# Patient Record
Sex: MALE | Race: WHITE | Employment: FULL TIME | URBAN - METROPOLITAN AREA
[De-identification: names, ages, dates, MRNs, and addresses within clinical notes are randomized per-mention and may not be internally consistent; named-entity substitution may affect disease eponyms.]

---

## 2017-07-09 LAB
AVERAGE GLUCOSE: 278
HBA1C MFR BLD: 11.3 %

## 2017-07-25 ENCOUNTER — OFFICE VISIT (OUTPATIENT)
Dept: INTERNAL MEDICINE | Age: 42
End: 2017-07-25
Payer: MEDICARE

## 2017-07-25 VITALS
HEART RATE: 88 BPM | BODY MASS INDEX: 36.58 KG/M2 | SYSTOLIC BLOOD PRESSURE: 116 MMHG | WEIGHT: 276 LBS | HEIGHT: 73 IN | DIASTOLIC BLOOD PRESSURE: 79 MMHG

## 2017-07-25 DIAGNOSIS — E11.42 POLYNEUROPATHY DUE TO TYPE 2 DIABETES MELLITUS (HCC): ICD-10-CM

## 2017-07-25 DIAGNOSIS — B18.2 CHRONIC HEPATITIS C WITHOUT HEPATIC COMA (HCC): ICD-10-CM

## 2017-07-25 DIAGNOSIS — E66.09 NON MORBID OBESITY DUE TO EXCESS CALORIES: ICD-10-CM

## 2017-07-25 DIAGNOSIS — H54.3 IMPAIRED VISION IN BOTH EYES: ICD-10-CM

## 2017-07-25 DIAGNOSIS — B35.1 ONYCHOMYCOSIS DUE TO DERMATOPHYTE: ICD-10-CM

## 2017-07-25 DIAGNOSIS — R35.89 POLYURIA: ICD-10-CM

## 2017-07-25 DIAGNOSIS — E11.9 NEW ONSET TYPE 2 DIABETES MELLITUS (HCC): Primary | ICD-10-CM

## 2017-07-25 DIAGNOSIS — Z11.4 SCREENING FOR HIV (HUMAN IMMUNODEFICIENCY VIRUS): ICD-10-CM

## 2017-07-25 PROCEDURE — 99214 OFFICE O/P EST MOD 30 MIN: CPT

## 2017-07-25 PROCEDURE — 99203 OFFICE O/P NEW LOW 30 MIN: CPT | Performed by: HOSPITALIST

## 2017-07-25 RX ORDER — TIZANIDINE 2 MG/1
TABLET ORAL
COMMUNITY
Start: 2017-06-08 | End: 2017-08-02 | Stop reason: ALTCHOICE

## 2017-07-25 RX ORDER — IBUPROFEN 800 MG/1
TABLET ORAL
COMMUNITY
Start: 2017-05-26 | End: 2017-08-02 | Stop reason: ALTCHOICE

## 2017-07-25 RX ORDER — GABAPENTIN 800 MG/1
TABLET ORAL
COMMUNITY
End: 2017-07-25 | Stop reason: SDUPTHER

## 2017-07-25 RX ORDER — GABAPENTIN 800 MG/1
TABLET ORAL
COMMUNITY
Start: 2017-06-25 | End: 2017-07-25 | Stop reason: SDUPTHER

## 2017-07-25 RX ORDER — GABAPENTIN 800 MG/1
800 TABLET ORAL 3 TIMES DAILY
Qty: 90 TABLET | Refills: 5 | Status: SHIPPED | OUTPATIENT
Start: 2017-07-25 | End: 2017-08-16

## 2017-07-25 RX ORDER — INSULIN HUMAN 100 [IU]/ML
INJECTION, SOLUTION PARENTERAL
COMMUNITY
Start: 2017-07-10 | End: 2017-07-25 | Stop reason: ALTCHOICE

## 2017-07-25 RX ORDER — IBUPROFEN 800 MG/1
800 TABLET ORAL
COMMUNITY
Start: 2017-05-20 | End: 2017-08-02 | Stop reason: SDUPTHER

## 2017-07-25 RX ORDER — FUROSEMIDE 20 MG/1
20 TABLET ORAL PRN
COMMUNITY
Start: 2017-05-10 | End: 2018-02-16 | Stop reason: SDUPTHER

## 2017-07-25 RX ORDER — INSULIN ASPART 100 [IU]/ML
INJECTION, SUSPENSION SUBCUTANEOUS
COMMUNITY
Start: 2017-07-05 | End: 2017-07-25 | Stop reason: ALTCHOICE

## 2017-07-25 RX ORDER — OMEPRAZOLE 20 MG/1
CAPSULE, DELAYED RELEASE ORAL
COMMUNITY
Start: 2017-07-05 | End: 2017-10-27 | Stop reason: SDUPTHER

## 2017-07-25 RX ORDER — SYRING-NEEDL,DISP,INSUL,0.3 ML 31 GX5/16"
SYRINGE, EMPTY DISPOSABLE MISCELLANEOUS
COMMUNITY
Start: 2017-07-10 | End: 2020-01-31

## 2017-07-25 ASSESSMENT — PATIENT HEALTH QUESTIONNAIRE - PHQ9
1. LITTLE INTEREST OR PLEASURE IN DOING THINGS: 0
2. FEELING DOWN, DEPRESSED OR HOPELESS: 0
SUM OF ALL RESPONSES TO PHQ9 QUESTIONS 1 & 2: 0
SUM OF ALL RESPONSES TO PHQ QUESTIONS 1-9: 0

## 2017-08-02 ENCOUNTER — OFFICE VISIT (OUTPATIENT)
Dept: INTERNAL MEDICINE | Age: 42
End: 2017-08-02
Payer: MEDICARE

## 2017-08-02 VITALS
OXYGEN SATURATION: 98 % | HEART RATE: 91 BPM | WEIGHT: 273 LBS | DIASTOLIC BLOOD PRESSURE: 77 MMHG | BODY MASS INDEX: 36.02 KG/M2 | RESPIRATION RATE: 18 BRPM | SYSTOLIC BLOOD PRESSURE: 121 MMHG

## 2017-08-02 DIAGNOSIS — L60.0 INGROWN TOENAIL: ICD-10-CM

## 2017-08-02 DIAGNOSIS — E11.9 NEW ONSET TYPE 2 DIABETES MELLITUS (HCC): Primary | ICD-10-CM

## 2017-08-02 LAB — HBA1C MFR BLD: 9.5 %

## 2017-08-02 PROCEDURE — 99213 OFFICE O/P EST LOW 20 MIN: CPT | Performed by: INTERNAL MEDICINE

## 2017-08-02 PROCEDURE — 83036 HEMOGLOBIN GLYCOSYLATED A1C: CPT | Performed by: INTERNAL MEDICINE

## 2017-08-02 ASSESSMENT — ENCOUNTER SYMPTOMS
RESPIRATORY NEGATIVE: 1
EYES NEGATIVE: 1
GASTROINTESTINAL NEGATIVE: 1
ALLERGIC/IMMUNOLOGIC NEGATIVE: 1

## 2017-08-09 ENCOUNTER — OFFICE VISIT (OUTPATIENT)
Dept: INTERNAL MEDICINE | Age: 42
End: 2017-08-09
Payer: MEDICARE

## 2017-08-09 ENCOUNTER — HOSPITAL ENCOUNTER (OUTPATIENT)
Age: 42
Setting detail: SPECIMEN
Discharge: HOME | End: 2017-08-09
Payer: MEDICARE

## 2017-08-09 ENCOUNTER — OFFICE VISIT (OUTPATIENT)
Dept: PODIATRY | Age: 42
End: 2017-08-09
Payer: MEDICARE

## 2017-08-09 ENCOUNTER — TELEPHONE (OUTPATIENT)
Dept: PODIATRY | Age: 42
End: 2017-08-09

## 2017-08-09 VITALS
WEIGHT: 274 LBS | BODY MASS INDEX: 36.31 KG/M2 | DIASTOLIC BLOOD PRESSURE: 86 MMHG | HEIGHT: 73 IN | HEART RATE: 89 BPM | SYSTOLIC BLOOD PRESSURE: 130 MMHG

## 2017-08-09 VITALS — HEIGHT: 73 IN | BODY MASS INDEX: 35.78 KG/M2 | WEIGHT: 270 LBS

## 2017-08-09 DIAGNOSIS — E11.40 UNCONTROLLED TYPE 2 DIABETES MELLITUS WITH DIABETIC NEUROPATHY, UNSPECIFIED LONG TERM INSULIN USE STATUS: ICD-10-CM

## 2017-08-09 DIAGNOSIS — F17.200 SMOKING: ICD-10-CM

## 2017-08-09 DIAGNOSIS — Z79.4 TYPE 2 DIABETES MELLITUS WITHOUT COMPLICATION, WITH LONG-TERM CURRENT USE OF INSULIN (HCC): ICD-10-CM

## 2017-08-09 DIAGNOSIS — M79.674 PAIN DUE TO ONYCHOMYCOSIS OF TOENAILS OF BOTH FEET: ICD-10-CM

## 2017-08-09 DIAGNOSIS — Z01.818 PRE-OPERATIVE EXAM: Primary | ICD-10-CM

## 2017-08-09 DIAGNOSIS — N30.00 ACUTE CYSTITIS WITHOUT HEMATURIA: Primary | ICD-10-CM

## 2017-08-09 DIAGNOSIS — B35.1 ONYCHOMYCOSIS: ICD-10-CM

## 2017-08-09 DIAGNOSIS — M79.675 PAIN DUE TO ONYCHOMYCOSIS OF TOENAILS OF BOTH FEET: ICD-10-CM

## 2017-08-09 DIAGNOSIS — E11.9 TYPE 2 DIABETES MELLITUS WITHOUT COMPLICATION, WITH LONG-TERM CURRENT USE OF INSULIN (HCC): ICD-10-CM

## 2017-08-09 DIAGNOSIS — J44.9 CHRONIC OBSTRUCTIVE PULMONARY DISEASE, UNSPECIFIED COPD TYPE (HCC): ICD-10-CM

## 2017-08-09 DIAGNOSIS — B35.1 PAIN DUE TO ONYCHOMYCOSIS OF TOENAILS OF BOTH FEET: ICD-10-CM

## 2017-08-09 DIAGNOSIS — E11.65 UNCONTROLLED TYPE 2 DIABETES MELLITUS WITH DIABETIC NEUROPATHY, UNSPECIFIED LONG TERM INSULIN USE STATUS: ICD-10-CM

## 2017-08-09 DIAGNOSIS — L60.0 OC (ONYCHOCRYPTOSIS): ICD-10-CM

## 2017-08-09 DIAGNOSIS — M72.2 PLANTAR FASCIITIS OF RIGHT FOOT: ICD-10-CM

## 2017-08-09 LAB
CREATININE URINE: 215.9 MG/DL (ref 39–259)
MICROALBUMIN/CREAT 24H UR: <12 MG/L
MICROALBUMIN/CREAT UR-RTO: 6 MCG/MG CREAT

## 2017-08-09 PROCEDURE — 99214 OFFICE O/P EST MOD 30 MIN: CPT | Performed by: INTERNAL MEDICINE

## 2017-08-09 PROCEDURE — 11765 WEDGE EXCISION SKN NAIL FOLD: CPT | Performed by: PODIATRIST

## 2017-08-09 PROCEDURE — 11721 DEBRIDE NAIL 6 OR MORE: CPT | Performed by: PODIATRIST

## 2017-08-09 PROCEDURE — 99203 OFFICE O/P NEW LOW 30 MIN: CPT | Performed by: PODIATRIST

## 2017-08-09 PROCEDURE — 81003 URINALYSIS AUTO W/O SCOPE: CPT | Performed by: INTERNAL MEDICINE

## 2017-08-09 RX ORDER — TAMSULOSIN HYDROCHLORIDE 0.4 MG/1
0.4 CAPSULE ORAL DAILY
Qty: 30 CAPSULE | Refills: 3 | Status: SHIPPED | OUTPATIENT
Start: 2017-08-09 | End: 2017-08-16

## 2017-08-09 RX ORDER — ASPIRIN 81 MG/1
81 TABLET ORAL DAILY
Qty: 30 TABLET | Refills: 3 | Status: SHIPPED | OUTPATIENT
Start: 2017-08-09 | End: 2017-12-22 | Stop reason: SDUPTHER

## 2017-08-09 RX ORDER — CEPHALEXIN 500 MG/1
500 CAPSULE ORAL 2 TIMES DAILY
Qty: 20 CAPSULE | Refills: 0 | Status: SHIPPED | OUTPATIENT
Start: 2017-08-09 | End: 2017-09-21 | Stop reason: ALTCHOICE

## 2017-08-09 RX ORDER — ATORVASTATIN CALCIUM 40 MG/1
40 TABLET, FILM COATED ORAL DAILY
Qty: 30 TABLET | Refills: 3 | Status: SHIPPED | OUTPATIENT
Start: 2017-08-09 | End: 2018-01-16 | Stop reason: SDUPTHER

## 2017-08-15 ENCOUNTER — TELEPHONE (OUTPATIENT)
Dept: INTERNAL MEDICINE | Age: 42
End: 2017-08-15

## 2017-08-16 ENCOUNTER — OFFICE VISIT (OUTPATIENT)
Dept: INTERNAL MEDICINE | Age: 42
End: 2017-08-16
Payer: MEDICARE

## 2017-08-16 VITALS
DIASTOLIC BLOOD PRESSURE: 82 MMHG | WEIGHT: 270.4 LBS | HEART RATE: 94 BPM | SYSTOLIC BLOOD PRESSURE: 128 MMHG | BODY MASS INDEX: 35.84 KG/M2 | HEIGHT: 73 IN

## 2017-08-16 DIAGNOSIS — E11.9 TYPE 2 DIABETES MELLITUS WITHOUT COMPLICATION, WITH LONG-TERM CURRENT USE OF INSULIN (HCC): Primary | ICD-10-CM

## 2017-08-16 DIAGNOSIS — G63 POLYNEUROPATHY ASSOCIATED WITH UNDERLYING DISEASE (HCC): ICD-10-CM

## 2017-08-16 DIAGNOSIS — Z79.4 TYPE 2 DIABETES MELLITUS WITHOUT COMPLICATION, WITH LONG-TERM CURRENT USE OF INSULIN (HCC): Primary | ICD-10-CM

## 2017-08-16 PROCEDURE — 99214 OFFICE O/P EST MOD 30 MIN: CPT | Performed by: INTERNAL MEDICINE

## 2017-08-16 RX ORDER — PREGABALIN 75 MG/1
75 CAPSULE ORAL 3 TIMES DAILY
Qty: 60 CAPSULE | Refills: 3 | Status: SHIPPED | OUTPATIENT
Start: 2017-08-16 | End: 2017-10-23 | Stop reason: ALTCHOICE

## 2017-08-16 ASSESSMENT — ENCOUNTER SYMPTOMS
ALLERGIC/IMMUNOLOGIC NEGATIVE: 1
RESPIRATORY NEGATIVE: 1
ABDOMINAL PAIN: 1

## 2017-09-21 ENCOUNTER — HOSPITAL ENCOUNTER (OUTPATIENT)
Age: 42
Setting detail: SPECIMEN
Discharge: HOME | End: 2017-09-21
Payer: MEDICARE

## 2017-09-21 ENCOUNTER — OFFICE VISIT (OUTPATIENT)
Dept: INTERNAL MEDICINE | Age: 42
End: 2017-09-21
Payer: MEDICARE

## 2017-09-21 VITALS
WEIGHT: 275 LBS | HEIGHT: 73 IN | SYSTOLIC BLOOD PRESSURE: 120 MMHG | TEMPERATURE: 97.8 F | DIASTOLIC BLOOD PRESSURE: 70 MMHG | HEART RATE: 80 BPM | BODY MASS INDEX: 36.45 KG/M2

## 2017-09-21 DIAGNOSIS — Z23 NEED FOR IMMUNIZATION AGAINST INFLUENZA: ICD-10-CM

## 2017-09-21 DIAGNOSIS — Z23 NEED FOR PROPHYLACTIC VACCINATION AGAINST STREPTOCOCCUS PNEUMONIAE (PNEUMOCOCCUS): ICD-10-CM

## 2017-09-21 DIAGNOSIS — L73.9 FOLLICULITIS: Primary | ICD-10-CM

## 2017-09-21 DIAGNOSIS — E11.42 TYPE 2 DIABETES MELLITUS WITH DIABETIC POLYNEUROPATHY, WITHOUT LONG-TERM CURRENT USE OF INSULIN (HCC): ICD-10-CM

## 2017-09-21 PROCEDURE — 90472 IMMUNIZATION ADMIN EACH ADD: CPT | Performed by: INTERNAL MEDICINE

## 2017-09-21 PROCEDURE — 99213 OFFICE O/P EST LOW 20 MIN: CPT | Performed by: INTERNAL MEDICINE

## 2017-09-21 PROCEDURE — 90688 IIV4 VACCINE SPLT 0.5 ML IM: CPT | Performed by: INTERNAL MEDICINE

## 2017-09-21 PROCEDURE — 90732 PPSV23 VACC 2 YRS+ SUBQ/IM: CPT | Performed by: INTERNAL MEDICINE

## 2017-09-21 PROCEDURE — 90471 IMMUNIZATION ADMIN: CPT | Performed by: INTERNAL MEDICINE

## 2017-09-21 RX ORDER — GABAPENTIN 800 MG/1
800 TABLET ORAL 3 TIMES DAILY
COMMUNITY
Start: 2017-09-15 | End: 2018-01-23 | Stop reason: SDUPTHER

## 2017-09-21 ASSESSMENT — ENCOUNTER SYMPTOMS
COUGH: 0
VOMITING: 0
SHORTNESS OF BREATH: 0
ABDOMINAL PAIN: 0
BLURRED VISION: 0

## 2017-10-11 ENCOUNTER — HOSPITAL ENCOUNTER (OUTPATIENT)
Age: 42
Discharge: HOME | End: 2017-10-11
Payer: MEDICARE

## 2017-10-11 ENCOUNTER — OFFICE VISIT (OUTPATIENT)
Dept: PODIATRY | Age: 42
End: 2017-10-11
Payer: MEDICARE

## 2017-10-11 VITALS
BODY MASS INDEX: 37.11 KG/M2 | DIASTOLIC BLOOD PRESSURE: 75 MMHG | HEART RATE: 81 BPM | SYSTOLIC BLOOD PRESSURE: 114 MMHG | WEIGHT: 280 LBS | HEIGHT: 73 IN

## 2017-10-11 DIAGNOSIS — E11.40 UNCONTROLLED TYPE 2 DIABETES MELLITUS WITH DIABETIC NEUROPATHY, UNSPECIFIED LONG TERM INSULIN USE STATUS: Primary | ICD-10-CM

## 2017-10-11 DIAGNOSIS — E11.9 NEW ONSET TYPE 2 DIABETES MELLITUS (HCC): ICD-10-CM

## 2017-10-11 DIAGNOSIS — M79.671 PAIN IN RIGHT FOOT: ICD-10-CM

## 2017-10-11 DIAGNOSIS — E11.65 UNCONTROLLED TYPE 2 DIABETES MELLITUS WITH DIABETIC NEUROPATHY, UNSPECIFIED LONG TERM INSULIN USE STATUS: Primary | ICD-10-CM

## 2017-10-11 DIAGNOSIS — F17.200 SMOKING: ICD-10-CM

## 2017-10-11 DIAGNOSIS — Z11.4 SCREENING FOR HIV (HUMAN IMMUNODEFICIENCY VIRUS): ICD-10-CM

## 2017-10-11 DIAGNOSIS — B35.1 ONYCHOMYCOSIS: ICD-10-CM

## 2017-10-11 DIAGNOSIS — B18.2 CHRONIC HEPATITIS C WITHOUT HEPATIC COMA (HCC): ICD-10-CM

## 2017-10-11 LAB
ABSOLUTE EOS #: 0.2 K/UL (ref 0–0.4)
ABSOLUTE LYMPH #: 1.6 K/UL (ref 1–4.8)
ABSOLUTE MONO #: 0.8 K/UL (ref 0.1–1.2)
ALBUMIN SERPL-MCNC: 3.3 G/DL (ref 3.5–5.2)
ALBUMIN/GLOBULIN RATIO: 0.9 (ref 1–2.5)
ALP BLD-CCNC: 153 U/L (ref 40–129)
ALT SERPL-CCNC: 298 U/L (ref 5–41)
ANION GAP SERPL CALCULATED.3IONS-SCNC: 17 MMOL/L (ref 9–17)
AST SERPL-CCNC: 245 U/L
BASOPHILS # BLD: 1 %
BASOPHILS ABSOLUTE: 0.1 K/UL (ref 0–0.2)
BILIRUB SERPL-MCNC: 0.82 MG/DL (ref 0.3–1.2)
BILIRUBIN DIRECT: 0.22 MG/DL
BILIRUBIN, INDIRECT: 0.6 MG/DL (ref 0–1)
BUN BLDV-MCNC: 14 MG/DL (ref 6–20)
BUN/CREAT BLD: ABNORMAL (ref 9–20)
CALCIUM SERPL-MCNC: 8.8 MG/DL (ref 8.6–10.4)
CHLORIDE BLD-SCNC: 102 MMOL/L (ref 98–107)
CHOLESTEROL/HDL RATIO: 7.4
CHOLESTEROL: 125 MG/DL
CO2: 20 MMOL/L (ref 20–31)
CREAT SERPL-MCNC: 0.92 MG/DL (ref 0.7–1.2)
DIFFERENTIAL TYPE: ABNORMAL
EOSINOPHILS RELATIVE PERCENT: 2 %
GFR AFRICAN AMERICAN: >60 ML/MIN
GFR NON-AFRICAN AMERICAN: >60 ML/MIN
GFR SERPL CREATININE-BSD FRML MDRD: ABNORMAL ML/MIN/{1.73_M2}
GFR SERPL CREATININE-BSD FRML MDRD: ABNORMAL ML/MIN/{1.73_M2}
GLOBULIN: ABNORMAL G/DL (ref 1.5–3.8)
GLUCOSE BLD-MCNC: 124 MG/DL (ref 70–99)
HCT VFR BLD CALC: 46.5 % (ref 41–53)
HDLC SERPL-MCNC: 17 MG/DL
HEMOGLOBIN: 15.5 G/DL (ref 13.5–17.5)
HIV AG/AB: NONREACTIVE
INR BLD: 1.1
LDL CHOLESTEROL: 79 MG/DL (ref 0–130)
LYMPHOCYTES # BLD: 18 %
MCH RBC QN AUTO: 30.3 PG (ref 26–34)
MCHC RBC AUTO-ENTMCNC: 33.4 G/DL (ref 31–37)
MCV RBC AUTO: 90.7 FL (ref 80–100)
MONOCYTES # BLD: 9 %
PDW BLD-RTO: 15.7 % (ref 12.5–15.4)
PLATELET # BLD: 173 K/UL (ref 140–450)
PLATELET ESTIMATE: ABNORMAL
PMV BLD AUTO: 10.1 FL (ref 6–12)
POTASSIUM SERPL-SCNC: 5 MMOL/L (ref 3.7–5.3)
PROTHROMBIN TIME: 12.3 SEC (ref 9.4–12.6)
RBC # BLD: 5.13 M/UL (ref 4.5–5.9)
RBC # BLD: ABNORMAL 10*6/UL
SEG NEUTROPHILS: 70 %
SEGMENTED NEUTROPHILS ABSOLUTE COUNT: 6.3 K/UL (ref 1.8–7.7)
SODIUM BLD-SCNC: 139 MMOL/L (ref 135–144)
T3 FREE: 3.37 PG/ML (ref 2.02–4.43)
THYROXINE, FREE: 1.47 NG/DL (ref 0.93–1.7)
TOTAL PROTEIN: 6.9 G/DL (ref 6.4–8.3)
TRIGL SERPL-MCNC: 145 MG/DL
TSH SERPL DL<=0.05 MIU/L-ACNC: 1.78 MIU/L (ref 0.3–5)
VLDLC SERPL CALC-MCNC: ABNORMAL MG/DL (ref 1–30)
WBC # BLD: 8.9 K/UL (ref 3.5–11)
WBC # BLD: ABNORMAL 10*3/UL

## 2017-10-11 PROCEDURE — 99213 OFFICE O/P EST LOW 20 MIN: CPT | Performed by: STUDENT IN AN ORGANIZED HEALTH CARE EDUCATION/TRAINING PROGRAM

## 2017-10-11 PROCEDURE — 84439 ASSAY OF FREE THYROXINE: CPT

## 2017-10-11 PROCEDURE — 36415 COLL VENOUS BLD VENIPUNCTURE: CPT

## 2017-10-11 PROCEDURE — 80061 LIPID PANEL: CPT

## 2017-10-11 PROCEDURE — 80076 HEPATIC FUNCTION PANEL: CPT

## 2017-10-11 PROCEDURE — 84443 ASSAY THYROID STIM HORMONE: CPT

## 2017-10-11 PROCEDURE — 85025 COMPLETE CBC W/AUTO DIFF WBC: CPT

## 2017-10-11 PROCEDURE — 80048 BASIC METABOLIC PNL TOTAL CA: CPT

## 2017-10-11 PROCEDURE — 85610 PROTHROMBIN TIME: CPT

## 2017-10-11 PROCEDURE — 87389 HIV-1 AG W/HIV-1&-2 AB AG IA: CPT

## 2017-10-11 PROCEDURE — 84481 FREE ASSAY (FT-3): CPT

## 2017-10-11 NOTE — PROGRESS NOTES
injection pen Inject 12 Units into the skin nightly 7/25/17  Yes Tanja Hussein MD   albuterol sulfate HFA (PROVENTIL HFA) 108 (90 BASE) MCG/ACT inhaler Inhale 2 puffs into the lungs every 4 hours as needed for Wheezing or Shortness of Breath (Space out to every 6 hours as symptoms improve) Space out to every 6 hours as symptoms improve. 10/20/16  Yes Emily Mancia MD   tiotropium (SPIRIVA) 18 MCG inhalation capsule Inhale 1 capsule into the lungs daily 10/13/16  Yes Vicki Jacobson,    METHADONE HCL PO Take  by mouth.    Yes Historical Provider, MD   NOVOFINE PLUS 32G X 4 MM MISC  7/6/17   Historical Provider, MD Judit Galindo 44 0.3ML/31G 31G X 5/16\" 0.3 ML MISC  7/10/17   Historical Provider, MD Sandhu [facility] [address] [facility]  7/6/17   Historical Provider, MD   ONE TOUCH ULTRA TEST strip 1 each by Other route 4 times daily (before meals and nightly) 7/25/17   Tanja Hussein MD   Insulin Pen Needle 31G X 5 MM MISC 1 each by Does not apply route 4 times daily (before meals and nightly) 7/25/17   Tanja Hussein MD   insulin aspart (NOVOLOG FLEXPEN) 100 UNIT/ML injection pen Inject 5 Units into the skin 3 times daily (before meals) 7/25/17   Tanja Hussein MD     Scheduled Meds:  Continuous Infusions:  PRN Meds:    Allergies   Allergen Reactions    Flexeril [Cyclobenzaprine]     Klonopin [Clonazepam]     Morphine Hives    Quetiapine        Family History   Problem Relation Age of Onset    Substance Abuse Mother     Depression Mother     Substance Abuse Father     Depression Sister     Substance Abuse Brother     Substance Abuse Maternal Aunt     Substance Abuse Maternal Uncle     Substance Abuse Paternal Aunt     Substance Abuse Paternal Uncle     Substance Abuse Maternal Grandmother     Depression Maternal Grandmother        Social History   Substance Use Topics    Smoking status: Current Every Day Smoker     Packs/day: 1.00     Years: 25.00    Smokeless tobacco: Never Used    Alcohol use No       Review of Systems: All 12 systems reviewed and pertinent positives noted above. Lower Extremity Physical Examination:     General: AAO x 3 in NAD. Vascular: DP and PT pulses palpable 2/4, Bilateral.  CFT <5 seconds, Bilateral.  Hair growth absent to the level of the digits, Bilateral.  Edema, erythema and varicosities absent, Bilateral.      Neurological: Sensation intact to light touch to level of digits, Bilateral.  Protective sensation intact 2/10 sites via 5.07/10g Gary-Aisha Monofilament, Bilateral.  Negative Tinel's and Valleix sign, Bilateral.      Musculoskeletal: Muscle strength 5/5, Bilateral.  Pain present to palpation medial and lateral borders Right hallux and plantar-medial calcaneal tubercle on Right. No pain present to palpation sinus tarsi, Bilateral, as well as plantar-medial calcaneal tubercle, Left. Medial longitudinal arch within normal limits, Bilateral.  Ankle ROM decreased, Bilateral.  1st MPJ ROM decreased, Bilateral.  Dorsally contracted digits present 2-5, Bilateral.     Integument: Warm, dry, supple, Bilateral.  Medial and lateral incurvation hallOpen lesion absent, Bilateral. Interdigital maceration absent to web spaces 1-4, Bilateral.  Fissures absent, Bilateral.     Biomechanical Exam:   Right foot: 1st MPJ: unloaded 55deg df, 10deg pf; loaded 45deg df          Right foot: 1st Ray: 2mm df, 2mm pf          Right foot: Ankle DF knee extended: 2deg df    Right foot: Ankle DF knee flexed: 4deg df    Left foot: 1st MPJ: unloaded 55deg df, 5deg pf; loaded 45deg df      Left foot: 1st Ray: 2mm df, 2mm pf       Left foot: Ankle DF knee extended: 2deg df    Left foot: Ankle DF knee flexed: 4deg df      Gait analysis: Propulsive, supination to mid-stance, no HAV or HT    Asessment: Patient is a 40 y.o. male with:   1.  Uncontrolled type 2 diabetes mellitus with diabetic neuropathy, unspecified long term insulin use status (HCC)  VL Lower Extremity Arterial Segmental Pressures W Ppg   2. Smoking  VL Lower Extremity Arterial Segmental Pressures W Ppg   3. Onychomycosis     4. Pain in right foot  VL Lower Extremity Arterial Segmental Pressures W Ppg       Plan:   Patient examined and evaluated. Discussed with patient total permanent nail avulsion Right hallux. Ordered PVR PPG again  Smoking cessation education. RTC 1 month and will perform PNA pending good blood flow and improved DM control. Discussed with Dr. Ava Crockett.     Electronically signed by Princess Berry DPM on 10/11/2017 at 3:43 PM

## 2017-10-12 DIAGNOSIS — R79.89 ABNORMAL LFTS: Primary | ICD-10-CM

## 2017-10-23 ENCOUNTER — OFFICE VISIT (OUTPATIENT)
Dept: INTERNAL MEDICINE | Age: 42
End: 2017-10-23
Payer: MEDICARE

## 2017-10-23 ENCOUNTER — HOSPITAL ENCOUNTER (OUTPATIENT)
Age: 42
Discharge: HOME | End: 2017-10-23
Payer: MEDICARE

## 2017-10-23 VITALS
HEART RATE: 91 BPM | DIASTOLIC BLOOD PRESSURE: 79 MMHG | WEIGHT: 271.6 LBS | BODY MASS INDEX: 36 KG/M2 | HEIGHT: 73 IN | SYSTOLIC BLOOD PRESSURE: 139 MMHG

## 2017-10-23 DIAGNOSIS — K21.9 GASTROESOPHAGEAL REFLUX DISEASE WITHOUT ESOPHAGITIS: ICD-10-CM

## 2017-10-23 DIAGNOSIS — K21.9 GASTROESOPHAGEAL REFLUX DISEASE WITHOUT ESOPHAGITIS: Primary | ICD-10-CM

## 2017-10-23 PROCEDURE — 4004F PT TOBACCO SCREEN RCVD TLK: CPT | Performed by: INTERNAL MEDICINE

## 2017-10-23 PROCEDURE — 83013 H PYLORI (C-13) BREATH: CPT

## 2017-10-23 PROCEDURE — 83014 H PYLORI DRUG ADMIN: CPT

## 2017-10-23 PROCEDURE — G8417 CALC BMI ABV UP PARAM F/U: HCPCS | Performed by: INTERNAL MEDICINE

## 2017-10-23 PROCEDURE — G8427 DOCREV CUR MEDS BY ELIG CLIN: HCPCS | Performed by: INTERNAL MEDICINE

## 2017-10-23 PROCEDURE — 99213 OFFICE O/P EST LOW 20 MIN: CPT | Performed by: INTERNAL MEDICINE

## 2017-10-23 PROCEDURE — G8484 FLU IMMUNIZE NO ADMIN: HCPCS | Performed by: INTERNAL MEDICINE

## 2017-10-23 ASSESSMENT — ENCOUNTER SYMPTOMS
NAUSEA: 0
BLURRED VISION: 0
ABDOMINAL PAIN: 0
HEMOPTYSIS: 0
COUGH: 0
HEARTBURN: 0
DOUBLE VISION: 0

## 2017-10-23 NOTE — PROGRESS NOTES
Visit Information    Have you changed or started any medications since your last visit including any over-the-counter medicines, vitamins, or herbal medicines? no   Have you stopped taking any of your medications? Is so, why? -  no  Are you having any side effects from any of your medications? - no    Have you seen any other physician or provider since your last visit?  no   Have you had any other diagnostic tests since your last visit? yes - labs,    Have you been seen in the emergency room and/or had an admission in a hospital since we last saw you?  no   Have you had your routine dental cleaning in the past 6 months?  no     Do you have an active MyChart account? If no, what is the barrier?   Yes    Patient Care Team:  Ioana Dawkins MD as PCP - General (Internal Medicine)    Medical History Review  Past Medical, Family, and Social History reviewed and does contribute to the patient presenting condition    Health Maintenance   Topic Date Due    Diabetic foot exam  02/18/1986    Diabetic hemoglobin A1C test  11/02/2017    Diabetic retinal exam  08/09/2018 (Originally 2/18/1986)    Diabetic microalbuminuria test  08/09/2018    Lipid screen  10/11/2018    DTaP/Tdap/Td vaccine (2 - Td) 10/08/2022    Flu vaccine  Completed    Pneumococcal med risk  Completed    HIV screen  Completed

## 2017-10-23 NOTE — PROGRESS NOTES
[facility]  [facility] [address]  [place] [address] [facility] [address]  Dept: [phone]  Dept Fax: [phone]    Office Progress/Follow Up Note  Date of patient's visit: 10/23/2017  Patient's Name:  Juan Luis Fonseca YOB: 1975            Patient Care Team:  Irving Kellogg MD as PCP - General (Internal Medicine)  ================================================================    REASON FOR VISIT/CHIEF COMPLAINT:  Nausea & Vomiting (sx started 10/18, burping \"sulfur\", very gassy, no known fever )    HISTORY OF PRESENTING ILLNESS:  History was obtained from: patient. Juan Luis Fonseca is a 40 y.o. is here for a same-day visit with a complain of nausea associated with burping and epigastric pain. Patient reported as symptoms comes and go. He reports when he burps smell like sulfa. He also reports that he feel gassy. Patient has history of GERD for which he is on omeprazole. He has not been screened for H. pylori.        Patient Active Problem List   Diagnosis    Long-term current use of methadone for opiate dependence ([facility] [address]    Tobacco abuse    Type 2 diabetes mellitus without complication, with long-term current use of insulin ([facility] [address]       Health Maintenance Due   Topic Date Due    Diabetic foot exam  02/18/1986    Diabetic hemoglobin A1C test  11/02/2017       Allergies   Allergen Reactions    Flexeril [Cyclobenzaprine]     Klonopin [Clonazepam]     Morphine Hives    Quetiapine          Current Outpatient Prescriptions   Medication Sig Dispense Refill    gabapentin (NEURONTIN) 800 MG tablet Take 800 mg by mouth 3 times daily      aspirin EC 81 MG EC tablet Take 1 tablet by mouth daily 30 tablet 3    atorvastatin (LIPITOR) 40 MG tablet Take 1 tablet by mouth daily 30 tablet 3    metFORMIN (GLUCOPHAGE) 500 MG tablet Take 1 tablet by mouth 2 times daily (with meals) 60 tablet 3    furosemide (LASIX) 20 MG tablet Take 20 mg by mouth as needed       RELION INSULIN SYR 0.3ML/31G 31G X 5/16\" 0.3 ML MISC       ONETOUCH DELICA LANCETS FINE MISC       nystatin-triamcinolone (MYCOLOG II) 653479-0.1 UNIT/GM-% cream       omeprazole (PRILOSEC) 20 MG delayed release capsule       ONE TOUCH ULTRA TEST strip 1 each by Other route 4 times daily (before meals and nightly) 100 each 5    albuterol sulfate HFA (PROVENTIL HFA) 108 (90 BASE) MCG/ACT inhaler Inhale 2 puffs into the lungs every 4 hours as needed for Wheezing or Shortness of Breath (Space out to every 6 hours as symptoms improve) Space out to every 6 hours as symptoms improve. 1 Inhaler 0    tiotropium (SPIRIVA) 18 MCG inhalation capsule Inhale 1 capsule into the lungs daily 30 capsule 1    METHADONE HCL PO Take  by mouth. No current facility-administered medications for this visit. Social History   Substance Use Topics    Smoking status: Current Every Day Smoker     Packs/day: 1.00     Years: 25.00    Smokeless tobacco: Never Used    Alcohol use No       Family History   Problem Relation Age of Onset    Substance Abuse Mother     Depression Mother     Substance Abuse Father     Depression Sister     Substance Abuse Brother     Substance Abuse Maternal Aunt     Substance Abuse Maternal Uncle     Substance Abuse Paternal Aunt     Substance Abuse Paternal Uncle     Substance Abuse Maternal Grandmother     Depression Maternal Grandmother         REVIEW OF SYSTEMS:  Review of Systems   Constitutional: Negative for chills and fever. HENT: Negative for congestion, ear discharge and nosebleeds. Eyes: Negative for blurred vision and double vision. Respiratory: Negative for cough and hemoptysis. Cardiovascular: Negative for chest pain and palpitations. Gastrointestinal: Negative for abdominal pain, heartburn and nausea. Genitourinary: Negative for dysuria and urgency. Musculoskeletal: Negative for myalgias and neck pain. Neurological: Negative for dizziness and headaches.

## 2017-10-25 LAB — H PYLORI BREATH TEST: POSITIVE

## 2017-10-27 ENCOUNTER — OFFICE VISIT (OUTPATIENT)
Dept: INTERNAL MEDICINE | Age: 42
End: 2017-10-27
Payer: MEDICARE

## 2017-10-27 VITALS
BODY MASS INDEX: 35.65 KG/M2 | HEART RATE: 105 BPM | HEIGHT: 73 IN | SYSTOLIC BLOOD PRESSURE: 129 MMHG | WEIGHT: 269 LBS | DIASTOLIC BLOOD PRESSURE: 72 MMHG

## 2017-10-27 DIAGNOSIS — A04.8 H. PYLORI INFECTION: ICD-10-CM

## 2017-10-27 DIAGNOSIS — E11.9 TYPE 2 DIABETES MELLITUS WITHOUT COMPLICATION, WITH LONG-TERM CURRENT USE OF INSULIN (HCC): ICD-10-CM

## 2017-10-27 DIAGNOSIS — E11.42 TYPE 2 DIABETES MELLITUS WITH DIABETIC POLYNEUROPATHY, WITHOUT LONG-TERM CURRENT USE OF INSULIN (HCC): Primary | ICD-10-CM

## 2017-10-27 DIAGNOSIS — Z79.4 TYPE 2 DIABETES MELLITUS WITHOUT COMPLICATION, WITH LONG-TERM CURRENT USE OF INSULIN (HCC): ICD-10-CM

## 2017-10-27 DIAGNOSIS — B18.2 CHRONIC HEPATITIS C WITH HEPATIC COMA (HCC): ICD-10-CM

## 2017-10-27 DIAGNOSIS — L82.1 SEBORRHEIC KERATOSES: ICD-10-CM

## 2017-10-27 PROCEDURE — 3046F HEMOGLOBIN A1C LEVEL >9.0%: CPT | Performed by: HOSPITALIST

## 2017-10-27 PROCEDURE — 99213 OFFICE O/P EST LOW 20 MIN: CPT | Performed by: HOSPITALIST

## 2017-10-27 PROCEDURE — 4004F PT TOBACCO SCREEN RCVD TLK: CPT | Performed by: HOSPITALIST

## 2017-10-27 PROCEDURE — G8427 DOCREV CUR MEDS BY ELIG CLIN: HCPCS | Performed by: HOSPITALIST

## 2017-10-27 PROCEDURE — G8484 FLU IMMUNIZE NO ADMIN: HCPCS | Performed by: HOSPITALIST

## 2017-10-27 PROCEDURE — G8417 CALC BMI ABV UP PARAM F/U: HCPCS | Performed by: HOSPITALIST

## 2017-10-27 RX ORDER — METRONIDAZOLE 250 MG/1
250 TABLET ORAL 4 TIMES DAILY
Qty: 56 TABLET | Refills: 0 | Status: SHIPPED | OUTPATIENT
Start: 2017-10-27 | End: 2017-11-10

## 2017-10-27 RX ORDER — OMEPRAZOLE 20 MG/1
20 CAPSULE, DELAYED RELEASE ORAL 2 TIMES DAILY
Qty: 60 CAPSULE | Refills: 3 | Status: SHIPPED | OUTPATIENT
Start: 2017-10-27 | End: 2018-01-16 | Stop reason: SDUPTHER

## 2017-10-27 RX ORDER — TETRACYCLINE HYDROCHLORIDE 500 MG/1
500 CAPSULE ORAL 4 TIMES DAILY
Qty: 56 CAPSULE | Refills: 0 | Status: SHIPPED | OUTPATIENT
Start: 2017-10-27 | End: 2017-11-10

## 2017-10-27 ASSESSMENT — ENCOUNTER SYMPTOMS
DIARRHEA: 0
CONSTIPATION: 0
ABDOMINAL PAIN: 0
HEARTBURN: 1
EYES NEGATIVE: 1
BLOOD IN STOOL: 0
NAUSEA: 1
RESPIRATORY NEGATIVE: 1
ORTHOPNEA: 0
VOMITING: 1

## 2017-10-27 NOTE — PROGRESS NOTES
Diabetic visit information    BP Readings from Last 3 Encounters:   10/23/17 139/79   10/11/17 114/75   09/21/17 120/70       Hemoglobin A1C (%)   Date Value   08/02/2017 9.5   07/09/2017 11.3     Microalb/Crt. Ratio (mcg/mg creat)   Date Value   08/09/2017 6     LDL Cholesterol (mg/dL)   Date Value   10/11/2017 79               Have you changed or started any medications since your last visit including any over-the-counter medicines, vitamins, or herbal medicines? no   Have you stopped taking any of your medications? Is so, why? -  no  Are you having any side effects from any of your medications? - no    Have you seen any other physician or provider since your last visit?  no   Have you had any other diagnostic tests since your last visit? Labs   Have you been seen in the emergency room and/or had an admission in a hospital since we last saw you?  no   Have you had your annual diabetic retinal (eye) exam? no   Have you had your routine dental cleaning in the past 6 months? no    Do you have an active [facility] account? If not, what are your barriers? No:     Patient Care Team:  Melissa Mcdermott MD as PCP - General (Internal Medicine)    Medical history Review  Past Medical, Family, and Social History reviewed and does not contribute to the patient presenting condition.     Health Maintenance   Topic Date Due    Diabetic foot exam  02/18/1986    Diabetic hemoglobin A1C test  11/02/2017    Diabetic retinal exam  08/09/2018 (Originally 2/18/1986)    Diabetic microalbuminuria test  08/09/2018    Lipid screen  10/11/2018    DTaP/Tdap/Td vaccine (2 - Td) 10/08/2022    Flu vaccine  Completed    Pneumococcal med risk  Completed    HIV screen  Completed

## 2017-10-27 NOTE — PROGRESS NOTES
[facility]  [facility] [address] [facility]  [place] [address] [facility] [address]  Dept: [phone]  Dept Fax: [phone]    Office Progress/Follow Up Note  Date of patient's visit: 10/27/2017  Patient's Name:  Andrew Burrell YOB: 1975            Patient Care Team:  Cisco Begum MD as PCP - General (Internal Medicine)  ================================================================    REASON FOR VISIT/CHIEF COMPLAINT:  Diabetes (FOLLOW UP ) and Discuss Labs (results)    HISTORY OF PRESENTING ILLNESS:  History was obtained from: patient, electronic medical record. Andrew Burrell is a 40 y.o. is here for a follow up visit for Diabetes and to discuss recent labs. Pt reports his diabetes is getting better controlled. His BS are around 120's - 140's. He is only taking Glucophage. He is checking his sugars 3 x per day. His A1C is down to 9.3. His recent lab work showed transaminitis. Pt reports to have hepatitic c infection and has never been treated. He has been having a lot of belching and GI upset. His H. Pylori breath test was positive. Patient wants a referral to dermatology for removal of several moles and skin tags.      Patient Active Problem List   Diagnosis    Long-term current use of methadone for opiate dependence ([facility] [address]    Tobacco abuse    Type 2 diabetes mellitus without complication, with long-term current use of insulin ([facility] [address]       Health Maintenance Due   Topic Date Due    Diabetic foot exam  02/18/1986    Diabetic hemoglobin A1C test  11/02/2017       Allergies   Allergen Reactions    Flexeril [Cyclobenzaprine]     Klonopin [Clonazepam]     Morphine Hives    Quetiapine          Current Outpatient Prescriptions   Medication Sig Dispense Refill    gabapentin (NEURONTIN) 800 MG tablet Take 800 mg by mouth 3 times daily      aspirin EC 81 MG EC tablet Take 1 tablet by mouth daily 30 tablet 3    atorvastatin (LIPITOR) 40 MG tablet Take 1 tablet by mouth daily 30 tablet 3    metFORMIN (GLUCOPHAGE) 500 MG tablet Take 1 tablet by mouth 2 times daily (with meals) 60 tablet 3    furosemide (LASIX) 20 MG tablet Take 20 mg by mouth as needed       RELION INSULIN SYR 0.3ML/31G 31G X 5/16\" 0.3 ML MISC       ONETOUCH DELICA LANCETS FINE MISC       nystatin-triamcinolone (MYCOLOG II) 106838-8.1 UNIT/GM-% cream       omeprazole (PRILOSEC) 20 MG delayed release capsule       ONE TOUCH ULTRA TEST strip 1 each by Other route 4 times daily (before meals and nightly) 100 each 5    albuterol sulfate HFA (PROVENTIL HFA) 108 (90 BASE) MCG/ACT inhaler Inhale 2 puffs into the lungs every 4 hours as needed for Wheezing or Shortness of Breath (Space out to every 6 hours as symptoms improve) Space out to every 6 hours as symptoms improve. 1 Inhaler 0    tiotropium (SPIRIVA) 18 MCG inhalation capsule Inhale 1 capsule into the lungs daily 30 capsule 1    METHADONE HCL PO Take  by mouth. No current facility-administered medications for this visit. Social History   Substance Use Topics    Smoking status: Current Every Day Smoker     Packs/day: 1.00     Years: 25.00    Smokeless tobacco: Never Used    Alcohol use No       Family History   Problem Relation Age of Onset    Substance Abuse Mother     Depression Mother     Substance Abuse Father     Depression Sister     Substance Abuse Brother     Substance Abuse Maternal Aunt     Substance Abuse Maternal Uncle     Substance Abuse Paternal Aunt     Substance Abuse Paternal Uncle     Substance Abuse Maternal Grandmother     Depression Maternal Grandmother         REVIEW OF SYSTEMS:  Review of Systems   Constitutional: Negative for chills, fever and weight loss. HENT: Negative. Eyes: Negative. Respiratory: Negative. Cardiovascular: Negative for chest pain, palpitations, orthopnea, claudication, leg swelling and PND. Gastrointestinal: Positive for heartburn, nausea and vomiting.

## 2017-11-03 ENCOUNTER — HOSPITAL ENCOUNTER (OUTPATIENT)
Dept: ULTRASOUND IMAGING | Age: 42
Discharge: HOME | End: 2017-11-03
Payer: MEDICARE

## 2017-11-03 ENCOUNTER — HOSPITAL ENCOUNTER (OUTPATIENT)
Dept: VASCULAR LAB | Age: 42
Discharge: HOME | End: 2017-11-03
Payer: MEDICARE

## 2017-11-03 ENCOUNTER — HOSPITAL ENCOUNTER (OUTPATIENT)
Age: 42
Discharge: HOME | End: 2017-11-03
Payer: MEDICARE

## 2017-11-03 DIAGNOSIS — M79.671 PAIN IN RIGHT FOOT: ICD-10-CM

## 2017-11-03 DIAGNOSIS — R79.89 ABNORMAL LFTS: ICD-10-CM

## 2017-11-03 DIAGNOSIS — B18.2 CHRONIC HEPATITIS C WITH HEPATIC COMA (HCC): ICD-10-CM

## 2017-11-03 DIAGNOSIS — E11.65 UNCONTROLLED TYPE 2 DIABETES MELLITUS WITH DIABETIC NEUROPATHY, UNSPECIFIED LONG TERM INSULIN USE STATUS: ICD-10-CM

## 2017-11-03 DIAGNOSIS — F17.200 SMOKING: ICD-10-CM

## 2017-11-03 DIAGNOSIS — E11.40 UNCONTROLLED TYPE 2 DIABETES MELLITUS WITH DIABETIC NEUROPATHY, UNSPECIFIED LONG TERM INSULIN USE STATUS: ICD-10-CM

## 2017-11-03 LAB
AFP: 56.6 UG/L
HAV AB SERPL IA-ACNC: NONREACTIVE
HBV SURFACE AB TITR SER: <3.5 MIU/ML
HEPATITIS B SURFACE ANTIGEN: REACTIVE

## 2017-11-03 PROCEDURE — 93923 UPR/LXTR ART STDY 3+ LVLS: CPT

## 2017-11-03 PROCEDURE — 87522 HEPATITIS C REVRS TRNSCRPJ: CPT

## 2017-11-03 PROCEDURE — 86317 IMMUNOASSAY INFECTIOUS AGENT: CPT

## 2017-11-03 PROCEDURE — 87340 HEPATITIS B SURFACE AG IA: CPT

## 2017-11-03 PROCEDURE — 76705 ECHO EXAM OF ABDOMEN: CPT

## 2017-11-03 PROCEDURE — 86708 HEPATITIS A ANTIBODY: CPT

## 2017-11-03 PROCEDURE — 36415 COLL VENOUS BLD VENIPUNCTURE: CPT

## 2017-11-03 PROCEDURE — 82105 ALPHA-FETOPROTEIN SERUM: CPT

## 2017-11-03 PROCEDURE — 87902 NFCT AGT GNTYP ALYS HEP C: CPT

## 2017-11-06 ENCOUNTER — TELEPHONE (OUTPATIENT)
Dept: INTERNAL MEDICINE | Age: 42
End: 2017-11-06

## 2017-11-06 LAB
HCV QUANTITATIVE: NORMAL
HEPATITIS C GENOTYPE: NORMAL

## 2017-11-07 LAB
DIRECT EXAM: NORMAL
Lab: NORMAL
SPECIMEN DESCRIPTION: NORMAL
STATUS: NORMAL

## 2017-11-07 RX ORDER — ONDANSETRON 4 MG/1
4 TABLET, FILM COATED ORAL EVERY 8 HOURS PRN
Qty: 30 TABLET | Refills: 1 | Status: SHIPPED | OUTPATIENT
Start: 2017-11-07 | End: 2018-02-11 | Stop reason: SDUPTHER

## 2017-11-10 ENCOUNTER — TELEPHONE (OUTPATIENT)
Dept: INTERNAL MEDICINE | Age: 42
End: 2017-11-10

## 2017-11-10 DIAGNOSIS — R76.8 HEPATITIS B ANTIBODY POSITIVE: Primary | ICD-10-CM

## 2017-11-10 NOTE — TELEPHONE ENCOUNTER
Pt is showing a Positive Hepatitis B surface antigen with elevated LFT's ; Alk Phos 153;  and has been having some nausea. Concern for acute Hepatitis B infection. Will order Hep B DNA, PCR; IgM Anti-Hep C, and Hepatitis E antigen and repeat LFT's. Pt will need to seen GI ASAP to see if treatment with interferon will be needed. Please inform patient that new lab work is required and he will need to schedule appointment with GI as soon as possible once lab work is complete. Ioana Dawkins M.D.  PGY-2 Internal Medicine  [address] [facility]

## 2017-12-22 DIAGNOSIS — Z79.4 TYPE 2 DIABETES MELLITUS WITHOUT COMPLICATION, WITH LONG-TERM CURRENT USE OF INSULIN (HCC): ICD-10-CM

## 2017-12-22 DIAGNOSIS — E11.9 TYPE 2 DIABETES MELLITUS WITHOUT COMPLICATION, WITH LONG-TERM CURRENT USE OF INSULIN (HCC): ICD-10-CM

## 2017-12-22 RX ORDER — ASPIRIN 81 MG/1
TABLET ORAL
Qty: 90 TABLET | Refills: 3 | Status: SHIPPED | OUTPATIENT
Start: 2017-12-22 | End: 2018-02-16 | Stop reason: SDUPTHER

## 2017-12-22 NOTE — TELEPHONE ENCOUNTER
E-scribe request for Aspirin 81 MG. Please review and e-scribe if applicable. Health Maintenance   Topic Date Due    Diabetic foot exam  02/18/1986    Diabetic hemoglobin A1C test  11/02/2017    Diabetic retinal exam  08/09/2018 (Originally 2/18/1986)    Diabetic microalbuminuria test  08/09/2018    Lipid screen  10/11/2018    DTaP/Tdap/Td vaccine (2 - Td) 10/08/2022    Flu vaccine  Completed    Pneumococcal med risk  Completed    HIV screen  Completed             (applicable per patient's age: Cancer Screenings, Depression Screening, Fall Risk Screening, Immunizations)    Hemoglobin A1C (%)   Date Value   08/02/2017 9.5   07/09/2017 11.3     Microalb/Crt.  Ratio (mcg/mg creat)   Date Value   08/09/2017 6     LDL Cholesterol (mg/dL)   Date Value   10/11/2017 79     AST (U/L)   Date Value   10/11/2017 245 (H)     ALT (U/L)   Date Value   10/11/2017 298 (H)     BUN (mg/dL)   Date Value   10/11/2017 14      (goal A1C is < 7)   (goal LDL is <100) need 30-50% reduction from baseline     BP Readings from Last 3 Encounters:   10/27/17 129/72   10/23/17 139/79   10/11/17 114/75    (goal /80)      All Future Testing planned in [facility]:  Lab Frequency Next Occurrence   AFP Tumor Marker Once 02/10/2018   VL Arterial PVR Lower w Exercise w PPG Once 10/27/2017   Hepatitis C Antibody Once 01/20/2018   Hepatitis B Surface Antigen Once 01/20/2018   Hepatitis B Core Antibody, Total Once 01/20/2018   Hepatitis B E Antigen Once 02/20/2018   Hepatitis B DNA, Ultraquantitative, PCR Once 02/20/2018   Hepatic Function Panel Once 02/20/2018   Hepatitis B Core Antibody, IgM Once 02/20/2018       Next Visit Date:  Future Appointments  Date Time Provider Didier Burton   12/27/2017 1:15 PM Ericka Vann DPM [facility] Podiatry [facility]   2/16/2018 1:00 PM Sharee Galaviz MD [facility] IM Rohan Nogueira            Patient Active Problem List:     Long-term current use of methadone for opiate dependence ([address]     Tobacco abuse     Type 2 diabetes mellitus without complication, with long-term current use of insulin ([facility] [address]

## 2017-12-27 ENCOUNTER — OFFICE VISIT (OUTPATIENT)
Dept: PODIATRY | Age: 42
End: 2017-12-27
Payer: MEDICARE

## 2017-12-27 VITALS
BODY MASS INDEX: 35.17 KG/M2 | HEIGHT: 73 IN | DIASTOLIC BLOOD PRESSURE: 76 MMHG | SYSTOLIC BLOOD PRESSURE: 114 MMHG | WEIGHT: 265.4 LBS | HEART RATE: 85 BPM

## 2017-12-27 DIAGNOSIS — L60.0 OC (ONYCHOCRYPTOSIS): ICD-10-CM

## 2017-12-27 DIAGNOSIS — F17.200 SMOKING: ICD-10-CM

## 2017-12-27 DIAGNOSIS — B35.1 ONYCHOMYCOSIS: ICD-10-CM

## 2017-12-27 DIAGNOSIS — E11.9 TYPE 2 DIABETES MELLITUS WITHOUT COMPLICATION, WITH LONG-TERM CURRENT USE OF INSULIN (HCC): Primary | ICD-10-CM

## 2017-12-27 DIAGNOSIS — M79.671 PAIN IN RIGHT FOOT: ICD-10-CM

## 2017-12-27 DIAGNOSIS — Z79.4 TYPE 2 DIABETES MELLITUS WITHOUT COMPLICATION, WITH LONG-TERM CURRENT USE OF INSULIN (HCC): Primary | ICD-10-CM

## 2017-12-27 LAB — HBA1C MFR BLD: 5.9 %

## 2017-12-27 PROCEDURE — G8427 DOCREV CUR MEDS BY ELIG CLIN: HCPCS | Performed by: PODIATRIST

## 2017-12-27 PROCEDURE — G8417 CALC BMI ABV UP PARAM F/U: HCPCS | Performed by: PODIATRIST

## 2017-12-27 PROCEDURE — 4004F PT TOBACCO SCREEN RCVD TLK: CPT | Performed by: PODIATRIST

## 2017-12-27 PROCEDURE — 11750 EXCISION NAIL&NAIL MATRIX: CPT | Performed by: PODIATRIST

## 2017-12-27 PROCEDURE — 3044F HG A1C LEVEL LT 7.0%: CPT | Performed by: PODIATRIST

## 2017-12-27 PROCEDURE — 83036 HEMOGLOBIN GLYCOSYLATED A1C: CPT | Performed by: PODIATRIST

## 2017-12-27 PROCEDURE — 99213 OFFICE O/P EST LOW 20 MIN: CPT | Performed by: PODIATRIST

## 2017-12-27 PROCEDURE — G8484 FLU IMMUNIZE NO ADMIN: HCPCS | Performed by: PODIATRIST

## 2017-12-27 RX ORDER — ACETAMINOPHEN AND CODEINE PHOSPHATE 300; 30 MG/1; MG/1
TABLET ORAL
COMMUNITY
Start: 2017-12-18 | End: 2017-12-27 | Stop reason: ALTCHOICE

## 2017-12-27 NOTE — PROGRESS NOTES
Patient instructed to remove shoes and socks, instructed to sit in exam chair. Current PCP name is Dr. Fredy Sparks and date of last visit 10/27/17. Do you have a follow up visit scheduled? Yes     If yes the date is 2/16/18  Diabetic visit information    Blood pressure (Control is BP <140/90)  BP Readings from Last 3 Encounters:   12/27/17 114/76   10/27/17 129/72   10/23/17 139/79       BP taken with correct size cuff? - Yes   Repeated if > 140/90 NA      Tobacco use:  Patient  reports that he has been smoking. He has a 25.00 pack-year smoking history. He has never used smokeless tobacco.  If Smoker - Cessation materials given?- NA       Diabetic Health Maintenance Items due  Diabetes Management   Topic Date Due    Diabetic foot exam  02/18/1986    Diabetic hemoglobin A1C test  11/02/2017       Diabetic retinal exam done in last year? - Yes   If No: remind patient that it is due and they should schedule an exam    Medications  Is patient taking any medications for diabetes? -   Yes  Have blood sugars been controlled? Fasting blood sugars under 120   -   Yes   Random home sugars or today's POCT glucose is under 180 -   Yes   []  If No to the above then patient should schedule appt with PCP. Diabetic Plan    A1C Plan  Lab Results   Component Value Date    LABA1C 9.5 08/02/2017    LABA1C 11.3 07/09/2017      []  If A1C over 8 and last result >3 months ago - Order A1C and refer to PCP   []  If last A1C over 6 months ago - Order A1C and refer to PCP for follow up   []  If elevated blood sugars > 180 - refer to PCP for follow up    []  Blood sugar controlled - A1C under 8 and last check was < 6 months      Cholesterol Plan   Lab Results   Component Value Date    LDLCHOLESTEROL 79 10/11/2017      []  If LDL > 100 and last result >3 months ago - order Fasting lipids and refer to PCP for follow up   []  If LDL < 100 and over 1 year ago - Order Fasting lipids and refer to PCP for follow up   [] LDL is controlled.   LDL < 100 and checked within the last year     Blood Pressure  BP Readings from Last 3 Encounters:   12/27/17 114/76   10/27/17 129/72   10/23/17 139/79      []  If SBP >140 mmhg - refer to PCP for follow up   []  If DBP > 90 mmhg - refer to PCP for follow up   [] BP is controlled <140/90     Order labs as PCP ordered.   (ie: Lipids, A1C, CMP)

## 2018-01-10 ENCOUNTER — OFFICE VISIT (OUTPATIENT)
Dept: PODIATRY | Age: 43
End: 2018-01-10
Payer: MEDICARE

## 2018-01-10 VITALS
BODY MASS INDEX: 34.72 KG/M2 | HEIGHT: 73 IN | SYSTOLIC BLOOD PRESSURE: 112 MMHG | DIASTOLIC BLOOD PRESSURE: 73 MMHG | HEART RATE: 79 BPM | WEIGHT: 262 LBS

## 2018-01-10 DIAGNOSIS — M79.675 PAIN AROUND TOENAIL, LEFT FOOT: ICD-10-CM

## 2018-01-10 DIAGNOSIS — E11.9 TYPE 2 DIABETES MELLITUS WITHOUT COMPLICATION, WITH LONG-TERM CURRENT USE OF INSULIN (HCC): ICD-10-CM

## 2018-01-10 DIAGNOSIS — L60.0 INGROWN RIGHT GREATER TOENAIL: ICD-10-CM

## 2018-01-10 DIAGNOSIS — L60.0 INGROWN TOENAIL: Primary | ICD-10-CM

## 2018-01-10 DIAGNOSIS — L60.0 INGROWN LEFT BIG TOENAIL: ICD-10-CM

## 2018-01-10 DIAGNOSIS — Z79.4 TYPE 2 DIABETES MELLITUS WITHOUT COMPLICATION, WITH LONG-TERM CURRENT USE OF INSULIN (HCC): ICD-10-CM

## 2018-01-10 PROCEDURE — G8484 FLU IMMUNIZE NO ADMIN: HCPCS | Performed by: STUDENT IN AN ORGANIZED HEALTH CARE EDUCATION/TRAINING PROGRAM

## 2018-01-10 PROCEDURE — 11730 AVULSION NAIL PLATE SIMPLE 1: CPT | Performed by: STUDENT IN AN ORGANIZED HEALTH CARE EDUCATION/TRAINING PROGRAM

## 2018-01-10 PROCEDURE — G8427 DOCREV CUR MEDS BY ELIG CLIN: HCPCS | Performed by: STUDENT IN AN ORGANIZED HEALTH CARE EDUCATION/TRAINING PROGRAM

## 2018-01-10 PROCEDURE — 3046F HEMOGLOBIN A1C LEVEL >9.0%: CPT | Performed by: STUDENT IN AN ORGANIZED HEALTH CARE EDUCATION/TRAINING PROGRAM

## 2018-01-10 PROCEDURE — 4004F PT TOBACCO SCREEN RCVD TLK: CPT | Performed by: STUDENT IN AN ORGANIZED HEALTH CARE EDUCATION/TRAINING PROGRAM

## 2018-01-10 PROCEDURE — G8417 CALC BMI ABV UP PARAM F/U: HCPCS | Performed by: STUDENT IN AN ORGANIZED HEALTH CARE EDUCATION/TRAINING PROGRAM

## 2018-01-10 RX ORDER — LIDOCAINE HYDROCHLORIDE 10 MG/ML
6 INJECTION, SOLUTION INFILTRATION; PERINEURAL ONCE
Status: COMPLETED | OUTPATIENT
Start: 2018-01-10 | End: 2018-01-10

## 2018-01-10 RX ADMIN — LIDOCAINE HYDROCHLORIDE 6 ML: 10 INJECTION, SOLUTION INFILTRATION; PERINEURAL at 15:46

## 2018-01-16 ENCOUNTER — HOSPITAL ENCOUNTER (OUTPATIENT)
Age: 43
Setting detail: SPECIMEN
Discharge: HOME | End: 2018-01-16
Payer: MEDICARE

## 2018-01-16 ENCOUNTER — OFFICE VISIT (OUTPATIENT)
Dept: INTERNAL MEDICINE | Age: 43
End: 2018-01-16
Payer: MEDICARE

## 2018-01-16 VITALS
WEIGHT: 265 LBS | SYSTOLIC BLOOD PRESSURE: 118 MMHG | BODY MASS INDEX: 34.97 KG/M2 | HEART RATE: 87 BPM | DIASTOLIC BLOOD PRESSURE: 74 MMHG

## 2018-01-16 DIAGNOSIS — A04.8 H. PYLORI INFECTION: ICD-10-CM

## 2018-01-16 DIAGNOSIS — B35.1 ONYCHOMYCOSIS: ICD-10-CM

## 2018-01-16 DIAGNOSIS — L82.1 SEBORRHEIC KERATOSES: Primary | ICD-10-CM

## 2018-01-16 DIAGNOSIS — R76.8 HEPATITIS B ANTIBODY POSITIVE: ICD-10-CM

## 2018-01-16 DIAGNOSIS — E11.9 TYPE 2 DIABETES MELLITUS WITHOUT COMPLICATION, WITH LONG-TERM CURRENT USE OF INSULIN (HCC): ICD-10-CM

## 2018-01-16 DIAGNOSIS — Z79.4 TYPE 2 DIABETES MELLITUS WITHOUT COMPLICATION, WITH LONG-TERM CURRENT USE OF INSULIN (HCC): ICD-10-CM

## 2018-01-16 LAB
ALBUMIN SERPL-MCNC: 3.5 G/DL (ref 3.5–5.2)
ALBUMIN/GLOBULIN RATIO: 0.8 (ref 1–2.5)
ALP BLD-CCNC: 200 U/L (ref 40–129)
ALT SERPL-CCNC: 326 U/L (ref 5–41)
AST SERPL-CCNC: 244 U/L
BILIRUB SERPL-MCNC: 2.32 MG/DL (ref 0.3–1.2)
BILIRUBIN DIRECT: 0.92 MG/DL
BILIRUBIN, INDIRECT: 1.4 MG/DL (ref 0–1)
GLOBULIN: ABNORMAL G/DL (ref 1.5–3.8)
HEPATITIS B CORE IGM ANTIBODY: NONREACTIVE
TOTAL PROTEIN: 7.7 G/DL (ref 6.4–8.3)

## 2018-01-16 PROCEDURE — 36415 COLL VENOUS BLD VENIPUNCTURE: CPT

## 2018-01-16 PROCEDURE — 87350 HEPATITIS BE AG IA: CPT

## 2018-01-16 PROCEDURE — 87517 HEPATITIS B DNA QUANT: CPT

## 2018-01-16 PROCEDURE — 83014 H PYLORI DRUG ADMIN: CPT

## 2018-01-16 PROCEDURE — 3046F HEMOGLOBIN A1C LEVEL >9.0%: CPT | Performed by: HOSPITALIST

## 2018-01-16 PROCEDURE — 86705 HEP B CORE ANTIBODY IGM: CPT

## 2018-01-16 PROCEDURE — 80076 HEPATIC FUNCTION PANEL: CPT

## 2018-01-16 PROCEDURE — G8417 CALC BMI ABV UP PARAM F/U: HCPCS | Performed by: HOSPITALIST

## 2018-01-16 PROCEDURE — 4004F PT TOBACCO SCREEN RCVD TLK: CPT | Performed by: HOSPITALIST

## 2018-01-16 PROCEDURE — G8427 DOCREV CUR MEDS BY ELIG CLIN: HCPCS | Performed by: HOSPITALIST

## 2018-01-16 PROCEDURE — 83013 H PYLORI (C-13) BREATH: CPT

## 2018-01-16 PROCEDURE — G8484 FLU IMMUNIZE NO ADMIN: HCPCS | Performed by: HOSPITALIST

## 2018-01-16 PROCEDURE — 99213 OFFICE O/P EST LOW 20 MIN: CPT | Performed by: HOSPITALIST

## 2018-01-16 RX ORDER — OMEPRAZOLE 20 MG/1
20 CAPSULE, DELAYED RELEASE ORAL 2 TIMES DAILY
Qty: 60 CAPSULE | Refills: 3 | Status: SHIPPED | OUTPATIENT
Start: 2018-01-16 | End: 2018-01-17 | Stop reason: ALTCHOICE

## 2018-01-16 RX ORDER — ATORVASTATIN CALCIUM 40 MG/1
40 TABLET, FILM COATED ORAL DAILY
Qty: 30 TABLET | Refills: 3 | Status: SHIPPED | OUTPATIENT
Start: 2018-01-16 | End: 2018-02-16

## 2018-01-16 NOTE — PROGRESS NOTES
Attending Physician Statement  I have discussed the care of Gerber Mg, including pertinent history and exam findings with the resident. I have reviewed the key elements of all parts of the encounter with the resident. I agree with the assessment, and status of the problem list as documented. 1. Seborrheic keratoses     2. H. pylori infection  omeprazole (PRILOSEC) 20 MG delayed release capsule    H. Pylori Breath Test   3. Type 2 diabetes mellitus without complication, with long-term current use of insulin (HCC)  metFORMIN (GLUCOPHAGE) 1000 MG tablet    atorvastatin (LIPITOR) 40 MG tablet   4. Onychomycosis        The plan and orders should include No orders of the defined types were placed in this encounter. and this was also documented by the resident. The medication list was reviewed with the resident and is up to date. The return visit should be in 3 months . Will need to follow up with referrals to GI and DERM.     Pham Jacobson MD, [address] [place] [address] Internal Medicine Associate & [facility]  Associate  Department of Internal Medicine  Internal Medicine Clerkship - [place] [facility]  1/16/2018, 2:03 PM

## 2018-01-16 NOTE — PATIENT INSTRUCTIONS
Return To Clinic 2/16/18. After Visit Summary given and reviewed. bb    It is very important for your care that you keep your appointment. If for some reason you are unable to keep your appointment it is equally important that you call our office at [phone] to cancel your appointment and reschedule. Failure to do so may result in your termination from our practice. LABORATORY INSTRUCTIONS    Your doctor has ordered blood or urine testing. You can get this testing done at the Lab located on the first floor of the [facility], or at any other [facility]. Please stop at Main Registration, before going to the lab, as you must be registered first.     Please get this lab done before your next visit.     You may eat or drink before this test.    Referrals for dermatology and GI given to pt with phone numbers and instructions to call both clinics to schedule an appointment     Labwork from last visit also reprinted and given to pt     Letter for work regarding appointment given to pt

## 2018-01-16 NOTE — PROGRESS NOTES
Diabetic visit information    BP Readings from Last 3 Encounters:   01/10/18 112/73   12/27/17 114/76   10/27/17 129/72       Hemoglobin A1C (%)   Date Value   12/27/2017 5.9   08/02/2017 9.5   07/09/2017 11.3     Microalb/Crt. Ratio (mcg/mg creat)   Date Value   08/09/2017 6     LDL Cholesterol (mg/dL)   Date Value   10/11/2017 79               Have you changed or started any medications since your last visit including any over-the-counter medicines, vitamins, or herbal medicines? no   Have you stopped taking any of your medications? Is so, why? -  no  Are you having any side effects from any of your medications? - no    Have you seen any other physician or provider since your last visit? yes - podiatry   Have you had any other diagnostic tests since your last visit?  no   Have you been seen in the emergency room and/or had an admission in a hospital since we last saw you?  yes - toe surgery     Have you had your annual diabetic retinal (eye) exam? No   (ensure copy of exam is in the chart)    Have you had your routine dental cleaning in the past 6 months? yes -     Do you have an active [employer] account? If not, what are your barriers? Yes    Patient Care Team:  Luz Marina Meraz MD as PCP - General (Internal Medicine)    Medical history Review  Past Medical, Family, and Social History reviewed and does not contribute to the patient presenting condition.     Health Maintenance   Topic Date Due    Diabetic foot exam  02/18/1986    Diabetic retinal exam  08/09/2018 (Originally 2/18/1986)    Diabetic microalbuminuria test  08/09/2018    Lipid screen  10/11/2018    Potassium monitoring  10/11/2018    Creatinine monitoring  10/11/2018    A1C test (Diabetic or Prediabetic)  12/27/2018    DTaP/Tdap/Td vaccine (2 - Td) 10/08/2022    Flu vaccine  Completed    Pneumococcal med risk  Completed    HIV screen  Completed

## 2018-01-17 ENCOUNTER — TELEPHONE (OUTPATIENT)
Dept: INTERNAL MEDICINE | Age: 43
End: 2018-01-17

## 2018-01-17 DIAGNOSIS — K21.9 GASTROESOPHAGEAL REFLUX DISEASE, ESOPHAGITIS PRESENCE NOT SPECIFIED: Primary | ICD-10-CM

## 2018-01-17 RX ORDER — PANTOPRAZOLE SODIUM 40 MG/1
40 TABLET, DELAYED RELEASE ORAL 2 TIMES DAILY
Qty: 60 TABLET | Refills: 3 | Status: SHIPPED | OUTPATIENT
Start: 2018-01-17 | End: 2018-02-16 | Stop reason: SDUPTHER

## 2018-01-17 NOTE — TELEPHONE ENCOUNTER
pc said he had a test to check and see if he still had H-pylori and he is now sick to his stomach and burping up sulfer , please advise

## 2018-01-17 NOTE — TELEPHONE ENCOUNTER
Prescribed Protonix 40 mg BID, Pt has been on prilosec and is still having symptoms despite being compliant with medication. Will await results from recent h. Pylori breath test which is pending. Pt will need prior insurance authorization for protonix. Pt will need to follow up with GI physician. Mani Car M.D.  PGY-2 Internal Medicine  [address] [facility]

## 2018-01-18 LAB — H PYLORI BREATH TEST: NEGATIVE

## 2018-01-19 LAB
HBV DNA QNT I/U: NORMAL IU/ML
HBV DNA ULTRA QNT REALTIME PCR: >8.2 LOG IU
HEP B PCR INTERP: DETECTED
HEPATITIS BE ANTIGEN: POSITIVE

## 2018-01-23 RX ORDER — GABAPENTIN 800 MG/1
800 TABLET ORAL 3 TIMES DAILY
Qty: 90 TABLET | Refills: 5 | Status: SHIPPED | OUTPATIENT
Start: 2018-01-23 | End: 2018-02-22

## 2018-01-24 NOTE — TELEPHONE ENCOUNTER
Patient called yesterday and letter was printed out and placed at 2nd floor desk  He has already picked up his Protonix

## 2018-01-29 ENCOUNTER — TELEPHONE (OUTPATIENT)
Dept: INTERNAL MEDICINE | Age: 43
End: 2018-01-29

## 2018-01-29 NOTE — TELEPHONE ENCOUNTER
Phone call from patient stating that he has not been testing his blood sugars but can just feel that they are running 200-400s he states that he think his body is rejecting the medication 2 days ago he states that it was 436. Also he states that he has a rotten egg smelling burp think he still has h-pylori please review and advise . Health Maintenance   Topic Date Due    Diabetic foot exam  02/18/1986    Diabetic retinal exam  08/09/2018 (Originally 2/18/1986)    Diabetic microalbuminuria test  08/09/2018    Lipid screen  10/11/2018    Potassium monitoring  10/11/2018    Creatinine monitoring  10/11/2018    A1C test (Diabetic or Prediabetic)  12/27/2018    DTaP/Tdap/Td vaccine (2 - Td) 10/08/2022    Flu vaccine  Completed    Pneumococcal med risk  Completed    HIV screen  Completed       Hemoglobin A1C (%)   Date Value   12/27/2017 5.9   08/02/2017 9.5   07/09/2017 11.3             ( goal A1C is < 7)   Microalb/Crt.  Ratio (mcg/mg creat)   Date Value   08/09/2017 6     LDL Cholesterol (mg/dL)   Date Value   10/11/2017 79       (goal LDL is <100)   AST (U/L)   Date Value   01/16/2018 244 (H)     ALT (U/L)   Date Value   01/16/2018 326 (H)     BUN (mg/dL)   Date Value   10/11/2017 14     BP Readings from Last 3 Encounters:   01/16/18 118/74   01/10/18 112/73   12/27/17 114/76          (goal 120/80)      Next Visit Date:  2/16/2018    Patient Active Problem List:     Long-term current use of methadone for opiate dependence ([address]     Tobacco abuse     Type 2 diabetes mellitus without complication, with long-term current use of insulin ([address]

## 2018-01-31 NOTE — TELEPHONE ENCOUNTER
PC to pt spoke with pt message given that BS needs to be tested 3 times a day, and he needs to be seen in office. Pt states he plans on using the walk in clinic tomorrow. He does take his medication Protonix every day. He also wants to know if it can interfer with the H-pylori breath test. Pt states he took his medication right before the test and the second part was done at 25 mins instead of the 15 mins. He would like to be retest to be sure.

## 2018-02-01 ENCOUNTER — HOSPITAL ENCOUNTER (OUTPATIENT)
Age: 43
Setting detail: SPECIMEN
Discharge: HOME | End: 2018-02-01
Payer: MEDICARE

## 2018-02-01 ENCOUNTER — OFFICE VISIT (OUTPATIENT)
Dept: INTERNAL MEDICINE | Age: 43
End: 2018-02-01
Payer: MEDICARE

## 2018-02-01 VITALS
HEART RATE: 91 BPM | BODY MASS INDEX: 35.23 KG/M2 | DIASTOLIC BLOOD PRESSURE: 81 MMHG | SYSTOLIC BLOOD PRESSURE: 125 MMHG | WEIGHT: 267 LBS

## 2018-02-01 DIAGNOSIS — E11.42 TYPE 2 DIABETES MELLITUS WITH DIABETIC POLYNEUROPATHY, WITHOUT LONG-TERM CURRENT USE OF INSULIN (HCC): Primary | ICD-10-CM

## 2018-02-01 DIAGNOSIS — R17 JAUNDICE: ICD-10-CM

## 2018-02-01 DIAGNOSIS — B16.9 ACUTE HEPATITIS B: ICD-10-CM

## 2018-02-01 DIAGNOSIS — L60.0 INGROWN TOENAIL: ICD-10-CM

## 2018-02-01 LAB
ALBUMIN SERPL-MCNC: 3.1 G/DL (ref 3.5–5.2)
ALBUMIN/GLOBULIN RATIO: 0.8 (ref 1–2.5)
ALP BLD-CCNC: 208 U/L (ref 40–129)
ALT SERPL-CCNC: 279 U/L (ref 5–41)
AST SERPL-CCNC: 238 U/L
BILIRUB SERPL-MCNC: 1.66 MG/DL (ref 0.3–1.2)
BILIRUBIN DIRECT: 0.74 MG/DL
BILIRUBIN, INDIRECT: 0.92 MG/DL (ref 0–1)
GLOBULIN: ABNORMAL G/DL (ref 1.5–3.8)
INR BLD: 1.2
PROTHROMBIN TIME: 13.3 SEC (ref 9.4–12.6)
TOTAL PROTEIN: 7 G/DL (ref 6.4–8.3)

## 2018-02-01 PROCEDURE — 85610 PROTHROMBIN TIME: CPT

## 2018-02-01 PROCEDURE — 80076 HEPATIC FUNCTION PANEL: CPT

## 2018-02-01 PROCEDURE — 4004F PT TOBACCO SCREEN RCVD TLK: CPT | Performed by: STUDENT IN AN ORGANIZED HEALTH CARE EDUCATION/TRAINING PROGRAM

## 2018-02-01 PROCEDURE — G8417 CALC BMI ABV UP PARAM F/U: HCPCS | Performed by: STUDENT IN AN ORGANIZED HEALTH CARE EDUCATION/TRAINING PROGRAM

## 2018-02-01 PROCEDURE — 99213 OFFICE O/P EST LOW 20 MIN: CPT | Performed by: STUDENT IN AN ORGANIZED HEALTH CARE EDUCATION/TRAINING PROGRAM

## 2018-02-01 PROCEDURE — 36415 COLL VENOUS BLD VENIPUNCTURE: CPT

## 2018-02-01 PROCEDURE — G8484 FLU IMMUNIZE NO ADMIN: HCPCS | Performed by: STUDENT IN AN ORGANIZED HEALTH CARE EDUCATION/TRAINING PROGRAM

## 2018-02-01 PROCEDURE — G8427 DOCREV CUR MEDS BY ELIG CLIN: HCPCS | Performed by: STUDENT IN AN ORGANIZED HEALTH CARE EDUCATION/TRAINING PROGRAM

## 2018-02-01 PROCEDURE — 3046F HEMOGLOBIN A1C LEVEL >9.0%: CPT | Performed by: STUDENT IN AN ORGANIZED HEALTH CARE EDUCATION/TRAINING PROGRAM

## 2018-02-01 NOTE — TELEPHONE ENCOUNTER
Yes the Protonix can interfere with the H.Pylori test. Perhaps when he is seen in the clinic he can get the fecal antigen stool test for H. Pylori, as he is unable to stop the Protonix due to his symptoms. Feli Nathan M.D.  PGY-2 Internal Medicine  [facility].

## 2018-02-01 NOTE — PROGRESS NOTES
Attending Physician Statement  I have discussed the care of Gerber gM, including pertinent history and exam findings with the resident. I have reviewed the key elements of all parts of the encounter and discussed them with the resident. Added history includes DM 2 and has been on metformin. His sugars have been elevated again in the 200s koffi in am. He had recent nail surgery and he has recent dx of Hep B and C the B is clearly active and he has e antigen pos. Added exam findings include VS ok and exam with mild icterus and liver slight tender and no edema no cpuat no signs of ascites and labs for years pos for increased LFTs and now bili is up . I agree with the assessment, and status of the problem list as documented. The plan and orders should include restart his Lantus 12 units at hs and followsugar and slowy increase the lantus to get FBS down to about 100. Continue the metformin  Orders Placed This Encounter   Procedures    Hepatic Function Panel    Protime-INR    and this was also documented by the resident. I agree with the referral to GI for hep B and C. The medication list was reviewed with the resident and is up to date. The return visit should be in 2 weeks to [facility] as sched .     Albert Clements
Nausea or Vomiting 30 tablet 1    bismuth subsalicylate (BISMATROL) 262 MG/15ML suspension Take 15 mLs by mouth every 6 hours as needed for Indigestion      furosemide (LASIX) 20 MG tablet Take 20 mg by mouth as needed       RELION INSULIN SYR 0.3ML/31G 31G X 5/16\" 0.3 ML MISC       ONETOUCH DELICA LANCETS FINE MISC       nystatin-triamcinolone (MYCOLOG II) 736677-1.1 UNIT/GM-% cream       ONE TOUCH ULTRA TEST strip 1 each by Other route 4 times daily (before meals and nightly) 100 each 5    albuterol sulfate HFA (PROVENTIL HFA) 108 (90 BASE) MCG/ACT inhaler Inhale 2 puffs into the lungs every 4 hours as needed for Wheezing or Shortness of Breath (Space out to every 6 hours as symptoms improve) Space out to every 6 hours as symptoms improve. 1 Inhaler 0    tiotropium (SPIRIVA) 18 MCG inhalation capsule Inhale 1 capsule into the lungs daily 30 capsule 1    METHADONE HCL PO Take  by mouth. No current facility-administered medications on file prior to visit. SOCIAL HISTORY    Reviewed and no change from previous record. Theodore  reports that he has been smoking. He has a 25.00 pack-year smoking history. He has never used smokeless tobacco.    FAMILY HISTORY:    Reviewed and No change from previous visit    REVIEW OF SYSTEMS:    ENT: negative  Respiratory: no cough, shortness of breath, or wheezing  Cardiovascular: no chest pain or dyspnea on exertion  Gastrointestinal:Dyspepsia  Neurological: no TIA or stroke symptoms  Endocrine: negative  Genito-Urinary: no dysuria, trouble voiding, or hematuria  Musculoskeletal: bigToenails in  Feet have been removed and undersurface looks to be healing without any active infection  Dermatological: negative    PHYSICAL EXAM:      Vitals:    02/01/18 0917   BP: 125/81   Pulse: 91     General appearance - alert, well appearing, and in no distress.   Mild icterus  Chest - clear to auscultation, no wheezes, rales or rhonchi, symmetric air entry  Heart - normal rate,

## 2018-02-07 ENCOUNTER — OFFICE VISIT (OUTPATIENT)
Dept: INTERNAL MEDICINE | Age: 43
End: 2018-02-07
Payer: MEDICARE

## 2018-02-07 VITALS
HEART RATE: 91 BPM | SYSTOLIC BLOOD PRESSURE: 128 MMHG | BODY MASS INDEX: 35.23 KG/M2 | DIASTOLIC BLOOD PRESSURE: 78 MMHG | WEIGHT: 267 LBS

## 2018-02-07 DIAGNOSIS — E11.9 TYPE 2 DIABETES MELLITUS WITHOUT COMPLICATION, WITH LONG-TERM CURRENT USE OF INSULIN (HCC): Primary | ICD-10-CM

## 2018-02-07 DIAGNOSIS — R50.9 FEVER, UNSPECIFIED FEVER CAUSE: ICD-10-CM

## 2018-02-07 DIAGNOSIS — J01.00 ACUTE NON-RECURRENT MAXILLARY SINUSITIS: ICD-10-CM

## 2018-02-07 DIAGNOSIS — Z79.4 TYPE 2 DIABETES MELLITUS WITHOUT COMPLICATION, WITH LONG-TERM CURRENT USE OF INSULIN (HCC): Primary | ICD-10-CM

## 2018-02-07 LAB
GLUCOSE BLD-MCNC: 290 MG/DL
INFLUENZA A ANTIBODY: NORMAL
INFLUENZA B ANTIBODY: NORMAL

## 2018-02-07 PROCEDURE — 87804 INFLUENZA ASSAY W/OPTIC: CPT | Performed by: INTERNAL MEDICINE

## 2018-02-07 PROCEDURE — G8417 CALC BMI ABV UP PARAM F/U: HCPCS | Performed by: INTERNAL MEDICINE

## 2018-02-07 PROCEDURE — G8484 FLU IMMUNIZE NO ADMIN: HCPCS | Performed by: INTERNAL MEDICINE

## 2018-02-07 PROCEDURE — 99213 OFFICE O/P EST LOW 20 MIN: CPT | Performed by: INTERNAL MEDICINE

## 2018-02-07 PROCEDURE — 3046F HEMOGLOBIN A1C LEVEL >9.0%: CPT | Performed by: INTERNAL MEDICINE

## 2018-02-07 PROCEDURE — 82962 GLUCOSE BLOOD TEST: CPT | Performed by: INTERNAL MEDICINE

## 2018-02-07 PROCEDURE — 4004F PT TOBACCO SCREEN RCVD TLK: CPT | Performed by: INTERNAL MEDICINE

## 2018-02-07 PROCEDURE — G8427 DOCREV CUR MEDS BY ELIG CLIN: HCPCS | Performed by: INTERNAL MEDICINE

## 2018-02-07 RX ORDER — ECHINACEA PURPUREA EXTRACT 125 MG
1 TABLET ORAL PRN
Qty: 1 BOTTLE | Refills: 3 | Status: SHIPPED | OUTPATIENT
Start: 2018-02-07 | End: 2021-09-14

## 2018-02-07 RX ORDER — AMOXICILLIN AND CLAVULANATE POTASSIUM 875; 125 MG/1; MG/1
1 TABLET, FILM COATED ORAL 2 TIMES DAILY
Qty: 14 TABLET | Refills: 0 | Status: SHIPPED | OUTPATIENT
Start: 2018-02-07 | End: 2018-02-14

## 2018-02-07 RX ORDER — FLUTICASONE PROPIONATE 50 MCG
1 SPRAY, SUSPENSION (ML) NASAL DAILY
Qty: 1 BOTTLE | Refills: 0 | Status: SHIPPED | OUTPATIENT
Start: 2018-02-07 | End: 2018-03-16 | Stop reason: ALTCHOICE

## 2018-02-07 NOTE — LETTER
[address]  [address] [facility] [address] [facility]  Phone: [phone]  Fax: [phone]    Godwin Madden MD        February 7, 2018     Patient: Roselyn Queen   YOB: 1975   Date of Visit: 2/7/2018       To Whom It May Concern: It is my medical opinion that Ronnie Castaneda should remain out of work until 2/9/18. .    If you have any questions or concerns, please don't hesitate to call.     Sincerely,        Godwin Madden MD

## 2018-02-07 NOTE — PATIENT INSTRUCTIONS
RTC on 2/16/18. Medications e-scribe to pharmacy of pt's choice. An After Visit Summary was printed and given to the patient.  CB

## 2018-02-09 ENCOUNTER — OFFICE VISIT (OUTPATIENT)
Dept: GASTROENTEROLOGY | Age: 43
End: 2018-02-09
Payer: MEDICARE

## 2018-02-09 VITALS
HEART RATE: 86 BPM | DIASTOLIC BLOOD PRESSURE: 71 MMHG | SYSTOLIC BLOOD PRESSURE: 109 MMHG | HEIGHT: 73 IN | OXYGEN SATURATION: 95 % | BODY MASS INDEX: 35.39 KG/M2 | WEIGHT: 267 LBS

## 2018-02-09 DIAGNOSIS — B19.10 HEPATITIS B INFECTION WITHOUT DELTA AGENT WITHOUT HEPATIC COMA, UNSPECIFIED CHRONICITY: ICD-10-CM

## 2018-02-09 DIAGNOSIS — R79.89 ELEVATED LFTS: Primary | ICD-10-CM

## 2018-02-09 DIAGNOSIS — Z86.19 HISTORY OF HEPATITIS C: ICD-10-CM

## 2018-02-09 PROCEDURE — G8417 CALC BMI ABV UP PARAM F/U: HCPCS | Performed by: INTERNAL MEDICINE

## 2018-02-09 PROCEDURE — G8484 FLU IMMUNIZE NO ADMIN: HCPCS | Performed by: INTERNAL MEDICINE

## 2018-02-09 PROCEDURE — 99204 OFFICE O/P NEW MOD 45 MIN: CPT | Performed by: INTERNAL MEDICINE

## 2018-02-09 PROCEDURE — G8427 DOCREV CUR MEDS BY ELIG CLIN: HCPCS | Performed by: INTERNAL MEDICINE

## 2018-02-09 PROCEDURE — 4004F PT TOBACCO SCREEN RCVD TLK: CPT | Performed by: INTERNAL MEDICINE

## 2018-02-09 ASSESSMENT — ENCOUNTER SYMPTOMS
TROUBLE SWALLOWING: 0
BLOOD IN STOOL: 0
SINUS PRESSURE: 1
ABDOMINAL PAIN: 1
ABDOMINAL DISTENTION: 1
ANAL BLEEDING: 0
NAUSEA: 1
SINUS PAIN: 1
SHORTNESS OF BREATH: 0
CONSTIPATION: 0
VOMITING: 0
RECTAL PAIN: 0
COUGH: 0
SORE THROAT: 0
DIARRHEA: 0
BACK PAIN: 1
CHOKING: 0

## 2018-02-09 NOTE — PROGRESS NOTES
Substance Abuse Paternal Aunt     Substance Abuse Paternal Uncle     Substance Abuse Maternal Grandmother     Depression Maternal Grandmother      No known GI pathology. SOCIAL HISTORY:   Social History     Social History    Marital status: Single     Spouse name: N/A    Number of children: N/A    Years of education: N/A     Occupational History    Not on file. Social History Main Topics    Smoking status: Current Every Day Smoker     Packs/day: 1.00     Years: 25.00    Smokeless tobacco: Never Used    Alcohol use No    Drug use: No      Comment: stopped a 1and  1/2 years ago.  Sexual activity: Yes     Partners: Female     Other Topics Concern    Not on file     Social History Narrative    No narrative on file       REVIEW OF SYSTEMS: A 12-point review of systems was obtained and pertinent positives and negatives were enumerated above in the history of present illness. All other reviewed systems / symptoms were negative. Review of Systems   Constitutional: Positive for chills, diaphoresis, fatigue and fever. HENT: Positive for sinus pain and sinus pressure. Negative for congestion, sore throat and trouble swallowing. Eyes: Positive for visual disturbance. Respiratory: Negative for cough, choking and shortness of breath. Cardiovascular: Positive for chest pain (occasional - not currently). Gastrointestinal: Positive for abdominal distention, abdominal pain and nausea. Negative for anal bleeding, blood in stool, constipation, diarrhea, rectal pain and vomiting. Endocrine: Negative. Genitourinary: Positive for difficulty urinating. Musculoskeletal: Positive for arthralgias and back pain. Skin: Negative. Allergic/Immunologic: Negative for environmental allergies and food allergies. Neurological: Positive for headaches. Negative for dizziness and light-headedness. Hematological: Bruises/bleeds easily. Psychiatric/Behavioral: Negative for sleep disturbance. PHYSICAL EXAMINATION: Vital signs reviewed per the nursing documentation. /71   Pulse 86   Ht 6' 1\" (1.854 m)   Wt 267 lb (121.1 kg)   SpO2 95%   BMI 35.23 kg/m²   Body mass index is 35.23 kg/m². I personally reviewed the nurse's notes and documentation and I agree with her notes. General: alert, appears stated age and cooperative Psych: Normal. and Alert and oriented, appropriate affect. . Normal affect. Mentation normal  HEENT: PERRLA. Clear conjunctivae and sclerae. Moist oral mucosae, no lesions or ulcers. The neck is supple, without lymphadenopathy or jugular venous distension. No masses. Normal thyroid. Cardiovascular: S1 S2 RRR no rubs or murmurs. Pulmonary: clear BL. No accessory muscle usage. Abdominal Exam: Soft, NT ND, no hepato or spleno megaly, +BS, no ascites. No groin masses or lymphadenopathy. Extremities: no lower ext edema. Skin: Warm skin. No skin rash. No spider nevi palmar erythema nail dystrophy. Joint: No joint swelling or deformity. Neurological: intact sensory. DTR+. LABORATORY DATA: Reviewed  Lab Results   Component Value Date    WBC 8.9 10/11/2017    HGB 15.5 10/11/2017    HCT 46.5 10/11/2017    MCV 90.7 10/11/2017     10/11/2017     10/11/2017    K 5.0 10/11/2017     10/11/2017    CO2 20 10/11/2017    BUN 14 10/11/2017    CREATININE 0.92 10/11/2017    LABALBU 3.1 (L) 02/01/2018    BILITOT 1.66 (H) 02/01/2018    ALKPHOS 208 (H) 02/01/2018     (H) 02/01/2018     (H) 02/01/2018    INR 1.2 02/01/2018         Lab Results   Component Value Date    RBC 5.13 10/11/2017    HGB 15.5 10/11/2017    MCV 90.7 10/11/2017    MCH 30.3 10/11/2017    MCHC 33.4 10/11/2017    RDW 15.7 (H) 10/11/2017    MPV 10.1 10/11/2017    BASOPCT 1 10/11/2017    LYMPHSABS 1.60 10/11/2017    MONOSABS 0.80 10/11/2017    NEUTROABS 6.30 10/11/2017    EOSABS 0.20 10/11/2017    BASOSABS 0.10 10/11/2017         DIAGNOSTIC TESTING:     No results found.

## 2018-02-12 RX ORDER — ONDANSETRON 4 MG/1
TABLET, FILM COATED ORAL
Qty: 30 TABLET | Refills: 0 | Status: SHIPPED | OUTPATIENT
Start: 2018-02-12 | End: 2018-03-16 | Stop reason: ALTCHOICE

## 2018-02-12 NOTE — TELEPHONE ENCOUNTER
Metabolic w Bili Profile Once 02/09/2018   CBC Auto Differential Once 05/10/2018   Ferritin Once 05/10/2018   Iron Profile Once 05/10/2018   Liver Fibrosis, Chronic Viral Hepatitis Once 02/09/2018       Next Visit Date:  Future Appointments  Date Time Provider Didier Burton   2/16/2018 1:00 PM Leigha Graf MD [facility]   3/15/2018 1:00 PM Aminah Huddleston MD [facility]            Patient Active Problem List:     Long-term current use of methadone for opiate dependence ([facility]     Tobacco abuse     Type 2 diabetes mellitus without complication, with long-term current use of insulin ([facility]     Hepatitis B     History of hepatitis C

## 2018-02-13 ENCOUNTER — HOSPITAL ENCOUNTER (OUTPATIENT)
Age: 43
Setting detail: SPECIMEN
Discharge: HOME | End: 2018-02-13
Payer: MEDICARE

## 2018-02-13 ENCOUNTER — OFFICE VISIT (OUTPATIENT)
Dept: INTERNAL MEDICINE | Age: 43
End: 2018-02-13
Payer: MEDICARE

## 2018-02-13 VITALS
WEIGHT: 269 LBS | HEART RATE: 79 BPM | BODY MASS INDEX: 35.65 KG/M2 | DIASTOLIC BLOOD PRESSURE: 67 MMHG | HEIGHT: 73 IN | SYSTOLIC BLOOD PRESSURE: 117 MMHG

## 2018-02-13 DIAGNOSIS — Z79.4 TYPE 2 DIABETES MELLITUS WITHOUT COMPLICATION, WITH LONG-TERM CURRENT USE OF INSULIN (HCC): Primary | ICD-10-CM

## 2018-02-13 DIAGNOSIS — B19.10 HEPATITIS B INFECTION WITHOUT DELTA AGENT WITHOUT HEPATIC COMA, UNSPECIFIED CHRONICITY: ICD-10-CM

## 2018-02-13 DIAGNOSIS — Z86.19 HISTORY OF HEPATITIS C: ICD-10-CM

## 2018-02-13 DIAGNOSIS — E11.9 TYPE 2 DIABETES MELLITUS WITHOUT COMPLICATION, WITH LONG-TERM CURRENT USE OF INSULIN (HCC): Primary | ICD-10-CM

## 2018-02-13 DIAGNOSIS — R79.89 ELEVATED LFTS: ICD-10-CM

## 2018-02-13 LAB
ABSOLUTE EOS #: 0.2 K/UL (ref 0–0.44)
ABSOLUTE IMMATURE GRANULOCYTE: 0.03 K/UL (ref 0–0.3)
ABSOLUTE LYMPH #: 1.77 K/UL (ref 1.1–3.7)
ABSOLUTE MONO #: 0.64 K/UL (ref 0.1–1.2)
ALBUMIN SERPL-MCNC: 3.2 G/DL (ref 3.5–5.2)
ALBUMIN/GLOBULIN RATIO: 0.9 (ref 1–2.5)
ALP BLD-CCNC: 210 U/L (ref 40–129)
ALT SERPL-CCNC: 307 U/L (ref 5–41)
ANION GAP SERPL CALCULATED.3IONS-SCNC: 11 MMOL/L (ref 9–17)
AST SERPL-CCNC: 237 U/L
BASOPHILS # BLD: 1 % (ref 0–2)
BASOPHILS ABSOLUTE: 0.05 K/UL (ref 0–0.2)
BILIRUB SERPL-MCNC: 1.84 MG/DL (ref 0.3–1.2)
BILIRUBIN DIRECT: 0.91 MG/DL
BILIRUBIN, INDIRECT: 0.93 MG/DL (ref 0–1)
BUN BLDV-MCNC: 13 MG/DL (ref 6–20)
CALCIUM SERPL-MCNC: 8.9 MG/DL (ref 8.6–10.4)
CHLORIDE BLD-SCNC: 104 MMOL/L (ref 98–107)
CO2: 23 MMOL/L (ref 20–31)
CREAT SERPL-MCNC: 0.87 MG/DL (ref 0.7–1.2)
DIFFERENTIAL TYPE: ABNORMAL
EOSINOPHILS RELATIVE PERCENT: 3 % (ref 1–4)
FERRITIN: 315 UG/L (ref 30–400)
GFR AFRICAN AMERICAN: >60 ML/MIN
GFR NON-AFRICAN AMERICAN: >60 ML/MIN
GFR SERPL CREATININE-BSD FRML MDRD: ABNORMAL ML/MIN/{1.73_M2}
GFR SERPL CREATININE-BSD FRML MDRD: ABNORMAL ML/MIN/{1.73_M2}
GLUCOSE BLD-MCNC: 141 MG/DL (ref 70–99)
HAV AB SERPL IA-ACNC: NONREACTIVE
HBV SURFACE AB TITR SER: <3.5 MIU/ML
HCT VFR BLD CALC: 41.1 % (ref 40.7–50.3)
HEMOGLOBIN: 13.4 G/DL (ref 13–17)
HEPATITIS B CORE TOTAL ANTIBODY: REACTIVE
IGG: 2318 MG/DL (ref 700–1600)
IGM: 130 MG/DL (ref 40–230)
IMMATURE GRANULOCYTES: 0 %
IRON SATURATION: 53 % (ref 20–55)
IRON: 117 UG/DL (ref 59–158)
LYMPHOCYTES # BLD: 26 % (ref 24–43)
MCH RBC QN AUTO: 30.9 PG (ref 25.2–33.5)
MCHC RBC AUTO-ENTMCNC: 32.6 G/DL (ref 28.4–34.8)
MCV RBC AUTO: 94.9 FL (ref 82.6–102.9)
MONOCYTES # BLD: 9 % (ref 3–12)
NRBC AUTOMATED: 0 PER 100 WBC
PDW BLD-RTO: 15.5 % (ref 11.8–14.4)
PLATELET # BLD: 100 K/UL (ref 138–453)
PLATELET ESTIMATE: ABNORMAL
PMV BLD AUTO: 11.8 FL (ref 8.1–13.5)
POTASSIUM SERPL-SCNC: 4.3 MMOL/L (ref 3.7–5.3)
RBC # BLD: 4.33 M/UL (ref 4.21–5.77)
RBC # BLD: ABNORMAL 10*6/UL
SEG NEUTROPHILS: 61 % (ref 36–65)
SEGMENTED NEUTROPHILS ABSOLUTE COUNT: 4.24 K/UL (ref 1.5–8.1)
SODIUM BLD-SCNC: 138 MMOL/L (ref 135–144)
TOTAL IRON BINDING CAPACITY: 222 UG/DL (ref 250–450)
TOTAL PROTEIN: 6.9 G/DL (ref 6.4–8.3)
UNSATURATED IRON BINDING CAPACITY: 105 UG/DL (ref 112–347)
WBC # BLD: 6.9 K/UL (ref 3.5–11.3)
WBC # BLD: ABNORMAL 10*3/UL

## 2018-02-13 PROCEDURE — 86708 HEPATITIS A ANTIBODY: CPT

## 2018-02-13 PROCEDURE — 80053 COMPREHEN METABOLIC PANEL: CPT

## 2018-02-13 PROCEDURE — 86255 FLUORESCENT ANTIBODY SCREEN: CPT

## 2018-02-13 PROCEDURE — 86707 HEPATITIS BE ANTIBODY: CPT

## 2018-02-13 PROCEDURE — 84520 ASSAY OF UREA NITROGEN: CPT

## 2018-02-13 PROCEDURE — 36415 COLL VENOUS BLD VENIPUNCTURE: CPT

## 2018-02-13 PROCEDURE — 3046F HEMOGLOBIN A1C LEVEL >9.0%: CPT | Performed by: INTERNAL MEDICINE

## 2018-02-13 PROCEDURE — 99213 OFFICE O/P EST LOW 20 MIN: CPT | Performed by: INTERNAL MEDICINE

## 2018-02-13 PROCEDURE — 4004F PT TOBACCO SCREEN RCVD TLK: CPT | Performed by: INTERNAL MEDICINE

## 2018-02-13 PROCEDURE — 87340 HEPATITIS B SURFACE AG IA: CPT

## 2018-02-13 PROCEDURE — G8484 FLU IMMUNIZE NO ADMIN: HCPCS | Performed by: INTERNAL MEDICINE

## 2018-02-13 PROCEDURE — 86704 HEP B CORE ANTIBODY TOTAL: CPT

## 2018-02-13 PROCEDURE — G8417 CALC BMI ABV UP PARAM F/U: HCPCS | Performed by: INTERNAL MEDICINE

## 2018-02-13 PROCEDURE — 82248 BILIRUBIN DIRECT: CPT

## 2018-02-13 PROCEDURE — 87350 HEPATITIS BE AG IA: CPT

## 2018-02-13 PROCEDURE — 83540 ASSAY OF IRON: CPT

## 2018-02-13 PROCEDURE — 82784 ASSAY IGA/IGD/IGG/IGM EACH: CPT

## 2018-02-13 PROCEDURE — 87522 HEPATITIS C REVRS TRNSCRPJ: CPT

## 2018-02-13 PROCEDURE — 86038 ANTINUCLEAR ANTIBODIES: CPT

## 2018-02-13 PROCEDURE — 82728 ASSAY OF FERRITIN: CPT

## 2018-02-13 PROCEDURE — 85025 COMPLETE CBC W/AUTO DIFF WBC: CPT

## 2018-02-13 PROCEDURE — 83883 ASSAY NEPHELOMETRY NOT SPEC: CPT

## 2018-02-13 PROCEDURE — 83516 IMMUNOASSAY NONANTIBODY: CPT

## 2018-02-13 PROCEDURE — 86317 IMMUNOASSAY INFECTIOUS AGENT: CPT

## 2018-02-13 PROCEDURE — 82977 ASSAY OF GGT: CPT

## 2018-02-13 PROCEDURE — 83550 IRON BINDING TEST: CPT

## 2018-02-13 PROCEDURE — 84460 ALANINE AMINO (ALT) (SGPT): CPT

## 2018-02-13 PROCEDURE — 84450 TRANSFERASE (AST) (SGOT): CPT

## 2018-02-13 PROCEDURE — 86376 MICROSOMAL ANTIBODY EACH: CPT

## 2018-02-13 PROCEDURE — 87517 HEPATITIS B DNA QUANT: CPT

## 2018-02-13 PROCEDURE — G8427 DOCREV CUR MEDS BY ELIG CLIN: HCPCS | Performed by: INTERNAL MEDICINE

## 2018-02-13 NOTE — PROGRESS NOTES
Visit Information    Have you changed or started any medications since your last visit including any over-the-counter medicines, vitamins, or herbal medicines? yes - motrin   Have you stopped taking any of your medications? Is so, why? -  no  Are you having any side effects from any of your medications? - no    Have you seen any other physician or provider since your last visit? yes - GI   Have you had any other diagnostic tests since your last visit?  no   Have you been seen in the emergency room and/or had an admission in a hospital since we last saw you?  no   Have you had your routine dental cleaning in the past 6 months?  yes -      Do you have an active [employer] account? If no, what is the barrier?   Yes    Patient Care Team:  Katelyn Castañeda MD as PCP - General (Internal Medicine)  Mikayla Howard MD as Consulting Physician (Gastroenterology)    Medical History Review  Past Medical, Family, and Social History reviewed and does contribute to the patient presenting condition    Health Maintenance   Topic Date Due    Diabetic foot exam  02/18/1986    Diabetic retinal exam  08/09/2018 (Originally 2/18/1986)    Diabetic microalbuminuria test  08/09/2018    Lipid screen  10/11/2018    Potassium monitoring  10/11/2018    Creatinine monitoring  10/11/2018    A1C test (Diabetic or Prediabetic)  12/27/2018    DTaP/Tdap/Td vaccine (2 - Td) 10/08/2022    Flu vaccine  Completed    Pneumococcal med risk  Completed    HIV screen  Completed

## 2018-02-14 LAB
ANTI-NUCLEAR ANTIBODY (ANA): POSITIVE
DIRECT EXAM: NORMAL
HEPATITIS B SURFACE ANTIGEN: REACTIVE
Lab: NORMAL
SPECIMEN DESCRIPTION: NORMAL
STATUS: NORMAL

## 2018-02-15 LAB
ALANINE AMINOTRANSFERASE, FIBROMETER: 327 U/L (ref 5–50)
ALPHA-2-MACROGLOBULIN, FIBROMETER: 152 MG/DL (ref 131–293)
ASPARTATE AMINOTRANSFERASE, FIBROMETER: 253 U/L (ref 9–50)
CIRRHOMETER PATIENT SCORE: 0.89
EER FIBROMETER REPORT: ABNORMAL
FIBROMETER INTERPRETATION: ABNORMAL
FIBROMETER PATIENT SCORE: 0.99
FIBROMETER PLATELET COUNT: 99
FIBROMETER PROTHROMBIN INDEX: 61 % (ref 90–120)
FIBROSIS METAVIR CLASSIFICATION: ABNORMAL
GAMMA GLUTAMYL TRANSFERASE, FIBROMETER: 608 U/L (ref 7–51)
HEPATITIS BE ANTIBODY: NEGATIVE
HEPATITIS BE ANTIGEN: POSITIVE
INFLAMETER METAVIR CLASSIFICATION: ABNORMAL
INFLAMETER PATIENT SCORE: 0.97
LIVER-KIDNEY MICROSOMAL AB: NORMAL
MITOCHONDRIAL ANTIBODY: 6.2 UNITS (ref 0–20)
SMOOTH MUSCLE ANTIBODY: NORMAL
UREA NITROGEN, FIBROMETER: 13 MG/DL (ref 7–20)

## 2018-02-16 ENCOUNTER — HOSPITAL ENCOUNTER (OUTPATIENT)
Age: 43
Setting detail: SPECIMEN
Discharge: HOME | End: 2018-02-16
Payer: MEDICARE

## 2018-02-16 ENCOUNTER — OFFICE VISIT (OUTPATIENT)
Dept: INTERNAL MEDICINE | Age: 43
End: 2018-02-16
Payer: MEDICARE

## 2018-02-16 VITALS
HEART RATE: 89 BPM | HEIGHT: 73 IN | WEIGHT: 262 LBS | SYSTOLIC BLOOD PRESSURE: 114 MMHG | DIASTOLIC BLOOD PRESSURE: 77 MMHG | BODY MASS INDEX: 34.72 KG/M2

## 2018-02-16 DIAGNOSIS — R53.83 FATIGUE, UNSPECIFIED TYPE: ICD-10-CM

## 2018-02-16 DIAGNOSIS — Z79.4 TYPE 2 DIABETES MELLITUS WITHOUT COMPLICATION, WITH LONG-TERM CURRENT USE OF INSULIN (HCC): Primary | ICD-10-CM

## 2018-02-16 DIAGNOSIS — J45.20 MILD INTERMITTENT ASTHMA WITHOUT COMPLICATION: ICD-10-CM

## 2018-02-16 DIAGNOSIS — R35.0 FREQUENCY OF MICTURITION: ICD-10-CM

## 2018-02-16 DIAGNOSIS — E11.9 TYPE 2 DIABETES MELLITUS WITHOUT COMPLICATION, WITH LONG-TERM CURRENT USE OF INSULIN (HCC): Primary | ICD-10-CM

## 2018-02-16 DIAGNOSIS — K21.9 GASTROESOPHAGEAL REFLUX DISEASE, ESOPHAGITIS PRESENCE NOT SPECIFIED: ICD-10-CM

## 2018-02-16 DIAGNOSIS — R29.818 SUSPECTED SLEEP APNEA: ICD-10-CM

## 2018-02-16 DIAGNOSIS — I50.32 CHRONIC DIASTOLIC CONGESTIVE HEART FAILURE (HCC): ICD-10-CM

## 2018-02-16 PROBLEM — A04.8 H. PYLORI INFECTION: Status: ACTIVE | Noted: 2018-01-15

## 2018-02-16 LAB
-: NORMAL
AMORPHOUS: NORMAL
BACTERIA: NORMAL
BILIRUBIN URINE: ABNORMAL
BILIRUBIN, POC: ABNORMAL
BLOOD URINE, POC: ABNORMAL
CASTS UA: NORMAL /LPF (ref 0–8)
CLARITY, POC: CLEAR
COLOR, POC: ABNORMAL
COLOR: ABNORMAL
COMMENT UA: ABNORMAL
CRYSTALS, UA: NORMAL /HPF
EPITHELIAL CELLS UA: NORMAL /HPF (ref 0–5)
GLUCOSE BLD-MCNC: 225 MG/DL
GLUCOSE URINE, POC: ABNORMAL
GLUCOSE URINE: NEGATIVE
HBV DNA QNT I/U: NORMAL IU/ML
HBV DNA ULTRA QNT REALTIME PCR: >8.2 LOG IU
HEP B PCR INTERP: DETECTED
KETONES, POC: ABNORMAL
KETONES, URINE: NEGATIVE
LEUKOCYTE EST, POC: ABNORMAL
LEUKOCYTE ESTERASE, URINE: NEGATIVE
MUCUS: NORMAL
NITRITE, POC: ABNORMAL
NITRITE, URINE: POSITIVE
OTHER OBSERVATIONS UA: NORMAL
PH UA: 5.5 (ref 5–8)
PH, POC: 5.5
PROTEIN UA: NEGATIVE
PROTEIN, POC: ABNORMAL
RBC UA: NORMAL /HPF (ref 0–4)
RENAL EPITHELIAL, UA: NORMAL /HPF
SPECIFIC GRAVITY UA: 1.02 (ref 1–1.03)
SPECIFIC GRAVITY, POC: 1.03
TRICHOMONAS: NORMAL
TURBIDITY: CLEAR
URINE HGB: NEGATIVE
UROBILINOGEN, POC: 2
UROBILINOGEN, URINE: NORMAL
WBC UA: NORMAL /HPF (ref 0–5)
YEAST: NORMAL

## 2018-02-16 PROCEDURE — G8484 FLU IMMUNIZE NO ADMIN: HCPCS | Performed by: HOSPITALIST

## 2018-02-16 PROCEDURE — 82962 GLUCOSE BLOOD TEST: CPT | Performed by: HOSPITALIST

## 2018-02-16 PROCEDURE — 4004F PT TOBACCO SCREEN RCVD TLK: CPT | Performed by: HOSPITALIST

## 2018-02-16 PROCEDURE — 3046F HEMOGLOBIN A1C LEVEL >9.0%: CPT | Performed by: HOSPITALIST

## 2018-02-16 PROCEDURE — G8417 CALC BMI ABV UP PARAM F/U: HCPCS | Performed by: HOSPITALIST

## 2018-02-16 PROCEDURE — G8427 DOCREV CUR MEDS BY ELIG CLIN: HCPCS | Performed by: HOSPITALIST

## 2018-02-16 PROCEDURE — 99214 OFFICE O/P EST MOD 30 MIN: CPT | Performed by: HOSPITALIST

## 2018-02-16 RX ORDER — ALBUTEROL SULFATE 90 UG/1
2 AEROSOL, METERED RESPIRATORY (INHALATION) EVERY 4 HOURS PRN
Qty: 1 INHALER | Refills: 3 | Status: SHIPPED | OUTPATIENT
Start: 2018-02-16

## 2018-02-16 RX ORDER — FUROSEMIDE 20 MG/1
20 TABLET ORAL DAILY
Qty: 30 TABLET | Refills: 2 | Status: SHIPPED | OUTPATIENT
Start: 2018-02-16 | End: 2018-03-16 | Stop reason: SDUPTHER

## 2018-02-16 RX ORDER — ASPIRIN 81 MG/1
81 TABLET ORAL DAILY
Qty: 90 TABLET | Refills: 3 | Status: SHIPPED | OUTPATIENT
Start: 2018-02-16 | End: 2018-03-16 | Stop reason: SDUPTHER

## 2018-02-16 RX ORDER — PANTOPRAZOLE SODIUM 40 MG/1
40 TABLET, DELAYED RELEASE ORAL DAILY
Qty: 30 TABLET | Refills: 2 | Status: SHIPPED | OUTPATIENT
Start: 2018-02-16 | End: 2018-03-16 | Stop reason: SDUPTHER

## 2018-02-16 RX ORDER — ATORVASTATIN CALCIUM 40 MG/1
40 TABLET, FILM COATED ORAL DAILY
Qty: 30 TABLET | Refills: 5 | Status: CANCELLED | OUTPATIENT
Start: 2018-02-16

## 2018-02-16 ASSESSMENT — ENCOUNTER SYMPTOMS
ORTHOPNEA: 0
HEARTBURN: 1
EYE PAIN: 0
PHOTOPHOBIA: 0
DIARRHEA: 0
EYE DISCHARGE: 0
CONSTIPATION: 0
EYE REDNESS: 0
ABDOMINAL PAIN: 0
BLOOD IN STOOL: 0
VOMITING: 0
NAUSEA: 0
RESPIRATORY NEGATIVE: 1
BLURRED VISION: 1
DOUBLE VISION: 0

## 2018-02-16 NOTE — PROGRESS NOTES
vaccine (2 - Td) 10/08/2022    Flu vaccine  Completed    Pneumococcal med risk  Completed    HIV screen  Completed

## 2018-02-17 LAB
CULTURE: NORMAL
CULTURE: NORMAL
Lab: NORMAL
SPECIMEN DESCRIPTION: NORMAL
STATUS: NORMAL

## 2018-02-23 DIAGNOSIS — E11.9 NEW ONSET TYPE 2 DIABETES MELLITUS (HCC): ICD-10-CM

## 2018-03-12 ENCOUNTER — TELEPHONE (OUTPATIENT)
Dept: GASTROENTEROLOGY | Age: 43
End: 2018-03-12

## 2018-03-16 ENCOUNTER — HOSPITAL ENCOUNTER (OUTPATIENT)
Age: 43
Setting detail: SPECIMEN
Discharge: HOME | End: 2018-03-16
Payer: MEDICARE

## 2018-03-16 ENCOUNTER — OFFICE VISIT (OUTPATIENT)
Dept: INTERNAL MEDICINE | Age: 43
End: 2018-03-16
Payer: MEDICARE

## 2018-03-16 VITALS
DIASTOLIC BLOOD PRESSURE: 66 MMHG | WEIGHT: 267 LBS | HEART RATE: 80 BPM | BODY MASS INDEX: 35.23 KG/M2 | SYSTOLIC BLOOD PRESSURE: 125 MMHG

## 2018-03-16 DIAGNOSIS — M25.561 CHRONIC PAIN OF BOTH KNEES: ICD-10-CM

## 2018-03-16 DIAGNOSIS — M25.562 CHRONIC PAIN OF BOTH KNEES: ICD-10-CM

## 2018-03-16 DIAGNOSIS — G89.29 CHRONIC PAIN OF BOTH KNEES: ICD-10-CM

## 2018-03-16 DIAGNOSIS — Z79.4 TYPE 2 DIABETES MELLITUS WITHOUT COMPLICATION, WITH LONG-TERM CURRENT USE OF INSULIN (HCC): Primary | ICD-10-CM

## 2018-03-16 DIAGNOSIS — K21.9 GASTROESOPHAGEAL REFLUX DISEASE, ESOPHAGITIS PRESENCE NOT SPECIFIED: ICD-10-CM

## 2018-03-16 DIAGNOSIS — B18.2 CHRONIC HEPATITIS C WITHOUT HEPATIC COMA (HCC): ICD-10-CM

## 2018-03-16 DIAGNOSIS — I50.32 CHRONIC DIASTOLIC CONGESTIVE HEART FAILURE (HCC): ICD-10-CM

## 2018-03-16 DIAGNOSIS — E11.9 TYPE 2 DIABETES MELLITUS WITHOUT COMPLICATION, WITH LONG-TERM CURRENT USE OF INSULIN (HCC): Primary | ICD-10-CM

## 2018-03-16 PROBLEM — A04.8 H. PYLORI INFECTION: Status: RESOLVED | Noted: 2018-01-15 | Resolved: 2018-03-16

## 2018-03-16 LAB
C-REACTIVE PROTEIN: 35.3 MG/L (ref 0–5)
INR BLD: 1.3
PROTHROMBIN TIME: 13.2 SEC (ref 9–12)
RHEUMATOID FACTOR: 10.4 IU/ML
SEDIMENTATION RATE, ERYTHROCYTE: 10 MM (ref 0–10)

## 2018-03-16 PROCEDURE — 99213 OFFICE O/P EST LOW 20 MIN: CPT

## 2018-03-16 PROCEDURE — G8427 DOCREV CUR MEDS BY ELIG CLIN: HCPCS | Performed by: HOSPITALIST

## 2018-03-16 PROCEDURE — 4004F PT TOBACCO SCREEN RCVD TLK: CPT | Performed by: HOSPITALIST

## 2018-03-16 PROCEDURE — 85610 PROTHROMBIN TIME: CPT

## 2018-03-16 PROCEDURE — G8482 FLU IMMUNIZE ORDER/ADMIN: HCPCS | Performed by: HOSPITALIST

## 2018-03-16 PROCEDURE — 36415 COLL VENOUS BLD VENIPUNCTURE: CPT

## 2018-03-16 PROCEDURE — 86431 RHEUMATOID FACTOR QUANT: CPT

## 2018-03-16 PROCEDURE — 86140 C-REACTIVE PROTEIN: CPT

## 2018-03-16 PROCEDURE — 85651 RBC SED RATE NONAUTOMATED: CPT

## 2018-03-16 PROCEDURE — G8417 CALC BMI ABV UP PARAM F/U: HCPCS | Performed by: HOSPITALIST

## 2018-03-16 PROCEDURE — 3046F HEMOGLOBIN A1C LEVEL >9.0%: CPT | Performed by: HOSPITALIST

## 2018-03-16 PROCEDURE — 86200 CCP ANTIBODY: CPT

## 2018-03-16 PROCEDURE — 99214 OFFICE O/P EST MOD 30 MIN: CPT | Performed by: HOSPITALIST

## 2018-03-16 RX ORDER — ASPIRIN 81 MG/1
81 TABLET ORAL DAILY
Qty: 90 TABLET | Refills: 5 | Status: SHIPPED | OUTPATIENT
Start: 2018-03-16 | End: 2019-04-05 | Stop reason: SDUPTHER

## 2018-03-16 RX ORDER — FUROSEMIDE 20 MG/1
20 TABLET ORAL DAILY
Qty: 30 TABLET | Refills: 5 | Status: SHIPPED | OUTPATIENT
Start: 2018-03-16 | End: 2018-09-28 | Stop reason: SDUPTHER

## 2018-03-16 RX ORDER — PANTOPRAZOLE SODIUM 40 MG/1
40 TABLET, DELAYED RELEASE ORAL DAILY
Qty: 30 TABLET | Refills: 5 | Status: SHIPPED | OUTPATIENT
Start: 2018-03-16 | End: 2018-09-28 | Stop reason: SDUPTHER

## 2018-03-16 ASSESSMENT — ENCOUNTER SYMPTOMS
EYES NEGATIVE: 1
BACK PAIN: 0
RESPIRATORY NEGATIVE: 1
GASTROINTESTINAL NEGATIVE: 1

## 2018-03-16 NOTE — PROGRESS NOTES
Uncle     Substance Abuse Maternal Grandmother     Depression Maternal Grandmother         REVIEW OF SYSTEMS:  Review of Systems   Constitutional: Negative. HENT: Negative. Eyes: Negative. Respiratory: Negative. Cardiovascular: Negative. Gastrointestinal: Negative. Genitourinary: Negative. Musculoskeletal: Positive for joint pain (bilateral knee pain). Negative for back pain, falls, myalgias and neck pain. Skin: Negative. Neurological: Negative. Endo/Heme/Allergies: Bruises/bleeds easily (c/o prolonged bleeding). Psychiatric/Behavioral: Positive for substance abuse (taking methadone, recovering substance abuse). Negative for depression and suicidal ideas. PHYSICAL EXAM:  Vitals:    03/16/18 0830   BP: 125/66   Site: Left Arm   Position: Sitting   Cuff Size: Medium Adult   Pulse: 80   Weight: 267 lb (121.1 kg)     BP Readings from Last 3 Encounters:   03/16/18 125/66   02/16/18 114/77   02/13/18 117/67        Physical Exam   Constitutional: He is oriented to person, place, and time and well-developed, well-nourished, and in no distress. No distress. HENT:   Head: Normocephalic and atraumatic. Right Ear: External ear normal.   Left Ear: External ear normal.   Mouth/Throat: Oropharynx is clear and moist. No oropharyngeal exudate. Eyes: Conjunctivae and EOM are normal. Pupils are equal, round, and reactive to light. Right eye exhibits no discharge. Left eye exhibits no discharge. No scleral icterus. Neck: Normal range of motion. Neck supple. No JVD present. No tracheal deviation present. Cardiovascular: Normal rate, regular rhythm, normal heart sounds and intact distal pulses. Exam reveals no gallop and no friction rub. No murmur heard. Pulmonary/Chest: Effort normal and breath sounds normal. No stridor. No respiratory distress. He has no wheezes. He has no rales. He exhibits no tenderness. Abdominal: Soft.  Bowel sounds are normal. He exhibits no distension and no Future  -     Cyclic Citrul Peptide Antibody, IgG; Future  -     Sedimentation Rate; Future  -     C-Reactive Protein; Future    Chronic hepatitis C without hepatic coma ([facility]  -     Protime-INR; Future      Will follow up with patient in 4 month for diabetes follow up. Pt is to follow up with GI on 3/23/2018 for hepatitis C treatment, Pt reporting they may obtain Liver Biopsy to assess for liver cirrhosis. Continue current diabetes treatment. Will follow up x-rays on knees, encouraged to lose weight, increase activity, and to treat with NSAIDs occassionally. Will obtain rheumatoid factor, anti-CCP, ESR, CRP      FOLLOW UP AND INSTRUCTIONS:  No Follow-up on file. · Theodore received counseling on the following healthy behaviors: nutrition, exercise, medication adherence and tobacco cessation    · Discussed use, benefit, and side effects of prescribed medications. Barriers to medication compliance addressed. All patient questions answered. Pt voiced understanding. · Patient given educational materials - see patient instructions    Olga Lovett M.D.  PGY-2 Internal Medicine  [facility]. This note is created with the assistance of a speech-recognition program. While intending to generate a document that actually reflects the content of the visit, the document can still have some mistakes which may not have been identified and corrected by editing.

## 2018-03-16 NOTE — PROGRESS NOTES
Diabetic visit information    BP Readings from Last 3 Encounters:   02/16/18 114/77   02/13/18 117/67   02/09/18 109/71       Hemoglobin A1C (%)   Date Value   12/27/2017 5.9   08/02/2017 9.5   07/09/2017 11.3     Microalb/Crt. Ratio (mcg/mg creat)   Date Value   08/09/2017 6     LDL Cholesterol (mg/dL)   Date Value   10/11/2017 79               Have you changed or started any medications since your last visit including any over-the-counter medicines, vitamins, or herbal medicines? no   Have you stopped taking any of your medications? Is so, why? -  no  Are you having any side effects from any of your medications? - no    Have you seen any other physician or provider since your last visit?  no   Have you had any other diagnostic tests since your last visit?  no   Have you been seen in the emergency room and/or had an admission in a hospital since we last saw you?  no     Have you had your annual diabetic retinal (eye) exam? No   (ensure copy of exam is in the chart)    Have you had your routine dental cleaning in the past 6 months? no    Do you have an active [email or web address] account? If not, what are your barriers? Yes    Patient Care Team:  Beryle Nephew, MD as PCP - General (Internal Medicine)  Ammie Boxer, MD as Consulting Physician (Gastroenterology)    Medical history Review  Past Medical, Family, and Social History reviewed and does not contribute to the patient presenting condition.     Health Maintenance   Topic Date Due    Diabetic retinal exam  08/09/2018 (Originally 2/18/1986)    Diabetic microalbuminuria test  08/09/2018    Lipid screen  10/11/2018    Creatinine monitoring  10/11/2018    A1C test (Diabetic or Prediabetic)  12/27/2018    Diabetic foot exam  01/10/2019    Potassium monitoring  02/13/2019    DTaP/Tdap/Td vaccine (2 - Td) 10/08/2022    Flu vaccine  Completed    Pneumococcal med risk  Completed    HIV screen  Completed

## 2018-03-20 LAB — CCP IGG ANTIBODIES: <1.5 U/ML

## 2018-03-21 ENCOUNTER — TELEPHONE (OUTPATIENT)
Dept: GASTROENTEROLOGY | Age: 43
End: 2018-03-21

## 2018-04-05 ENCOUNTER — HOSPITAL ENCOUNTER (OUTPATIENT)
Age: 43
Setting detail: SPECIMEN
Discharge: HOME | End: 2018-04-05
Payer: MEDICARE

## 2018-04-05 ENCOUNTER — OFFICE VISIT (OUTPATIENT)
Dept: GASTROENTEROLOGY | Age: 43
End: 2018-04-05
Payer: MEDICARE

## 2018-04-05 VITALS
SYSTOLIC BLOOD PRESSURE: 112 MMHG | DIASTOLIC BLOOD PRESSURE: 75 MMHG | BODY MASS INDEX: 35.39 KG/M2 | OXYGEN SATURATION: 95 % | WEIGHT: 267 LBS | HEIGHT: 73 IN | HEART RATE: 95 BPM

## 2018-04-05 DIAGNOSIS — B19.10 HEPATITIS B INFECTION WITHOUT DELTA AGENT WITHOUT HEPATIC COMA, UNSPECIFIED CHRONICITY: ICD-10-CM

## 2018-04-05 DIAGNOSIS — Z86.19 HISTORY OF HEPATITIS C: ICD-10-CM

## 2018-04-05 DIAGNOSIS — B19.10 HEPATITIS B INFECTION WITHOUT DELTA AGENT WITHOUT HEPATIC COMA, UNSPECIFIED CHRONICITY: Primary | ICD-10-CM

## 2018-04-05 LAB
ALBUMIN SERPL-MCNC: 3.2 G/DL (ref 3.5–5.2)
ALBUMIN/GLOBULIN RATIO: 0.7 (ref 1–2.5)
ALP BLD-CCNC: 187 U/L (ref 40–129)
ALT SERPL-CCNC: 155 U/L (ref 5–41)
AST SERPL-CCNC: 138 U/L
BILIRUB SERPL-MCNC: 1.7 MG/DL (ref 0.3–1.2)
BILIRUBIN DIRECT: 0.7 MG/DL
BILIRUBIN, INDIRECT: 1 MG/DL (ref 0–1)
GLOBULIN: ABNORMAL G/DL (ref 1.5–3.8)
TOTAL PROTEIN: 7.7 G/DL (ref 6.4–8.3)

## 2018-04-05 PROCEDURE — G8427 DOCREV CUR MEDS BY ELIG CLIN: HCPCS | Performed by: INTERNAL MEDICINE

## 2018-04-05 PROCEDURE — 4004F PT TOBACCO SCREEN RCVD TLK: CPT | Performed by: INTERNAL MEDICINE

## 2018-04-05 PROCEDURE — 99213 OFFICE O/P EST LOW 20 MIN: CPT | Performed by: INTERNAL MEDICINE

## 2018-04-05 PROCEDURE — G8417 CALC BMI ABV UP PARAM F/U: HCPCS | Performed by: INTERNAL MEDICINE

## 2018-04-05 ASSESSMENT — ENCOUNTER SYMPTOMS
SORE THROAT: 0
BLOOD IN STOOL: 0
VOMITING: 0
ABDOMINAL PAIN: 1
CONSTIPATION: 0
SHORTNESS OF BREATH: 0
ABDOMINAL DISTENTION: 1
CHOKING: 0
ANAL BLEEDING: 0
BACK PAIN: 1
SINUS PAIN: 1
COUGH: 0
SINUS PRESSURE: 1
DIARRHEA: 0
NAUSEA: 1
TROUBLE SWALLOWING: 0
RECTAL PAIN: 0

## 2018-04-09 LAB
HBV DNA QNT I/U: NORMAL IU/ML
HBV DNA ULTRA QNT REALTIME PCR: 7.1 LOG IU
HEP B PCR INTERP: DETECTED

## 2018-05-01 ENCOUNTER — HOSPITAL ENCOUNTER (OUTPATIENT)
Dept: INTERVENTIONAL RADIOLOGY/VASCULAR | Age: 43
Discharge: HOME | End: 2018-05-03
Payer: MEDICARE

## 2018-05-01 VITALS
TEMPERATURE: 98.6 F | HEART RATE: 80 BPM | OXYGEN SATURATION: 95 % | SYSTOLIC BLOOD PRESSURE: 106 MMHG | RESPIRATION RATE: 18 BRPM | DIASTOLIC BLOOD PRESSURE: 66 MMHG | WEIGHT: 267.7 LBS | HEIGHT: 73 IN | BODY MASS INDEX: 35.48 KG/M2

## 2018-05-01 DIAGNOSIS — B19.10 HEPATITIS B INFECTION WITHOUT DELTA AGENT WITHOUT HEPATIC COMA, UNSPECIFIED CHRONICITY: ICD-10-CM

## 2018-05-01 LAB
INR BLD: 1.2
PARTIAL THROMBOPLASTIN TIME: 26.8 SEC (ref 23–31)
PLATELET # BLD: 101 K/UL (ref 150–450)
PROTHROMBIN TIME: 12.6 SEC (ref 9.7–12)

## 2018-05-01 PROCEDURE — 7100000031 HC ASPR PHASE II RECOVERY - ADDTL 15 MIN

## 2018-05-01 PROCEDURE — 85049 AUTOMATED PLATELET COUNT: CPT

## 2018-05-01 PROCEDURE — 88313 SPECIAL STAINS GROUP 2: CPT

## 2018-05-01 PROCEDURE — 85610 PROTHROMBIN TIME: CPT

## 2018-05-01 PROCEDURE — 2580000003 HC RX 258: Performed by: RADIOLOGY

## 2018-05-01 PROCEDURE — 76942 ECHO GUIDE FOR BIOPSY: CPT | Performed by: RADIOLOGY

## 2018-05-01 PROCEDURE — 88307 TISSUE EXAM BY PATHOLOGIST: CPT

## 2018-05-01 PROCEDURE — 7100000030 HC ASPR PHASE II RECOVERY - FIRST 15 MIN

## 2018-05-01 PROCEDURE — 47000 NEEDLE BIOPSY OF LIVER PERQ: CPT | Performed by: RADIOLOGY

## 2018-05-01 PROCEDURE — 36415 COLL VENOUS BLD VENIPUNCTURE: CPT

## 2018-05-01 PROCEDURE — 85730 THROMBOPLASTIN TIME PARTIAL: CPT

## 2018-05-01 RX ORDER — SODIUM CHLORIDE 0.9 % (FLUSH) 0.9 %
10 SYRINGE (ML) INJECTION PRN
Status: DISCONTINUED | OUTPATIENT
Start: 2018-05-01 | End: 2018-05-04 | Stop reason: HOSPADM

## 2018-05-01 RX ORDER — ACETAMINOPHEN 325 MG/1
650 TABLET ORAL EVERY 4 HOURS PRN
Status: DISCONTINUED | OUTPATIENT
Start: 2018-05-01 | End: 2018-05-04 | Stop reason: HOSPADM

## 2018-05-01 RX ORDER — SODIUM CHLORIDE 9 MG/ML
INJECTION, SOLUTION INTRAVENOUS CONTINUOUS
Status: DISCONTINUED | OUTPATIENT
Start: 2018-05-01 | End: 2018-05-04 | Stop reason: HOSPADM

## 2018-05-01 RX ADMIN — SODIUM CHLORIDE: 9 INJECTION, SOLUTION INTRAVENOUS at 10:06

## 2018-05-01 ASSESSMENT — PAIN DESCRIPTION - PAIN TYPE
TYPE: ACUTE PAIN
TYPE: ACUTE PAIN

## 2018-05-01 ASSESSMENT — PAIN DESCRIPTION - ORIENTATION
ORIENTATION: RIGHT;UPPER
ORIENTATION: UPPER;RIGHT

## 2018-05-01 ASSESSMENT — PAIN DESCRIPTION - LOCATION
LOCATION: ABDOMEN
LOCATION: ABDOMEN

## 2018-05-01 ASSESSMENT — PAIN SCALES - GENERAL
PAINLEVEL_OUTOF10: 6
PAINLEVEL_OUTOF10: 7

## 2018-05-01 ASSESSMENT — PAIN - FUNCTIONAL ASSESSMENT: PAIN_FUNCTIONAL_ASSESSMENT: 0-10

## 2018-05-02 LAB — SURGICAL PATHOLOGY REPORT: NORMAL

## 2018-06-07 ENCOUNTER — TELEPHONE (OUTPATIENT)
Dept: INTERNAL MEDICINE | Age: 43
End: 2018-06-07

## 2018-07-18 RX ORDER — ONDANSETRON 4 MG/1
TABLET, FILM COATED ORAL
Qty: 30 TABLET | Refills: 0 | OUTPATIENT
Start: 2018-07-18

## 2018-07-18 RX ORDER — GABAPENTIN 800 MG/1
TABLET ORAL
Qty: 90 TABLET | Refills: 5 | OUTPATIENT
Start: 2018-07-18

## 2018-07-18 NOTE — TELEPHONE ENCOUNTER
E-scribe request for ONDANSETRON 4MG TAB. Please review and e-scribe if applicable. Last Visit Date:  3/16/18  Next Visit Date:  8/1/18    Hemoglobin A1C (%)   Date Value   12/27/2017 5.9   08/02/2017 9.5   07/09/2017 11.3             ( goal A1C is < 7)   Microalb/Crt.  Ratio (mcg/mg creat)   Date Value   08/09/2017 6     LDL Cholesterol (mg/dL)   Date Value   10/11/2017 79       (goal LDL is <100)   AST (U/L)   Date Value   04/05/2018 138 (H)     ALT (U/L)   Date Value   04/05/2018 155 (H)     BUN (mg/dL)   Date Value   02/13/2018 13     BP Readings from Last 3 Encounters:   05/01/18 106/66   04/05/18 112/75   03/16/18 125/66          (goal 120/80)        Patient Active Problem List:     Long-term current use of methadone for opiate dependence ([facility] [address]     Tobacco abuse     Type 2 diabetes mellitus without complication, with long-term current use of insulin (HCC)     Hepatitis B     History of hepatitis C     Gastroesophageal reflux disease     Chronic diastolic congestive heart failure ([facility] [address]      ----JF

## 2018-07-18 NOTE — TELEPHONE ENCOUNTER
E-scribe request for GABAPENTIN 800MG TAB. Please review and e-scribe if applicable. Last Visit Date:  3/16/18  Next Visit Date:  8/1/2018    Hemoglobin A1C (%)   Date Value   12/27/2017 5.9   08/02/2017 9.5   07/09/2017 11.3             ( goal A1C is < 7)   Microalb/Crt.  Ratio (mcg/mg creat)   Date Value   08/09/2017 6     LDL Cholesterol (mg/dL)   Date Value   10/11/2017 79       (goal LDL is <100)   AST (U/L)   Date Value   04/05/2018 138 (H)     ALT (U/L)   Date Value   04/05/2018 155 (H)     BUN (mg/dL)   Date Value   02/13/2018 13     BP Readings from Last 3 Encounters:   05/01/18 106/66   04/05/18 112/75   03/16/18 125/66          (goal 120/80)        Patient Active Problem List:     Long-term current use of methadone for opiate dependence ([facility] [address]     Tobacco abuse     Type 2 diabetes mellitus without complication, with long-term current use of insulin (HCC)     Hepatitis B     History of hepatitis C     Gastroesophageal reflux disease     Chronic diastolic congestive heart failure ([facility] [address]      ----JF

## 2018-08-01 ENCOUNTER — OFFICE VISIT (OUTPATIENT)
Dept: INTERNAL MEDICINE | Age: 43
End: 2018-08-01
Payer: MEDICARE

## 2018-08-01 ENCOUNTER — HOSPITAL ENCOUNTER (OUTPATIENT)
Age: 43
Setting detail: SPECIMEN
Discharge: HOME | End: 2018-08-01
Payer: MEDICARE

## 2018-08-01 VITALS
BODY MASS INDEX: 35.39 KG/M2 | SYSTOLIC BLOOD PRESSURE: 124 MMHG | DIASTOLIC BLOOD PRESSURE: 76 MMHG | WEIGHT: 267 LBS | HEART RATE: 66 BPM | HEIGHT: 73 IN

## 2018-08-01 DIAGNOSIS — G62.9 NEUROPATHY: ICD-10-CM

## 2018-08-01 DIAGNOSIS — Z79.4 TYPE 2 DIABETES MELLITUS WITH HYPERGLYCEMIA, WITH LONG-TERM CURRENT USE OF INSULIN (HCC): Primary | ICD-10-CM

## 2018-08-01 DIAGNOSIS — E11.65 TYPE 2 DIABETES MELLITUS WITH HYPERGLYCEMIA, WITH LONG-TERM CURRENT USE OF INSULIN (HCC): Primary | ICD-10-CM

## 2018-08-01 DIAGNOSIS — B19.10 HEPATITIS B INFECTION WITHOUT DELTA AGENT WITHOUT HEPATIC COMA, UNSPECIFIED CHRONICITY: ICD-10-CM

## 2018-08-01 DIAGNOSIS — F11.20 LONG-TERM CURRENT USE OF METHADONE FOR OPIATE DEPENDENCE (HCC): ICD-10-CM

## 2018-08-01 PROBLEM — L02.91 ABSCESS: Status: ACTIVE | Noted: 2018-06-04

## 2018-08-01 LAB
CREATININE URINE: 191.5 MG/DL (ref 39–259)
HBA1C MFR BLD: 7.2 %
MICROALBUMIN/CREAT 24H UR: <12 MG/L
MICROALBUMIN/CREAT UR-RTO: NORMAL MCG/MG CREAT

## 2018-08-01 PROCEDURE — 2022F DILAT RTA XM EVC RTNOPTHY: CPT | Performed by: HOSPITALIST

## 2018-08-01 PROCEDURE — 83036 HEMOGLOBIN GLYCOSYLATED A1C: CPT | Performed by: HOSPITALIST

## 2018-08-01 PROCEDURE — G8427 DOCREV CUR MEDS BY ELIG CLIN: HCPCS | Performed by: HOSPITALIST

## 2018-08-01 PROCEDURE — 3045F PR MOST RECENT HEMOGLOBIN A1C LEVEL 7.0-9.0%: CPT | Performed by: HOSPITALIST

## 2018-08-01 PROCEDURE — 99213 OFFICE O/P EST LOW 20 MIN: CPT | Performed by: INTERNAL MEDICINE

## 2018-08-01 PROCEDURE — 99213 OFFICE O/P EST LOW 20 MIN: CPT | Performed by: HOSPITALIST

## 2018-08-01 PROCEDURE — 4004F PT TOBACCO SCREEN RCVD TLK: CPT | Performed by: HOSPITALIST

## 2018-08-01 PROCEDURE — G8417 CALC BMI ABV UP PARAM F/U: HCPCS | Performed by: HOSPITALIST

## 2018-08-01 RX ORDER — GABAPENTIN 800 MG/1
800 TABLET ORAL 3 TIMES DAILY
Qty: 90 TABLET | Refills: 3 | Status: SHIPPED | OUTPATIENT
Start: 2018-08-01 | End: 2018-09-28 | Stop reason: SDUPTHER

## 2018-08-01 RX ORDER — GLYBURIDE 2.5 MG/1
2.5 TABLET ORAL
Qty: 30 TABLET | Refills: 3 | Status: CANCELLED | OUTPATIENT
Start: 2018-08-01

## 2018-08-01 ASSESSMENT — ENCOUNTER SYMPTOMS
VOMITING: 0
BLOOD IN STOOL: 0
SPUTUM PRODUCTION: 0
DOUBLE VISION: 0
CONSTIPATION: 0
HEMOPTYSIS: 0
PHOTOPHOBIA: 0
SHORTNESS OF BREATH: 0
BLURRED VISION: 0
WHEEZING: 0
DIARRHEA: 0
SINUS PAIN: 0
ABDOMINAL PAIN: 0
ORTHOPNEA: 0
NAUSEA: 0
HEARTBURN: 0
COUGH: 0

## 2018-08-01 ASSESSMENT — PATIENT HEALTH QUESTIONNAIRE - PHQ9
SUM OF ALL RESPONSES TO PHQ9 QUESTIONS 1 & 2: 0
SUM OF ALL RESPONSES TO PHQ QUESTIONS 1-9: 0
1. LITTLE INTEREST OR PLEASURE IN DOING THINGS: 0
2. FEELING DOWN, DEPRESSED OR HOPELESS: 0
1. LITTLE INTEREST OR PLEASURE IN DOING THINGS: 0
SUM OF ALL RESPONSES TO PHQ9 QUESTIONS 1 & 2: 0
2. FEELING DOWN, DEPRESSED OR HOPELESS: 0
SUM OF ALL RESPONSES TO PHQ QUESTIONS 1-9: 0

## 2018-08-01 NOTE — PROGRESS NOTES
His chronic medical problems including insulin-dependent diabetes type 2, obesity, hyperlipidemia. He has chronic hepatitis B which is currently being treated. He also has a history of hepatitis C. He has a history of IVDA in the past but has been on methadone for several years and stable. Diet is poor. A1c is higher than before. He is also having diarrhea and abdominal discomfort. Metformin. Will decrease metformin dose and add Januvia. No history of pancreatic CA. Continue current dose of insulin. Follow up with GI  Advise eye exam    Results for POC orders placed in visit on 08/01/18   POCT glycosylated hemoglobin (Hb A1C)   Result Value Ref Range    Hemoglobin A1C 7.2 %     Attending Physician Statement  I have discussed the care of [address] , including pertinent history and exam findings,  with the resident. I have reviewed the key elements of all parts of the encounter with the resident. I agree with the assessment, plan and orders as documented by the resident.   (GE Modifier)

## 2018-08-01 NOTE — PROGRESS NOTES
Chronic Disease Visit Information    BP Readings from Last 3 Encounters:   05/01/18 106/66   04/05/18 112/75   03/16/18 125/66          Hemoglobin A1C (%)   Date Value   12/27/2017 5.9   08/02/2017 9.5   07/09/2017 11.3     Microalb/Crt. Ratio (mcg/mg creat)   Date Value   08/09/2017 6     LDL Cholesterol (mg/dL)   Date Value   10/11/2017 79     HDL (mg/dL)   Date Value   10/11/2017 17 (L)     BUN (mg/dL)   Date Value   02/13/2018 13     CREATININE (mg/dL)   Date Value   02/13/2018 0.87     Glucose (mg/dL)   Date Value   02/16/2018 225            Have you changed or started any medications since your last visit including any over-the-counter medicines, vitamins, or herbal medicines? no   Are you having any side effects from any of your medications? -  yes - gas  Have you stopped taking any of your medications? Is so, why? -  no    Have you seen any other physician or provider since your last visit? No  Have you had any other diagnostic tests since your last visit? No  Have you been seen in the emergency room and/or had an admission to a hospital since we last saw you? No  Have you had your annual diabetic retinal (eye) exam? Yes - Records Obtained  Have you had your routine dental cleaning in the past 6 months? no    Have you activated your [employer] account? If not, what are your barriers?  Yes     Patient Care Team:  Alexandra Gracia MD as PCP - General (Internal Medicine)  Bijan Andino MD as PCP - S Attributed Provider  Alyssa Boateng MD as Consulting Physician (Gastroenterology)         Medical History Review  Past Medical, Family, and Social History reviewed and does not contribute to the patient presenting condition    Health Maintenance   Topic Date Due    Diabetic microalbuminuria test  08/09/2018    Diabetic retinal exam  08/09/2018 (Originally 2/18/1986)    Flu vaccine (1) 09/01/2018    Lipid screen  10/11/2018    Creatinine monitoring  10/11/2018    A1C test (Diabetic or Prediabetic)  12/27/2018  Diabetic foot exam  01/10/2019    Potassium monitoring  02/13/2019    DTaP/Tdap/Td vaccine (2 - Td) 10/08/2022    Pneumococcal med risk  Completed    HIV screen  Completed

## 2018-08-27 ENCOUNTER — TELEPHONE (OUTPATIENT)
Dept: INTERNAL MEDICINE | Age: 43
End: 2018-08-27

## 2018-08-27 DIAGNOSIS — E11.9 TYPE 2 DIABETES MELLITUS WITHOUT COMPLICATION, WITH LONG-TERM CURRENT USE OF INSULIN (HCC): ICD-10-CM

## 2018-08-27 DIAGNOSIS — Z79.4 TYPE 2 DIABETES MELLITUS WITHOUT COMPLICATION, WITH LONG-TERM CURRENT USE OF INSULIN (HCC): ICD-10-CM

## 2018-08-28 NOTE — TELEPHONE ENCOUNTER
We will need to see him in the office so as to get a better handle on his diabetes. I don't think the adjustment in his Glucophage could have made this kind of change from his baseline sugars. He may need to be checked for an infection. Sandra Sheikh M.D.  PGY-3 Internal Medicine  [facility]
Tobacco abuse     Type 2 diabetes mellitus without complication, with long-term current use of insulin (HCC)     Hepatitis B     History of hepatitis C     Gastroesophageal reflux disease     Chronic diastolic congestive heart failure (HCC)     Abscess

## 2018-08-30 ENCOUNTER — HOSPITAL ENCOUNTER (OUTPATIENT)
Age: 43
Setting detail: SPECIMEN
Discharge: HOME | End: 2018-08-30
Payer: MEDICARE

## 2018-08-30 ENCOUNTER — OFFICE VISIT (OUTPATIENT)
Dept: INTERNAL MEDICINE | Age: 43
End: 2018-08-30
Payer: MEDICARE

## 2018-08-30 VITALS
WEIGHT: 267.8 LBS | BODY MASS INDEX: 35.33 KG/M2 | DIASTOLIC BLOOD PRESSURE: 77 MMHG | HEART RATE: 78 BPM | SYSTOLIC BLOOD PRESSURE: 133 MMHG

## 2018-08-30 DIAGNOSIS — Z86.19 HISTORY OF HEPATITIS C: ICD-10-CM

## 2018-08-30 DIAGNOSIS — E11.65 UNCONTROLLED TYPE 2 DIABETES MELLITUS WITH HYPERGLYCEMIA, WITH LONG-TERM CURRENT USE OF INSULIN (HCC): Primary | ICD-10-CM

## 2018-08-30 DIAGNOSIS — I50.32 CHRONIC DIASTOLIC CONGESTIVE HEART FAILURE (HCC): ICD-10-CM

## 2018-08-30 DIAGNOSIS — Z79.4 UNCONTROLLED TYPE 2 DIABETES MELLITUS WITH HYPERGLYCEMIA, WITH LONG-TERM CURRENT USE OF INSULIN (HCC): ICD-10-CM

## 2018-08-30 DIAGNOSIS — E11.65 UNCONTROLLED TYPE 2 DIABETES MELLITUS WITH HYPERGLYCEMIA, WITH LONG-TERM CURRENT USE OF INSULIN (HCC): ICD-10-CM

## 2018-08-30 DIAGNOSIS — Z79.4 UNCONTROLLED TYPE 2 DIABETES MELLITUS WITH HYPERGLYCEMIA, WITH LONG-TERM CURRENT USE OF INSULIN (HCC): Primary | ICD-10-CM

## 2018-08-30 LAB
-: ABNORMAL
ALBUMIN SERPL-MCNC: 3.6 G/DL (ref 3.5–5.2)
ALBUMIN/GLOBULIN RATIO: 1.1 (ref 1–2.5)
ALP BLD-CCNC: 160 U/L (ref 40–129)
ALT SERPL-CCNC: 40 U/L (ref 5–41)
AMORPHOUS: ABNORMAL
ANION GAP SERPL CALCULATED.3IONS-SCNC: 12 MMOL/L (ref 9–17)
AST SERPL-CCNC: 48 U/L
BACTERIA: ABNORMAL
BILIRUB SERPL-MCNC: 0.84 MG/DL (ref 0.3–1.2)
BILIRUBIN URINE: NEGATIVE
BUN BLDV-MCNC: 11 MG/DL (ref 6–20)
BUN/CREAT BLD: ABNORMAL (ref 9–20)
CALCIUM SERPL-MCNC: 8.6 MG/DL (ref 8.6–10.4)
CASTS UA: ABNORMAL /LPF (ref 0–8)
CHLORIDE BLD-SCNC: 103 MMOL/L (ref 98–107)
CO2: 22 MMOL/L (ref 20–31)
COLOR: YELLOW
CREAT SERPL-MCNC: 0.89 MG/DL (ref 0.7–1.2)
CRYSTALS, UA: ABNORMAL /HPF
EPITHELIAL CELLS UA: ABNORMAL /HPF (ref 0–5)
GFR AFRICAN AMERICAN: >60 ML/MIN
GFR NON-AFRICAN AMERICAN: >60 ML/MIN
GFR SERPL CREATININE-BSD FRML MDRD: ABNORMAL ML/MIN/{1.73_M2}
GFR SERPL CREATININE-BSD FRML MDRD: ABNORMAL ML/MIN/{1.73_M2}
GLUCOSE BLD-MCNC: 260 MG/DL
GLUCOSE BLD-MCNC: 287 MG/DL (ref 70–99)
GLUCOSE URINE: ABNORMAL
KETONES, URINE: NEGATIVE
LEUKOCYTE ESTERASE, URINE: NEGATIVE
MUCUS: ABNORMAL
NITRITE, URINE: NEGATIVE
OTHER OBSERVATIONS UA: ABNORMAL
PH UA: 5.5 (ref 5–8)
POTASSIUM SERPL-SCNC: 4.9 MMOL/L (ref 3.7–5.3)
PROTEIN UA: NEGATIVE
RBC UA: ABNORMAL /HPF (ref 0–4)
RENAL EPITHELIAL, UA: ABNORMAL /HPF
SODIUM BLD-SCNC: 137 MMOL/L (ref 135–144)
SPECIFIC GRAVITY UA: 1.02 (ref 1–1.03)
TOTAL PROTEIN: 6.8 G/DL (ref 6.4–8.3)
TRICHOMONAS: ABNORMAL
TURBIDITY: CLEAR
URINE HGB: NEGATIVE
UROBILINOGEN, URINE: NORMAL
WBC UA: ABNORMAL /HPF (ref 0–5)
YEAST: ABNORMAL

## 2018-08-30 PROCEDURE — 4004F PT TOBACCO SCREEN RCVD TLK: CPT | Performed by: STUDENT IN AN ORGANIZED HEALTH CARE EDUCATION/TRAINING PROGRAM

## 2018-08-30 PROCEDURE — 81001 URINALYSIS AUTO W/SCOPE: CPT

## 2018-08-30 PROCEDURE — G8427 DOCREV CUR MEDS BY ELIG CLIN: HCPCS | Performed by: STUDENT IN AN ORGANIZED HEALTH CARE EDUCATION/TRAINING PROGRAM

## 2018-08-30 PROCEDURE — 3045F PR MOST RECENT HEMOGLOBIN A1C LEVEL 7.0-9.0%: CPT | Performed by: STUDENT IN AN ORGANIZED HEALTH CARE EDUCATION/TRAINING PROGRAM

## 2018-08-30 PROCEDURE — 80053 COMPREHEN METABOLIC PANEL: CPT

## 2018-08-30 PROCEDURE — 36415 COLL VENOUS BLD VENIPUNCTURE: CPT

## 2018-08-30 PROCEDURE — 82962 GLUCOSE BLOOD TEST: CPT | Performed by: STUDENT IN AN ORGANIZED HEALTH CARE EDUCATION/TRAINING PROGRAM

## 2018-08-30 PROCEDURE — G8417 CALC BMI ABV UP PARAM F/U: HCPCS | Performed by: STUDENT IN AN ORGANIZED HEALTH CARE EDUCATION/TRAINING PROGRAM

## 2018-08-30 PROCEDURE — 99211 OFF/OP EST MAY X REQ PHY/QHP: CPT | Performed by: INTERNAL MEDICINE

## 2018-08-30 PROCEDURE — 2022F DILAT RTA XM EVC RTNOPTHY: CPT | Performed by: STUDENT IN AN ORGANIZED HEALTH CARE EDUCATION/TRAINING PROGRAM

## 2018-08-30 PROCEDURE — 99213 OFFICE O/P EST LOW 20 MIN: CPT | Performed by: STUDENT IN AN ORGANIZED HEALTH CARE EDUCATION/TRAINING PROGRAM

## 2018-08-30 RX ORDER — PEN NEEDLE, DIABETIC 30 GX5/16"
1 NEEDLE, DISPOSABLE MISCELLANEOUS 3 TIMES DAILY
Qty: 100 EACH | Refills: 5 | Status: SHIPPED | OUTPATIENT
Start: 2018-08-30 | End: 2020-01-31

## 2018-08-30 NOTE — PATIENT INSTRUCTIONS
You have been given a lab order no need to schedule you are not required to fast you may complete labs anytime prior to your next visit. You have been given an order for Diabetes Education, your order was faxed to [facility] they will contact you with an appointment if any questions you can contact their office at [phone]. Your medications for this visit were escribed to your preferred pharmacy. Avs was given and reviewed appt card given with next appt.  MS

## 2018-08-30 NOTE — PROGRESS NOTES
[facility]  [facility] [address] [facility]  [address] [facility] [address]  Dept: [phone]  Dept Fax: [phone]    Office Progress/Follow Up Note  Date of patient's visit: 8/30/2018  Patient's Name:  Ava Geronimo YOB: 1975            Patient Care Team:  Jazmine Duffy MD as PCP - General (Internal Medicine)  Benito Stevens MD as PCP - [facility] Attributed Provider  Joanna Suggs MD as Consulting Physician (Gastroenterology)    REASON FOR VISIT:  Hyperglycemia    HISTORY OF PRESENT ILLNESS:      Chief Complaint   Patient presents with    Blood Sugar Problem     pt states blood sugars have been running high , pt states they have been running 300-400 since he has been on Januvia       History was obtained from the patient. Ava Geronimo is a 44 y.o. with past medical history of diabetes mellitus HbA1c 7.2 on 8118, was on metformin 1000 mg twice a day and insulin basilar 55 units nightly. Patient was complaining of bloating and gaseous distention of the abdomen and metformin was decreased from 1000 twice a day to 500 twice a day. Per patient, his fasting blood glucose was running between 100 to 130 before but after metformin was decreased. His blood glucose has been running in 250s to 300s. Per patient, he is compliant with his diet. Patient denies any other problems except he started his school from a few weeks back. 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 Patient denies any other stressors except school. Patient has increased his basiglar insulin to 85 units from 70 units last night. Patient denies any dysuria, or upper respiratory infection.       Patient Active Problem List   Diagnosis    Long-term current use of methadone for opiate dependence ([facility]    Tobacco abuse    Type 2 diabetes mellitus without complication, with long-term current use of insulin ([address]    Hepatitis medications for this visit. SOCIAL HISTORY    Reviewed and no change from previous record. Theodore  reports that he has been smoking. He has a 30.00 pack-year smoking history.  He has never used smokeless tobacco.    FAMILY HISTORY:    Reviewed and No change from previous visit    REVIEW OF SYSTEMS:    CONSTITUTIONAL: Denies: fever, chills  PSYCH: Denies: anxiety, depression  ALLERGIES: Denies: urticaria  EYES: Denies: blurry vision, decreased vision, photophobia  ENT: Denies: sore throat, nasal congestion  CARDIOVASCULAR: Denies: chest pain, dyspnea on exertion  RESPIRATORY: Denies: cough, hemoptysis, shortness of breath  GI: Denies: Denies: abdominal pain, flank pain  : Denies: Denies: dysuria, frequency/urgency  NEURO: Denies: dizzy/vertigo, headache  MUSCULOSKELETAL: Denies: back pain, joint pain  SKIN: Denies: rash, itching    PHYSICAL EXAM:      Vitals:    08/30/18 0825   BP: 133/77   Site: Right Arm   Position: Sitting   Cuff Size: Large Adult   Pulse: 78   Weight: 267 lb 12.8 oz (121.5 kg)     BP Readings from Last 3 Encounters:   08/30/18 133/77   08/01/18 124/76   05/01/18 106/66      General appearance - alert, well appearing, and in no distress  Chest - clear to auscultation, no wheezes, rales or rhonchi, symmetric air entry  Heart - normal rate, regular rhythm, normal S1, S2, no murmurs, rubs, clicks or gallops  Abdomen - soft, nontender, nondistended, no masses or organomegaly  Neurological - alert, oriented, normal speech, no focal findings or movement disorder noted  Musculoskeletal - no joint tenderness, deformity or swelling  Extremities - peripheral pulses normal, 1 plus pedal edema, no clubbing or cyanosis  Skin - normal coloration and turgor, no rashes, no suspicious skin lesions noted    LABORATORY FINDINGS:    CBC:  Lab Results   Component Value Date    WBC 6.9 02/13/2018    HGB 13.4 02/13/2018     05/01/2018       BMP:    Lab Results   Component Value Date     02/13/2018 K 4.3 02/13/2018     02/13/2018    CO2 23 02/13/2018    BUN 13 02/13/2018    CREATININE 0.87 02/13/2018    GLUCOSE 260 08/30/2018       HEMOGLOBIN A1C:   Lab Results   Component Value Date    LABA1C 7.2 08/01/2018       FASTING LIPID PANEL:  Lab Results   Component Value Date    CHOL 125 10/11/2017    HDL 17 (L) 10/11/2017    TRIG 145 10/11/2017       ASSESSMENT AND PLAN:    Al Newman was seen today for blood sugar problem. Diagnoses and all orders for this visit:    Uncontrolled type 2 diabetes mellitus with hyperglycemia, with long-term current use of insulin ([facility])  -     insulin lispro (HUMALOG KWIKPEN) 100 UNIT/ML pen; 7 units with breakfast and 10 units with meals  -     Ambulatory referral to Diabetic Education  -     Insulin Pen Needle (PEN NEEDLES 3/16\") 31G X 5 MM MISC; 1 each by Does not apply route 3 times daily  -     Comprehensive Metabolic Panel; Future  -     Urinalysis with Microscopic; Future    History of hepatitis C  -     Comprehensive Metabolic Panel; Future    Chronic diastolic congestive heart failure ([facility] [address]      FOLLOW UP AND INSTRUCTIONS:   Return in about 1 week (around 9/6/2018). · Theodore received counseling on the following healthy behaviors: nutrition, exercise and medication adherence    · Discussed use, benefit, and side effects of prescribed medications. Barriers to medication compliance addressed. All patient questions answered. Pt voiced understanding.      · Patient given educational materials - see patient instructions    Jesús Castellanos MD  Internal Medicine Resident  [facility]  8/30/2018, 9:24 AM

## 2018-08-30 NOTE — PROGRESS NOTES
Patient comes in with high blood sugars for the last few weeks. He has not been compliant with diet. Recently his glargine insulin was increased to 85 units daily. He is still having high blood sugars in the 200-300 range. His metformin was cut down due to abdominal discomfort. He is also on sitagliptin. Plan  Refer to diabetic add for diabetic meal plans and insulin education. Start short-acting insulin with meals. Follow-up in a week with blood sugars. He did not bring his blood sugars today with him and he is advised to bring it with every visit. Check labs. Attending Physician Statement  I have discussed the care of Theodore Valadez, including pertinent history and exam findings,  with the resident. I have reviewed the key elements of all parts of the encounter with the resident. I agree with the assessment, plan and orders as documented by the resident.   (GE Modifier)

## 2018-09-07 ENCOUNTER — OFFICE VISIT (OUTPATIENT)
Dept: INTERNAL MEDICINE | Age: 43
End: 2018-09-07
Payer: MEDICARE

## 2018-09-07 VITALS
DIASTOLIC BLOOD PRESSURE: 62 MMHG | BODY MASS INDEX: 36.18 KG/M2 | HEART RATE: 85 BPM | WEIGHT: 273 LBS | HEIGHT: 73 IN | SYSTOLIC BLOOD PRESSURE: 107 MMHG

## 2018-09-07 DIAGNOSIS — E11.9 TYPE 2 DIABETES MELLITUS WITHOUT COMPLICATION, WITH LONG-TERM CURRENT USE OF INSULIN (HCC): ICD-10-CM

## 2018-09-07 DIAGNOSIS — Z79.4 TYPE 2 DIABETES MELLITUS WITH HYPERGLYCEMIA, WITH LONG-TERM CURRENT USE OF INSULIN (HCC): Primary | ICD-10-CM

## 2018-09-07 DIAGNOSIS — Z79.4 TYPE 2 DIABETES MELLITUS WITHOUT COMPLICATION, WITH LONG-TERM CURRENT USE OF INSULIN (HCC): ICD-10-CM

## 2018-09-07 DIAGNOSIS — E11.65 TYPE 2 DIABETES MELLITUS WITH HYPERGLYCEMIA, WITH LONG-TERM CURRENT USE OF INSULIN (HCC): Primary | ICD-10-CM

## 2018-09-07 LAB — GLUCOSE BLD-MCNC: 321 MG/DL

## 2018-09-07 PROCEDURE — 4004F PT TOBACCO SCREEN RCVD TLK: CPT | Performed by: STUDENT IN AN ORGANIZED HEALTH CARE EDUCATION/TRAINING PROGRAM

## 2018-09-07 PROCEDURE — 99214 OFFICE O/P EST MOD 30 MIN: CPT | Performed by: STUDENT IN AN ORGANIZED HEALTH CARE EDUCATION/TRAINING PROGRAM

## 2018-09-07 PROCEDURE — G8427 DOCREV CUR MEDS BY ELIG CLIN: HCPCS | Performed by: STUDENT IN AN ORGANIZED HEALTH CARE EDUCATION/TRAINING PROGRAM

## 2018-09-07 PROCEDURE — 99211 OFF/OP EST MAY X REQ PHY/QHP: CPT | Performed by: INTERNAL MEDICINE

## 2018-09-07 PROCEDURE — G8417 CALC BMI ABV UP PARAM F/U: HCPCS | Performed by: STUDENT IN AN ORGANIZED HEALTH CARE EDUCATION/TRAINING PROGRAM

## 2018-09-07 PROCEDURE — 82962 GLUCOSE BLOOD TEST: CPT | Performed by: STUDENT IN AN ORGANIZED HEALTH CARE EDUCATION/TRAINING PROGRAM

## 2018-09-07 PROCEDURE — 2022F DILAT RTA XM EVC RTNOPTHY: CPT | Performed by: STUDENT IN AN ORGANIZED HEALTH CARE EDUCATION/TRAINING PROGRAM

## 2018-09-07 PROCEDURE — 3045F PR MOST RECENT HEMOGLOBIN A1C LEVEL 7.0-9.0%: CPT | Performed by: STUDENT IN AN ORGANIZED HEALTH CARE EDUCATION/TRAINING PROGRAM

## 2018-09-07 RX ORDER — LISINOPRIL 2.5 MG/1
2.5 TABLET ORAL DAILY
Qty: 30 TABLET | Refills: 2 | Status: SHIPPED | OUTPATIENT
Start: 2018-09-07 | End: 2018-09-07 | Stop reason: CLARIF

## 2018-09-07 NOTE — PATIENT INSTRUCTIONS
Follow-up appointment scheduled for 9/21/18, AVS given to patient. Letter for work given to pt. Labs given to patient, they will have them done before their next visit.      Alicia Gross

## 2018-09-07 NOTE — PROGRESS NOTES
organomegaly  Neurological - alert, oriented, normal speech, no focal findings or movement disorder noted  Extremities - peripheral pulses normal, no pedal edema, no clubbing or cyanosis  Skin - normal coloration and turgor, no rashes, no suspicious skin lesions noted    LABORATORY FINDINGS:    CBC:  Lab Results   Component Value Date    WBC 6.9 02/13/2018    HGB 13.4 02/13/2018     05/01/2018       BMP:    Lab Results   Component Value Date     08/30/2018    K 4.9 08/30/2018     08/30/2018    CO2 22 08/30/2018    BUN 11 08/30/2018    CREATININE 0.89 08/30/2018    GLUCOSE 321 09/07/2018       HEMOGLOBIN A1C:   Lab Results   Component Value Date    LABA1C 7.2 08/01/2018       FASTING LIPID PANEL:  Lab Results   Component Value Date    CHOL 125 10/11/2017    HDL 17 (L) 10/11/2017    TRIG 145 10/11/2017       ASSESSMENT AND PLAN:    Bryce Wilkes was seen today for diabetes, letter for school/work and health maintenance. Diagnoses and all orders for this visit:    Type 2 diabetes mellitus without complication, with long-term current use of insulin ([facility])  -     POCT Glucose  -     dapagliflozin (FARXIGA) 5 MG tablet; Take 1 tablet by mouth every morning  -     Insulin Degludec (TRESIBA FLEXTOUCH) 200 UNIT/ML SOPN; Inject 85 Units into the skin nightly  -     Basic Metabolic Panel; Future    Type 2 diabetes mellitus with hyperglycemia, with long-term current use of insulin ([facility])  -     metFORMIN (GLUCOPHAGE) 1000 MG tablet; Take 1 tablet by mouth 2 times daily (with meals)      FOLLOW UP AND INSTRUCTIONS:   Return in about 2 weeks (around 9/21/2018) for DM. · Theodore received counseling on the following healthy behaviors: nutrition, exercise and medication adherence    · Discussed use, benefit, and side effects of prescribed medications. Barriers to medication compliance addressed. All patient questions answered. Pt voiced understanding.      · Patient given educational materials - see patient instructions    [facility]  9/7/2018, 9:48 AM

## 2018-09-07 NOTE — PROGRESS NOTES
ATTENDING PHYSICIAN STATEMENT     I have discussed the care of Gerber Mg, including pertinent history and exam findings with the resident. I have reviewed the key elements of all parts of the encounter with the resident. I have seen and examined the patient with the resident and the key elements of all parts of the encounter have been performed by me. I agree with the assessment, and status of the problem list as documented. Additional Comments:  Patient is here to follow-up for diabetes. His blood sugars have been running more than 200 lately. He is in [address] [place] [address] 80 units daily. He is also on metformin 500 mg twice a day and Januvia. Metformin dose was decreased due to flatulence. Will increase metformin to 1000 twice a day and change Basaglar to [place] u 200, 85 units daily. We will also add [place]. Side effects of Farxiga like hyperkalemia, renal failure, UTI and yeast infection was discussed. He is advised to maintain hydration and get his BMP done in 2 weeks. His potassium runs a little high. He is also on Lasix for leg edema. He is advised to maintain blood sugar log. We'll adjust the insulin next visit. He is advised to continue using NovoLog per sliding scale. The plan and orders should include   Diagnosis Orders   1. Type 2 diabetes mellitus without complication, with long-term current use of insulin (HCC)  POCT Glucose    dapagliflozin (FARXIGA) 5 MG tablet    Insulin Degludec (TRESIBA FLEXTOUCH) 200 UNIT/ML SOPN    Basic Metabolic Panel    DISCONTINUED: lisinopril (PRINIVIL;ZESTRIL) 2.5 MG tablet   2. Type 2 diabetes mellitus with hyperglycemia, with long-term current use of insulin (HCC)  metFORMIN (GLUCOPHAGE) 1000 MG tablet          The return visit should be in 2 weeks .     Alvarado Lombardo MD  Attending and Faculty Internal Medicine  [address] [facility]  Internal Medicine [address] [place] [address], [facility]   9/7/18 9:19 AM      This note is created with the assistance of a speech-recognition program. While intending to generate a document that actually reflects the content of the visit, the document can still have some mistakes which may not have been identified and corrected by editing.

## 2018-09-10 ENCOUNTER — TELEPHONE (OUTPATIENT)
Dept: INTERNAL MEDICINE | Age: 43
End: 2018-09-10

## 2018-09-10 DIAGNOSIS — E11.9 TYPE 2 DIABETES MELLITUS WITHOUT COMPLICATION, WITH LONG-TERM CURRENT USE OF INSULIN (HCC): Primary | ICD-10-CM

## 2018-09-10 DIAGNOSIS — Z79.4 TYPE 2 DIABETES MELLITUS WITHOUT COMPLICATION, WITH LONG-TERM CURRENT USE OF INSULIN (HCC): Primary | ICD-10-CM

## 2018-09-11 PROBLEM — E11.9 TYPE 2 DIABETES MELLITUS WITHOUT COMPLICATION, WITH LONG-TERM CURRENT USE OF INSULIN (HCC): Chronic | Status: ACTIVE | Noted: 2017-08-16

## 2018-09-11 PROBLEM — Z79.4 TYPE 2 DIABETES MELLITUS WITHOUT COMPLICATION, WITH LONG-TERM CURRENT USE OF INSULIN (HCC): Chronic | Status: ACTIVE | Noted: 2017-08-16

## 2018-09-11 NOTE — TELEPHONE ENCOUNTER
Can you please adjust patient's medication list to which medication he should be taking. As of now, [place] and Trulicity neither have been approved by insurance. If you would like him to stay on Basaglar it would need to be reordered. He has metformin 1000 mg and Januvia at home.

## 2018-09-11 NOTE — TELEPHONE ENCOUNTER
Per Dr. Dominique Echols, patient to decrease Basaglar to 60 units, continue metformin 1000 mg bid, and Januvia 100 mg daily. As [place] are approved, patient to inform us of blood sugars before adding them into his routine. PC to patient, needs refill on Basaglar. Order pended.

## 2018-09-12 NOTE — TELEPHONE ENCOUNTER
Miguel Morales now approved through insurance. Per Dr. Juno Cortez, patient to start Tresiba 60 units, continue metformin 1000 mg bid and Januvia 100 mg daily. Call with blood sugars. PC to patient, left voice message with instructions and requested a return call verifying he received message.

## 2018-09-17 DIAGNOSIS — E11.9 TYPE 2 DIABETES MELLITUS WITHOUT COMPLICATION, WITH LONG-TERM CURRENT USE OF INSULIN (HCC): ICD-10-CM

## 2018-09-17 DIAGNOSIS — Z79.4 TYPE 2 DIABETES MELLITUS WITHOUT COMPLICATION, WITH LONG-TERM CURRENT USE OF INSULIN (HCC): ICD-10-CM

## 2018-09-19 RX ORDER — ATORVASTATIN CALCIUM 40 MG/1
TABLET, FILM COATED ORAL
Qty: 90 TABLET | Refills: 2 | Status: SHIPPED | OUTPATIENT
Start: 2018-09-19 | End: 2019-07-09 | Stop reason: SDUPTHER

## 2018-09-19 NOTE — TELEPHONE ENCOUNTER
Will restart Lipitor at 40 mg po q hs. LFT's on 8/30/2018 Albumin 3.6; Alk Phos 160, ALT 40, AST 48, Bilirubin 0.84, Total protein 6.8. Will need to monitor liver function in light of pt's hepatitis C and Hepatitis B. Liver function appears stable. Will need to obtain lipid profile with next office visit. Sandra Sheikh M.D.  PGY-3 Internal Medicine  [address] [facility]
B     History of hepatitis C     Gastroesophageal reflux disease     Chronic diastolic congestive heart failure (HCC)     Abscess

## 2018-09-25 ENCOUNTER — HOSPITAL ENCOUNTER (EMERGENCY)
Age: 43
Discharge: HOME | End: 2018-09-25
Payer: MEDICARE

## 2018-09-25 VITALS
OXYGEN SATURATION: 96 % | TEMPERATURE: 98 F | SYSTOLIC BLOOD PRESSURE: 128 MMHG | WEIGHT: 268.4 LBS | DIASTOLIC BLOOD PRESSURE: 78 MMHG | HEART RATE: 101 BPM | RESPIRATION RATE: 16 BRPM | BODY MASS INDEX: 35.41 KG/M2

## 2018-09-25 DIAGNOSIS — K21.9 GASTROESOPHAGEAL REFLUX DISEASE, ESOPHAGITIS PRESENCE NOT SPECIFIED: ICD-10-CM

## 2018-09-25 DIAGNOSIS — Z87.19 HISTORY OF INDIGESTION: ICD-10-CM

## 2018-09-25 DIAGNOSIS — Z79.4 TYPE 2 DIABETES MELLITUS WITHOUT COMPLICATION, WITH LONG-TERM CURRENT USE OF INSULIN (HCC): Chronic | ICD-10-CM

## 2018-09-25 DIAGNOSIS — E11.9 TYPE 2 DIABETES MELLITUS WITHOUT COMPLICATION, WITH LONG-TERM CURRENT USE OF INSULIN (HCC): Chronic | ICD-10-CM

## 2018-09-25 DIAGNOSIS — R11.0 NAUSEA: Primary | ICD-10-CM

## 2018-09-25 PROBLEM — Z76.89 ESTABLISHING CARE WITH NEW DOCTOR, ENCOUNTER FOR: Status: ACTIVE | Noted: 2018-09-25

## 2018-09-25 PROCEDURE — 99212 OFFICE O/P EST SF 10 MIN: CPT

## 2018-09-25 PROCEDURE — 99213 OFFICE O/P EST LOW 20 MIN: CPT | Performed by: NURSE PRACTITIONER

## 2018-09-25 RX ORDER — ONDANSETRON 4 MG/1
4 TABLET, FILM COATED ORAL EVERY 8 HOURS PRN
Qty: 15 TABLET | Refills: 0 | Status: SHIPPED | OUTPATIENT
Start: 2018-09-25 | End: 2018-09-30

## 2018-09-25 ASSESSMENT — ENCOUNTER SYMPTOMS
CHEST TIGHTNESS: 0
COUGH: 0
NAUSEA: 1
ABDOMINAL PAIN: 0
SHORTNESS OF BREATH: 0
VOMITING: 1

## 2018-09-25 ASSESSMENT — PAIN SCALES - GENERAL: PAINLEVEL_OUTOF10: 7

## 2018-09-25 ASSESSMENT — PAIN DESCRIPTION - LOCATION: LOCATION: ABDOMEN

## 2018-09-25 ASSESSMENT — PAIN DESCRIPTION - ORIENTATION: ORIENTATION: MID

## 2018-09-25 NOTE — ED PROVIDER NOTES
[facility]  Urgent Care Encounter       CHIEF COMPLAINT       Chief Complaint   Patient presents with    Nausea    Emesis     x2/24 hours    Other     \" burps. I was treated with antibiotics within the last 6 months by PCP in [place] and now syptoms are back\"    Letter for School/Work     pt request note \" missed school yesterday but wants to go today\"       Nurses Notes reviewed and I agree except as noted in the HPI. HISTORY OF PRESENT ILLNESS   Charlotte Vences is a 44 y.o. male who presents     Patient states that for the last 24-48 hours he has had some indigestion and burping episodes, and had vomited twice which had caused him to be evaluated in urgent care. Patient denies any fevers or body aches. He states that his appetite has not changed. Patient was previously treated 6 months ago by primary care provider for another flareup, he states that he believes the sclerae is back. He does have an appointment with same primary care provider within the next 5 days that he will address this with. Patient is also requesting assistance in establishing with a primary care provider within [facility] network that is closer to his residence. Gastroesophageal Reflux   This is a recurrent problem. The current episode started yesterday. The problem occurs daily. The problem has not changed since onset. Pertinent negatives include no chest pain, no abdominal pain, no headaches and no shortness of breath. Nothing aggravates the symptoms. Nothing relieves the symptoms. He has tried nothing for the symptoms. The treatment provided no relief. REVIEW OF SYSTEMS     Review of Systems   Constitutional: Negative for chills, diaphoresis, fatigue and fever. HENT: Negative for congestion. Respiratory: Negative for cough, chest tightness and shortness of breath. Cardiovascular: Negative for chest pain.    Gastrointestinal: Positive for nausea and vomiting (reports twice within last 24 hours). Negative for abdominal pain. Allergic/Immunologic: Negative for environmental allergies and food allergies. Neurological: Negative for dizziness, weakness, light-headedness, numbness and headaches. All other systems reviewed and are negative. 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 CURRENT MEDICATIONS       Previous Medications    ALBUTEROL SULFATE HFA (PROVENTIL HFA) 108 (90 BASE) MCG/ACT INHALER    Inhale 2 puffs into the lungs every 4 hours as needed for Wheezing or Shortness of Breath (Space out to every 6 hours as symptoms improve) Space out to every 6 hours as symptoms improve. ASPIRIN 81 MG EC TABLET    Take 1 tablet by mouth daily    ATORVASTATIN (LIPITOR) 40 MG TABLET    TAKE ONE TABLET BY MOUTH ONCE DAILY    DAPAGLIFLOZIN (FARXIGA) 5 MG TABLET    Take 1 tablet by mouth every morning    FUROSEMIDE (LASIX) 20 MG TABLET    Take 1 tablet by mouth daily    GABAPENTIN (NEURONTIN) 800 MG TABLET    Take 1 tablet by mouth 3 times daily for 120 days. .    INSULIN DEGLUDEC (TRESIBA FLEXTOUCH) 200 UNIT/ML SOPN    Inject 85 Units into the skin nightly    INSULIN LISPRO (HUMALOG KWIKPEN) 100 UNIT/ML PEN    7 units with breakfast and 10 units with meals    INSULIN PEN NEEDLE (PEN NEEDLES 3/16\") 31G X 5 MM MISC patient. Physical Exam   Constitutional: He is oriented to person, place, and time. He appears well-developed and well-nourished. No distress. Pulmonary/Chest: Effort normal.   Abdominal: Soft. Normal appearance. He exhibits no distension. There is no tenderness. Neurological: He is alert and oriented to person, place, and time. Skin: Skin is warm and dry. No rash noted. He is not diaphoretic. No erythema. No pallor. Psychiatric: He has a normal mood and affect. His behavior is normal. Thought content normal.       DIAGNOSTIC RESULTS     Labs:No results found for this visit on 09/25/18. IMAGING:    No orders to display     URGENT CARE COURSE:     Vitals:    09/25/18 0825   BP: 128/78   Pulse: 101   Resp: 16   Temp: 98 °F (36.7 °C)   SpO2: 96%   Weight: 268 lb 6.4 oz (121.7 kg)       Medications - No data to display         PROCEDURES:  None    FINAL IMPRESSION      1. Nausea    2. History of indigestion    3. Gastroesophageal reflux disease, esophagitis presence not specified    4. Type 2 diabetes mellitus without complication, with long-term current use of insulin [facility])          DISPOSITION/PLAN   Patient is discharged home with script for Zofran for the next 5 days for nausea. Patient is encouraged to follow up with primary care for chronic management of indigestion and diabetes. Patient encouraged to use diet low in greasy, spicy, or citrus foods to prevent flare-ups. He will continue use of Protonix daily.         PATIENT REFERRED TO:  Justina Mills MD  [facility] [id number]      DISCHARGE MEDICATIONS:  New Prescriptions    ONDANSETRON (ZOFRAN) 4 MG TABLET    Take 1 tablet by mouth every 8 hours as needed for Nausea or Vomiting       Discontinued Medications    No medications on file       Current Discharge Medication List          CELIO Mcgowan - NP    (Please note that portions of this note were completed with a voice recognition program.  Efforts were made to edit the

## 2018-09-28 ENCOUNTER — OFFICE VISIT (OUTPATIENT)
Dept: INTERNAL MEDICINE | Age: 43
End: 2018-09-28
Payer: MEDICARE

## 2018-09-28 VITALS
HEIGHT: 73 IN | BODY MASS INDEX: 36.05 KG/M2 | DIASTOLIC BLOOD PRESSURE: 85 MMHG | WEIGHT: 272 LBS | SYSTOLIC BLOOD PRESSURE: 136 MMHG | HEART RATE: 85 BPM

## 2018-09-28 DIAGNOSIS — G62.9 NEUROPATHY: ICD-10-CM

## 2018-09-28 DIAGNOSIS — I50.32 CHRONIC DIASTOLIC CONGESTIVE HEART FAILURE (HCC): ICD-10-CM

## 2018-09-28 DIAGNOSIS — E11.9 NEW ONSET TYPE 2 DIABETES MELLITUS (HCC): ICD-10-CM

## 2018-09-28 DIAGNOSIS — Z79.4 TYPE 2 DIABETES MELLITUS WITH HYPERGLYCEMIA, WITH LONG-TERM CURRENT USE OF INSULIN (HCC): Primary | ICD-10-CM

## 2018-09-28 DIAGNOSIS — Z23 NEED FOR IMMUNIZATION AGAINST INFLUENZA: ICD-10-CM

## 2018-09-28 DIAGNOSIS — E11.65 TYPE 2 DIABETES MELLITUS WITH HYPERGLYCEMIA, WITH LONG-TERM CURRENT USE OF INSULIN (HCC): Primary | ICD-10-CM

## 2018-09-28 DIAGNOSIS — A04.8 H. PYLORI INFECTION: ICD-10-CM

## 2018-09-28 DIAGNOSIS — K21.9 GASTROESOPHAGEAL REFLUX DISEASE, ESOPHAGITIS PRESENCE NOT SPECIFIED: ICD-10-CM

## 2018-09-28 LAB — GLUCOSE BLD-MCNC: 225 MG/DL

## 2018-09-28 PROCEDURE — 99214 OFFICE O/P EST MOD 30 MIN: CPT | Performed by: STUDENT IN AN ORGANIZED HEALTH CARE EDUCATION/TRAINING PROGRAM

## 2018-09-28 PROCEDURE — 2022F DILAT RTA XM EVC RTNOPTHY: CPT | Performed by: STUDENT IN AN ORGANIZED HEALTH CARE EDUCATION/TRAINING PROGRAM

## 2018-09-28 PROCEDURE — G8417 CALC BMI ABV UP PARAM F/U: HCPCS | Performed by: STUDENT IN AN ORGANIZED HEALTH CARE EDUCATION/TRAINING PROGRAM

## 2018-09-28 PROCEDURE — G8427 DOCREV CUR MEDS BY ELIG CLIN: HCPCS | Performed by: STUDENT IN AN ORGANIZED HEALTH CARE EDUCATION/TRAINING PROGRAM

## 2018-09-28 PROCEDURE — 82962 GLUCOSE BLOOD TEST: CPT | Performed by: STUDENT IN AN ORGANIZED HEALTH CARE EDUCATION/TRAINING PROGRAM

## 2018-09-28 PROCEDURE — 3045F PR MOST RECENT HEMOGLOBIN A1C LEVEL 7.0-9.0%: CPT | Performed by: STUDENT IN AN ORGANIZED HEALTH CARE EDUCATION/TRAINING PROGRAM

## 2018-09-28 PROCEDURE — 99211 OFF/OP EST MAY X REQ PHY/QHP: CPT | Performed by: STUDENT IN AN ORGANIZED HEALTH CARE EDUCATION/TRAINING PROGRAM

## 2018-09-28 PROCEDURE — 4004F PT TOBACCO SCREEN RCVD TLK: CPT | Performed by: STUDENT IN AN ORGANIZED HEALTH CARE EDUCATION/TRAINING PROGRAM

## 2018-09-28 PROCEDURE — 90686 IIV4 VACC NO PRSV 0.5 ML IM: CPT | Performed by: INTERNAL MEDICINE

## 2018-09-28 RX ORDER — ONDANSETRON 4 MG/1
4 TABLET, FILM COATED ORAL EVERY 8 HOURS PRN
Qty: 15 TABLET | Refills: 0 | OUTPATIENT
Start: 2018-09-28 | End: 2018-10-03

## 2018-09-28 RX ORDER — GABAPENTIN 800 MG/1
800 TABLET ORAL 3 TIMES DAILY
Qty: 90 TABLET | Refills: 3 | Status: SHIPPED | OUTPATIENT
Start: 2018-09-28 | End: 2019-03-14 | Stop reason: SDUPTHER

## 2018-09-28 RX ORDER — FUROSEMIDE 20 MG/1
20 TABLET ORAL DAILY
Qty: 30 TABLET | Refills: 5 | Status: SHIPPED | OUTPATIENT
Start: 2018-09-28 | End: 2019-03-08

## 2018-09-28 RX ORDER — CLARITHROMYCIN 500 MG/1
500 TABLET, COATED ORAL 2 TIMES DAILY
Qty: 28 TABLET | Refills: 0 | Status: SHIPPED | OUTPATIENT
Start: 2018-09-28 | End: 2018-10-12

## 2018-09-28 RX ORDER — PROMETHAZINE HYDROCHLORIDE 12.5 MG/1
12.5 TABLET ORAL EVERY 6 HOURS PRN
Qty: 15 TABLET | Refills: 0 | Status: SHIPPED | OUTPATIENT
Start: 2018-09-28 | End: 2018-10-05

## 2018-09-28 RX ORDER — AMOXICILLIN AND CLAVULANATE POTASSIUM 562.5; 437.5; 62.5 MG/1; MG/1; MG/1
1 TABLET, FILM COATED, EXTENDED RELEASE ORAL 2 TIMES DAILY
Qty: 28 TABLET | Refills: 0 | Status: SHIPPED | OUTPATIENT
Start: 2018-09-28 | End: 2018-10-12

## 2018-09-28 RX ORDER — PANTOPRAZOLE SODIUM 40 MG/1
40 TABLET, DELAYED RELEASE ORAL DAILY
Qty: 30 TABLET | Refills: 5 | Status: SHIPPED | OUTPATIENT
Start: 2018-09-28 | End: 2019-05-06 | Stop reason: SDUPTHER

## 2018-09-28 RX ORDER — PANTOPRAZOLE SODIUM 40 MG/1
40 TABLET, DELAYED RELEASE ORAL DAILY
Qty: 30 TABLET | Refills: 0 | Status: CANCELLED | OUTPATIENT
Start: 2018-09-28 | End: 2018-10-28

## 2018-09-28 NOTE — PROGRESS NOTES
(Diabetic or Prediabetic)  08/01/2019    Diabetic microalbuminuria test  08/01/2019    Potassium monitoring  08/30/2019    Creatinine monitoring  08/30/2019    DTaP/Tdap/Td vaccine (3 - Td) 11/13/2025    Pneumococcal med risk  Completed    HIV screen  Completed

## 2018-09-28 NOTE — PROGRESS NOTES
3 months . Johanne Manriquez MD  Attending and Faculty Internal Medicine  [address]   Internal [address]   9/28/18 10:48 AM      This note is created with the assistance of a speech-recognition program. While intending to generate a document that actually reflects the content of the visit, the document can still have some mistakes which may not have been identified and corrected by editing.

## 2018-09-28 NOTE — PATIENT INSTRUCTIONS
Medications e-scribed to pharmacy of patient's choice. LABORATORY INSTRUCTIONS    Your doctor has ordered blood or urine testing. You can get this testing done at the Lab located on the first floor of the [facility], or at any other [facility]. Please stop at Main Registration, before going to the lab, as you must be registered first.     Please get this lab done before your next visit. You may NOT eat, drink, smoke, or chew anything before this test for 8 hours. You may still have water. Return To Clinic 12/5/18. After Visit Summary given and reviewed. JF    It is very important for your care that you keep your appointment. If for some reason you are unable to keep your appointment it is equally important that you call our office at [phone] to cancel your appointment and reschedule. Failure to do so may result in your termination from our practice.

## 2018-09-28 NOTE — TELEPHONE ENCOUNTER
Won't do Zofran due to interaction with Methadone, will rather do Phenergan. E-scribed.  Thank you    Hope Sue MD  PGY-2, Internal Medicine  [address] 
Gastroesophageal reflux disease     Chronic diastolic congestive heart failure ([facility] [address]     Abscess     Establishing care with new doctor, encounter for

## 2018-09-28 NOTE — PROGRESS NOTES
Internal Medicine Clinic Progress Note    Date of patient's visit: 9/28/2018  Patient's Name:  Anirudh Cerrato                   YOB: 1975        PCP:  Sosa Adams MD    Anirudh Cerrato is a 44 y.o. male who presents for   Chief Complaint   Patient presents with    Diabetes     Pt last seen on 9/7/18   [facility] Health Maintenance     Due for Lipid and Flu, discuss with pt.  Gas     Pt having H. Pylori flare up, lots of sulfur gaseous burps    Skin Lesion     Pt has mole of sorts on left upper arm, states it's been there last year but has been getting harder and peeling now      Patient is here for a follow-up visit for his type 2 diabetes, new onset last year. Patient is currently on Farxiga 5 mg, insulin Degludec 85U nightly but he is currently using 80U. And metformin 1000mg BID. That was decreased to 500 BID due to flatulence and abdominal SE but was increased back up to 1000mg. Blood glucose on last visit was 321, hemoglobin A1c on 8/1/18 was 7.2, 5.9 on 12/27/17. He states morning sugars are running around 100-140. BG this am is 225, he had his breakfast @ 6am and ate pancakes/Malaysian toast with syrup and nieves. He states that he has not been able tolerate solid food comfortably due to excessive burping, and belching along with, \"sulphur gaseous burps\" for which he went to ER 4 days ago and received Zofran on discharge which helped with the symptoms. But he continues to have the \"sulphur burps\" and states that his [facility]. Pylori has come back\". Her states he was treated for it sometime ago, but it never went away. He states that \"my negative was false negative\". EGD done on 6/20/2018 showed grade 1 esophageal varices. Patient currently not on any beta blockers. He sees GI Dr James Whitehead at [place], and had liver biopsy done 3 months ago that showed Cirrhosis of the liver, also has chronic HBV, and HCV that he reports \"his body fought off\". HCV was negative on 11/3/17. He last saw him on 8/31/18. Dispense Refill    pantoprazole (PROTONIX) 40 MG tablet Take 1 tablet by mouth daily 30 tablet 5    gabapentin (NEURONTIN) 800 MG tablet Take 1 tablet by mouth 3 times daily for 120 days. . 90 tablet 3    metFORMIN (GLUCOPHAGE) 1000 MG tablet Take 1 tablet by mouth 2 times daily (with meals) 60 tablet 2    ONE TOUCH ULTRA TEST strip 1 each by Other route 3 times daily 100 each 5    furosemide (LASIX) 20 MG tablet Take 1 tablet by mouth daily 30 tablet 5    clarithromycin (BIAXIN) 500 MG tablet Take 1 tablet by mouth 2 times daily for 14 days 28 tablet 0    amoxicillin-clavulanate (AUGMENTIN XR) 1000-62.5 MG per extended release tablet Take 1 tablet by mouth 2 times daily for 14 days 28 tablet 0    promethazine (PHENERGAN) 12.5 MG tablet Take 1 tablet by mouth every 6 hours as needed for Nausea 15 tablet 0    ondansetron (ZOFRAN) 4 MG tablet Take 1 tablet by mouth every 8 hours as needed for Nausea or Vomiting 15 tablet 0    atorvastatin (LIPITOR) 40 MG tablet TAKE ONE TABLET BY MOUTH ONCE DAILY 90 tablet 2    dapagliflozin (FARXIGA) 5 MG tablet Take 1 tablet by mouth every morning 30 tablet 3    Insulin Degludec (TRESIBA FLEXTOUCH) 200 UNIT/ML SOPN Inject 85 Units into the skin nightly 3 mL 2    insulin lispro (HUMALOG KWIKPEN) 100 UNIT/ML pen 7 units with breakfast and 10 units with meals 5 pen 3    Insulin Pen Needle (PEN NEEDLES 3/16\") 31G X 5 MM MISC 1 each by Does not apply route 3 times daily 100 each 5    SITagliptin (JANUVIA) 100 MG tablet Take 1 tablet by mouth daily 30 tablet 3    aspirin 81 MG EC tablet Take 1 tablet by mouth daily 90 tablet 5    albuterol sulfate HFA (PROVENTIL HFA) 108 (90 Base) MCG/ACT inhaler Inhale 2 puffs into the lungs every 4 hours as needed for Wheezing or Shortness of Breath (Space out to every 6 hours as symptoms improve) Space out to every 6 hours as symptoms improve.  1 Inhaler 3    sodium chloride (ALTAMIST SPRAY) 0.65 % nasal spray 1 spray by Nasal route as Maternal Grandmother     Depression Maternal Grandmother        REVIEW OF SYSTEMS:    · General: no significant weight changes. · Dermatological: no abnormal pigmentation, rash, or itching. · Ears/Nose/Throat: no hearing loss, tinnitus, vertigo, nosebleed, chronic nasal congestion, rhinorrhea, sore throat. · Respiratory: no cough, pleuritic chest pain, dyspnea, or wheezing  · Cardiovascular: no pain, dyspnea on exertion, orthopnea, palpitations, or claudication  · Gastrointestinal: no nausea, vomiting, heartburn, diarrhea, constipation, bloating, or abdominal pain. No bloody or black stools. · Genito-Urinary: no urinary urgency, frequency, dysuria, nocturia, hesitancy, incontinence, or pain. No hematuria  · Musculoskeletal: no arthralgia, myalgia, weakness, or morning stiffness  · Neurologic: no TIA or stroke symptoms. no paralysis, or frequent or severe headaches  · Hematologic/Lymphatic/Immunologic: no abnormal bleeding/bruising, fever, chills, night sweats orswollen glands.   · Endocrine: no heat or cold intolerance and no polyuria    PHYSICAL EXAM:      Vitals:    09/28/18 0919   BP: 136/85   Pulse: 85     BP Readings from Last 3 Encounters:   09/28/18 136/85   09/25/18 128/78   09/07/18 107/62       Physical Examination: General appearance - alert, well appearing, and in no distress  Eyes - pupils equal and reactive, extraocular eye movements intact  Chest - clear to auscultation, no wheezes, rales or rhonchi, symmetric air entry  Heart - normal rate, regular rhythm, normal S1, S2, no murmurs, rubs, clicks or gallops  Abdomen - soft, nontender, nondistended, no masses or organomegaly  Extremities - peripheral pulses normal, no pedal edema, no clubbing or cyanosis    LABORATORY FINDINGS:    CBC:   Lab Results   Component Value Date    WBC 6.9 02/13/2018    HGB 13.4 02/13/2018     05/01/2018     BMP:    Lab Results   Component Value Date     08/30/2018    K 4.9 08/30/2018     tablet     Sig: Take 1 tablet by mouth every 6 hours as needed for Nausea     Dispense:  15 tablet     Refill:  0          Completed Refills               Requested Prescriptions     Signed Prescriptions Disp Refills    pantoprazole (PROTONIX) 40 MG tablet 30 tablet 5     Sig: Take 1 tablet by mouth daily    gabapentin (NEURONTIN) 800 MG tablet 90 tablet 3     Sig: Take 1 tablet by mouth 3 times daily for 120 days. Thomas Handing metFORMIN (GLUCOPHAGE) 1000 MG tablet 60 tablet 2     Sig: Take 1 tablet by mouth 2 times daily (with meals)    ONE TOUCH ULTRA TEST strip 100 each 5     Si each by Other route 3 times daily    furosemide (LASIX) 20 MG tablet 30 tablet 5     Sig: Take 1 tablet by mouth daily    clarithromycin (BIAXIN) 500 MG tablet 28 tablet 0     Sig: Take 1 tablet by mouth 2 times daily for 14 days    amoxicillin-clavulanate (AUGMENTIN XR) 1000-62.5 MG per extended release tablet 28 tablet 0     Sig: Take 1 tablet by mouth 2 times daily for 14 days    promethazine (PHENERGAN) 12.5 MG tablet 15 tablet 0     Sig: Take 1 tablet by mouth every 6 hours as needed for Nausea       5. Patient given educational materials - see patient instructions    6. Was a self-tracking handout given in paper form or via [employer]? If yes, see orders or list here. Orders Placed This Encounter   Procedures    INFLUENZA, QUADV, 3 YRS AND OLDER, IM, PF, PREFILL SYR OR SDV, 0.5ML (FLUZONE QUADV, PF)    Lipid Panel     Standing Status:   Future     Standing Expiration Date:   2019     Order Specific Question:   Is Patient Fasting?/# of Hours     Answer:   8    H. Pylori Antigen, EIA    POCT Glucose    AR ADMIN INFLUENZA VIRUS VAC       No Follow-up on file. Patient voiced understanding and agreed to treatment plan.        Fabricio Fulton MD  PGY-2, Internal Medicine  [facility]

## 2018-10-10 ENCOUNTER — OFFICE VISIT (OUTPATIENT)
Dept: INTERNAL MEDICINE | Age: 43
End: 2018-10-10
Payer: MEDICARE

## 2018-10-10 ENCOUNTER — TELEPHONE (OUTPATIENT)
Dept: INTERNAL MEDICINE | Age: 43
End: 2018-10-10

## 2018-10-10 VITALS
SYSTOLIC BLOOD PRESSURE: 131 MMHG | HEART RATE: 86 BPM | WEIGHT: 272.6 LBS | DIASTOLIC BLOOD PRESSURE: 84 MMHG | BODY MASS INDEX: 35.97 KG/M2

## 2018-10-10 DIAGNOSIS — E11.65 UNCONTROLLED TYPE 2 DIABETES MELLITUS WITH HYPERGLYCEMIA, WITH LONG-TERM CURRENT USE OF INSULIN (HCC): ICD-10-CM

## 2018-10-10 DIAGNOSIS — E11.65 TYPE 2 DIABETES MELLITUS WITH HYPERGLYCEMIA, WITH LONG-TERM CURRENT USE OF INSULIN (HCC): Primary | Chronic | ICD-10-CM

## 2018-10-10 DIAGNOSIS — J30.1 SEASONAL ALLERGIC RHINITIS DUE TO POLLEN: ICD-10-CM

## 2018-10-10 DIAGNOSIS — E66.01 CLASS 2 SEVERE OBESITY DUE TO EXCESS CALORIES WITH SERIOUS COMORBIDITY AND BODY MASS INDEX (BMI) OF 35.0 TO 35.9 IN ADULT (HCC): ICD-10-CM

## 2018-10-10 DIAGNOSIS — Z79.4 UNCONTROLLED TYPE 2 DIABETES MELLITUS WITH HYPERGLYCEMIA, WITH LONG-TERM CURRENT USE OF INSULIN (HCC): ICD-10-CM

## 2018-10-10 DIAGNOSIS — Z79.4 TYPE 2 DIABETES MELLITUS WITH HYPERGLYCEMIA, WITH LONG-TERM CURRENT USE OF INSULIN (HCC): Primary | Chronic | ICD-10-CM

## 2018-10-10 LAB — GLUCOSE BLD-MCNC: 265 MG/DL

## 2018-10-10 PROCEDURE — 3045F PR MOST RECENT HEMOGLOBIN A1C LEVEL 7.0-9.0%: CPT | Performed by: HOSPITALIST

## 2018-10-10 PROCEDURE — 99213 OFFICE O/P EST LOW 20 MIN: CPT | Performed by: HOSPITALIST

## 2018-10-10 PROCEDURE — 99211 OFF/OP EST MAY X REQ PHY/QHP: CPT | Performed by: INTERNAL MEDICINE

## 2018-10-10 PROCEDURE — G8427 DOCREV CUR MEDS BY ELIG CLIN: HCPCS | Performed by: HOSPITALIST

## 2018-10-10 PROCEDURE — G8482 FLU IMMUNIZE ORDER/ADMIN: HCPCS | Performed by: HOSPITALIST

## 2018-10-10 PROCEDURE — 82962 GLUCOSE BLOOD TEST: CPT | Performed by: HOSPITALIST

## 2018-10-10 PROCEDURE — 2022F DILAT RTA XM EVC RTNOPTHY: CPT | Performed by: HOSPITALIST

## 2018-10-10 PROCEDURE — 4004F PT TOBACCO SCREEN RCVD TLK: CPT | Performed by: HOSPITALIST

## 2018-10-10 PROCEDURE — G8417 CALC BMI ABV UP PARAM F/U: HCPCS | Performed by: HOSPITALIST

## 2018-10-10 RX ORDER — LORATADINE 10 MG/1
10 TABLET ORAL NIGHTLY
Qty: 90 TABLET | Refills: 3 | Status: SHIPPED | OUTPATIENT
Start: 2018-10-10 | End: 2020-08-13 | Stop reason: SDUPTHER

## 2018-10-10 ASSESSMENT — ENCOUNTER SYMPTOMS
EYE DISCHARGE: 0
RHINORRHEA: 1
EYE REDNESS: 0
VOICE CHANGE: 0
GASTROINTESTINAL NEGATIVE: 1
EYE ITCHING: 0
PHOTOPHOBIA: 0
SINUS PAIN: 1
FACIAL SWELLING: 0
RESPIRATORY NEGATIVE: 1
ALLERGIC/IMMUNOLOGIC NEGATIVE: 1
SINUS PRESSURE: 0
EYE PAIN: 0
TROUBLE SWALLOWING: 0
SORE THROAT: 0

## 2018-10-10 NOTE — PATIENT INSTRUCTIONS
severe hypoglycemia and cannot eat or drink. Be sure your family and close friends know how to give you this injection in an emergency. Also watch for signs of high blood sugar (hyperglycemia) such as increased thirst or urination, blurred vision, headache, and tiredness. Blood sugar levels can be affected by stress, illness, surgery, exercise, alcohol use, or skipping meals. Ask your doctor before changing your insulin dose or schedule. Insulin degludec is only part of a complete treatment program that may also include diet, exercise, weight control, regular blood sugar testing, and special medical care. Follow your doctor's instructions very closely. Keep this medicine in its original container protected from heat and light. Do not freeze insulin or store it near the cooling element in a refrigerator. Throw away any insulin that has been frozen. Storing unopened (not in use) insulin degludec:  · Refrigerate and use until expiration date; or  · Store at room temperature and use within 8 weeks (56 days). Storing opened (in use) insulin degludec:  · Store the injection pen at room temperature (do not refrigerate) and use within 8 weeks. Do not store the injection pen with a needle attached. Do not use the medicine if it looks cloudy or has changed colors. Call your pharmacist for new medicine. Read all patient information, medication guides, and instruction sheets provided to you. Ask your doctor or pharmacist if you have any questions. Wear a diabetes medical alert tag in case of emergency. Any medical care provider who treats you should know that you have diabetes. What happens if I miss a dose? Use the missed dose as soon as you remember. Then continue your regular dosing schedule, allowing at least 8 hours to pass between doses. Do not use extra insulin to make up a missed dose. Keep insulin on hand at all times. Get your prescription refilled before you run out of medicine completely.   What happens nurse or pharmacist.  Copyright 7598-1676 [employer]. Version: 1.04. Revision date: 6/22/2016. Care instructions adapted under license by [facility] ([facility]). If you have questions about a medical condition or this instruction, always ask your healthcare professional. [address] any warranty or liability for your use of this information. dapagliflozin  Pronunciation:  DAP a gli FLOE zin  Brand:  Warren Cohn  What is the most important information I should know about dapagliflozin? You should not use dapagliflozin if you have severe kidney disease, if you are on dialysis, or if you have diabetic ketoacidosis. Dapagliflozin is not for treating type 1 diabetes. Taking dapagliflozin can make you dehydrated, which could cause you to feel weak or dizzy (especially when you stand up). Dapagliflozin can also cause infections in the bladder or genitals (penis or vagina). Call your doctor if you have genital pain or itching, genital odor or discharge, increased urination, pain or burning when you urinate, or blood in your urine. Some people taking this medicine have had bladder cancer, but it is not clear if dapagliflozin was the actual cause. What is dapagliflozin? Dapagliflozin is an oral diabetes medicine that helps control blood sugar levels. Dapagliflozin works by helping the kidneys get rid of glucose from your bloodstream.  Dapagliflozin is used together with diet and exercise to improve blood sugar control in adults with type 2 diabetes mellitus. Dapagliflozin is not for treating type 1 diabetes. Dapagliflozin may also be used for purposes not listed in this medication guide. What should I discuss with my healthcare provider before taking dapagliflozin? You should not use dapagliflozin if you are allergic to it, or if you have:  · severe kidney disease (or if you are on dialysis); or  · diabetic ketoacidosis (call your doctor for treatment with insulin).   To make sure

## 2018-10-10 NOTE — PROGRESS NOTES
[facility]  [facility] [address]  [address] [place] [address] [facility] [address]  Dept: [phone]  Dept Fax: [phone]    Office Progress/Follow Up Note  Date of patient's visit: 10/10/2018  Patient's Name:  Jean-Pierre Boothe YOB: 1975            Patient Care Team:  Marita Brewster MD as PCP - General (Internal Medicine)  Bhavik Brand MD as PCP - S Attributed Provider  Mynor Menendez MD as Consulting Physician (Gastroenterology)  ================================================================    REASON FOR VISIT/CHIEFCOMPLAINT:  Diabetes    HISTORY OF PRESENTING ILLNESS:  History was obtained from: patient, electronic medical record. Jean-Pierre Boothe is a 44 y.o. is here for a same day visit for reports of uncontrolled diabetes. Patient reports his blood sugars of been between 250 and 350 at home. Patient reports he has been taking the Degludec insulin and has increased his dose up to 105 units subcu every evening. Patient reports he is taking Januvia 100 daily, Metformin 1000 mg twice a day. Patient reports she has not been taking the Humalog sliding scale insulin with meals, and has not been taking the dapagliflozin. His fasting blood sugar this a.m. in the office is 265. His previous hemoglobin A1c in August was 1.2. Medication teaching done with patient. Patient told to resume taking the Humalog sliding scale insulin with meals. Patient told to resume taking the dapagliflozin. Patient is also complaining of intermittent headaches and sinus congestion. He is also complaining of some visual changes which could be due to his diabetes being uncontrolled as well as some allergies. Patient has some redness in the bilateral nares with some green drainage. No reports of fever, chills, nausea, or vomiting. He is instructed to stop smoking. Patient is not amenable to at this time.           Patient Active Problem List   Diagnosis    Long-term Genitourinary: Positive for frequency. Negative for decreased urine volume, difficulty urinating, discharge, dysuria, flank pain, hematuria, penile pain, penile swelling, scrotal swelling, testicular pain and urgency. Musculoskeletal: Negative. Allergic/Immunologic: Negative. Neurological: Negative. Hematological: Negative. Psychiatric/Behavioral: Negative. PHYSICAL EXAM:  Vitals:    10/10/18 0842   BP: 131/84   Site: Right Upper Arm   Position: Sitting   Cuff Size: Large Adult   Pulse: 86   Weight: 272 lb 9.6 oz (123.7 kg)     BP Readings from Last 3 Encounters:   10/10/18 131/84   09/28/18 136/85   09/25/18 128/78        Physical Exam   Constitutional: He is oriented to person, place, and time. He appears well-developed and well-nourished. No distress. HENT:   Head: Normocephalic and atraumatic. Mouth/Throat: Oropharynx is clear and moist. No oropharyngeal exudate. Bilateral nares with redness and congestion. Eyes: Pupils are equal, round, and reactive to light. Conjunctivae and EOM are normal. Right eye exhibits no discharge. Left eye exhibits no discharge. No scleral icterus. Neck: Normal range of motion. Neck supple. No JVD present. No tracheal deviation present. No thyromegaly present. Cardiovascular: Normal rate, regular rhythm, normal heart sounds and intact distal pulses. Exam reveals no gallop and no friction rub. No murmur heard. Pulmonary/Chest: Effort normal and breath sounds normal. No stridor. No respiratory distress. He has no wheezes. He has no rales. He exhibits no tenderness. Abdominal: Soft. Bowel sounds are normal. He exhibits no distension and no mass. There is no tenderness. There is no rebound. Musculoskeletal: Normal range of motion. He exhibits no edema, tenderness or deformity. Lymphadenopathy:     He has no cervical adenopathy. Neurological: He is alert and oriented to person, place, and time. He displays normal reflexes.  No cranial nerve

## 2018-10-24 ENCOUNTER — TELEPHONE (OUTPATIENT)
Dept: INTERNAL MEDICINE | Age: 43
End: 2018-10-24

## 2018-10-30 NOTE — TELEPHONE ENCOUNTER
PS response still not received---     The preferred medication is Admelog Vials can we change him to that medication? ?

## 2018-10-31 ENCOUNTER — TELEPHONE (OUTPATIENT)
Dept: INTERNAL MEDICINE | Age: 43
End: 2018-10-31

## 2018-10-31 ENCOUNTER — OFFICE VISIT (OUTPATIENT)
Dept: INTERNAL MEDICINE | Age: 43
End: 2018-10-31
Payer: MEDICARE

## 2018-10-31 VITALS
SYSTOLIC BLOOD PRESSURE: 144 MMHG | HEART RATE: 83 BPM | BODY MASS INDEX: 35.65 KG/M2 | DIASTOLIC BLOOD PRESSURE: 83 MMHG | HEIGHT: 73 IN | WEIGHT: 269 LBS

## 2018-10-31 DIAGNOSIS — E11.65 TYPE 2 DIABETES MELLITUS WITH HYPERGLYCEMIA, WITH LONG-TERM CURRENT USE OF INSULIN (HCC): Primary | Chronic | ICD-10-CM

## 2018-10-31 DIAGNOSIS — Z72.0 TOBACCO ABUSE: ICD-10-CM

## 2018-10-31 DIAGNOSIS — I10 ESSENTIAL HYPERTENSION: ICD-10-CM

## 2018-10-31 DIAGNOSIS — H53.9 VISUAL DISTURBANCES: ICD-10-CM

## 2018-10-31 DIAGNOSIS — Z79.4 TYPE 2 DIABETES MELLITUS WITH HYPERGLYCEMIA, WITH LONG-TERM CURRENT USE OF INSULIN (HCC): Primary | Chronic | ICD-10-CM

## 2018-10-31 LAB — GLUCOSE BLD-MCNC: 299 MG/DL

## 2018-10-31 PROCEDURE — 82962 GLUCOSE BLOOD TEST: CPT | Performed by: HOSPITALIST

## 2018-10-31 PROCEDURE — 4004F PT TOBACCO SCREEN RCVD TLK: CPT | Performed by: HOSPITALIST

## 2018-10-31 PROCEDURE — G8417 CALC BMI ABV UP PARAM F/U: HCPCS | Performed by: HOSPITALIST

## 2018-10-31 PROCEDURE — 99211 OFF/OP EST MAY X REQ PHY/QHP: CPT | Performed by: HOSPITALIST

## 2018-10-31 PROCEDURE — G8427 DOCREV CUR MEDS BY ELIG CLIN: HCPCS | Performed by: HOSPITALIST

## 2018-10-31 PROCEDURE — 99213 OFFICE O/P EST LOW 20 MIN: CPT | Performed by: HOSPITALIST

## 2018-10-31 PROCEDURE — 3045F PR MOST RECENT HEMOGLOBIN A1C LEVEL 7.0-9.0%: CPT | Performed by: HOSPITALIST

## 2018-10-31 PROCEDURE — G8482 FLU IMMUNIZE ORDER/ADMIN: HCPCS | Performed by: HOSPITALIST

## 2018-10-31 PROCEDURE — 2022F DILAT RTA XM EVC RTNOPTHY: CPT | Performed by: HOSPITALIST

## 2018-10-31 RX ORDER — LISINOPRIL 5 MG/1
5 TABLET ORAL DAILY
Qty: 30 TABLET | Refills: 3 | Status: SHIPPED | OUTPATIENT
Start: 2018-10-31 | End: 2019-07-27 | Stop reason: SDUPTHER

## 2018-10-31 ASSESSMENT — ENCOUNTER SYMPTOMS
TROUBLE SWALLOWING: 0
DIARRHEA: 0
RESPIRATORY NEGATIVE: 1
VOMITING: 0
ABDOMINAL PAIN: 0
EYE PAIN: 0
EYE DISCHARGE: 0
BLOOD IN STOOL: 0
EYE ITCHING: 0
CONSTIPATION: 0
ANAL BLEEDING: 0
ABDOMINAL DISTENTION: 0
SINUS PAIN: 0
ALLERGIC/IMMUNOLOGIC NEGATIVE: 1
NAUSEA: 0
RECTAL PAIN: 0
PHOTOPHOBIA: 0
SINUS PRESSURE: 0
RHINORRHEA: 0
EYE REDNESS: 0

## 2018-10-31 NOTE — PROGRESS NOTES
Attending Physician Statement  I have discussed the care of Gerber Mg, including pertinent history and exam findings with the resident. I have reviewed the key elements of all parts of the encounter with the resident. I agree with the assessment, and status of the problem list as documented. Diagnosis Orders   1. Type 2 diabetes mellitus with hyperglycemia, with long-term current use of insulin (HCC)  POCT Glucose    insulin lispro (HUMALOG KWIKPEN) 100 UNIT/ML pen    insulin glargine (BASAGLAR KWIKPEN) 100 UNIT/ML injection pen    [facility] - Jamil Stokes MD    Lipid Panel    lisinopril (PRINIVIL;ZESTRIL) 5 MG tablet   2. Visual disturbances     3. Essential hypertension  lisinopril (PRINIVIL;ZESTRIL) 5 MG tablet   4. Tobacco abuse        The plan and orders should include   Orders Placed This Encounter   Procedures    POCT Glucose    and this was also documented by the resident. I agree with the referral to Endo. The medication list was reviewed with the resident and is up to date. The return visit should be in 6 weeks .     Coretta Ivan MD, [address] [place] [address] [facility]  Associate  Department of Internal Medicine  Internal Medicine Clerkship - Aylin Alfredo  10/31/2018, 10:23 AM

## 2018-10-31 NOTE — PATIENT INSTRUCTIONS
Patient Education   Patient Education        Learning About Diabetes Food Guidelines  Your Care Instructions    Meal planning is important to manage diabetes. It helps keep your blood sugar at a target level (which you set with your doctor). You don't have to eat special foods. You can eat what your family eats, including sweets once in a while. But you do have to pay attention to how often you eat and how much you eat of certain foods. You may want to work with a dietitian or a certified diabetes educator (CDE) to help you plan meals and snacks. A dietitian or CDE can also help you lose weight if that is one of your goals. What should you know about eating carbs? Managing the amount of carbohydrate (carbs) you eat is an important part of healthy meals when you have diabetes. Carbohydrate is found in many foods. · Learn which foods have carbs. And learn the amounts of carbs in different foods. ¨ Bread, cereal, pasta, and rice have about 15 grams of carbs in a serving. A serving is 1 slice of bread (1 ounce), ½ cup of cooked cereal, or 1/3 cup of cooked pasta or rice. ¨ Fruits have 15 grams of carbs in a serving. A serving is 1 small fresh fruit, such as an apple or orange; ½ of a banana; ½ cup of cooked or canned fruit; ½ cup of fruit juice; 1 cup of melon or raspberries; or 2 tablespoons of dried fruit. ¨ Milk and no-sugar-added yogurt have 15 grams of carbs in a serving. A serving is 1 cup of milk or 2/3 cup of no-sugar-added yogurt. ¨ Starchy vegetables have 15 grams of carbs in a serving. A serving is ½ cup of mashed potatoes or sweet potato; 1 cup winter squash; ½ of a small baked potato; ½ cup of cooked beans; or ½ cup cooked corn or green peas. · Learn how much carbs to eat each day and at each meal. A dietitian or CDE can teach you how to keep track of the amount of carbs you eat. This is called carbohydrate counting.   · If you are not sure how to count carbohydrate grams, use the Plate Method to plan meals. It is a good, quick way to make sure that you have a balanced meal. It also helps you spread carbs throughout the day. ¨ Divide your plate by types of foods. Put non-starchy vegetables on half the plate, meat or other protein food on one-quarter of the plate, and a grain or starchy vegetable in the final quarter of the plate. To this you can add a small piece of fruit and 1 cup of milk or yogurt, depending on how many carbs you are supposed to eat at a meal.  · Try to eat about the same amount of carbs at each meal. Do not \"save up\" your daily allowance of carbs to eat at one meal.  · Proteins have very little or no carbs per serving. Examples of proteins are beef, chicken, turkey, fish, eggs, tofu, cheese, cottage cheese, and peanut butter. A serving size of meat is 3 ounces, which is about the size of a deck of cards. Examples of meat substitute serving sizes (equal to 1 ounce of meat) are 1/4 cup of cottage cheese, 1 egg, 1 tablespoon of peanut butter, and ½ cup of tofu. How can you eat out and still eat healthy? · Learn to estimate the serving sizes of foods that have carbohydrate. If you measure food at home, it will be easier to estimate the amount in a serving of restaurant food. · If the meal you order has too much carbohydrate (such as potatoes, corn, or baked beans), ask to have a low-carbohydrate food instead. Ask for a salad or green vegetables. · If you use insulin, check your blood sugar before and after eating out to help you plan how much to eat in the future. · If you eat more carbohydrate at a meal than you had planned, take a walk or do other exercise. This will help lower your blood sugar. What else should you know? · Limit saturated fat, such as the fat from meat and dairy products. This is a healthy choice because people who have diabetes are at higher risk of heart disease. So choose lean cuts of meat and nonfat or low-fat dairy products.  Use olive or canola oil instead of please click on the \"Sign Up Now\" link. Current as of: November 29, 2017  Content Version: 11.7  © 0761-6868 [employer]. Care instructions adapted under license by [facility] ([facility]). If you have questions about a medical condition or this instruction, always ask your healthcare professional. [address] any warranty or liability for your use of this information. Medications e-scribed to pharmacy of patient's choice. LABORATORY INSTRUCTIONS    Your doctor has ordered blood or urine testing. You can get this testing done at the Lab located on the first floor of the [facility], or at any other [facility]. Please stop at Main Registration, before going to the lab, as you must be registered first.     Please get this lab done before your next visit. You may NOT eat, drink, smoke, or chew anything before this test for 8 hours. You may still have water. Return To Clinic 11/28/18. After Visit Summary given and reviewed. JF    It is very important for your care that you keep your appointment. If for some reason you are unable to keep your appointment it is equally important that you call our office at [phone] to cancel your appointment and reschedule. Failure to do so may result in your termination from our practice.

## 2018-11-01 ENCOUNTER — TELEPHONE (OUTPATIENT)
Dept: INTERNAL MEDICINE | Age: 43
End: 2018-11-01

## 2018-11-01 NOTE — TELEPHONE ENCOUNTER
LM for patient to call the office for a message from md  Letter can be printed and cosigned, need to know if patient would like to  letter
[facility]            Patient Active Problem List:     Long-term current use of methadone for opiate dependence ([facility] [address]     Tobacco abuse     Type 2 diabetes mellitus with hyperglycemia, with long-term current use of insulin (HCC)     Hepatitis B     History of hepatitis C     Gastroesophageal reflux disease     Chronic diastolic congestive heart failure ([facility] [address]     Abscess     Establishing care with new doctor, encounter for

## 2018-11-06 ENCOUNTER — OFFICE VISIT (OUTPATIENT)
Dept: INTERNAL MEDICINE | Age: 43
End: 2018-11-06
Payer: MEDICARE

## 2018-11-06 VITALS
WEIGHT: 280 LBS | DIASTOLIC BLOOD PRESSURE: 88 MMHG | HEIGHT: 73 IN | BODY MASS INDEX: 37.11 KG/M2 | HEART RATE: 100 BPM | SYSTOLIC BLOOD PRESSURE: 137 MMHG

## 2018-11-06 DIAGNOSIS — R11.0 CHRONIC NAUSEA: ICD-10-CM

## 2018-11-06 DIAGNOSIS — E11.65 UNCONTROLLED TYPE 2 DIABETES MELLITUS WITH HYPERGLYCEMIA (HCC): Primary | ICD-10-CM

## 2018-11-06 LAB — GLUCOSE BLD-MCNC: 353 MG/DL

## 2018-11-06 PROCEDURE — 3045F PR MOST RECENT HEMOGLOBIN A1C LEVEL 7.0-9.0%: CPT | Performed by: INTERNAL MEDICINE

## 2018-11-06 PROCEDURE — 82962 GLUCOSE BLOOD TEST: CPT | Performed by: INTERNAL MEDICINE

## 2018-11-06 PROCEDURE — G8427 DOCREV CUR MEDS BY ELIG CLIN: HCPCS | Performed by: INTERNAL MEDICINE

## 2018-11-06 PROCEDURE — 99211 OFF/OP EST MAY X REQ PHY/QHP: CPT | Performed by: INTERNAL MEDICINE

## 2018-11-06 PROCEDURE — G8417 CALC BMI ABV UP PARAM F/U: HCPCS | Performed by: INTERNAL MEDICINE

## 2018-11-06 PROCEDURE — G8482 FLU IMMUNIZE ORDER/ADMIN: HCPCS | Performed by: INTERNAL MEDICINE

## 2018-11-06 PROCEDURE — 2022F DILAT RTA XM EVC RTNOPTHY: CPT | Performed by: INTERNAL MEDICINE

## 2018-11-06 PROCEDURE — 99213 OFFICE O/P EST LOW 20 MIN: CPT | Performed by: INTERNAL MEDICINE

## 2018-11-06 PROCEDURE — 4004F PT TOBACCO SCREEN RCVD TLK: CPT | Performed by: INTERNAL MEDICINE

## 2018-11-06 RX ORDER — ONDANSETRON 4 MG/1
4 TABLET, FILM COATED ORAL EVERY 12 HOURS PRN
Qty: 20 TABLET | Refills: 0 | Status: SHIPPED | OUTPATIENT
Start: 2018-11-06 | End: 2018-11-26 | Stop reason: SDUPTHER

## 2018-11-06 ASSESSMENT — ENCOUNTER SYMPTOMS
NAUSEA: 1
PHOTOPHOBIA: 0

## 2018-11-06 NOTE — PROGRESS NOTES
In to see pt , pt states he keeps hi BS history in his machine. Pt not interested at all in learning to fill out a BS log. Educated pt on reasons for keeping good BS control, taking medications as directed and not forgetting medications. Pt interested in DM Ed and would like to see if there is a class he can take through [address] [facility] in [address] [facility] where he lives in the afternoon pt is only in [facility] in the mornings. All questions and concerns answered. Support offered writers desk ph# given to pt .

## 2018-11-06 NOTE — PROGRESS NOTES
[facility]    Progress Note    Date of patient's visit: 11/6/2018    Patient's Name:  Yvone Hamman    YOB: 1975            Patient Care Team:  Quinn Chin MD as PCP - General (Internal Medicine)  Tevin Rjoo MD as PCP - S Attributed Provider  Julienne Hope MD as Consulting Physician (Gastroenterology)    REASON FOR VISIT: Routine outpatient follow     Chief Complaint   Patient presents with    Diabetes     high blood sugars pt has endocrine appt 11/12 having vision issues state that eye dr can not do anything until his blood sugar is down fasting blood sugar this morning 277    Hypertension     not taking bp meds     Nausea     since blood sugar has been up          HISTORY OF PRESENT ILLNESS:    History was obtained from the patient. Yvone Hamman is a 44 y.o. is here for Follow-up of uncontrolled diabetes. He has hypertension but has not been taking Lisinopril. He says he never picked up those medications. He has uncontrolled diabetes. He has blood sugars running in the 300 range all the time but yesterday his morning and her blood sugar was more than 500. He is supposed to be on basically a 75 units 2 times a day along with 15 units of Humalog with his meals. He says yesterday he had to take 30 units of Humalog with each meal to his blood sugar came down to about 180. He has an appointment next week to see an endocrinologist.  He is at school trade school. He would like a week off from school as he is not able to see very well because of blurring vision. He saw an ophthalmologist earlier this summer and was told he has myopia but no diabetic retinopathy. He has been nauseous also for the last month. He has not had a gastric emptying scan done. He states compliance with all his medications. His metformin was cut down as it was causing a lot of flatulence. He has been going to methadone clinic regularly.   He has not had

## 2018-11-11 ASSESSMENT — ENCOUNTER SYMPTOMS
SHORTNESS OF BREATH: 0
ABDOMINAL PAIN: 0
WHEEZING: 0
VOICE CHANGE: 0
RHINORRHEA: 0
CONSTIPATION: 0
COUGH: 0
BLOOD IN STOOL: 0
SORE THROAT: 0
VOMITING: 0

## 2018-11-14 ENCOUNTER — TELEPHONE (OUTPATIENT)
Dept: INTERNAL MEDICINE | Age: 43
End: 2018-11-14

## 2018-11-14 NOTE — LETTER
[address]  [address] [facility] [address] [facility]  Phone: [phone]  Fax: [phone]    Carlos Gutiérrez MD        November 14, 2018    [address] [place] [address]      Dear Lauren Horotn: We are sending this letter because your PCP ordered [facility] for you to have done at your last visit here and they have not yet been completed. If you can please come to our office on the 2nd floor to  your orders to have them compelted. If you do not have a follow-up appointment scheduled you can either contact the office to make an appointment with us or you can make one when you come in to pick-up your orders. If you have any questions or concerns, please don't hesitate to call.     Sincerely,        Carlos Gutiérrez MD

## 2018-11-26 DIAGNOSIS — R11.0 CHRONIC NAUSEA: ICD-10-CM

## 2018-11-28 RX ORDER — ONDANSETRON 4 MG/1
TABLET, FILM COATED ORAL
Qty: 20 TABLET | Refills: 2 | Status: SHIPPED | OUTPATIENT
Start: 2018-11-28 | End: 2019-02-20 | Stop reason: SDUPTHER

## 2018-12-31 DIAGNOSIS — Z79.4 TYPE 2 DIABETES MELLITUS WITH HYPERGLYCEMIA, WITH LONG-TERM CURRENT USE OF INSULIN (HCC): Primary | Chronic | ICD-10-CM

## 2018-12-31 DIAGNOSIS — E11.65 TYPE 2 DIABETES MELLITUS WITH HYPERGLYCEMIA, WITH LONG-TERM CURRENT USE OF INSULIN (HCC): Primary | Chronic | ICD-10-CM

## 2019-01-01 NOTE — TELEPHONE ENCOUNTER
Glenna Luu refilled    Wali Marin M.D.  PGY-3 Internal Medicine  [facility].
RIGHT (1-2 VIEWS) Once 03/16/2019   C-Peptide Once 02/24/2019   TSH with Reflex Once 02/07/2019   Lipid Panel Once 80/92/0229   Basic Metabolic Panel Once 73/54/8701   CBC With Auto Differential Once 02/24/2019   Hepatic Function Panel Once 02/07/2019       Next Visit Date:  No future appointments.          Patient Active Problem List:     Long-term current use of methadone for opiate dependence ([facility] [address]     Tobacco abuse     Type 2 diabetes mellitus with hyperglycemia, with long-term current use of insulin (HCC)     Hepatitis B     History of hepatitis C     Gastroesophageal reflux disease     Chronic diastolic congestive heart failure ([facility] [address]     Abscess     Establishing care with new doctor, encounter for

## 2019-02-06 ENCOUNTER — OFFICE VISIT (OUTPATIENT)
Dept: PRIMARY CARE CLINIC | Age: 44
End: 2019-02-06
Payer: MEDICARE

## 2019-02-06 VITALS
HEART RATE: 78 BPM | HEIGHT: 73 IN | BODY MASS INDEX: 37.51 KG/M2 | WEIGHT: 283 LBS | DIASTOLIC BLOOD PRESSURE: 70 MMHG | TEMPERATURE: 98.1 F | SYSTOLIC BLOOD PRESSURE: 102 MMHG

## 2019-02-06 DIAGNOSIS — R11.2 NAUSEA AND VOMITING, INTRACTABILITY OF VOMITING NOT SPECIFIED, UNSPECIFIED VOMITING TYPE: ICD-10-CM

## 2019-02-06 DIAGNOSIS — Z87.19 HX OF GASTROESOPHAGEAL REFLUX (GERD): ICD-10-CM

## 2019-02-06 DIAGNOSIS — B19.10 HEPATITIS B INFECTION WITHOUT DELTA AGENT WITHOUT HEPATIC COMA, UNSPECIFIED CHRONICITY: Primary | ICD-10-CM

## 2019-02-06 DIAGNOSIS — Z87.19 HISTORY OF CIRRHOSIS: ICD-10-CM

## 2019-02-06 PROCEDURE — G8427 DOCREV CUR MEDS BY ELIG CLIN: HCPCS | Performed by: NURSE PRACTITIONER

## 2019-02-06 PROCEDURE — G8417 CALC BMI ABV UP PARAM F/U: HCPCS | Performed by: NURSE PRACTITIONER

## 2019-02-06 PROCEDURE — G8482 FLU IMMUNIZE ORDER/ADMIN: HCPCS | Performed by: NURSE PRACTITIONER

## 2019-02-06 PROCEDURE — 99202 OFFICE O/P NEW SF 15 MIN: CPT | Performed by: NURSE PRACTITIONER

## 2019-02-06 PROCEDURE — 4004F PT TOBACCO SCREEN RCVD TLK: CPT | Performed by: NURSE PRACTITIONER

## 2019-02-06 RX ORDER — SUCRALFATE ORAL 1 G/10ML
1 SUSPENSION ORAL 4 TIMES DAILY
Qty: 200 ML | Refills: 0 | Status: SHIPPED | OUTPATIENT
Start: 2019-02-06 | End: 2019-03-08 | Stop reason: SDUPTHER

## 2019-02-06 RX ORDER — PROMETHAZINE HYDROCHLORIDE 12.5 MG/1
12.5 TABLET ORAL 4 TIMES DAILY PRN
Qty: 20 TABLET | Refills: 0 | Status: SHIPPED | OUTPATIENT
Start: 2019-02-06 | End: 2019-02-13

## 2019-02-06 RX ORDER — ONDANSETRON 4 MG/1
4 TABLET, FILM COATED ORAL ONCE
Status: COMPLETED | OUTPATIENT
Start: 2019-02-06 | End: 2019-02-06

## 2019-02-06 RX ADMIN — ONDANSETRON 4 MG: 4 TABLET, FILM COATED ORAL at 10:17

## 2019-02-06 ASSESSMENT — ENCOUNTER SYMPTOMS
EYE PAIN: 0
DIARRHEA: 0
SINUS PAIN: 0
SHORTNESS OF BREATH: 0
COUGH: 0
VOMITING: 1
BACK PAIN: 0
ABDOMINAL PAIN: 0
NAUSEA: 1
CONSTIPATION: 0
SORE THROAT: 0

## 2019-02-06 ASSESSMENT — PATIENT HEALTH QUESTIONNAIRE - PHQ9
SUM OF ALL RESPONSES TO PHQ9 QUESTIONS 1 & 2: 0
SUM OF ALL RESPONSES TO PHQ QUESTIONS 1-9: 0
1. LITTLE INTEREST OR PLEASURE IN DOING THINGS: 0
SUM OF ALL RESPONSES TO PHQ QUESTIONS 1-9: 0
2. FEELING DOWN, DEPRESSED OR HOPELESS: 0

## 2019-02-07 ENCOUNTER — TELEPHONE (OUTPATIENT)
Dept: INTERNAL MEDICINE | Age: 44
End: 2019-02-07

## 2019-02-15 ENCOUNTER — TELEPHONE (OUTPATIENT)
Dept: INTERNAL MEDICINE | Age: 44
End: 2019-02-15

## 2019-02-20 DIAGNOSIS — R11.0 CHRONIC NAUSEA: ICD-10-CM

## 2019-02-20 RX ORDER — ONDANSETRON 4 MG/1
4 TABLET, FILM COATED ORAL EVERY 8 HOURS PRN
Qty: 60 TABLET | Refills: 1 | Status: SHIPPED | OUTPATIENT
Start: 2019-02-20 | End: 2019-04-12 | Stop reason: SDUPTHER

## 2019-02-26 ENCOUNTER — OFFICE VISIT (OUTPATIENT)
Dept: PRIMARY CARE CLINIC | Age: 44
End: 2019-02-26
Payer: MEDICARE

## 2019-02-26 ENCOUNTER — HOSPITAL ENCOUNTER (OUTPATIENT)
Age: 44
Discharge: HOME | End: 2019-02-26
Payer: MEDICARE

## 2019-02-26 VITALS
BODY MASS INDEX: 37.89 KG/M2 | WEIGHT: 287.2 LBS | SYSTOLIC BLOOD PRESSURE: 119 MMHG | DIASTOLIC BLOOD PRESSURE: 78 MMHG | HEART RATE: 99 BPM | OXYGEN SATURATION: 96 % | TEMPERATURE: 99 F

## 2019-02-26 DIAGNOSIS — J40 BRONCHITIS: Primary | ICD-10-CM

## 2019-02-26 DIAGNOSIS — J02.9 SORE THROAT: ICD-10-CM

## 2019-02-26 LAB
ABSOLUTE EOS #: 0.1 K/UL (ref 0–0.44)
ABSOLUTE IMMATURE GRANULOCYTE: 0.05 K/UL (ref 0–0.3)
ABSOLUTE LYMPH #: 1.15 K/UL (ref 1.1–3.7)
ABSOLUTE MONO #: 0.56 K/UL (ref 0.1–1.2)
ALBUMIN SERPL-MCNC: 4 G/DL (ref 3.5–5.2)
ALBUMIN/GLOBULIN RATIO: 1.3 (ref 1–2.5)
ALP BLD-CCNC: 154 U/L (ref 40–129)
ALT SERPL-CCNC: 27 U/L (ref 5–41)
ANION GAP SERPL CALCULATED.3IONS-SCNC: 13 MMOL/L (ref 9–17)
AST SERPL-CCNC: 33 U/L
BASOPHILS # BLD: 1 % (ref 0–2)
BASOPHILS ABSOLUTE: 0.04 K/UL (ref 0–0.2)
BILIRUB SERPL-MCNC: 0.76 MG/DL (ref 0.3–1.2)
BUN BLDV-MCNC: 10 MG/DL (ref 6–20)
BUN/CREAT BLD: ABNORMAL (ref 9–20)
CALCIUM SERPL-MCNC: 9.1 MG/DL (ref 8.6–10.4)
CHLORIDE BLD-SCNC: 103 MMOL/L (ref 98–107)
CO2: 22 MMOL/L (ref 20–31)
CREAT SERPL-MCNC: 0.75 MG/DL (ref 0.7–1.2)
DIFFERENTIAL TYPE: ABNORMAL
EOSINOPHILS RELATIVE PERCENT: 2 % (ref 1–4)
GFR AFRICAN AMERICAN: >60 ML/MIN
GFR NON-AFRICAN AMERICAN: >60 ML/MIN
GFR SERPL CREATININE-BSD FRML MDRD: ABNORMAL ML/MIN/{1.73_M2}
GFR SERPL CREATININE-BSD FRML MDRD: ABNORMAL ML/MIN/{1.73_M2}
GLUCOSE BLD-MCNC: 160 MG/DL (ref 70–99)
HCT VFR BLD CALC: 43.5 % (ref 40.7–50.3)
HEMOGLOBIN: 15.5 G/DL (ref 13–17)
IMMATURE GRANULOCYTES: 1 %
LYMPHOCYTES # BLD: 17 % (ref 24–43)
MCH RBC QN AUTO: 30.9 PG (ref 25.2–33.5)
MCHC RBC AUTO-ENTMCNC: 35.6 G/DL (ref 28.4–34.8)
MCV RBC AUTO: 86.8 FL (ref 82.6–102.9)
MONOCYTES # BLD: 8 % (ref 3–12)
NRBC AUTOMATED: 0 PER 100 WBC
PDW BLD-RTO: 14.2 % (ref 11.8–14.4)
PLATELET # BLD: 123 K/UL (ref 138–453)
PLATELET ESTIMATE: ABNORMAL
PMV BLD AUTO: 10.9 FL (ref 8.1–13.5)
POTASSIUM SERPL-SCNC: 4.3 MMOL/L (ref 3.7–5.3)
RBC # BLD: 5.01 M/UL (ref 4.21–5.77)
RBC # BLD: ABNORMAL 10*6/UL
S PYO AG THROAT QL: NORMAL
SEG NEUTROPHILS: 71 % (ref 36–65)
SEGMENTED NEUTROPHILS ABSOLUTE COUNT: 4.74 K/UL (ref 1.5–8.1)
SODIUM BLD-SCNC: 138 MMOL/L (ref 135–144)
TOTAL PROTEIN: 7.2 G/DL (ref 6.4–8.3)
WBC # BLD: 6.6 K/UL (ref 3.5–11.3)
WBC # BLD: ABNORMAL 10*3/UL

## 2019-02-26 PROCEDURE — G8417 CALC BMI ABV UP PARAM F/U: HCPCS | Performed by: INTERNAL MEDICINE

## 2019-02-26 PROCEDURE — 36415 COLL VENOUS BLD VENIPUNCTURE: CPT

## 2019-02-26 PROCEDURE — 85025 COMPLETE CBC W/AUTO DIFF WBC: CPT

## 2019-02-26 PROCEDURE — G8482 FLU IMMUNIZE ORDER/ADMIN: HCPCS | Performed by: INTERNAL MEDICINE

## 2019-02-26 PROCEDURE — 87880 STREP A ASSAY W/OPTIC: CPT | Performed by: INTERNAL MEDICINE

## 2019-02-26 PROCEDURE — 4004F PT TOBACCO SCREEN RCVD TLK: CPT | Performed by: INTERNAL MEDICINE

## 2019-02-26 PROCEDURE — G8427 DOCREV CUR MEDS BY ELIG CLIN: HCPCS | Performed by: INTERNAL MEDICINE

## 2019-02-26 PROCEDURE — 99213 OFFICE O/P EST LOW 20 MIN: CPT | Performed by: INTERNAL MEDICINE

## 2019-02-26 PROCEDURE — 80053 COMPREHEN METABOLIC PANEL: CPT

## 2019-02-26 RX ORDER — GUAIFENESIN DEXTROMETHORPHAN HYDROBROMIDE ORAL SOLUTION 10; 100 MG/5ML; MG/5ML
10 SOLUTION ORAL EVERY 4 HOURS PRN
Qty: 1 BOTTLE | Refills: 1 | Status: SHIPPED | OUTPATIENT
Start: 2019-02-26 | End: 2020-01-31

## 2019-02-26 RX ORDER — AZITHROMYCIN 250 MG/1
TABLET, FILM COATED ORAL
Qty: 1 PACKET | Refills: 0 | Status: SHIPPED | OUTPATIENT
Start: 2019-02-26 | End: 2019-05-17

## 2019-02-26 ASSESSMENT — ENCOUNTER SYMPTOMS
COUGH: 1
SORE THROAT: 1
SHORTNESS OF BREATH: 1
ALLERGIC/IMMUNOLOGIC NEGATIVE: 1
GASTROINTESTINAL NEGATIVE: 1
EYES NEGATIVE: 1

## 2019-02-27 ENCOUNTER — TELEPHONE (OUTPATIENT)
Dept: PRIMARY CARE CLINIC | Age: 44
End: 2019-02-27

## 2019-03-05 ENCOUNTER — OFFICE VISIT (OUTPATIENT)
Dept: PRIMARY CARE CLINIC | Age: 44
End: 2019-03-05
Payer: MEDICARE

## 2019-03-05 VITALS
OXYGEN SATURATION: 94 % | BODY MASS INDEX: 37.47 KG/M2 | WEIGHT: 284 LBS | SYSTOLIC BLOOD PRESSURE: 128 MMHG | DIASTOLIC BLOOD PRESSURE: 82 MMHG | TEMPERATURE: 97 F | HEART RATE: 92 BPM

## 2019-03-05 DIAGNOSIS — I50.32 CHRONIC DIASTOLIC CONGESTIVE HEART FAILURE (HCC): Primary | ICD-10-CM

## 2019-03-05 PROCEDURE — 4004F PT TOBACCO SCREEN RCVD TLK: CPT | Performed by: INTERNAL MEDICINE

## 2019-03-05 PROCEDURE — G8482 FLU IMMUNIZE ORDER/ADMIN: HCPCS | Performed by: INTERNAL MEDICINE

## 2019-03-05 PROCEDURE — G8427 DOCREV CUR MEDS BY ELIG CLIN: HCPCS | Performed by: INTERNAL MEDICINE

## 2019-03-05 PROCEDURE — 99214 OFFICE O/P EST MOD 30 MIN: CPT | Performed by: INTERNAL MEDICINE

## 2019-03-05 PROCEDURE — G8417 CALC BMI ABV UP PARAM F/U: HCPCS | Performed by: INTERNAL MEDICINE

## 2019-03-05 RX ORDER — PREDNISONE 50 MG/1
TABLET ORAL
COMMUNITY
Start: 2019-03-02 | End: 2019-05-17

## 2019-03-05 RX ORDER — FUROSEMIDE 40 MG/1
40 TABLET ORAL 2 TIMES DAILY
Qty: 60 TABLET | Refills: 1 | Status: SHIPPED | OUTPATIENT
Start: 2019-03-05 | End: 2019-03-08 | Stop reason: SDUPTHER

## 2019-03-05 RX ORDER — IPRATROPIUM BROMIDE AND ALBUTEROL SULFATE 2.5; .5 MG/3ML; MG/3ML
SOLUTION RESPIRATORY (INHALATION)
COMMUNITY
Start: 2019-03-02 | End: 2020-05-06 | Stop reason: SDUPTHER

## 2019-03-05 ASSESSMENT — ENCOUNTER SYMPTOMS
COUGH: 1
SHORTNESS OF BREATH: 1

## 2019-03-08 ENCOUNTER — OFFICE VISIT (OUTPATIENT)
Dept: INTERNAL MEDICINE | Age: 44
End: 2019-03-08
Payer: MEDICARE

## 2019-03-08 VITALS
BODY MASS INDEX: 37.77 KG/M2 | DIASTOLIC BLOOD PRESSURE: 86 MMHG | HEIGHT: 73 IN | HEART RATE: 95 BPM | WEIGHT: 285 LBS | OXYGEN SATURATION: 96 % | SYSTOLIC BLOOD PRESSURE: 136 MMHG

## 2019-03-08 DIAGNOSIS — E11.65 TYPE 2 DIABETES MELLITUS WITH HYPERGLYCEMIA, WITH LONG-TERM CURRENT USE OF INSULIN (HCC): Primary | Chronic | ICD-10-CM

## 2019-03-08 DIAGNOSIS — K21.9 GASTROESOPHAGEAL REFLUX DISEASE, ESOPHAGITIS PRESENCE NOT SPECIFIED: ICD-10-CM

## 2019-03-08 DIAGNOSIS — I50.32 CHRONIC DIASTOLIC CONGESTIVE HEART FAILURE (HCC): ICD-10-CM

## 2019-03-08 DIAGNOSIS — F11.20 LONG-TERM CURRENT USE OF METHADONE FOR OPIATE DEPENDENCE (HCC): ICD-10-CM

## 2019-03-08 DIAGNOSIS — Z87.19 HX OF GASTROESOPHAGEAL REFLUX (GERD): ICD-10-CM

## 2019-03-08 DIAGNOSIS — Z79.4 TYPE 2 DIABETES MELLITUS WITH HYPERGLYCEMIA, WITH LONG-TERM CURRENT USE OF INSULIN (HCC): Primary | Chronic | ICD-10-CM

## 2019-03-08 DIAGNOSIS — R06.02 SHORTNESS OF BREATH: ICD-10-CM

## 2019-03-08 LAB — HBA1C MFR BLD: 6.8 %

## 2019-03-08 PROCEDURE — G8482 FLU IMMUNIZE ORDER/ADMIN: HCPCS | Performed by: STUDENT IN AN ORGANIZED HEALTH CARE EDUCATION/TRAINING PROGRAM

## 2019-03-08 PROCEDURE — G8417 CALC BMI ABV UP PARAM F/U: HCPCS | Performed by: STUDENT IN AN ORGANIZED HEALTH CARE EDUCATION/TRAINING PROGRAM

## 2019-03-08 PROCEDURE — 99211 OFF/OP EST MAY X REQ PHY/QHP: CPT | Performed by: INTERNAL MEDICINE

## 2019-03-08 PROCEDURE — 3044F HG A1C LEVEL LT 7.0%: CPT | Performed by: STUDENT IN AN ORGANIZED HEALTH CARE EDUCATION/TRAINING PROGRAM

## 2019-03-08 PROCEDURE — 83036 HEMOGLOBIN GLYCOSYLATED A1C: CPT | Performed by: STUDENT IN AN ORGANIZED HEALTH CARE EDUCATION/TRAINING PROGRAM

## 2019-03-08 PROCEDURE — 4004F PT TOBACCO SCREEN RCVD TLK: CPT | Performed by: STUDENT IN AN ORGANIZED HEALTH CARE EDUCATION/TRAINING PROGRAM

## 2019-03-08 PROCEDURE — 2022F DILAT RTA XM EVC RTNOPTHY: CPT | Performed by: STUDENT IN AN ORGANIZED HEALTH CARE EDUCATION/TRAINING PROGRAM

## 2019-03-08 PROCEDURE — 99213 OFFICE O/P EST LOW 20 MIN: CPT | Performed by: STUDENT IN AN ORGANIZED HEALTH CARE EDUCATION/TRAINING PROGRAM

## 2019-03-08 PROCEDURE — G8427 DOCREV CUR MEDS BY ELIG CLIN: HCPCS | Performed by: STUDENT IN AN ORGANIZED HEALTH CARE EDUCATION/TRAINING PROGRAM

## 2019-03-08 RX ORDER — SUCRALFATE ORAL 1 G/10ML
1 SUSPENSION ORAL 4 TIMES DAILY
Qty: 200 ML | Refills: 0 | Status: SHIPPED | OUTPATIENT
Start: 2019-03-08 | End: 2021-07-01

## 2019-03-08 RX ORDER — FUROSEMIDE 40 MG/1
40 TABLET ORAL DAILY
Qty: 30 TABLET | Refills: 2 | Status: SHIPPED | OUTPATIENT
Start: 2019-03-08 | End: 2020-09-10 | Stop reason: SDUPTHER

## 2019-03-14 DIAGNOSIS — G62.9 NEUROPATHY: ICD-10-CM

## 2019-03-14 RX ORDER — GABAPENTIN 800 MG/1
800 TABLET ORAL 3 TIMES DAILY
Qty: 90 TABLET | Refills: 3 | Status: SHIPPED | OUTPATIENT
Start: 2019-03-14 | End: 2019-07-09 | Stop reason: SDUPTHER

## 2019-03-18 ENCOUNTER — HOSPITAL ENCOUNTER (OUTPATIENT)
Dept: NON INVASIVE DIAGNOSTICS | Age: 44
Discharge: HOME | End: 2019-03-18
Payer: MEDICARE

## 2019-03-18 DIAGNOSIS — I50.32 CHRONIC DIASTOLIC CONGESTIVE HEART FAILURE (HCC): ICD-10-CM

## 2019-03-18 LAB
LV EF: 58 %
LVEF MODALITY: NORMAL

## 2019-03-18 PROCEDURE — 93306 TTE W/DOPPLER COMPLETE: CPT

## 2019-03-22 ENCOUNTER — HOSPITAL ENCOUNTER (OUTPATIENT)
Dept: PULMONOLOGY | Age: 44
Discharge: HOME | End: 2019-03-22
Payer: MEDICARE

## 2019-03-22 DIAGNOSIS — R06.02 SHORTNESS OF BREATH: ICD-10-CM

## 2019-03-22 PROCEDURE — 94640 AIRWAY INHALATION TREATMENT: CPT

## 2019-03-22 PROCEDURE — 94060 EVALUATION OF WHEEZING: CPT

## 2019-03-22 PROCEDURE — 99406 BEHAV CHNG SMOKING 3-10 MIN: CPT

## 2019-03-22 PROCEDURE — 94726 PLETHYSMOGRAPHY LUNG VOLUMES: CPT

## 2019-03-22 PROCEDURE — 94664 DEMO&/EVAL PT USE INHALER: CPT

## 2019-03-22 PROCEDURE — 94727 GAS DIL/WSHOT DETER LNG VOL: CPT

## 2019-03-22 PROCEDURE — 94250 HC DIFFUSION: CPT

## 2019-04-05 ENCOUNTER — TELEPHONE (OUTPATIENT)
Dept: INTERNAL MEDICINE | Age: 44
End: 2019-04-05

## 2019-04-05 DIAGNOSIS — Z79.4 TYPE 2 DIABETES MELLITUS WITHOUT COMPLICATION, WITH LONG-TERM CURRENT USE OF INSULIN (HCC): ICD-10-CM

## 2019-04-05 DIAGNOSIS — E11.9 TYPE 2 DIABETES MELLITUS WITHOUT COMPLICATION, WITH LONG-TERM CURRENT USE OF INSULIN (HCC): ICD-10-CM

## 2019-04-05 RX ORDER — HYDROGEN PEROXIDE 2.65 ML/100ML
LIQUID ORAL; TOPICAL
Qty: 30 TABLET | Refills: 17 | Status: SHIPPED | OUTPATIENT
Start: 2019-04-05 | End: 2020-09-25

## 2019-04-05 NOTE — TELEPHONE ENCOUNTER
PC to PCP--patient had ECHO and PFT completed on 3/18/19 and would like to discuss results, please advise.

## 2019-04-05 NOTE — TELEPHONE ENCOUNTER
Attempted to call patient with results of PFT's and Echo. Pt did not answer, message left with patient to schedule appointment if he wants further information about his tests. Will try and call again on Monday. Pt' PFT show mild obstructive disease consistent with his smoking history and his echo shows preserved LV function, LVH, and grade 1 diastolic dysfunction. Melvina Casey M.D.  PGY-3 Internal Medicine  [facility].

## 2019-04-09 NOTE — TELEPHONE ENCOUNTER
PC to patient---He said he has not talked to Dr. Gigi Zendejas about results but has appt tomorrow. He will discuss test results at that time.

## 2019-04-10 ENCOUNTER — OFFICE VISIT (OUTPATIENT)
Dept: INTERNAL MEDICINE | Age: 44
End: 2019-04-10
Payer: MEDICARE

## 2019-04-10 VITALS
SYSTOLIC BLOOD PRESSURE: 125 MMHG | BODY MASS INDEX: 38.03 KG/M2 | HEART RATE: 82 BPM | WEIGHT: 280.8 LBS | HEIGHT: 72 IN | DIASTOLIC BLOOD PRESSURE: 84 MMHG

## 2019-04-10 DIAGNOSIS — E11.9 ENCOUNTER FOR DIABETIC FOOT EXAM (HCC): ICD-10-CM

## 2019-04-10 DIAGNOSIS — E66.01 CLASS 2 SEVERE OBESITY DUE TO EXCESS CALORIES WITH SERIOUS COMORBIDITY AND BODY MASS INDEX (BMI) OF 38.0 TO 38.9 IN ADULT (HCC): ICD-10-CM

## 2019-04-10 DIAGNOSIS — Z71.6 TOBACCO ABUSE COUNSELING: ICD-10-CM

## 2019-04-10 DIAGNOSIS — J43.2 CENTRILOBULAR EMPHYSEMA (HCC): Primary | ICD-10-CM

## 2019-04-10 DIAGNOSIS — Z72.0 TOBACCO ABUSE: ICD-10-CM

## 2019-04-10 DIAGNOSIS — E11.42 CONTROLLED TYPE 2 DIABETES MELLITUS WITH DIABETIC POLYNEUROPATHY, WITH LONG-TERM CURRENT USE OF INSULIN (HCC): ICD-10-CM

## 2019-04-10 DIAGNOSIS — Z79.4 CONTROLLED TYPE 2 DIABETES MELLITUS WITH DIABETIC POLYNEUROPATHY, WITH LONG-TERM CURRENT USE OF INSULIN (HCC): ICD-10-CM

## 2019-04-10 DIAGNOSIS — I50.32 CHRONIC DIASTOLIC CONGESTIVE HEART FAILURE, NYHA CLASS 1 (HCC): ICD-10-CM

## 2019-04-10 DIAGNOSIS — G47.33 OBSTRUCTIVE SLEEP APNEA SYNDROME IN ADULT: ICD-10-CM

## 2019-04-10 DIAGNOSIS — I10 ESSENTIAL HYPERTENSION: ICD-10-CM

## 2019-04-10 PROCEDURE — 2022F DILAT RTA XM EVC RTNOPTHY: CPT | Performed by: HOSPITALIST

## 2019-04-10 PROCEDURE — 99211 OFF/OP EST MAY X REQ PHY/QHP: CPT | Performed by: INTERNAL MEDICINE

## 2019-04-10 PROCEDURE — 3023F SPIROM DOC REV: CPT | Performed by: HOSPITALIST

## 2019-04-10 PROCEDURE — 3044F HG A1C LEVEL LT 7.0%: CPT | Performed by: HOSPITALIST

## 2019-04-10 PROCEDURE — G8417 CALC BMI ABV UP PARAM F/U: HCPCS | Performed by: HOSPITALIST

## 2019-04-10 PROCEDURE — G8427 DOCREV CUR MEDS BY ELIG CLIN: HCPCS | Performed by: HOSPITALIST

## 2019-04-10 PROCEDURE — 99213 OFFICE O/P EST LOW 20 MIN: CPT | Performed by: HOSPITALIST

## 2019-04-10 PROCEDURE — G8926 SPIRO NO PERF OR DOC: HCPCS | Performed by: HOSPITALIST

## 2019-04-10 PROCEDURE — 4004F PT TOBACCO SCREEN RCVD TLK: CPT | Performed by: HOSPITALIST

## 2019-04-10 RX ORDER — NICOTINE 21 MG/24HR
1 PATCH, TRANSDERMAL 24 HOURS TRANSDERMAL DAILY
Qty: 45 PATCH | Refills: 0 | Status: SHIPPED | OUTPATIENT
Start: 2019-04-10 | End: 2020-01-31

## 2019-04-10 ASSESSMENT — ENCOUNTER SYMPTOMS
EYE PAIN: 0
NAUSEA: 0
VOICE CHANGE: 0
ABDOMINAL PAIN: 0
SORE THROAT: 0
WHEEZING: 0
EYE DISCHARGE: 0
CONSTIPATION: 0
SHORTNESS OF BREATH: 0
DIARRHEA: 0
RHINORRHEA: 0
EYE REDNESS: 0
VOMITING: 0
COUGH: 0
CHEST TIGHTNESS: 0

## 2019-04-10 NOTE — PROGRESS NOTES
Shouldn't here for follow-up. He had recent tests done including echo and PFTs. He continues to smoke. He agrees to nicotine cessation with nicotine patches. Echo shows diastolic congestive heart failure. He has symptoms of obstructive sleep apnea including morning headaches, fatigue and he is obese. He also has snoring per wife. He agrees to sleep study. Plan  See orders  Attending Physician Statement  I have discussed the care of [facility], including pertinent history and exam findings,  with the resident. I have reviewed the key elements of all parts of the encounter with the resident. I agree with the assessment, plan and orders as documented by the resident.   (GE Modifier)

## 2019-04-10 NOTE — LETTER
[address]  [address] [facility] 29 [facility]  Phone: [phone]  Fax: [phone]    Anali Soliz MD        April 10, 2019     Patient: Fior Loera   YOB: 1975   Date of Visit: 4/10/2019       To Whom it May Concern: Haywood Schaumann was seen in my clinic on 4/10/2019. He may return to school on 4/11/2019. .    If you have any questions or concerns, please don't hesitate to call.     Sincerely,         Anali Soliz MD

## 2019-04-10 NOTE — PROGRESS NOTES
[facility]  [facility] [address] [facility]  [address] [facility] [address]  Dept: [phone]  Dept Fax: [phone]    Office Progress/Follow Up Note  Date of patient's visit: 4/10/2019  Patient's Name:  Ronnie Oakes YOB: 1975            Patient Care Team:  Felton Merlos MD as PCP - General (Internal Medicine)  Demond Sparks MD as PCP - [facility] Attributed Provider  Nicola Poe MD as Consulting Physician (Gastroenterology)  ================================================================    REASON FOR VISIT/CHIEFCOMPLAINT:  COPD (f/u); Health Maintenance; and Diabetes    HISTORY OF PRESENTING ILLNESS:  History was obtained from: patient, electronic medical record. Ronnie Oakes is a 38 y.o. is here for a follow-up visit for his COPD. Patient is also wanting to know about his test results from his recent echocardiogram and PFTs. Discussed with patient his chronic diastolic heart failure, and his mild obstructive COPD. Patient reports he would like to quit smoking and is asking for the nicotine patch at this time. Patient also has not had his sleep study done which was ordered in February 2018. Discussed with patient the need for the sleep study. Patient still continues to have witnessed episodes of apnea at night per his wife who is in the room with him. Patient continues to snore loudly. Patient has morning headaches. Patient is hypertensive. Patient has daytime fatigue, and patient is obese with large short neck. Patient's diabetes is controlled with hemoglobin A1c of 6.8. Patient reports his blood sugars are ranging 100 - 130 mg/dL. Patient reports he is been having difficulty losing weight. Patient reports now that the weather is warming up he will start walking in the evenings. Patient hypertension is controlled. Patient reports she is not taking any inhalers at this time.   Patient does have his rescue inhaler but he does not take it very often. Patient Active Problem List   Diagnosis    Long-term current use of methadone for opiate dependence ([facility] [address]    Tobacco abuse    Type 2 diabetes mellitus with hyperglycemia, with long-term current use of insulin ([facility] [address]    Hepatitis B    History of hepatitis C    Gastroesophageal reflux disease    Chronic diastolic congestive heart failure ([facility] [address]    Abscess    Establishing care with new doctor, encounter for       Health Maintenance Due   Topic Date Due    Hepatitis B Vaccine (1 of 3 - Risk 3-dose series) 02/18/1995    Lipid screen  10/11/2018    Diabetic foot exam  01/10/2019       Allergies   Allergen Reactions    Flexeril [Cyclobenzaprine] Hives     Pt unsure    Klonopin [Clonazepam] Other (See Comments)     Restless leg    Morphine Hives    Quetiapine Other (See Comments)     restless         Current Outpatient Medications   Medication Sig Dispense Refill    EQ ASPIRIN ADULT LOW DOSE 81 MG EC tablet TAKE 1 TABLET BY MOUTH ONCE DAILY 30 tablet 17    gabapentin (NEURONTIN) 800 MG tablet Take 1 tablet by mouth 3 times daily for 120 days.  90 tablet 3    sucralfate (CARAFATE) 1 GM/10ML suspension Take 10 mLs by mouth 4 times daily 200 mL 0    furosemide (LASIX) 40 MG tablet Take 1 tablet by mouth daily 30 tablet 2    predniSONE (DELTASONE) 50 MG tablet       ipratropium-albuterol (DUONEB) 0.5-2.5 (3) MG/3ML SOLN nebulizer solution       azithromycin (ZITHROMAX) 250 MG tablet Take 2 tabs (500 mg) on Day 1, and take 1 tab (250 mg) on days 2 through 5. 1 packet 0    dextromethorphan-guaiFENesin (GUAIASORB DM)  MG/5ML syrup Take 10 mLs by mouth every 4 hours as needed for Cough 1 Bottle 1    ondansetron (ZOFRAN) 4 MG tablet Take 1 tablet by mouth every 8 hours as needed for Nausea or Vomiting 60 tablet 1    insulin glargine (BASAGLAR KWIKPEN) 100 UNIT/ML injection pen Inject 80 Units into the skin 2 times daily 5 pen 5    insulin lispro (HUMALOG KWIKPEN) 100 UNIT/ML pen Inject 15 Units into the skin 3 times daily (before meals) Take 15 units into the skin 3 time daily before meals. 5 pen 3    lisinopril (PRINIVIL;ZESTRIL) 5 MG tablet Take 1 tablet by mouth daily 30 tablet 3    loratadine (CLARITIN) 10 MG tablet Take 1 tablet by mouth nightly 90 tablet 3    pantoprazole (PROTONIX) 40 MG tablet Take 1 tablet by mouth daily 30 tablet 5    metFORMIN (GLUCOPHAGE) 1000 MG tablet Take 1 tablet by mouth 2 times daily (with meals) 60 tablet 2    ONE TOUCH ULTRA TEST strip 1 each by Other route 3 times daily 100 each 5    atorvastatin (LIPITOR) 40 MG tablet TAKE ONE TABLET BY MOUTH ONCE DAILY 90 tablet 2    Insulin Pen Needle (PEN NEEDLES 3/16\") 31G X 5 MM MISC 1 each by Does not apply route 3 times daily 100 each 5    albuterol sulfate HFA (PROVENTIL HFA) 108 (90 Base) MCG/ACT inhaler Inhale 2 puffs into the lungs every 4 hours as needed for Wheezing or Shortness of Breath (Space out to every 6 hours as symptoms improve) Space out to every 6 hours as symptoms improve. 1 Inhaler 3    sodium chloride (ALTAMIST SPRAY) 0.65 % nasal spray 1 spray by Nasal route as needed for Congestion 1 Bottle 3    RELION INSULIN SYR 0.3ML/31G 31G X 5/16\" 0.3 ML MISC       ONETOUCH DELICA LANCETS FINE MISC       METHADONE HCL PO Take 175 mg by mouth daily        No current facility-administered medications for this visit.         Social History     Tobacco Use    Smoking status: Current Every Day Smoker     Packs/day: 0.50     Years: 30.00     Pack years: 15.00    Smokeless tobacco: Never Used   Substance Use Topics    Alcohol use: No    Drug use: No     Comment: stopped heroin in 2013       Family History   Problem Relation Age of Onset    Substance Abuse Mother     Depression Mother     Heart Disease Mother     Substance Abuse Father     Heart Disease Father     Depression Sister     Substance Abuse Brother     Substance Abuse Maternal Aunt     Substance Abuse Maternal Uncle     Substance Abuse Paternal Aunt     Substance Abuse Paternal Uncle     Substance Abuse Maternal Grandmother     Depression Maternal Grandmother         REVIEW OF SYSTEMS:  Review of Systems   Constitutional: Positive for fatigue. Negative for activity change, diaphoresis and fever. HENT: Negative for congestion, ear pain, rhinorrhea, sore throat, tinnitus and voice change. Eyes: Negative for pain, discharge, redness and visual disturbance. Respiratory: Negative for cough, chest tightness, shortness of breath and wheezing. Cardiovascular: Positive for leg swelling. Negative for chest pain and palpitations. Gastrointestinal: Negative for abdominal pain, constipation, diarrhea, nausea and vomiting. Endocrine: Negative for cold intolerance, heat intolerance and polyuria. Genitourinary: Negative for difficulty urinating, frequency, hematuria and urgency. Musculoskeletal: Negative for arthralgias and myalgias. Skin: Negative for pallor, rash and wound. Allergic/Immunologic: Negative for environmental allergies and food allergies. Neurological: Negative for dizziness, seizures, syncope, weakness, light-headedness and numbness. Hematological: Negative for adenopathy. Does not bruise/bleed easily. Psychiatric/Behavioral: Negative for agitation, hallucinations, sleep disturbance and suicidal ideas. The patient is not nervous/anxious. PHYSICAL EXAM:  Vitals:    04/10/19 0813   BP: 125/84   Site: Right Upper Arm   Position: Sitting   Cuff Size: Large Adult   Pulse: 82   Weight: 280 lb 12.8 oz (127.4 kg)   Height: 6' (1.829 m)     BP Readings from Last 3 Encounters:   04/10/19 125/84   03/08/19 136/86   03/05/19 128/82        Physical Exam   Constitutional: He is oriented to person, place, and time. He appears well-developed and well-nourished. No distress. HENT:   Head: Normocephalic and atraumatic.    Right Ear: External ear normal.   Left Ear: External ear normal.   Nose: Nose normal. Mouth/Throat: Oropharynx is clear and moist. No oropharyngeal exudate. Eyes: Pupils are equal, round, and reactive to light. EOM are normal. Right eye exhibits no discharge. Left eye exhibits no discharge. No scleral icterus. Neck: No JVD present. No tracheal deviation present. No thyromegaly present. Cardiovascular: Normal rate, regular rhythm, normal heart sounds and intact distal pulses. Exam reveals no gallop and no friction rub. No murmur heard. Pulmonary/Chest: Effort normal and breath sounds normal. No stridor. No respiratory distress. He has no wheezes. He has no rales. He exhibits no tenderness. Abdominal: Soft. Bowel sounds are normal. He exhibits no distension and no mass. There is no tenderness. There is no rebound and no guarding. No hernia. Obese abdomen   Musculoskeletal: Normal range of motion. He exhibits no edema, tenderness or deformity. Lymphadenopathy:     He has no cervical adenopathy. Neurological: He is alert and oriented to person, place, and time. He displays normal reflexes. No cranial nerve deficit or sensory deficit. He exhibits normal muscle tone. Coordination normal.   Skin: Skin is warm and dry. Capillary refill takes less than 2 seconds. He is not diaphoretic. No erythema. No pallor. Psychiatric: He has a normal mood and affect.  His behavior is normal. Judgment and thought content normal.         DIAGNOSTIC FINDINGS:  CBC:  Lab Results   Component Value Date    WBC 6.6 02/26/2019    HGB 15.5 02/26/2019     02/26/2019       BMP:    Lab Results   Component Value Date     02/26/2019    K 4.3 02/26/2019     02/26/2019    CO2 22 02/26/2019    BUN 10 02/26/2019    CREATININE 0.75 02/26/2019    GLUCOSE 160 02/26/2019       HEMOGLOBIN A1C:   Lab Results   Component Value Date    LABA1C 6.8 03/08/2019       FASTING LIPID PANEL:  Lab Results   Component Value Date    CHOL 125 10/11/2017    HDL 17 (L) 10/11/2017    TRIG 145 10/11/2017       ASSESSMENT ANDPLAN:  1. Centrilobular emphysema (HCC)    - tiotropium (SPIRIVA HANDIHALER) 18 MCG inhalation capsule; Inhale 1 capsule into the lungs daily  Dispense: 90 capsule; Refill: 1    2. Obstructive sleep apnea syndrome in adult    - Sleep Study with PAP Titration; Future    3. Essential hypertension  Continue Lisinopril    4. Controlled type 2 diabetes mellitus with diabetic polyneuropathy, with long-term current use of insulin ([facility])    -  DIABETES FOOT EXAM, done. Circulation intact, no wounds. There is a sensory defect to the distal digits suggestive of polyneuropathy. 5. Chronic diastolic congestive heart failure, NYHA class 1 ([facility])  Obtain sleep study, continue lasix 40 mg po qd. Continue asa 81 mg po qd    6. Class 2 severe obesity due to excess calories with serious comorbidity and body mass index (BMI) of 38.0 to 38.9 in adult [facility])  Stressed weight loss, will walk in the evenings. 7. Encounter for diabetic foot exam ([facility] [address]  DM foot exam done    8. Tobacco abuse    - nicotine (NICODERM CQ) 14 MG/24HR; Place 1 patch onto the skin daily  Dispense: 45 patch; Refill: 0    9. Tobacco abuse counseling    - nicotine (NICODERM CQ) 14 MG/24HR; Place 1 patch onto the skin daily  Dispense: 45 patch; Refill: 0    Letter written for school. Lab work re-printed. Pt reports will get the labs drawn. FOLLOW UP ANDINSTRUCTIONS:  No follow-ups on file. · Theodore received counseling on the following healthy behaviors: nutrition, exercise, medication adherence and tobacco cessation    · Discussed use, benefit, and side effects of prescribed medications. Barriers to medication compliance addressed. All patientquestions answered. Pt voiced understanding.      · Patient given educational materials - see patientinstructions    Bessie Santos MD PGY-3; Internal Medicine  [facility]. [facility]  4/10/2019, 8:47 AM    This note is created with the assistance of torres speech-recognition program. While intending to generate a document that actually reflectsthe content of the visit, the document can still have some mistakes which may not have been identified and corrected by editing.

## 2019-04-10 NOTE — PROGRESS NOTES
Visit Information    Have you changed or started any medications since your last visit including any over-the-counter medicines, vitamins, or herbal medicines? yes - jason,   Have you stopped taking any of your medications? Is so, why? -  no  Are you having any side effects from any of your medications? - no    Have you seen any other physician or provider since your last visit? yes - gastro,x 2 week   Have you had any other diagnostic tests since your last visit?  no   Have you been seen in the emergency room and/or had an admission in a hospital since we last saw you?  no   Have you had your routine dental cleaning in the past 6 months?  yes - [facility], 2 week ago    Do you have an active MyChart account? If no, what is the barrier?   Yes    Patient Care Team:  Shaneka To MD as PCP - General (Internal Medicine)  Demond Greco MD as PCP - S Attributed Provider  An Milian MD as Consulting Physician (Gastroenterology)    Medical History Review  Past Medical, Family, and Social History reviewed and does contribute to the patient presenting condition    Health Maintenance   Topic Date Due    Hepatitis B Vaccine (1 of 3 - Risk 3-dose series) 02/18/1995    Lipid screen  10/11/2018    Diabetic foot exam  01/10/2019    Diabetic retinal exam  06/04/2019    Diabetic microalbuminuria test  08/01/2019    Potassium monitoring  02/26/2020    Creatinine monitoring  02/26/2020    A1C test (Diabetic or Prediabetic)  03/08/2020    DTaP/Tdap/Td vaccine (3 - Td) 11/13/2025    Flu vaccine  Completed    Pneumococcal 0-64 years Vaccine  Completed    HIV screen  Completed

## 2019-04-12 DIAGNOSIS — R11.0 CHRONIC NAUSEA: ICD-10-CM

## 2019-04-15 ENCOUNTER — HOSPITAL ENCOUNTER (OUTPATIENT)
Dept: SLEEP CENTER | Age: 44
Discharge: HOME | End: 2019-04-17
Payer: MEDICARE

## 2019-04-15 VITALS — HEART RATE: 64 BPM | WEIGHT: 280 LBS | HEIGHT: 72 IN | BODY MASS INDEX: 37.93 KG/M2 | RESPIRATION RATE: 12 BRPM

## 2019-04-15 DIAGNOSIS — E66.01 CLASS 2 SEVERE OBESITY DUE TO EXCESS CALORIES WITH SERIOUS COMORBIDITY AND BODY MASS INDEX (BMI) OF 38.0 TO 38.9 IN ADULT (HCC): ICD-10-CM

## 2019-04-15 DIAGNOSIS — I50.32 CHRONIC DIASTOLIC CONGESTIVE HEART FAILURE, NYHA CLASS 1 (HCC): ICD-10-CM

## 2019-04-15 DIAGNOSIS — G47.33 OBSTRUCTIVE SLEEP APNEA SYNDROME IN ADULT: ICD-10-CM

## 2019-04-15 DIAGNOSIS — I10 ESSENTIAL HYPERTENSION: ICD-10-CM

## 2019-04-15 PROCEDURE — 95811 POLYSOM 6/>YRS CPAP 4/> PARM: CPT

## 2019-04-15 ASSESSMENT — SLEEP AND FATIGUE QUESTIONNAIRES
HOW LIKELY ARE YOU TO NOD OFF OR FALL ASLEEP WHEN YOU ARE A PASSENGER IN A CAR FOR AN HOUR WITHOUT A BREAK: 3
HOW LIKELY ARE YOU TO NOD OFF OR FALL ASLEEP WHILE WATCHING TV: 3
HOW LIKELY ARE YOU TO NOD OFF OR FALL ASLEEP WHILE SITTING AND TALKING TO SOMEONE: 0
HOW LIKELY ARE YOU TO NOD OFF OR FALL ASLEEP WHILE LYING DOWN TO REST IN THE AFTERNOON WHEN CIRCUMSTANCES PERMIT: 2
HOW LIKELY ARE YOU TO NOD OFF OR FALL ASLEEP WHILE SITTING AND READING: 1
ESS TOTAL SCORE: 11
HOW LIKELY ARE YOU TO NOD OFF OR FALL ASLEEP IN A CAR, WHILE STOPPED FOR A FEW MINUTES IN TRAFFIC: 0
HOW LIKELY ARE YOU TO NOD OFF OR FALL ASLEEP WHILE SITTING INACTIVE IN A PUBLIC PLACE: 1
HOW LIKELY ARE YOU TO NOD OFF OR FALL ASLEEP WHILE SITTING QUIETLY AFTER LUNCH WITHOUT ALCOHOL: 1

## 2019-04-16 ENCOUNTER — TELEPHONE (OUTPATIENT)
Dept: INTERNAL MEDICINE | Age: 44
End: 2019-04-16

## 2019-04-16 RX ORDER — ONDANSETRON 4 MG/1
TABLET, FILM COATED ORAL
Qty: 90 TABLET | Refills: 1 | Status: SHIPPED | OUTPATIENT
Start: 2019-04-16 | End: 2019-07-09 | Stop reason: SDUPTHER

## 2019-04-16 NOTE — TELEPHONE ENCOUNTER
PC from pt stating the pharmacy has been trying to contact the office for a refill on Zofran. Script sent to wrong office. Medication is pending. Health Maintenance   Topic Date Due    Hepatitis B Vaccine (1 of 3 - Risk 3-dose series) 02/18/1995    Lipid screen  10/11/2018    Diabetic retinal exam  06/04/2019    Diabetic microalbuminuria test  08/01/2019    Potassium monitoring  02/26/2020    Creatinine monitoring  02/26/2020    A1C test (Diabetic or Prediabetic)  03/08/2020    Diabetic foot exam  04/10/2020    DTaP/Tdap/Td vaccine (3 - Td) 11/13/2025    Flu vaccine  Completed    Pneumococcal 0-64 years Vaccine  Completed    HIV screen  Completed             (applicable per patient's age: Cancer Screenings, Depression Screening, Fall Risk Screening, Immunizations)    Hemoglobin A1C (%)   Date Value   03/08/2019 6.8   08/01/2018 7.2   12/27/2017 5.9     Microalb/Crt.  Ratio (mcg/mg creat)   Date Value   08/01/2018 CANNOT BE CALCULATED     LDL Cholesterol (mg/dL)   Date Value   10/11/2017 79     AST (U/L)   Date Value   02/26/2019 33     ALT (U/L)   Date Value   02/26/2019 27     BUN (mg/dL)   Date Value   02/26/2019 10      (goal A1C is < 7)   (goal LDL is <100) need 30-50% reduction from baseline     BP Readings from Last 3 Encounters:   04/10/19 125/84   03/08/19 136/86   03/05/19 128/82    (goal /80)      All Future Testing planned in [facility]:  Lab Frequency Next Occurrence   C-Peptide Once 07/12/2019   TSH with Reflex Once 05/15/2019   Lipid Panel Once 00/16/1150   Basic Metabolic Panel Once 11/78/9482   CBC With Auto Differential Once 07/12/2019   Hepatic Function Panel Once 05/15/2019   Brain Natriuretic Peptide Once 06/01/2019   Sleep Study with PAP Titration Once 04/10/2019       Next Visit Date:  Future Appointments   Date Time Provider Didier Burton   7/12/2019  1:00 PM Guillermina Wagner MD [id number]            Patient Active Problem List:     Long-term current use of methadone for opiate dependence ([facility] [address]     Tobacco abuse     Type 2 diabetes mellitus with hyperglycemia, with long-term current use of insulin (HCC)     Hepatitis B     History of hepatitis C     Gastroesophageal reflux disease     Chronic diastolic congestive heart failure ([facility] [address]     Abscess     Establishing care with new doctor, encounter for

## 2019-04-16 NOTE — TELEPHONE ENCOUNTER
Pt calling stating that he had a sleep study done last night at [facility] and he needs a note stating he wasn't able to go to school because he had to get sleep study. Pt states that he goes to school [place] in [place]. Health Maintenance   Topic Date Due    Hepatitis B Vaccine (1 of 3 - Risk 3-dose series) 02/18/1995    Lipid screen  10/11/2018    Diabetic retinal exam  06/04/2019    Diabetic microalbuminuria test  08/01/2019    Potassium monitoring  02/26/2020    Creatinine monitoring  02/26/2020    A1C test (Diabetic or Prediabetic)  03/08/2020    Diabetic foot exam  04/10/2020    DTaP/Tdap/Td vaccine (3 - Td) 11/13/2025    Flu vaccine  Completed    Pneumococcal 0-64 years Vaccine  Completed    HIV screen  Completed             (applicable per patient's age: Cancer Screenings, Depression Screening, Fall Risk Screening, Immunizations)    Hemoglobin A1C (%)   Date Value   03/08/2019 6.8   08/01/2018 7.2   12/27/2017 5.9     Microalb/Crt.  Ratio (mcg/mg creat)   Date Value   08/01/2018 CANNOT BE CALCULATED     LDL Cholesterol (mg/dL)   Date Value   10/11/2017 79     AST (U/L)   Date Value   02/26/2019 33     ALT (U/L)   Date Value   02/26/2019 27     BUN (mg/dL)   Date Value   02/26/2019 10      (goal A1C is < 7)   (goal LDL is <100) need 30-50% reduction from baseline     BP Readings from Last 3 Encounters:   04/10/19 125/84   03/08/19 136/86   03/05/19 128/82    (goal /80)      All Future Testing planned in [facility]:  Lab Frequency Next Occurrence   C-Peptide Once 07/12/2019   TSH with Reflex Once 05/15/2019   Lipid Panel Once 05/90/6255   Basic Metabolic Panel Once 55/37/7256   CBC With Auto Differential Once 07/12/2019   Hepatic Function Panel Once 05/15/2019   Brain Natriuretic Peptide Once 06/01/2019   Sleep Study with PAP Titration Once 04/10/2019       Next Visit Date:  Future Appointments   Date Time Provider Didier Burton   7/12/2019  1:00 PM Damion Stringer MD [facility] [address] [facility]

## 2019-04-22 NOTE — TELEPHONE ENCOUNTER
PC to pt-- spoke with him and let him know that he will need to get letter from sleep center-- he voiced understanding

## 2019-05-06 DIAGNOSIS — K21.9 GASTROESOPHAGEAL REFLUX DISEASE, ESOPHAGITIS PRESENCE NOT SPECIFIED: ICD-10-CM

## 2019-05-06 DIAGNOSIS — A04.8 H. PYLORI INFECTION: ICD-10-CM

## 2019-05-07 NOTE — TELEPHONE ENCOUNTER
Protonix refilled    Jacquelin Kawasaki M.D.  PGY-3 Internal Medicine  [facility]. E-scribe request for Pantoprazole 40 MG. Please review and e-scribe if applicable. Next Visit Date:  7/12/2019    Health Maintenance   Topic Date Due    Hepatitis B Vaccine (1 of 3 - Risk 3-dose series) 02/18/1995    Lipid screen  10/11/2018    Diabetic retinal exam  06/04/2019    Diabetic microalbuminuria test  08/01/2019    Potassium monitoring  02/26/2020    Creatinine monitoring  02/26/2020    A1C test (Diabetic or Prediabetic)  03/08/2020    Diabetic foot exam  04/10/2020    DTaP/Tdap/Td vaccine (3 - Td) 11/13/2025    Flu vaccine  Completed    Pneumococcal 0-64 years Vaccine  Completed    HIV screen  Completed             (applicable per patient's age: Cancer Screenings, Depression Screening, Fall Risk Screening, Immunizations)    Hemoglobin A1C (%)   Date Value   03/08/2019 6.8   08/01/2018 7.2   12/27/2017 5.9     Microalb/Crt.  Ratio (mcg/mg creat)   Date Value   08/01/2018 CANNOT BE CALCULATED     LDL Cholesterol (mg/dL)   Date Value   10/11/2017 79     AST (U/L)   Date Value   02/26/2019 33     ALT (U/L)   Date Value   02/26/2019 27     BUN (mg/dL)   Date Value   02/26/2019 10      (goal A1C is < 7)   (goal LDL is <100) need 30-50% reduction from baseline     BP Readings from Last 3 Encounters:   04/10/19 125/84   03/08/19 136/86   03/05/19 128/82    (goal /80)      All Future Testing planned in [facility]:  Lab Frequency Next Occurrence   C-Peptide Once 07/12/2019   TSH with Reflex Once 05/15/2019   Lipid Panel Once 97/17/7164   Basic Metabolic Panel Once 63/99/3000   CBC With Auto Differential Once 07/12/2019   Hepatic Function Panel Once 05/15/2019   Brain Natriuretic Peptide Once 06/01/2019   Sleep Study with PAP Titration Once 04/10/2019       Next Visit Date:  Future Appointments   Date Time Provider Didier Burton   7/12/2019  1:00 PM Karol Mason MD [facility] [place] Patient Active Problem List:     Long-term current use of methadone for opiate dependence ([address]     Tobacco abuse     Type 2 diabetes mellitus with hyperglycemia, with long-term current use of insulin (HCC)     Hepatitis B     History of hepatitis C     Gastroesophageal reflux disease     Chronic diastolic congestive heart failure ([address]     Abscess     Establishing care with new doctor, encounter for

## 2019-05-08 RX ORDER — PANTOPRAZOLE SODIUM 40 MG/1
TABLET, DELAYED RELEASE ORAL
Qty: 30 TABLET | Refills: 5 | Status: SHIPPED | OUTPATIENT
Start: 2019-05-08 | End: 2020-01-08

## 2019-05-11 LAB — STATUS: NORMAL

## 2019-05-15 ENCOUNTER — TELEPHONE (OUTPATIENT)
Dept: INTERNAL MEDICINE | Age: 44
End: 2019-05-15

## 2019-05-15 NOTE — TELEPHONE ENCOUNTER
Patient needs to be seen in clinic for evaluation for hypoglycemia and possible medication adjustment. Steven Garcias M.D.  PGY-3 Internal Medicine  [facility].

## 2019-05-15 NOTE — TELEPHONE ENCOUNTER
PC to PCP--patient calling in with concerns about his blood sugars saying they drop very low and he was thinking maybe they need to be reduced. He also says he had a sleep study completed on 4/15/19 that has to be reviewed before he can receive his Cpap. Writer sent fax to [facility] for sleep study records. Please advise about patients DM medications.

## 2019-05-17 ENCOUNTER — OFFICE VISIT (OUTPATIENT)
Dept: INTERNAL MEDICINE | Age: 44
End: 2019-05-17
Payer: MEDICARE

## 2019-05-17 VITALS
DIASTOLIC BLOOD PRESSURE: 73 MMHG | WEIGHT: 279 LBS | SYSTOLIC BLOOD PRESSURE: 132 MMHG | BODY MASS INDEX: 37.84 KG/M2 | HEART RATE: 87 BPM

## 2019-05-17 DIAGNOSIS — E11.42 CONTROLLED TYPE 2 DIABETES MELLITUS WITH DIABETIC POLYNEUROPATHY, WITH LONG-TERM CURRENT USE OF INSULIN (HCC): Primary | ICD-10-CM

## 2019-05-17 DIAGNOSIS — Z79.4 CONTROLLED TYPE 2 DIABETES MELLITUS WITH DIABETIC POLYNEUROPATHY, WITH LONG-TERM CURRENT USE OF INSULIN (HCC): Primary | ICD-10-CM

## 2019-05-17 DIAGNOSIS — I50.32 CHRONIC DIASTOLIC CONGESTIVE HEART FAILURE (HCC): ICD-10-CM

## 2019-05-17 DIAGNOSIS — Z79.4 TYPE 2 DIABETES MELLITUS WITH HYPERGLYCEMIA, WITH LONG-TERM CURRENT USE OF INSULIN (HCC): Chronic | ICD-10-CM

## 2019-05-17 DIAGNOSIS — I10 ESSENTIAL HYPERTENSION: ICD-10-CM

## 2019-05-17 DIAGNOSIS — E11.65 TYPE 2 DIABETES MELLITUS WITH HYPERGLYCEMIA, WITH LONG-TERM CURRENT USE OF INSULIN (HCC): Chronic | ICD-10-CM

## 2019-05-17 LAB
CHP ED QC CHECK: ABNORMAL
GLUCOSE BLD-MCNC: 171 MG/DL

## 2019-05-17 PROCEDURE — 4004F PT TOBACCO SCREEN RCVD TLK: CPT | Performed by: INTERNAL MEDICINE

## 2019-05-17 PROCEDURE — 3044F HG A1C LEVEL LT 7.0%: CPT | Performed by: INTERNAL MEDICINE

## 2019-05-17 PROCEDURE — G8417 CALC BMI ABV UP PARAM F/U: HCPCS | Performed by: INTERNAL MEDICINE

## 2019-05-17 PROCEDURE — 99214 OFFICE O/P EST MOD 30 MIN: CPT | Performed by: INTERNAL MEDICINE

## 2019-05-17 PROCEDURE — 99211 OFF/OP EST MAY X REQ PHY/QHP: CPT | Performed by: INTERNAL MEDICINE

## 2019-05-17 PROCEDURE — 2022F DILAT RTA XM EVC RTNOPTHY: CPT | Performed by: INTERNAL MEDICINE

## 2019-05-17 PROCEDURE — G8427 DOCREV CUR MEDS BY ELIG CLIN: HCPCS | Performed by: INTERNAL MEDICINE

## 2019-05-17 PROCEDURE — 82962 GLUCOSE BLOOD TEST: CPT | Performed by: INTERNAL MEDICINE

## 2019-05-17 NOTE — LETTER
[address]  [address] [facility] [address] [facility]  Phone: [phone]  Fax: [phone]    Cuba Rodriguez MD        May 17, 2019     Patient: Grant Ivy   YOB: 1975   Date of Visit: 5/17/2019       To Whom It May Concern: It is my medical opinion that Elissa Medina may be excused from work on 5/14/19 and 5/16/19 due to his medical condition. If you have any questions or concerns, please don't hesitate to call.     Sincerely,        Cuba Rodriguez MD

## 2019-05-17 NOTE — PROGRESS NOTES
[facility]  [facility] [address] [facility]  [address]  Dept: [phone]      Today's Date: 5/17/2019  Patient Name: Vinod Ybarra  Patient's age: 38 y.o., 1975        CHIEF COMPLAINT:    Chief Complaint   Patient presents with    Diabetes     low bs has been dropping to 45s       History Obtained From:  patient    HISTORY OF PRESENT ILLNESS:      The patient is a 38 y.o. old  male and is here to follow-up for diabetes. His blood sugar has been dropping 30s and 40s. He is on Basaglar 80 units twice a day and has been taking it for last 6 months or more. He is also on Humalog 40 units pre-meal 3 times a day. He was started by endocrinology. He states recently he started taking metoclopramide for gastroparesis and has been exercising regularly. He does not take any GLP-1. Denies any history of pancreatitis. His blood pressure is stable with lisinopril 5 mg daily and his leg edema is stable with Lasix 40 mg daily. Today his blood sugar is 171. He has not taken his Basaglar last night and today morning. He has also not taken metformin on his regular insulin. He ate his breakfast this morning.   He is also requesting referral to another endocrinologist.    Patient Active Problem List   Diagnosis    Long-term current use of methadone for opiate dependence ([address]    Tobacco abuse    Type 2 diabetes mellitus with hyperglycemia, with long-term current use of insulin ([address]    Hepatitis B    History of hepatitis C    Gastroesophageal reflux disease    Chronic diastolic congestive heart failure ([address]    Abscess    Establishing care with new doctor, encounter for       Past Medical History:   has a past medical history of Anxiety, Bipolar 1 disorder ([address] Chronic diastolic congestive heart failure ([address] Constipation, Gastroesophageal reflux disease, Hard of hearing, Hep C w/o coma, chronic ([address] Hepatitis B, Kidney stones, Post traumatic stress disorder (PTSD), Substance abuse ([facility] Type 2 diabetes mellitus without complication, with long-term current use of insulin ([facility] and Wears glasses. Past Surgical History:   has a past surgical history that includes Kidney stone surgery and Ankle surgery (Left). Medications:    Current Outpatient Medications   Medication Sig Dispense Refill    pantoprazole (PROTONIX) 40 MG tablet TAKE 1 TABLET BY MOUTH ONCE DAILY 30 tablet 5    ondansetron (ZOFRAN) 4 MG tablet TAKE 1 TABLET BY MOUTH EVERY 8 HOURS AS NEEDED FOR NAUSEA/VOMITING 90 tablet 1    tiotropium (SPIRIVA HANDIHALER) 18 MCG inhalation capsule Inhale 1 capsule into the lungs daily 90 capsule 1    nicotine (NICODERM CQ) 14 MG/24HR Place 1 patch onto the skin daily 45 patch 0    EQ ASPIRIN ADULT LOW DOSE 81 MG EC tablet TAKE 1 TABLET BY MOUTH ONCE DAILY 30 tablet 17    gabapentin (NEURONTIN) 800 MG tablet Take 1 tablet by mouth 3 times daily for 120 days. 90 tablet 3    sucralfate (CARAFATE) 1 GM/10ML suspension Take 10 mLs by mouth 4 times daily 200 mL 0    furosemide (LASIX) 40 MG tablet Take 1 tablet by mouth daily 30 tablet 2    predniSONE (DELTASONE) 50 MG tablet       ipratropium-albuterol (DUONEB) 0.5-2.5 (3) MG/3ML SOLN nebulizer solution       azithromycin (ZITHROMAX) 250 MG tablet Take 2 tabs (500 mg) on Day 1, and take 1 tab (250 mg) on days 2 through 5. 1 packet 0    dextromethorphan-guaiFENesin (GUAIASORB DM)  MG/5ML syrup Take 10 mLs by mouth every 4 hours as needed for Cough 1 Bottle 1    insulin glargine (BASAGLAR KWIKPEN) 100 UNIT/ML injection pen Inject 80 Units into the skin 2 times daily 5 pen 5    insulin lispro (HUMALOG KWIKPEN) 100 UNIT/ML pen Inject 15 Units into the skin 3 times daily (before meals) Take 15 units into the skin 3 time daily before meals.  5 pen 3    lisinopril (PRINIVIL;ZESTRIL) 5 MG tablet Take 1 tablet by mouth daily 30 tablet 3    loratadine (CLARITIN) 10 MG tablet Take 1 tablet by mouth nightly 90 tablet 3    metFORMIN (GLUCOPHAGE) 1000 MG tablet Take 1 tablet by mouth 2 times daily (with meals) 60 tablet 2    ONE TOUCH ULTRA TEST strip 1 each by Other route 3 times daily 100 each 5    atorvastatin (LIPITOR) 40 MG tablet TAKE ONE TABLET BY MOUTH ONCE DAILY 90 tablet 2    Insulin Pen Needle (PEN NEEDLES 3/16\") 31G X 5 MM MISC 1 each by Does not apply route 3 times daily 100 each 5    albuterol sulfate HFA (PROVENTIL HFA) 108 (90 Base) MCG/ACT inhaler Inhale 2 puffs into the lungs every 4 hours as needed for Wheezing or Shortness of Breath (Space out to every 6 hours as symptoms improve) Space out to every 6 hours as symptoms improve. 1 Inhaler 3    sodium chloride (ALTAMIST SPRAY) 0.65 % nasal spray 1 spray by Nasal route as needed for Congestion 1 Bottle 3    RELION INSULIN SYR 0.3ML/31G 31G X 5/16\" 0.3 ML MISC       ONETOUCH DELICA LANCETS FINE MISC       METHADONE HCL PO Take 175 mg by mouth daily        No current facility-administered medications for this visit. Allergies:  Flexeril [cyclobenzaprine]; Jadine Height; Morphine; and Quetiapine    Social History:   reports that he has been smoking. He has a 15.00 pack-year smoking history. He has never used smokeless tobacco. He reports that he does not drink alcohol or use drugs. Family History: family history includes Depression in his maternal grandmother, mother, and sister; Heart Disease in his father and mother; Substance Abuse in his brother, father, maternal aunt, maternal grandmother, maternal uncle, mother, paternal aunt, and paternal uncle. REVIEW OF SYSTEMS:      Constitutional: Negative for fever and fatigue. HENT: Negative for congestion and sore throat. Eyes: Negative for eye pain and visual disturbance. Respiratory: Negative for chest tightness and shortness of breath. Cardiovascular: Negative for chest pain and orthopnea .    Gastrointestinal: Negative for vomiting, abdominal pain, constipation and diarrhea. Endocrine: Negative for cold intolerance, heat intolerance, polydipsia and polyuria. Genitourinary: Negative for dysuria and frequency. Musculoskeletal: Negative for  Myalgia, back pain, bone pain and arthralgias. Neurological: Negative for dizziness, weakness and headaches. PHYSICAL EXAM:        /73 (Site: Right Upper Arm, Position: Sitting, Cuff Size: Medium Adult)   Pulse 87   Wt 279 lb (126.6 kg)   BMI 37.84 kg/m²      General appearance - well appearing, not in  distress. Mental status - alert and cooperative . Head: Normocephalic, without obvious abnormality, atraumatic. Eye: PERRL, conjunctiva/corneas clear, EOM's intact. Neck - Supple, no significant adenopathy . Chest - Clear to auscultation, no wheezes, rales or rhonchi, symmetric air entry. Heart -  regular rhythm, normal S1, S2, no murmurs. Abdomen - Soft, nontender, nondistended, no masses or organomegaly. Neurological - Alert, oriented, normal speech, no focal findings or movement disorder noted. Extremities -  No pedal edema, no clubbing or cyanosis.         Labs:       Chemistry        Component Value Date/Time     02/26/2019 0810    K 4.3 02/26/2019 0810     02/26/2019 0810    CO2 22 02/26/2019 0810    BUN 10 02/26/2019 0810    CREATININE 0.75 02/26/2019 0810        Component Value Date/Time    CALCIUM 9.1 02/26/2019 0810    ALKPHOS 154 (H) 02/26/2019 0810    AST 33 02/26/2019 0810    ALT 27 02/26/2019 0810    BILITOT 0.76 02/26/2019 0810          Recent Labs     05/17/19  0823   GLUCOSE 171     Lab Results   Component Value Date    WBC 6.6 02/26/2019    HGB 15.5 02/26/2019    HCT 43.5 02/26/2019    MCV 86.8 02/26/2019     (L) 02/26/2019     Lab Results   Component Value Date    TSH 1.78 10/11/2017     Lab Results   Component Value Date    LABA1C 6.8 03/08/2019     Lab Results   Component Value Date    LABMICR CANNOT BE CALCULATED 08/01/2018     No components found for: CHLPL  Lab Results   Component Value Date    TRIG 145 10/11/2017     Lab Results   Component Value Date    HDL 17 (L) 10/11/2017     Lab Results   Component Value Date    LDLCHOLESTEROL 79 10/11/2017     The ASCVD Risk score (Amber Gonzalez., et al., 2013) failed to calculate for the following reasons: The valid HDL cholesterol range is 20 to 100 mg/dL    The valid total cholesterol range is 130 to 320 mg/dL    Health Maintenance Due   Topic Date Due    Hepatitis B Vaccine (1 of 3 - Risk 3-dose series) 02/18/1995    Lipid screen  10/11/2018        ASSESSMENT AND PLAN    1. Controlled type 2 diabetes mellitus with diabetic polyneuropathy, with long-term current use of insulin ([facility])    - POCT Glucose  - Stephanie Ruiz MD, Endocrinology, [facility]  - Dulaglutide (TRULICITY) 3.55 MD/2.0PX SOPN; Inject 0.75 mg into the skin once a week  Dispense: 4 pen; Refill: 2    2. Type 2 diabetes mellitus with hyperglycemia, with long-term current use of insulin ([facility])    - insulin lispro (HUMALOG KWIKPEN) 100 UNIT/ML pen; Take 3 times ,<200 - 0 units, 201-250 - 4 units, 251-300 - 6 units, 301-350 - 8 units, 351-400 - 10 units, >400 - 12 units. Dispense: 5 pen; Refill: 3    3. Chronic diastolic congestive heart failure ([address]      4. Essential hypertension        · Stop humalog 40 U TID and start Sliding scale   · Start Trulicity 3.85 mg weekly   · Side effects of GLP-1 agonist Trulicity was discussed with her which included abdominal pain, pancreatitis, diarrhea, nausea and weight loss. · Theodore received counseling on the following healthy behaviors: exercise and medication adherence  · Discussed use, benefit, and side effects of prescribed medications. Barriers to medication compliance addressed. All patient questions answered. Pt voiced understanding.    · The return visit should be in 4 weeks      Anyi Blue MD  Attending and Faculty Internal Medicine  [facility]  [address]

## 2019-05-17 NOTE — PATIENT INSTRUCTIONS
Return To Clinic 7/12/19. After Visit Summary given and reviewed. BB    It is very important for your care that you keep your appointment. If for some reason you are unable to keep your appointment it is equally important that you call our office at [phone] to cancel your appointment and reschedule. Failure to do so may result in your termination from our practice. Referral for endocrinology they will call pt for appt, copy of referral with number and address given to pt. Pt should call referral number if not heard from within a couple of weeks.

## 2019-05-17 NOTE — PROGRESS NOTES
Diabetic visit information    BP Readings from Last 3 Encounters:   04/10/19 125/84   03/08/19 136/86   03/05/19 128/82       Hemoglobin A1C (%)   Date Value   03/08/2019 6.8   08/01/2018 7.2   12/27/2017 5.9     Microalb/Crt. Ratio (mcg/mg creat)   Date Value   08/01/2018 CANNOT BE CALCULATED     LDL Cholesterol (mg/dL)   Date Value   10/11/2017 79               Have you changed or started any medications since your last visit including any over-the-counter medicines, vitamins, or herbal medicines? no   Have you stopped taking any of your medications? Is so, why? -  no  Are you having any side effects from any of your medications? - no    Have you seen any other physician or provider since your last visit?  no   Have you had any other diagnostic tests since your last visit?  no   Have you been seen in the emergency room and/or had an admission in a hospital since we last saw you?  no     Have you had your annual diabetic retinal (eye) exam? No   (ensure copy of exam is in the chart)    Have you had your routine dental cleaning in the past 6 months? no    Do you have an active [employer] account? If not, what are your barriers? Yes    Patient Care Team:  Melvina Casey MD as PCP - General (Internal Medicine)  Demond Poe MD as PCP - [facility] Attributed Provider  Jaki Nunes MD as Consulting Physician (Gastroenterology)    Medical history Review  Past Medical, Family, and Social History reviewed and does contribute to the patient presenting condition.     Health Maintenance   Topic Date Due    Hepatitis B Vaccine (1 of 3 - Risk 3-dose series) 02/18/1995    Lipid screen  10/11/2018    Diabetic retinal exam  06/04/2019    Diabetic microalbuminuria test  08/01/2019    Potassium monitoring  02/26/2020    Creatinine monitoring  02/26/2020    A1C test (Diabetic or Prediabetic)  03/08/2020    Diabetic foot exam  04/10/2020    DTaP/Tdap/Td vaccine (3 - Td) 11/13/2025    Flu vaccine  Completed    Pneumococcal 0-64 years Vaccine  Completed    HIV screen  Completed

## 2019-05-23 ENCOUNTER — TELEPHONE (OUTPATIENT)
Dept: INTERNAL MEDICINE | Age: 44
End: 2019-05-23

## 2019-05-23 NOTE — LETTER
[address]  [address] [facility] [address] [facility]  Phone: [phone]  Fax: [phone]    Lucinda Malave MD        May 23, 2019    [address] [place] [address]      Dear Sancho Huerta: We are sending this letter because your PCP ordered [facility] for you to have done at your last visit here and they have not yet been completed. If you can please come to our office on the 2nd floor to  your orders to have them compelted. If you do not have a follow-up appointment scheduled you can either contact the office to make an appointment with us or you can make one when you come in to pick-up your orders. If you have any questions or concerns, please don't hesitate to call.     Sincerely,        Lucinda Malave MD

## 2019-05-25 ENCOUNTER — TELEPHONE (OUTPATIENT)
Dept: INTERNAL MEDICINE | Age: 44
End: 2019-05-25

## 2019-05-25 DIAGNOSIS — E11.65 TYPE 2 DIABETES MELLITUS WITH HYPERGLYCEMIA, WITH LONG-TERM CURRENT USE OF INSULIN (HCC): Primary | Chronic | ICD-10-CM

## 2019-05-25 DIAGNOSIS — Z79.4 TYPE 2 DIABETES MELLITUS WITH HYPERGLYCEMIA, WITH LONG-TERM CURRENT USE OF INSULIN (HCC): Primary | Chronic | ICD-10-CM

## 2019-05-25 NOTE — TELEPHONE ENCOUNTER
[facility]     PA processed and submitted to pt insurance, waiting for response in regards to coverage

## 2019-05-31 DIAGNOSIS — E11.9 NEW ONSET TYPE 2 DIABETES MELLITUS (HCC): ICD-10-CM

## 2019-06-03 NOTE — TELEPHONE ENCOUNTER
E-scribe request for Test Strips. Please review and e-scribe if applicable. Next Visit Date:  7/12/2019    Health Maintenance   Topic Date Due    Hepatitis B Vaccine (1 of 3 - Risk 3-dose series) 02/18/1995    Lipid screen  10/11/2018    Diabetic retinal exam  06/04/2019    Diabetic microalbuminuria test  08/01/2019    Potassium monitoring  02/26/2020    Creatinine monitoring  02/26/2020    A1C test (Diabetic or Prediabetic)  03/08/2020    Diabetic foot exam  04/10/2020    DTaP/Tdap/Td vaccine (3 - Td) 11/13/2025    Flu vaccine  Completed    Pneumococcal 0-64 years Vaccine  Completed    HIV screen  Completed             (applicable per patient's age: Cancer Screenings, Depression Screening, Fall Risk Screening, Immunizations)    Hemoglobin A1C (%)   Date Value   03/08/2019 6.8   08/01/2018 7.2   12/27/2017 5.9     Microalb/Crt.  Ratio (mcg/mg creat)   Date Value   08/01/2018 CANNOT BE CALCULATED     LDL Cholesterol (mg/dL)   Date Value   10/11/2017 79     AST (U/L)   Date Value   02/26/2019 33     ALT (U/L)   Date Value   02/26/2019 27     BUN (mg/dL)   Date Value   02/26/2019 10      (goal A1C is < 7)   (goal LDL is <100) need 30-50% reduction from baseline     BP Readings from Last 3 Encounters:   05/17/19 132/73   04/10/19 125/84   03/08/19 136/86    (goal /80)      All Future Testing planned in [facility]:  Lab Frequency Next Occurrence   C-Peptide Once 07/12/2019   TSH with Reflex Once 08/23/2019   Lipid Panel Once 54/23/0739   Basic Metabolic Panel Once 18/45/6074   CBC With Auto Differential Once 07/12/2019   Hepatic Function Panel Once 08/23/2019   Brain Natriuretic Peptide Once 06/01/2019   Sleep Study with PAP Titration Once 04/10/2019       Next Visit Date:  Future Appointments   Date Time Provider Didier Burton   6/21/2019  7:30 PM [facility] None   7/12/2019  1:00 PM Naldo Cam MD [facility]            Patient Active Problem List:     Long-term current use of methadone for opiate dependence ([facility]     Tobacco abuse     Type 2 diabetes mellitus with hyperglycemia, with long-term current use of insulin (HCC)     Hepatitis B     History of hepatitis C     Gastroesophageal reflux disease     Chronic diastolic congestive heart failure ([facility]     Abscess     Establishing care with new doctor, encounter for     Essential hypertension

## 2019-06-05 NOTE — TELEPHONE ENCOUNTER
For some reason this encounter will not let me order the medications. I says the medications and orders are read only. Can this be fixed? Ketan Soria M.D.  PGY-3 Internal Medicine  [address] [facility]. Pharmacy called and states the patient can try Ozempic or Victoza, please advise.

## 2019-06-21 ENCOUNTER — HOSPITAL ENCOUNTER (OUTPATIENT)
Dept: SLEEP CENTER | Age: 44
Discharge: HOME | End: 2019-06-23
Payer: MEDICARE

## 2019-06-21 DIAGNOSIS — G47.33 OBSTRUCTIVE SLEEP APNEA SYNDROME IN ADULT: ICD-10-CM

## 2019-06-21 PROCEDURE — 95811 POLYSOM 6/>YRS CPAP 4/> PARM: CPT

## 2019-06-25 DIAGNOSIS — E11.65 TYPE 2 DIABETES MELLITUS WITH HYPERGLYCEMIA, WITH LONG-TERM CURRENT USE OF INSULIN (HCC): ICD-10-CM

## 2019-06-25 DIAGNOSIS — Z79.4 TYPE 2 DIABETES MELLITUS WITH HYPERGLYCEMIA, WITH LONG-TERM CURRENT USE OF INSULIN (HCC): ICD-10-CM

## 2019-06-25 NOTE — TELEPHONE ENCOUNTER
Request for metformin. Next Visit Date:  Future Appointments   Date Time Provider Didier Falki   7/12/2019  1:00 PM Curt Mitchell MD [id number] [facility]   Topic Date Due    Hepatitis B Vaccine (1 of 3 - Risk 3-dose series) 02/18/1995    Lipid screen  10/11/2018    Diabetic retinal exam  06/04/2019    Diabetic microalbuminuria test  08/01/2019    Potassium monitoring  02/26/2020    Creatinine monitoring  02/26/2020    A1C test (Diabetic or Prediabetic)  03/08/2020    Diabetic foot exam  04/10/2020    DTaP/Tdap/Td vaccine (3 - Td) 11/13/2025    Flu vaccine  Completed    Pneumococcal 0-64 years Vaccine  Completed    HIV screen  Completed       Hemoglobin A1C (%)   Date Value   03/08/2019 6.8   08/01/2018 7.2   12/27/2017 5.9             ( goal A1C is < 7)   Microalb/Crt.  Ratio (mcg/mg creat)   Date Value   08/01/2018 CANNOT BE CALCULATED     LDL Cholesterol (mg/dL)   Date Value   10/11/2017 79       (goal LDL is <100)   AST (U/L)   Date Value   02/26/2019 33     ALT (U/L)   Date Value   02/26/2019 27     BUN (mg/dL)   Date Value   02/26/2019 10     BP Readings from Last 3 Encounters:   05/17/19 132/73   04/10/19 125/84   03/08/19 136/86          (goal 120/80)    All Future Testing planned in [facility]  Lab Frequency Next Occurrence   C-Peptide Once 07/12/2019   TSH with Reflex Once 08/23/2019   Lipid Panel Once 06/71/5169   Basic Metabolic Panel Once 62/30/3332   CBC With Auto Differential Once 07/12/2019   Hepatic Function Panel Once 08/23/2019   Brain Natriuretic Peptide Once 08/12/2019         Patient Active Problem List:     Long-term current use of methadone for opiate dependence ([address]     Tobacco abuse     Type 2 diabetes mellitus with hyperglycemia, with long-term current use of insulin (HCC)     Hepatitis B     History of hepatitis C     Gastroesophageal reflux disease     Chronic diastolic congestive heart failure ([address]     Abscess     Establishing care with new doctor, encounter for     Essential hypertension

## 2019-06-29 DIAGNOSIS — R11.0 CHRONIC NAUSEA: ICD-10-CM

## 2019-06-29 DIAGNOSIS — Z79.4 TYPE 2 DIABETES MELLITUS WITHOUT COMPLICATION, WITH LONG-TERM CURRENT USE OF INSULIN (HCC): ICD-10-CM

## 2019-06-29 DIAGNOSIS — G62.9 NEUROPATHY: ICD-10-CM

## 2019-06-29 DIAGNOSIS — E11.9 TYPE 2 DIABETES MELLITUS WITHOUT COMPLICATION, WITH LONG-TERM CURRENT USE OF INSULIN (HCC): ICD-10-CM

## 2019-07-01 NOTE — TELEPHONE ENCOUNTER
Refill request for med pended          Next Visit Date:  Future Appointments   Date Time Provider Didier Falki   7/12/2019  1:00 PM Jessica Adrian MD [address] [facility]   Topic Date Due    Hepatitis B Vaccine (1 of 3 - Risk 3-dose series) 02/18/1995    Lipid screen  10/11/2018    Diabetic retinal exam  06/04/2019    Diabetic microalbuminuria test  08/01/2019    Flu vaccine (1) 09/01/2019    Potassium monitoring  02/26/2020    Creatinine monitoring  02/26/2020    A1C test (Diabetic or Prediabetic)  03/08/2020    Diabetic foot exam  04/10/2020    DTaP/Tdap/Td vaccine (3 - Td) 11/13/2025    Pneumococcal 0-64 years Vaccine  Completed    HIV screen  Completed       Hemoglobin A1C (%)   Date Value   03/08/2019 6.8   08/01/2018 7.2   12/27/2017 5.9             ( goal A1C is < 7)   Microalb/Crt.  Ratio (mcg/mg creat)   Date Value   08/01/2018 CANNOT BE CALCULATED     LDL Cholesterol (mg/dL)   Date Value   10/11/2017 79       (goal LDL is <100)   AST (U/L)   Date Value   02/26/2019 33     ALT (U/L)   Date Value   02/26/2019 27     BUN (mg/dL)   Date Value   02/26/2019 10     BP Readings from Last 3 Encounters:   05/17/19 132/73   04/10/19 125/84   03/08/19 136/86          (goal 120/80)    All Future Testing planned in [facility]  Lab Frequency Next Occurrence   C-Peptide Once 07/12/2019   TSH with Reflex Once 08/23/2019   Lipid Panel Once 04/93/1289   Basic Metabolic Panel Once 59/80/1921   CBC With Auto Differential Once 07/12/2019   Hepatic Function Panel Once 08/23/2019   Brain Natriuretic Peptide Once 08/12/2019               Patient Active Problem List:     Long-term current use of methadone for opiate dependence ([address]     Tobacco abuse     Type 2 diabetes mellitus with hyperglycemia, with long-term current use of insulin (HCC)     Hepatitis B     History of hepatitis C     Gastroesophageal reflux disease     Chronic diastolic congestive heart failure ([address]     Abscess     Establishing care with new doctor, encounter for     Essential hypertension

## 2019-07-09 RX ORDER — ONDANSETRON 4 MG/1
TABLET, FILM COATED ORAL
Qty: 90 TABLET | Refills: 1 | Status: SHIPPED | OUTPATIENT
Start: 2019-07-09 | End: 2019-09-19 | Stop reason: SDUPTHER

## 2019-07-09 RX ORDER — GABAPENTIN 800 MG/1
TABLET ORAL
Qty: 90 TABLET | Refills: 1 | Status: SHIPPED | OUTPATIENT
Start: 2019-07-09 | End: 2019-08-28 | Stop reason: SDUPTHER

## 2019-07-09 RX ORDER — ATORVASTATIN CALCIUM 40 MG/1
TABLET, FILM COATED ORAL
Qty: 90 TABLET | Refills: 2 | Status: SHIPPED | OUTPATIENT
Start: 2019-07-09 | End: 2020-08-13 | Stop reason: SDUPTHER

## 2019-07-09 NOTE — TELEPHONE ENCOUNTER
Pt calling again upset that his medications have not been filled yet and he is out of them. Pt insisting on speaking to the manager. Pt was told that he would be forward to the  and to leave a message. Writer will contact resident.

## 2019-07-27 DIAGNOSIS — Z79.4 TYPE 2 DIABETES MELLITUS WITH HYPERGLYCEMIA, WITH LONG-TERM CURRENT USE OF INSULIN (HCC): Chronic | ICD-10-CM

## 2019-07-27 DIAGNOSIS — I10 ESSENTIAL HYPERTENSION: ICD-10-CM

## 2019-07-27 DIAGNOSIS — E11.65 TYPE 2 DIABETES MELLITUS WITH HYPERGLYCEMIA, WITH LONG-TERM CURRENT USE OF INSULIN (HCC): Chronic | ICD-10-CM

## 2019-07-29 LAB — STATUS: NORMAL

## 2019-07-29 NOTE — TELEPHONE ENCOUNTER
Lizy Request for Lisinopril. Last Visit Date:5/17/19  Next Visit Date:  No future appointments. Health Maintenance   Topic Date Due    Hepatitis B Vaccine (1 of 3 - Risk 3-dose series) 02/18/1995    Lipid screen  10/11/2018    Diabetic retinal exam  06/04/2019    Diabetic microalbuminuria test  08/01/2019    Flu vaccine (1) 09/01/2019    Potassium monitoring  02/26/2020    Creatinine monitoring  02/26/2020    A1C test (Diabetic or Prediabetic)  03/08/2020    Diabetic foot exam  04/10/2020    DTaP/Tdap/Td vaccine (3 - Td) 11/13/2025    Pneumococcal 0-64 years Vaccine  Completed    HIV screen  Completed       Hemoglobin A1C (%)   Date Value   03/08/2019 6.8   08/01/2018 7.2   12/27/2017 5.9             ( goal A1C is < 7)   Microalb/Crt. Ratio (mcg/mg creat)   Date Value   08/01/2018 CANNOT BE CALCULATED     LDL Cholesterol (mg/dL)   Date Value   10/11/2017 79       (goal LDL is <100)   AST (U/L)   Date Value   02/26/2019 33     ALT (U/L)   Date Value   02/26/2019 27     BUN (mg/dL)   Date Value   02/26/2019 10     BP Readings from Last 3 Encounters:   05/17/19 132/73   04/10/19 125/84   03/08/19 136/86          (goal 120/80)    All Future Testing planned in [facility]  Lab Frequency Next Occurrence   C-Peptide Once 10/22/2019   TSH with Reflex Once 08/23/2019   Lipid Panel Once 44/63/4703   Basic Metabolic Panel Once 99/41/8529   CBC With Auto Differential Once 10/22/2019   Hepatic Function Panel Once 08/23/2019   Brain Natriuretic Peptide Once 08/12/2019       Next Visit Date:  No future appointments.       Patient Active Problem List:     Long-term current use of methadone for opiate dependence ([address]     Tobacco abuse     Type 2 diabetes mellitus with hyperglycemia, with long-term current use of insulin (HCC)     Hepatitis B     History of hepatitis C     Gastroesophageal reflux disease     Chronic diastolic congestive heart failure ([address]     Abscess     Establishing care with new doctor, encounter

## 2019-07-31 RX ORDER — LISINOPRIL 5 MG/1
TABLET ORAL
Qty: 30 TABLET | Refills: 3 | Status: SHIPPED | OUTPATIENT
Start: 2019-07-31 | End: 2020-01-08

## 2019-07-31 NOTE — TELEPHONE ENCOUNTER
Refill request for lisinopril approved.     Electronically signed by Celestino Stroud MD on 7/31/2019 at 8:29 AM

## 2019-08-28 DIAGNOSIS — G62.9 NEUROPATHY: ICD-10-CM

## 2019-08-29 RX ORDER — GABAPENTIN 800 MG/1
TABLET ORAL
Qty: 90 TABLET | Refills: 0 | Status: SHIPPED | OUTPATIENT
Start: 2019-08-29 | End: 2019-10-01 | Stop reason: SDUPTHER

## 2019-09-19 DIAGNOSIS — R11.0 CHRONIC NAUSEA: ICD-10-CM

## 2019-09-19 NOTE — TELEPHONE ENCOUNTER
Refill request for Zofran. If appropriate please send medication(s) to patients pharmacy. Next appt: None currently. Note to pharmacy to have patient contact the office to schedule appointment. Last seen : 05/17/2019      Health Maintenance   Topic Date Due    Hepatitis B Vaccine (1 of 3 - Risk 3-dose series) 02/18/1995    Lipid screen  10/11/2018    Diabetic retinal exam  06/04/2019    Diabetic microalbuminuria test  08/01/2019    Flu vaccine (1) 09/01/2019    Potassium monitoring  02/26/2020    Creatinine monitoring  02/26/2020    A1C test (Diabetic or Prediabetic)  03/08/2020    Diabetic foot exam  04/10/2020    DTaP/Tdap/Td vaccine (3 - Td) 11/13/2025    Pneumococcal 0-64 years Vaccine  Completed    HIV screen  Completed       Hemoglobin A1C (%)   Date Value   03/08/2019 6.8   08/01/2018 7.2   12/27/2017 5.9             ( goal A1C is < 7)   Microalb/Crt.  Ratio (mcg/mg creat)   Date Value   08/01/2018 CANNOT BE CALCULATED     LDL Cholesterol (mg/dL)   Date Value   10/11/2017 79       (goal LDL is <100)   AST (U/L)   Date Value   02/26/2019 33     ALT (U/L)   Date Value   02/26/2019 27     BUN (mg/dL)   Date Value   02/26/2019 10     BP Readings from Last 3 Encounters:   05/17/19 132/73   04/10/19 125/84   03/08/19 136/86          (goal 120/80)          Patient Active Problem List:     Long-term current use of methadone for opiate dependence ([address]     Tobacco abuse     Type 2 diabetes mellitus with hyperglycemia, with long-term current use of insulin (HCC)     Hepatitis B     History of hepatitis C     Gastroesophageal reflux disease     Chronic diastolic congestive heart failure ([address]     Abscess     Establishing care with new doctor, encounter for     Essential hypertension

## 2019-09-25 RX ORDER — ONDANSETRON 4 MG/1
TABLET, FILM COATED ORAL
Qty: 90 TABLET | Refills: 1 | Status: SHIPPED | OUTPATIENT
Start: 2019-09-25 | End: 2020-07-23

## 2019-09-27 DIAGNOSIS — G62.9 NEUROPATHY: ICD-10-CM

## 2019-09-27 RX ORDER — GABAPENTIN 800 MG/1
TABLET ORAL
Qty: 90 TABLET | Refills: 0 | Status: CANCELLED | OUTPATIENT
Start: 2019-09-27 | End: 2019-12-26

## 2019-10-01 DIAGNOSIS — G62.9 NEUROPATHY: ICD-10-CM

## 2019-10-02 RX ORDER — GABAPENTIN 800 MG/1
TABLET ORAL
Qty: 90 TABLET | Refills: 2 | Status: SHIPPED | OUTPATIENT
Start: 2019-10-02 | End: 2020-01-08

## 2020-01-08 RX ORDER — PANTOPRAZOLE SODIUM 40 MG/1
TABLET, DELAYED RELEASE ORAL
Qty: 30 TABLET | Refills: 0 | Status: SHIPPED | OUTPATIENT
Start: 2020-01-08 | End: 2020-01-27

## 2020-01-24 RX ORDER — LISINOPRIL 5 MG/1
TABLET ORAL
Qty: 30 TABLET | Refills: 0 | Status: CANCELLED | OUTPATIENT
Start: 2020-01-24

## 2020-01-27 RX ORDER — PANTOPRAZOLE SODIUM 40 MG/1
TABLET, DELAYED RELEASE ORAL
Qty: 30 TABLET | Refills: 0 | Status: SHIPPED | OUTPATIENT
Start: 2020-01-27 | End: 2020-03-25

## 2020-01-27 RX ORDER — LISINOPRIL 5 MG/1
TABLET ORAL
Qty: 30 TABLET | Refills: 0 | Status: SHIPPED | OUTPATIENT
Start: 2020-01-27 | End: 2020-01-31

## 2020-01-31 ENCOUNTER — OFFICE VISIT (OUTPATIENT)
Dept: INTERNAL MEDICINE | Age: 45
End: 2020-01-31
Payer: MEDICARE

## 2020-01-31 VITALS
DIASTOLIC BLOOD PRESSURE: 67 MMHG | HEART RATE: 71 BPM | BODY MASS INDEX: 33.92 KG/M2 | SYSTOLIC BLOOD PRESSURE: 108 MMHG | WEIGHT: 250.4 LBS | HEIGHT: 72 IN

## 2020-01-31 PROBLEM — M72.2 PLANTAR FASCIITIS OF RIGHT FOOT: Status: ACTIVE | Noted: 2020-01-31

## 2020-01-31 LAB — HBA1C MFR BLD: 5.3 %

## 2020-01-31 PROCEDURE — 99213 OFFICE O/P EST LOW 20 MIN: CPT | Performed by: STUDENT IN AN ORGANIZED HEALTH CARE EDUCATION/TRAINING PROGRAM

## 2020-01-31 PROCEDURE — G8417 CALC BMI ABV UP PARAM F/U: HCPCS | Performed by: STUDENT IN AN ORGANIZED HEALTH CARE EDUCATION/TRAINING PROGRAM

## 2020-01-31 PROCEDURE — G8427 DOCREV CUR MEDS BY ELIG CLIN: HCPCS | Performed by: STUDENT IN AN ORGANIZED HEALTH CARE EDUCATION/TRAINING PROGRAM

## 2020-01-31 PROCEDURE — 3044F HG A1C LEVEL LT 7.0%: CPT | Performed by: STUDENT IN AN ORGANIZED HEALTH CARE EDUCATION/TRAINING PROGRAM

## 2020-01-31 PROCEDURE — 99211 OFF/OP EST MAY X REQ PHY/QHP: CPT

## 2020-01-31 PROCEDURE — G8482 FLU IMMUNIZE ORDER/ADMIN: HCPCS | Performed by: STUDENT IN AN ORGANIZED HEALTH CARE EDUCATION/TRAINING PROGRAM

## 2020-01-31 PROCEDURE — 4004F PT TOBACCO SCREEN RCVD TLK: CPT | Performed by: STUDENT IN AN ORGANIZED HEALTH CARE EDUCATION/TRAINING PROGRAM

## 2020-01-31 PROCEDURE — 2022F DILAT RTA XM EVC RTNOPTHY: CPT | Performed by: STUDENT IN AN ORGANIZED HEALTH CARE EDUCATION/TRAINING PROGRAM

## 2020-01-31 PROCEDURE — 90688 IIV4 VACCINE SPLT 0.5 ML IM: CPT | Performed by: STUDENT IN AN ORGANIZED HEALTH CARE EDUCATION/TRAINING PROGRAM

## 2020-01-31 PROCEDURE — 83036 HEMOGLOBIN GLYCOSYLATED A1C: CPT | Performed by: STUDENT IN AN ORGANIZED HEALTH CARE EDUCATION/TRAINING PROGRAM

## 2020-01-31 RX ORDER — NICOTINE 21 MG/24HR
1 PATCH, TRANSDERMAL 24 HOURS TRANSDERMAL DAILY
Qty: 42 PATCH | Refills: 0 | Status: SHIPPED | OUTPATIENT
Start: 2020-01-31 | End: 2020-06-12 | Stop reason: ALTCHOICE

## 2020-01-31 NOTE — PROGRESS NOTES
[facility]    Progress Note    Date of patient's visit: 1/31/2020  YOB: 1975  Patient's Name:  Lana Willis    Patient Care Team:  Geno Lua MD as PCP - General (Emergency Medicine)  Ondina Dial MD as PCP - [facility]  Georgie Mcmahan MD as Consulting Physician (Gastroenterology)  Nicola Goldberg MD as Consulting Physician (Internal Medicine)  Nicola Goldberg MD as Consulting Physician (Internal Medicine)    REASON FOR VISIT: Routine outpatient follow     HISTORY OF PRESENT ILLNESS:    History was obtained from the patient. Lana Willsi is a 38 y.o. is here for a routine outpatient follow-up visit. Patient had a recent CT abdomen and pelvis for right flank pain, incidentally lung nodules were found. Right base nodule 5 mm. Right posterior basilar nodule 3 mm. To the lateral left base nodules measuring 5 mm and 3 mm. Patient is a current smoker, has 30-pack-year history of smoking, recently increased to 2 packs a day since the past 4 months. Denies any alcohol or illicit drug usage. Patient previously worked at [employer] for 16 years. Has history of COPD on albuterol, DuoNeb and Spiriva. Stable no worsening shortness of breath. Reports that he lost 40 LBS in the past 6 months, intentional.    Patient reports that he has occasional chest pain, does not relate to work of food intake. Reports that he has panic attacks. He complains of right foot plantar pain, usually after period of rest or inactivity when he takes the first few steps. Reports having history of bipolar disorder and PTSD. Currently states that he does not have interest in doing things, has sleep disturbances, loss of appetite. No intentions of harming himself or others. Patient has history of hepatitis B, gastroparesis, liver cirrhosis, grade 1 esophageal varices. Patient follows up with Dr. Marco A Negron.     Has history of ipratropium-albuterol (DUONEB) 0.5-2.5 (3) MG/3ML SOLN nebulizer solution       loratadine (CLARITIN) 10 MG tablet Take 1 tablet by mouth nightly 90 tablet 3    albuterol sulfate HFA (PROVENTIL HFA) 108 (90 Base) MCG/ACT inhaler Inhale 2 puffs into the lungs every 4 hours as needed for Wheezing or Shortness of Breath (Space out to every 6 hours as symptoms improve) Space out to every 6 hours as symptoms improve. 1 Inhaler 3    sodium chloride (ALTAMIST SPRAY) 0.65 % nasal spray 1 spray by Nasal route as needed for Congestion 1 Bottle 3    METHADONE HCL PO Take 133 mg by mouth daily       Semaglutide (OZEMPIC) 0.25 or 0.5 MG/DOSE SOPN 0.25 mg weekly x 2 weeks then take 0.5 mg weekly (Patient not taking: Reported on 1/31/2020) 4 pen 1    tiotropium (SPIRIVA HANDIHALER) 18 MCG inhalation capsule Inhale 1 capsule into the lungs daily (Patient not taking: Reported on 1/31/2020) 90 capsule 1     No current facility-administered medications on file prior to visit. SOCIAL HISTORY    Reviewed and no change from previous record. Theodore  reports that he has been smoking cigarettes. He has a 15.00 pack-year smoking history.  He has never used smokeless tobacco.    FAMILY HISTORY:    Reviewed and No change from previous visit    REVIEW OF SYSTEMS:    ENT: negative  Respiratory: no cough,no worsening shortness of breath, or wheezing  Cardiovascular: occasional chest pain or dyspnea on exertion  Gastrointestinal: no abdominal pain, black or bloody stools  Neurological: no TIA or stroke symptoms  Endocrine: negative  Genito-Urinary: no dysuria, trouble voiding, or hematuria  Musculoskeletal: right plantar foot pain  Dermatological: negative    PHYSICAL EXAM:      Vitals:    01/31/20 1307   BP: 108/67   Pulse: 71     General appearance - alert, well appearing, and in no distress  Mental status - alert, oriented to person, place, and time  Mouth - mucous membranes moist, pharynx normal without lesions  Chest - clear to

## 2020-01-31 NOTE — PROGRESS NOTES
DIABETES and HYPERTENSION visit    BP Readings from Last 3 Encounters:   05/17/19 132/73   04/10/19 125/84   03/08/19 136/86        Hemoglobin A1C (%)   Date Value   03/08/2019 6.8   08/01/2018 7.2   12/27/2017 5.9     Microalb/Crt. Ratio (mcg/mg creat)   Date Value   08/01/2018 CANNOT BE CALCULATED     LDL Cholesterol (mg/dL)   Date Value   10/11/2017 79     HDL (mg/dL)   Date Value   10/11/2017 17 (L)     BUN (mg/dL)   Date Value   02/26/2019 10     CREATININE (mg/dL)   Date Value   02/26/2019 0.75     Glucose (mg/dL)   Date Value   05/17/2019 171            Have you changed or started any medications since your last visit including any over-the-counter medicines, vitamins, or herbal medicines? no   Have you stopped taking any of your medications? Is so, why? -  yes - Stopped all of his diabetic medications  Are you having any side effects from any of your medications? - no    Have you seen any other physician or provider since your last visit?  no   Have you had any other diagnostic tests since your last visit? yes -    Have you been seen in the emergency room and/or had an admission in a hospital since we last saw you?  yes -    Have you had your routine dental cleaning in the past 6 months?  no     Have you had your annual diabetic retinal (eye) exam? Yes   (ensure copy of exam is in the chart)    Do you have an active MyChart account? If no, what is the barrier?   Yes    Patient Care Team:  Aurora De Leon MD as PCP - General (Emergency Medicine)  Naa Odell MD as PCP - [facility]  Hallie Turner MD as Consulting Physician (Gastroenterology)  Yoshi Wagner MD as Consulting Physician (Internal Medicine)  Yoshi Wagner MD as Consulting Physician (Internal Medicine)    Medical History Review  Past Medical, Family, and Social History reviewed and does contribute to the patient presenting condition    Health Maintenance   Topic Date Due    Lipid screen  10/11/2018    Diabetic

## 2020-02-13 ENCOUNTER — HOSPITAL ENCOUNTER (OUTPATIENT)
Dept: CT IMAGING | Age: 45
Discharge: HOME | End: 2020-02-15
Payer: MEDICARE

## 2020-02-13 ENCOUNTER — HOSPITAL ENCOUNTER (OUTPATIENT)
Age: 45
Discharge: HOME | End: 2020-02-13
Payer: MEDICARE

## 2020-02-13 ENCOUNTER — HOSPITAL ENCOUNTER (OUTPATIENT)
Dept: NON INVASIVE DIAGNOSTICS | Age: 45
Discharge: HOME | End: 2020-02-13
Payer: MEDICARE

## 2020-02-13 LAB
CHOLESTEROL/HDL RATIO: 3.2
CHOLESTEROL: 90 MG/DL
CREATININE URINE: 162.8 MG/DL (ref 39–259)
HDLC SERPL-MCNC: 28 MG/DL
LDL CHOLESTEROL: 33 MG/DL (ref 0–130)
MICROALBUMIN/CREAT 24H UR: <12 MG/L
MICROALBUMIN/CREAT UR-RTO: NORMAL MCG/MG CREAT
TRIGL SERPL-MCNC: 143 MG/DL
VLDLC SERPL CALC-MCNC: ABNORMAL MG/DL (ref 1–30)

## 2020-02-13 PROCEDURE — 93017 CV STRESS TEST TRACING ONLY: CPT

## 2020-02-13 PROCEDURE — 93005 ELECTROCARDIOGRAM TRACING: CPT | Performed by: STUDENT IN AN ORGANIZED HEALTH CARE EDUCATION/TRAINING PROGRAM

## 2020-02-13 PROCEDURE — 80061 LIPID PANEL: CPT

## 2020-02-13 PROCEDURE — 71250 CT THORAX DX C-: CPT

## 2020-02-13 PROCEDURE — 82043 UR ALBUMIN QUANTITATIVE: CPT

## 2020-02-13 PROCEDURE — 82570 ASSAY OF URINE CREATININE: CPT

## 2020-02-13 PROCEDURE — 36415 COLL VENOUS BLD VENIPUNCTURE: CPT

## 2020-02-13 PROCEDURE — 93350 STRESS TTE ONLY: CPT

## 2020-02-14 LAB
EKG ATRIAL RATE: 58 BPM
EKG P AXIS: 41 DEGREES
EKG P-R INTERVAL: 168 MS
EKG Q-T INTERVAL: 438 MS
EKG QRS DURATION: 82 MS
EKG QTC CALCULATION (BAZETT): 429 MS
EKG R AXIS: 50 DEGREES
EKG T AXIS: 40 DEGREES
EKG VENTRICULAR RATE: 58 BPM

## 2020-02-14 PROCEDURE — 93010 ELECTROCARDIOGRAM REPORT: CPT | Performed by: INTERNAL MEDICINE

## 2020-03-23 NOTE — TELEPHONE ENCOUNTER
Refill request for Spiriva Handihaler. If appropriate please send medication(s) to patients pharmacy. Next appt: 05/06/2020      Health Maintenance   Topic Date Due    Diabetic retinal exam  06/04/2019    Potassium monitoring  02/26/2020    Creatinine monitoring  02/26/2020    Hepatitis B vaccine (2 of 3 - Hep B Twinrix risk 3-dose series) 01/24/2021 (Originally 7/10/2013)    Diabetic foot exam  04/10/2020    A1C test (Diabetic or Prediabetic)  01/31/2021    Diabetic microalbuminuria test  02/13/2021    Lipid screen  02/13/2021    DTaP/Tdap/Td vaccine (4 - Td) 11/13/2025    Shingles Vaccine (1 of 2) 02/18/2026    Flu vaccine  Completed    Pneumococcal 0-64 years Vaccine  Completed    HIV screen  Completed    Hepatitis A vaccine  Aged Out    Hib vaccine  Aged Out    Meningococcal (ACWY) vaccine  Aged Out       Hemoglobin A1C (%)   Date Value   01/31/2020 5.3   03/08/2019 6.8   08/01/2018 7.2             ( goal A1C is < 7)   Microalb/Crt.  Ratio (mcg/mg creat)   Date Value   02/13/2020 CANNOT BE CALCULATED     LDL Cholesterol (mg/dL)   Date Value   02/13/2020 33       (goal LDL is <100)   AST (U/L)   Date Value   02/26/2019 33     ALT (U/L)   Date Value   02/26/2019 27     BUN (mg/dL)   Date Value   02/26/2019 10     BP Readings from Last 3 Encounters:   01/31/20 108/67   05/17/19 132/73   04/10/19 125/84          (goal 120/80)          Patient Active Problem List:     Long-term current use of methadone for opiate dependence ([address]     Tobacco abuse     Type 2 diabetes mellitus with hyperglycemia, with long-term current use of insulin (HCC)     Hepatitis B     History of hepatitis C     Gastroesophageal reflux disease     Chronic diastolic congestive heart failure (HCC)     Abscess     Establishing care with new doctor, encounter for     Essential hypertension     Plantar fasciitis of right foot

## 2020-03-23 NOTE — TELEPHONE ENCOUNTER
Refill request for Test strips. If appropriate please send medication(s) to patients pharmacy. Next appt: 05/06/2020      Health Maintenance   Topic Date Due    Diabetic retinal exam  06/04/2019    Potassium monitoring  02/26/2020    Creatinine monitoring  02/26/2020    Hepatitis B vaccine (2 of 3 - Hep B Twinrix risk 3-dose series) 01/24/2021 (Originally 7/10/2013)    Diabetic foot exam  04/10/2020    A1C test (Diabetic or Prediabetic)  01/31/2021    Diabetic microalbuminuria test  02/13/2021    Lipid screen  02/13/2021    DTaP/Tdap/Td vaccine (4 - Td) 11/13/2025    Shingles Vaccine (1 of 2) 02/18/2026    Flu vaccine  Completed    Pneumococcal 0-64 years Vaccine  Completed    HIV screen  Completed    Hepatitis A vaccine  Aged Out    Hib vaccine  Aged Out    Meningococcal (ACWY) vaccine  Aged Out       Hemoglobin A1C (%)   Date Value   01/31/2020 5.3   03/08/2019 6.8   08/01/2018 7.2             ( goal A1C is < 7)   Microalb/Crt.  Ratio (mcg/mg creat)   Date Value   02/13/2020 CANNOT BE CALCULATED     LDL Cholesterol (mg/dL)   Date Value   02/13/2020 33       (goal LDL is <100)   AST (U/L)   Date Value   02/26/2019 33     ALT (U/L)   Date Value   02/26/2019 27     BUN (mg/dL)   Date Value   02/26/2019 10     BP Readings from Last 3 Encounters:   01/31/20 108/67   05/17/19 132/73   04/10/19 125/84          (goal 120/80)          Patient Active Problem List:     Long-term current use of methadone for opiate dependence ([facility] [address]     Tobacco abuse     Type 2 diabetes mellitus with hyperglycemia, with long-term current use of insulin (HCC)     Hepatitis B     History of hepatitis C     Gastroesophageal reflux disease     Chronic diastolic congestive heart failure (HCC)     Abscess     Establishing care with new doctor, encounter for     Essential hypertension     Plantar fasciitis of right foot

## 2020-03-25 RX ORDER — LISINOPRIL 5 MG/1
TABLET ORAL
Qty: 30 TABLET | Refills: 0 | Status: SHIPPED | OUTPATIENT
Start: 2020-03-25 | End: 2020-08-12

## 2020-03-25 RX ORDER — TIOTROPIUM BROMIDE 18 UG/1
CAPSULE ORAL; RESPIRATORY (INHALATION)
Qty: 90 CAPSULE | Refills: 0 | Status: SHIPPED | OUTPATIENT
Start: 2020-03-25 | End: 2020-05-04 | Stop reason: SDUPTHER

## 2020-03-25 RX ORDER — PANTOPRAZOLE SODIUM 40 MG/1
TABLET, DELAYED RELEASE ORAL
Qty: 30 TABLET | Refills: 0 | Status: SHIPPED | OUTPATIENT
Start: 2020-03-25 | End: 2020-06-03

## 2020-04-16 ENCOUNTER — TELEPHONE (OUTPATIENT)
Dept: FAMILY MEDICINE CLINIC | Age: 45
End: 2020-04-16

## 2020-04-20 ENCOUNTER — TELEPHONE (OUTPATIENT)
Dept: INTERNAL MEDICINE CLINIC | Age: 45
End: 2020-04-20

## 2020-04-20 RX ORDER — SERTRALINE HYDROCHLORIDE 25 MG/1
25 TABLET, FILM COATED ORAL DAILY
Qty: 90 TABLET | Refills: 1 | Status: SHIPPED | OUTPATIENT
Start: 2020-04-20 | End: 2020-08-13 | Stop reason: SINTOL

## 2020-05-01 ENCOUNTER — TELEPHONE (OUTPATIENT)
Dept: INTERNAL MEDICINE | Age: 45
End: 2020-05-01

## 2020-05-04 ENCOUNTER — TELEPHONE (OUTPATIENT)
Dept: INTERNAL MEDICINE | Age: 45
End: 2020-05-04

## 2020-05-05 NOTE — TELEPHONE ENCOUNTER
Refill request for Gabapentin. If appropriate please send medication(s) to patients pharmacy. Next appt: None - patient was scheduled for 5/6/2020 but appt was cancelled due to COVID-19    Last appt: 1/31/2020    Health Maintenance   Topic Date Due    Diabetic retinal exam  06/04/2019    Potassium monitoring  02/26/2020    Creatinine monitoring  02/26/2020    Diabetic foot exam  04/10/2020    Hepatitis B vaccine (2 of 3 - Hep B Twinrix risk 3-dose series) 01/24/2021 (Originally 7/10/2013)    A1C test (Diabetic or Prediabetic)  01/31/2021    Diabetic microalbuminuria test  02/13/2021    Lipid screen  02/13/2021    DTaP/Tdap/Td vaccine (4 - Td) 11/13/2025    Flu vaccine  Completed    Pneumococcal 0-64 years Vaccine  Completed    HIV screen  Completed    Hepatitis A vaccine  Aged Out    Hib vaccine  Aged Out    Meningococcal (ACWY) vaccine  Aged Out       Hemoglobin A1C (%)   Date Value   01/31/2020 5.3   03/08/2019 6.8   08/01/2018 7.2             ( goal A1C is < 7)   Microalb/Crt.  Ratio (mcg/mg creat)   Date Value   02/13/2020 CANNOT BE CALCULATED     LDL Cholesterol (mg/dL)   Date Value   02/13/2020 33       (goal LDL is <100)   AST (U/L)   Date Value   02/26/2019 33     ALT (U/L)   Date Value   02/26/2019 27     BUN (mg/dL)   Date Value   02/26/2019 10     BP Readings from Last 3 Encounters:   01/31/20 108/67   05/17/19 132/73   04/10/19 125/84          (goal 120/80)          Patient Active Problem List:     Long-term current use of methadone for opiate dependence ([facility] [address]     Tobacco abuse     Type 2 diabetes mellitus with hyperglycemia, with long-term current use of insulin (HCC)     Hepatitis B     History of hepatitis C     Gastroesophageal reflux disease     Chronic diastolic congestive heart failure (HCC)     Abscess     Establishing care with new doctor, encounter for     Essential hypertension     Plantar fasciitis of right foot

## 2020-05-08 RX ORDER — GABAPENTIN 800 MG/1
TABLET ORAL
Qty: 90 TABLET | Refills: 0 | Status: SHIPPED | OUTPATIENT
Start: 2020-05-08 | End: 2020-05-08

## 2020-05-08 RX ORDER — IPRATROPIUM BROMIDE AND ALBUTEROL SULFATE 2.5; .5 MG/3ML; MG/3ML
1 SOLUTION RESPIRATORY (INHALATION) EVERY 6 HOURS
Qty: 360 ML | Refills: 3 | Status: SHIPPED | OUTPATIENT
Start: 2020-05-08 | End: 2020-09-10 | Stop reason: SDUPTHER

## 2020-05-08 RX ORDER — TIOTROPIUM BROMIDE 18 UG/1
CAPSULE ORAL; RESPIRATORY (INHALATION)
Qty: 90 CAPSULE | Refills: 0 | Status: SHIPPED | OUTPATIENT
Start: 2020-05-08 | End: 2020-09-10 | Stop reason: SDUPTHER

## 2020-05-08 RX ORDER — GABAPENTIN 800 MG/1
TABLET ORAL
Qty: 90 TABLET | Refills: 2 | Status: SHIPPED | OUTPATIENT
Start: 2020-05-08 | End: 2020-07-23

## 2020-05-11 ENCOUNTER — APPOINTMENT (OUTPATIENT)
Dept: GENERAL RADIOLOGY | Age: 45
End: 2020-05-11
Payer: MEDICARE

## 2020-05-11 ENCOUNTER — HOSPITAL ENCOUNTER (EMERGENCY)
Age: 45
Discharge: HOME | End: 2020-05-11
Payer: MEDICARE

## 2020-05-11 ENCOUNTER — APPOINTMENT (OUTPATIENT)
Dept: CT IMAGING | Age: 45
End: 2020-05-11
Payer: MEDICARE

## 2020-05-11 VITALS
RESPIRATION RATE: 16 BRPM | WEIGHT: 237 LBS | TEMPERATURE: 98 F | DIASTOLIC BLOOD PRESSURE: 83 MMHG | HEART RATE: 70 BPM | OXYGEN SATURATION: 99 % | BODY MASS INDEX: 31.41 KG/M2 | SYSTOLIC BLOOD PRESSURE: 113 MMHG | HEIGHT: 73 IN

## 2020-05-11 LAB
ALBUMIN SERPL-MCNC: 3.7 G/DL (ref 3.5–5.1)
ALP BLD-CCNC: 105 U/L (ref 38–126)
ALT SERPL-CCNC: 12 U/L (ref 11–66)
ANION GAP SERPL CALCULATED.3IONS-SCNC: 7 MEQ/L (ref 8–16)
AST SERPL-CCNC: 22 U/L (ref 5–40)
BASOPHILS # BLD: 0.5 %
BASOPHILS ABSOLUTE: 0 THOU/MM3 (ref 0–0.1)
BILIRUB SERPL-MCNC: 0.6 MG/DL (ref 0.3–1.2)
BUN BLDV-MCNC: 11 MG/DL (ref 7–22)
CALCIUM SERPL-MCNC: 9 MG/DL (ref 8.5–10.5)
CHLORIDE BLD-SCNC: 100 MEQ/L (ref 98–111)
CO2: 28 MEQ/L (ref 23–33)
CREAT SERPL-MCNC: 0.7 MG/DL (ref 0.4–1.2)
EOSINOPHIL # BLD: 2.5 %
EOSINOPHILS ABSOLUTE: 0.2 THOU/MM3 (ref 0–0.4)
ERYTHROCYTE [DISTWIDTH] IN BLOOD BY AUTOMATED COUNT: 13.9 % (ref 11.5–14.5)
ERYTHROCYTE [DISTWIDTH] IN BLOOD BY AUTOMATED COUNT: 43.1 FL (ref 35–45)
ETHYL ALCOHOL, SERUM: < 0.01 %
GFR SERPL CREATININE-BSD FRML MDRD: > 90 ML/MIN/1.73M2
GLUCOSE BLD-MCNC: 76 MG/DL (ref 70–108)
HCT VFR BLD CALC: 40.4 % (ref 42–52)
HEMOGLOBIN: 13.8 GM/DL (ref 14–18)
IMMATURE GRANS (ABS): 0.05 THOU/MM3 (ref 0–0.07)
IMMATURE GRANULOCYTES: 0.6 %
LYMPHOCYTES # BLD: 23.6 %
LYMPHOCYTES ABSOLUTE: 1.8 THOU/MM3 (ref 1–4.8)
MCH RBC QN AUTO: 29.6 PG (ref 26–33)
MCHC RBC AUTO-ENTMCNC: 34.2 GM/DL (ref 32.2–35.5)
MCV RBC AUTO: 86.5 FL (ref 80–94)
MONOCYTES # BLD: 9.3 %
MONOCYTES ABSOLUTE: 0.7 THOU/MM3 (ref 0.4–1.3)
NUCLEATED RED BLOOD CELLS: 0 /100 WBC
OSMOLALITY CALCULATION: 268.3 MOSMOL/KG (ref 275–300)
PLATELET # BLD: 110 THOU/MM3 (ref 130–400)
PMV BLD AUTO: 10.1 FL (ref 9.4–12.4)
POTASSIUM SERPL-SCNC: 3.6 MEQ/L (ref 3.5–5.2)
RBC # BLD: 4.67 MILL/MM3 (ref 4.7–6.1)
SEG NEUTROPHILS: 63.5 %
SEGMENTED NEUTROPHILS ABSOLUTE COUNT: 4.9 THOU/MM3 (ref 1.8–7.7)
SODIUM BLD-SCNC: 135 MEQ/L (ref 135–145)
TOTAL PROTEIN: 6.2 G/DL (ref 6.1–8)
WBC # BLD: 7.7 THOU/MM3 (ref 4.8–10.8)

## 2020-05-11 PROCEDURE — 36415 COLL VENOUS BLD VENIPUNCTURE: CPT

## 2020-05-11 PROCEDURE — 72100 X-RAY EXAM L-S SPINE 2/3 VWS: CPT

## 2020-05-11 PROCEDURE — 70450 CT HEAD/BRAIN W/O DYE: CPT

## 2020-05-11 PROCEDURE — 99282 EMERGENCY DEPT VISIT SF MDM: CPT

## 2020-05-11 PROCEDURE — G0480 DRUG TEST DEF 1-7 CLASSES: HCPCS

## 2020-05-11 PROCEDURE — 6370000000 HC RX 637 (ALT 250 FOR IP): Performed by: PHYSICIAN ASSISTANT

## 2020-05-11 PROCEDURE — 72125 CT NECK SPINE W/O DYE: CPT

## 2020-05-11 PROCEDURE — 80053 COMPREHEN METABOLIC PANEL: CPT

## 2020-05-11 PROCEDURE — 85025 COMPLETE CBC W/AUTO DIFF WBC: CPT

## 2020-05-11 RX ORDER — ACETAMINOPHEN 325 MG/1
650 TABLET ORAL ONCE
Status: COMPLETED | OUTPATIENT
Start: 2020-05-11 | End: 2020-05-11

## 2020-05-11 RX ORDER — LIDOCAINE 50 MG/G
1 PATCH TOPICAL DAILY
Qty: 15 PATCH | Refills: 0 | Status: SHIPPED | OUTPATIENT
Start: 2020-05-11

## 2020-05-11 RX ORDER — LIDOCAINE 4 G/G
2 PATCH TOPICAL ONCE
Status: DISCONTINUED | OUTPATIENT
Start: 2020-05-11 | End: 2020-05-11 | Stop reason: HOSPADM

## 2020-05-11 RX ORDER — ORPHENADRINE CITRATE 100 MG/1
100 TABLET, EXTENDED RELEASE ORAL ONCE
Status: COMPLETED | OUTPATIENT
Start: 2020-05-11 | End: 2020-05-11

## 2020-05-11 RX ORDER — BACLOFEN 10 MG/1
10 TABLET ORAL 3 TIMES DAILY PRN
Qty: 10 TABLET | Refills: 0 | Status: SHIPPED | OUTPATIENT
Start: 2020-05-11 | End: 2020-06-12 | Stop reason: ALTCHOICE

## 2020-05-11 RX ADMIN — ACETAMINOPHEN 650 MG: 325 TABLET ORAL at 17:52

## 2020-05-11 RX ADMIN — ORPHENADRINE CITRATE 100 MG: 100 TABLET, EXTENDED RELEASE ORAL at 17:52

## 2020-05-11 ASSESSMENT — PAIN SCALES - GENERAL: PAINLEVEL_OUTOF10: 8

## 2020-05-11 ASSESSMENT — PAIN DESCRIPTION - LOCATION: LOCATION: NECK;HEAD

## 2020-05-11 ASSESSMENT — PAIN DESCRIPTION - PAIN TYPE: TYPE: ACUTE PAIN

## 2020-05-11 NOTE — ED PROVIDER NOTES
numbness. PAST MEDICAL HISTORY    has a past medical history of Anxiety, Bipolar 1 disorder ([address] Chronic diastolic congestive heart failure ([address] Constipation, Gastroesophageal reflux disease, Hard of hearing, Hep C w/o coma, chronic (HCC), Hepatitis B, Kidney stones, Post traumatic stress disorder (PTSD), Substance abuse ([address] Type 2 diabetes mellitus without complication, with long-term current use of insulin ([address] and Wears glasses. SURGICAL HISTORY      has a past surgical history that includes Kidney stone surgery and Ankle surgery (Left). CURRENT MEDICATIONS       Discharge Medication List as of 5/11/2020  6:16 PM      CONTINUE these medications which have NOT CHANGED    Details   tiotropium (SPIRIVA HANDIHALER) 18 MCG inhalation capsule Inhale 1 puff by mouth once daily, Disp-90 capsule, R-0Normal      gabapentin (NEURONTIN) 800 MG tablet TAKE 1 TABLET BY MOUTH THREE TIMES DAILY, Disp-90 tablet, R-2Normal      ipratropium-albuterol (DUONEB) 0.5-2.5 (3) MG/3ML SOLN nebulizer solution Take 3 mLs by nebulization every 6 hours, Disp-360 mL, R-3Normal      sertraline (ZOLOFT) 25 MG tablet Take 1 tablet by mouth daily, Disp-90 tablet, R-1Normal      ONE TOUCH ULTRA TEST strip USE 1 STRIP TO CHECK GLUCOSE THREE TIMES DAILY, Disp-100 each, R-0, DAWNormal      pantoprazole (PROTONIX) 40 MG tablet Take 1 tablet by mouth once daily, Disp-30 tablet, R-0Normal      lisinopril (PRINIVIL;ZESTRIL) 5 MG tablet Take 1 tablet by mouth once daily, Disp-30 tablet, R-0Normal      nicotine (NICODERM CQ) 21 MG/24HR Place 1 patch onto the skin daily, Disp-42 patch, R-0Normal      ondansetron (ZOFRAN) 4 MG tablet TAKE 1 TABLET BY MOUTH EVERY 8 HOURS FOR NAUSEA AND VOMITING, Disp-90 tablet, R-1Please consider 90 day supplies to promote better adherence    Please have patient contact office to schedule appointment. Normal      atorvastatin (LIPITOR) 40 MG tablet TAKE 1 TABLET BY MOUTH ONCE DAILY, Disp-90 tablet,

## 2020-05-12 ENCOUNTER — CARE COORDINATION (OUTPATIENT)
Dept: CARE COORDINATION | Age: 45
End: 2020-05-12

## 2020-05-13 ENCOUNTER — CARE COORDINATION (OUTPATIENT)
Dept: CARE COORDINATION | Age: 45
End: 2020-05-13

## 2020-05-13 ASSESSMENT — ENCOUNTER SYMPTOMS
VOMITING: 0
NAUSEA: 0
SHORTNESS OF BREATH: 0
ABDOMINAL PAIN: 0
COUGH: 0
BACK PAIN: 1
BLOOD IN STOOL: 0
COLOR CHANGE: 0
SORE THROAT: 0

## 2020-06-03 RX ORDER — BLOOD SUGAR DIAGNOSTIC
STRIP MISCELLANEOUS
Qty: 100 EACH | Refills: 0 | Status: SHIPPED | OUTPATIENT
Start: 2020-06-03 | End: 2020-07-20 | Stop reason: SDUPTHER

## 2020-06-03 RX ORDER — PANTOPRAZOLE SODIUM 40 MG/1
TABLET, DELAYED RELEASE ORAL
Qty: 30 TABLET | Refills: 0 | Status: SHIPPED | OUTPATIENT
Start: 2020-06-03 | End: 2020-08-12

## 2020-06-12 ENCOUNTER — TELEPHONE (OUTPATIENT)
Dept: INTERNAL MEDICINE | Age: 45
End: 2020-06-12

## 2020-06-12 ENCOUNTER — OFFICE VISIT (OUTPATIENT)
Dept: INTERNAL MEDICINE | Age: 45
End: 2020-06-12
Payer: MEDICARE

## 2020-06-12 VITALS
HEART RATE: 68 BPM | WEIGHT: 232 LBS | SYSTOLIC BLOOD PRESSURE: 121 MMHG | BODY MASS INDEX: 30.61 KG/M2 | TEMPERATURE: 97 F | DIASTOLIC BLOOD PRESSURE: 53 MMHG

## 2020-06-12 PROCEDURE — 1111F DSCHRG MED/CURRENT MED MERGE: CPT | Performed by: INTERNAL MEDICINE

## 2020-06-12 PROCEDURE — G8427 DOCREV CUR MEDS BY ELIG CLIN: HCPCS | Performed by: INTERNAL MEDICINE

## 2020-06-12 PROCEDURE — 3023F SPIROM DOC REV: CPT | Performed by: INTERNAL MEDICINE

## 2020-06-12 PROCEDURE — G8926 SPIRO NO PERF OR DOC: HCPCS | Performed by: INTERNAL MEDICINE

## 2020-06-12 PROCEDURE — G8417 CALC BMI ABV UP PARAM F/U: HCPCS | Performed by: INTERNAL MEDICINE

## 2020-06-12 PROCEDURE — 99211 OFF/OP EST MAY X REQ PHY/QHP: CPT | Performed by: INTERNAL MEDICINE

## 2020-06-12 PROCEDURE — 4004F PT TOBACCO SCREEN RCVD TLK: CPT | Performed by: INTERNAL MEDICINE

## 2020-06-12 PROCEDURE — 99214 OFFICE O/P EST MOD 30 MIN: CPT | Performed by: INTERNAL MEDICINE

## 2020-06-12 RX ORDER — BUSPIRONE HYDROCHLORIDE 5 MG/1
5 TABLET ORAL 3 TIMES DAILY
Qty: 90 TABLET | Refills: 0 | Status: SHIPPED | OUTPATIENT
Start: 2020-06-12 | End: 2020-07-12

## 2020-06-12 ASSESSMENT — ENCOUNTER SYMPTOMS
SHORTNESS OF BREATH: 1
WHEEZING: 1

## 2020-06-12 ASSESSMENT — COPD QUESTIONNAIRES: COPD: 1

## 2020-06-12 NOTE — TELEPHONE ENCOUNTER
Patient states you told him to call with the name of a medication he could not remember at the time of his appt. He states it is celexa.

## 2020-06-12 NOTE — PROGRESS NOTES
Visit Information    Have you changed or started any medications since your last visit including any over-the-counter medicines, vitamins, or herbal medicines? no   Are you having any side effects from any of your medications? -  no  Have you stopped taking any of your medications? Is so, why? -  no    Have you seen any other physician or provider since your last visit? No  Have you had any other diagnostic tests since your last visit? No  Have you been seen in the emergency room and/or had an admission to a hospital since we last saw you? No  Have you had your routine dental cleaning in the past 6 months? no    Have you activated your [email or web address] account? If not, what are your barriers?  Yes     Patient Care Team:  Yonathan Trujillo MD as PCP - General (Emergency Medicine)  Freddy Jennings MD as PCP - [facility]  Ned Islas MD as Consulting Physician (Gastroenterology)  Verito Story MD as Consulting Physician (Internal Medicine)  Verito Story MD as Consulting Physician (Internal Medicine)  Segundo Lang RN as Ambulatory Care Manager    Medical History Review  Past Medical, Family, and Social History reviewed and does contribute to the patient presenting condition    Health Maintenance   Topic Date Due    Diabetic retinal exam  06/04/2019    Diabetic foot exam  04/10/2020    Hepatitis B vaccine (2 of 3 - Hep B Twinrix risk 3-dose series) 01/24/2021 (Originally 7/10/2013)    A1C test (Diabetic or Prediabetic)  01/31/2021    Diabetic microalbuminuria test  02/13/2021    Lipid screen  02/13/2021    Potassium monitoring  05/11/2021    Creatinine monitoring  05/11/2021    DTaP/Tdap/Td vaccine (4 - Td) 11/13/2025    Flu vaccine  Completed    Pneumococcal 0-64 years Vaccine  Completed    HIV screen  Completed    Hepatitis A vaccine  Aged Out    Hib vaccine  Aged Out    Meningococcal (ACWY) vaccine  Aged Out

## 2020-06-12 NOTE — PATIENT INSTRUCTIONS
Medications e-scribed to pharmacy of patient's choice. Follow-up appointment scheduled for    07/16/2020, AVS given to patient.         tv

## 2020-07-14 ENCOUNTER — TELEPHONE (OUTPATIENT)
Dept: INTERNAL MEDICINE | Age: 45
End: 2020-07-14

## 2020-07-14 NOTE — TELEPHONE ENCOUNTER
Pt calling stating he needs a hose and a bowl for his nebulizer machine sent to [address] also states that he was in [facility] and they cut his Gabapentin down to 200 mg because they thought it was causing him to be dizzy. Pt states that it is doing anything for his muscle spasms and wants it increased up to 800 mg. Health Maintenance   Topic Date Due    Hepatitis A vaccine (2 of 3 - Hep A Twinrix risk 3-dose series) 07/10/2013    Diabetic retinal exam  06/04/2019    Diabetic foot exam  04/10/2020    Hepatitis B vaccine (2 of 3 - Hep B Twinrix risk 3-dose series) 01/24/2021 (Originally 7/10/2013)    Flu vaccine (1) 09/01/2020    A1C test (Diabetic or Prediabetic)  01/31/2021    Diabetic microalbuminuria test  02/13/2021    Lipid screen  02/13/2021    Potassium monitoring  05/11/2021    Creatinine monitoring  05/11/2021    DTaP/Tdap/Td vaccine (4 - Td) 11/13/2025    Pneumococcal 0-64 years Vaccine  Completed    HIV screen  Completed    Hib vaccine  Aged Out    Meningococcal (ACWY) vaccine  Aged Out             (applicable per patient's age: Cancer Screenings, Depression Screening, Fall Risk Screening, Immunizations)    Hemoglobin A1C (%)   Date Value   01/31/2020 5.3   03/08/2019 6.8   08/01/2018 7.2     Microalb/Crt.  Ratio (mcg/mg creat)   Date Value   02/13/2020 CANNOT BE CALCULATED     LDL Cholesterol (mg/dL)   Date Value   02/13/2020 33     AST (U/L)   Date Value   05/11/2020 22     ALT (U/L)   Date Value   05/11/2020 12     BUN (mg/dL)   Date Value   05/11/2020 11      (goal A1C is < 7)   (goal LDL is <100) need 30-50% reduction from baseline     BP Readings from Last 3 Encounters:   06/12/20 (!) 121/53   05/11/20 113/83   01/31/20 108/67    (goal /80)      All Future Testing planned in [facility]:  Lab Frequency Next Occurrence   EKG 12 lead Once 08/11/2020       Next Visit Date:  Future Appointments   Date Time Provider Didier Burton   7/30/2020 10:20 AM

## 2020-07-15 NOTE — TELEPHONE ENCOUNTER
Patient said he called the sleep study center and they told him since it has been so long he will need to get a new order to have the sleep study done. The patient now lives in [address] [place] [address] so he would like to get the sleep study done there. He said he wants to keep Dr. Vamsi Garcia as his PCP.

## 2020-07-16 ENCOUNTER — TELEPHONE (OUTPATIENT)
Dept: INTERNAL MEDICINE | Age: 45
End: 2020-07-16

## 2020-07-16 NOTE — TELEPHONE ENCOUNTER
Referred patient to a new Pscyhiatrist ,as his appointment today with another pschiatrist was cancelled.

## 2020-07-16 NOTE — TELEPHONE ENCOUNTER
Patient had appt to see psychologist today & was informed the doctors wife  of COVID so the appt was cancelled. He would like a referral to be sent to Dr. Yakov Velásquez in [facility], [address]. Patient requests this be sent on high priority as he states he needs to see a psychologist asap.

## 2020-07-16 NOTE — TELEPHONE ENCOUNTER
Referral electronically sent to Dr. Catia June in [facility], [place]. Phone call to patient - informed of referral placed & he will them now.

## 2020-07-20 NOTE — TELEPHONE ENCOUNTER
Request for test strips. Patient said his blood sugars have been running low. Next Visit Date:  Future Appointments   Date Time Provider Didier Burton   7/30/2020 10:20 AM Omid Mejia MD [facility] [address] Maintenance   Topic Date Due    Hepatitis A vaccine (2 of 3 - Hep A Twinrix risk 3-dose series) 07/10/2013    Diabetic retinal exam  06/04/2019    Diabetic foot exam  04/10/2020    Hepatitis B vaccine (2 of 3 - Hep B Twinrix risk 3-dose series) 01/24/2021 (Originally 7/10/2013)    Flu vaccine (1) 09/01/2020    A1C test (Diabetic or Prediabetic)  01/31/2021    Diabetic microalbuminuria test  02/13/2021    Lipid screen  02/13/2021    Potassium monitoring  05/11/2021    Creatinine monitoring  05/11/2021    DTaP/Tdap/Td vaccine (4 - Td) 11/13/2025    Pneumococcal 0-64 years Vaccine  Completed    HIV screen  Completed    Hib vaccine  Aged Out    Meningococcal (ACWY) vaccine  Aged Out       Hemoglobin A1C (%)   Date Value   01/31/2020 5.3   03/08/2019 6.8   08/01/2018 7.2             ( goal A1C is < 7)   Microalb/Crt.  Ratio (mcg/mg creat)   Date Value   02/13/2020 CANNOT BE CALCULATED     LDL Cholesterol (mg/dL)   Date Value   02/13/2020 33       (goal LDL is <100)   AST (U/L)   Date Value   05/11/2020 22     ALT (U/L)   Date Value   05/11/2020 12     BUN (mg/dL)   Date Value   05/11/2020 11     BP Readings from Last 3 Encounters:   06/12/20 (!) 121/53   05/11/20 113/83   01/31/20 108/67          (goal 120/80)    All Future Testing planned in [facility]  Lab Frequency Next Occurrence   EKG 12 lead Once 08/11/2020   Baseline Diagnostic Sleep Study Once 07/20/2020   Sleep Study with PAP Titration Once 07/20/2020         Patient Active Problem List:     Long-term current use of methadone for opiate dependence ([facility] [address]     Tobacco abuse     Type 2 diabetes mellitus with hyperglycemia, with long-term current use of insulin (HCC)     Hepatitis B     History of hepatitis C Gastroesophageal reflux disease     Chronic diastolic congestive heart failure ([facility] [address]     Abscess     Establishing care with new doctor, encounter for     Essential hypertension     Plantar fasciitis of right foot

## 2020-07-23 ENCOUNTER — TELEMEDICINE (OUTPATIENT)
Dept: INTERNAL MEDICINE | Age: 45
End: 2020-07-23
Payer: MEDICARE

## 2020-07-23 PROBLEM — E66.01 CLASS 2 SEVERE OBESITY DUE TO EXCESS CALORIES WITH SERIOUS COMORBIDITY AND BODY MASS INDEX (BMI) OF 38.0 TO 38.9 IN ADULT (HCC): Status: ACTIVE | Noted: 2020-07-23

## 2020-07-23 PROBLEM — K76.82 HEPATIC ENCEPHALOPATHY: Status: ACTIVE | Noted: 2020-07-23

## 2020-07-23 PROBLEM — Z79.4 TYPE 2 DIABETES MELLITUS WITH HYPERGLYCEMIA, WITH LONG-TERM CURRENT USE OF INSULIN (HCC): Chronic | Status: RESOLVED | Noted: 2017-08-16 | Resolved: 2020-07-23

## 2020-07-23 PROBLEM — J45.20 MILD INTERMITTENT ASTHMA WITHOUT COMPLICATION: Status: ACTIVE | Noted: 2020-07-23

## 2020-07-23 PROBLEM — R91.8 LUNG NODULES: Status: ACTIVE | Noted: 2020-07-23

## 2020-07-23 PROBLEM — E11.65 TYPE 2 DIABETES MELLITUS WITH HYPERGLYCEMIA, WITH LONG-TERM CURRENT USE OF INSULIN (HCC): Chronic | Status: RESOLVED | Noted: 2017-08-16 | Resolved: 2020-07-23

## 2020-07-23 PROBLEM — F32.A DEPRESSION: Status: ACTIVE | Noted: 2020-07-23

## 2020-07-23 PROBLEM — J44.9 COPD (CHRONIC OBSTRUCTIVE PULMONARY DISEASE) (HCC): Status: ACTIVE | Noted: 2020-07-23

## 2020-07-23 PROBLEM — G63 POLYNEUROPATHY ASSOCIATED WITH UNDERLYING DISEASE (HCC): Status: ACTIVE | Noted: 2020-07-23

## 2020-07-23 PROCEDURE — 99213 OFFICE O/P EST LOW 20 MIN: CPT | Performed by: INTERNAL MEDICINE

## 2020-07-23 RX ORDER — GABAPENTIN 600 MG/1
TABLET ORAL
Qty: 90 TABLET | Refills: 2 | Status: SHIPPED | OUTPATIENT
Start: 2020-07-23 | End: 2020-08-13

## 2020-07-23 ASSESSMENT — ENCOUNTER SYMPTOMS
SHORTNESS OF BREATH: 0
COUGH: 0
ABDOMINAL PAIN: 0
DIARRHEA: 0
CONSTIPATION: 0
NAUSEA: 0

## 2020-07-23 NOTE — PROGRESS NOTES
Attending Physician Statement  I have discussed the care of Gerber Mg, including pertinent history and exam findings with the resident. I have reviewed the key elements of all parts of the encounter with the resident. Has COPD that is stable. He is referred to PULM for finding of lung nodules. Active smoker. Was started on zoloft for depression at last visit. He is waiting for psych follow up. Methadone clinic  Reduced dose of gabapentin    Diagnosis Orders   1. Chronic diastolic congestive heart failure ([address]     2. Polyneuropathy associated with underlying disease ([address]  gabapentin (NEURONTIN) 600 MG tablet   3. Class 2 severe obesity due to excess calories with serious comorbidity and body mass index (BMI) of 38.0 to 38.9 in adult ([address]     4. Hepatic encephalopathy ([address]     5. Mild intermittent asthma without complication     6. Essential hypertension     7. Hepatitis B infection without delta agent without hepatic coma, unspecified chronicity     8. Gastroesophageal reflux disease, esophagitis presence not specified     9. Long-term current use of methadone for opiate dependence ([address]     10. Tobacco abuse     11. Lung nodules     12. Chronic obstructive pulmonary disease, unspecified COPD type ([address]     13. Other depression     14. Neuropathy  gabapentin (NEURONTIN) 600 MG tablet       The plan and orders should include No orders of the defined types were placed in this encounter. and this was also documented by the resident. The medication list was reviewed with the resident and is up to date. The return visit should be in 6 months .     Dr Ratna Obando MD, [address]  Associate , Department of Internal Medicine  Resident Ambulatory Site Medical Director  [address] [facility] Internal Medicine  [address] [place] [address],[address]  Internal Medicine Clerkship - Harper Urias    7/23/2020, 9:40 AM

## 2020-07-23 NOTE — PATIENT INSTRUCTIONS
Medications e-scribe to pharmacy of pt's choice. Patient to return to clinic 6 months (office will call and schedule an appointment for January). AVS reviewed and mailed to pt. It is very important for your care that you keep your appointment. If for some reason you are unable to keep your appointment it is equally important that you call our office at [phone] to cancel your appointment and reschedule. Failure to do so may result in your termination from our practice.   MB

## 2020-07-23 NOTE — PROGRESS NOTES
varices. Patient follows up with Dr. Belkis Hernandez.     Has history of chronic diastolic CHF on Lasix 40 mg.  Echo 3/18/2019 EF 55-60, evidence of diastolic dysfunction.     Patient has history of drug abuse, currently on methadone, has been clean for 7 years.     Patient has history of diabetes mellitus, HbA1c was 5.3 on  1/31/2020  Patient not on any medications.         Review of Systems   Constitutional: Negative for chills and fever. HENT: Negative. Respiratory: Negative for cough and shortness of breath. Cardiovascular: Negative for chest pain. Gastrointestinal: Negative for abdominal pain, constipation, diarrhea and nausea. Musculoskeletal: Negative for arthralgias. Neurological: Negative for dizziness and seizures. Prior to Visit Medications    Medication Sig Taking? Authorizing Provider   ONETOUCH ULTRA strip USE 1 STRIP TO CHECK GLUCOSE THREE TIMES DAILY Yes Vicenta Peters MD   pantoprazole (PROTONIX) 40 MG tablet Take 1 tablet by mouth once daily Yes Vicenta Peters MD   lidocaine (LIDODERM) 5 % Place 1 patch onto the skin daily 12 hours on, 12 hours off.  Yes Irene Rosales PA-C   tiotropium (SPIRIVA HANDIHALER) 18 MCG inhalation capsule Inhale 1 puff by mouth once daily Yes Vicenta Peters MD   gabapentin (NEURONTIN) 800 MG tablet TAKE 1 TABLET BY MOUTH THREE TIMES DAILY Yes Vicenta Peters MD   ipratropium-albuterol (DUONEB) 0.5-2.5 (3) MG/3ML SOLN nebulizer solution Take 3 mLs by nebulization every 6 hours Yes Vicenta Peters MD   sertraline (ZOLOFT) 25 MG tablet Take 1 tablet by mouth daily Yes Vicenta Peters MD   lisinopril (PRINIVIL;ZESTRIL) 5 MG tablet Take 1 tablet by mouth once daily Yes Vicenta Peters MD   ondansetron (ZOFRAN) 4 MG tablet TAKE 1 TABLET BY MOUTH EVERY 8 HOURS FOR NAUSEA AND VOMITING Yes Alvaro Moy MD   atorvastatin (LIPITOR) 40 MG tablet TAKE 1 TABLET BY MOUTH ONCE DAILY Yes Alvaro Moy MD EQ ASPIRIN ADULT LOW DOSE 81 MG EC tablet TAKE 1 TABLET BY MOUTH ONCE DAILY Yes Elyssa Nicholas MD   sucralfate (CARAFATE) 1 GM/10ML suspension Take 10 mLs by mouth 4 times daily Yes Kandis Tran MD   furosemide (LASIX) 40 MG tablet Take 1 tablet by mouth daily Yes Kandis Tran MD   loratadine (CLARITIN) 10 MG tablet Take 1 tablet by mouth nightly Yes Kandis Tran MD   albuterol sulfate HFA (PROVENTIL HFA) 108 (90 Base) MCG/ACT inhaler Inhale 2 puffs into the lungs every 4 hours as needed for Wheezing or Shortness of Breath (Space out to every 6 hours as symptoms improve) Space out to every 6 hours as symptoms improve. Yes Vu Becker MD   sodium chloride (ALTAMIST SPRAY) 0.65 % nasal spray 1 spray by Nasal route as needed for Congestion Yes Kandis Tran MD   METHADONE HCL PO Take 108 mg by mouth daily  Yes Ann Fernandez MD   Semaglutide (OZEMPIC) 0.25 or 0.5 MG/DOSE SOPN 0.25 mg weekly x 2 weeks then take 0.5 mg weekly  Patient not taking: Reported on 1/31/2020  Vu Becker MD       Social History     Tobacco Use    Smoking status: Current Every Day Smoker     Packs/day: 1.00     Years: 30.00     Pack years: 30.00     Types: Cigarettes    Smokeless tobacco: Never Used   Substance Use Topics    Alcohol use: No    Drug use: No     Comment: stopped heroin in 2013            PHYSICAL EXAMINATION:  [ INSTRUCTIONS:  \"[x]\" Indicates a positive item  \"[]\" Indicates a negative item  -- DELETE ALL ITEMS NOT EXAMINED]  Vital Signs: (As obtained by patient/caregiver or practitioner observation)        Constitutional: [x] Appears well-developed and well-nourished [] No apparent distress      [] Abnormal-   Mental status  [x] Alert and awake  [] Oriented to person/place/time []Able to follow commands      Eyes:  EOM    [x]  Normal  [] Abnormal-  Sclera  []  Normal  [] Abnormal -         Discharge []  None visible  [] Abnormal -    HENT:   [x] Normocephalic, atraumatic.   [] Abnormal [] Mouth/Throat: Mucous membranes are moist.     External Ears [x] Normal  [] Abnormal-     Neck: [x] No visualized mass     Pulmonary/Chest: [x] Respiratory effort normal.  [] No visualized signs of difficulty breathing or respiratory distress        [] Abnormal-             Neurological:        [x] No Facial Asymmetry (Cranial nerve 7 motor function) (limited exam to video visit)          [] No gaze palsy        [] Abnormal-         Skin:        [x] No significant exanthematous lesions or discoloration noted on facial skin         [] Abnormal-            Psychiatric:       [x] Normal Affect [] No Hallucinations        [] Abnormal-     Other pertinent observable physical exam findings-     ASSESSMENT/PLAN:  1. Chronic diastolic congestive heart failure (HCC)  Lasix 40 mg     2. Polyneuropathy associated with underlying disease (HCC)  Gabapentin, will decrease dosage to 600 mg     3. Essential hypertension  Lisinopril 5 mg    7. Hepatitis B infection without delta agent without hepatic coma, unspecified chronicity      8. Gastroesophageal reflux disease, esophagitis presence not specified  Protonix 40 mg    9. Long-term current use of methadone for opiate dependence ([facility] [address]      10. Tobacco abuse  Counseled on smoking cessation    11. Lung nodules  Referral to pulmonology    12. Chronic obstructive pulmonary disease, unspecified COPD type (HCC)  Albuterol, DuoNeb, Spiriva      No follow-ups on file. Lenny Joaquin is a 41 y.o. male being evaluated by a Virtual Visit (video visit) encounter to address concerns as mentioned above. A caregiver was present when appropriate. Due to this being a TeleHealth encounter (During [facility] public health emergency), evaluation of the following organ systems was limited: Vitals/Constitutional/EENT/Resp/CV/GI//MS/Neuro/Skin/Heme-Lymph-Imm.   Pursuant to the emergency declaration under the [facility] waiver authority and the Coronavirus Preparedness and Response Supplemental Appropriations Act, this Virtual Visit was conducted with patient's (and/or legal guardian's) consent, to reduce the patient's risk of exposure to COVID-19 and provide necessary medical care. The patient (and/or legal guardian) has also been advised to contact this office for worsening conditions or problems, and seek emergency medical treatment and/or call 911 if deemed necessary. Patient identification was verified at the start of the visit: Yes    Total time spent on this encounter: 15min    Services were provided through a video synchronous discussion virtually to substitute for in-person clinic visit. Patient and provider were located at their individual homes. --Kyree Yee MD on 7/23/2020 at 9:23 AM    An electronic signature was used to authenticate this note.

## 2020-07-23 NOTE — PROGRESS NOTES
Diabetic visit information    BP Readings from Last 3 Encounters:   06/12/20 (!) 121/53   05/11/20 113/83   01/31/20 108/67       Hemoglobin A1C (%)   Date Value   01/31/2020 5.3   03/08/2019 6.8   08/01/2018 7.2     Microalb/Crt. Ratio (mcg/mg creat)   Date Value   02/13/2020 CANNOT BE CALCULATED     LDL Cholesterol (mg/dL)   Date Value   02/13/2020 33               Have you changed or started any medications since your last visit including any over-the-counter medicines, vitamins, or herbal medicines? {YES/NO DEFAULT:21114}   Have you stopped taking any of your medications? Is so, why? -  {YES/NO DEFAULT:21114}  Are you having any side effects from any of your medications? - {YES/NO DEFAULT:21114}    Have you seen any other physician or provider since your last visit? {YES/NO DEFAULT:21114}   Have you had any other diagnostic tests since your last visit? {YES/NO DEFAULT:21114}   Have you been seen in the emergency room and/or had an admission in a hospital since we last saw you? {YES/NO DEFAULT:21114}     Have you had your annual diabetic retinal (eye) exam? {MODEL YES/NO:200010}  (ensure copy of exam is in the chart)    Have you had your routine dental cleaning in the past 6 months? {YES/NO DEFAULT:21114}    Do you have an active MyChart account? If not, what are your barriers? {yes no:247743::\"Yes\"}    Patient Care Team:  Jen Voss MD as PCP - General (Emergency Medicine)  Elmer Kapadia MD as PCP - [facility] Provider  Elissa Browning MD as Consulting Physician (Gastroenterology)  Radha Lubin MD as Consulting Physician (Internal Medicine)  Radha Lubin MD as Consulting Physician (Internal Medicine)    Medical history Review  Past Medical, Family, and Social History reviewed and {DOES/NOT:44165} contribute to the patient presenting condition.     Health Maintenance   Topic Date Due    Hepatitis A vaccine (2 of 3 - Hep A Twinrix risk 3-dose series) 07/10/2013    Diabetic retinal exam  06/04/2019    Diabetic foot exam  04/10/2020    Hepatitis B vaccine (2 of 3 - Hep B Twinrix risk 3-dose series) 01/24/2021 (Originally 7/10/2013)    Flu vaccine (1) 09/01/2020    A1C test (Diabetic or Prediabetic)  01/31/2021    Diabetic microalbuminuria test  02/13/2021    Lipid screen  02/13/2021    Potassium monitoring  05/11/2021    Creatinine monitoring  05/11/2021    DTaP/Tdap/Td vaccine (4 - Td) 11/13/2025    Pneumococcal 0-64 years Vaccine  Completed    HIV screen  Completed    Hib vaccine  Aged Out    Meningococcal (ACWY) vaccine  Aged Out

## 2020-07-28 RX ORDER — BLOOD SUGAR DIAGNOSTIC
STRIP MISCELLANEOUS
Qty: 100 EACH | Refills: 0 | Status: SHIPPED | OUTPATIENT
Start: 2020-07-28 | End: 2020-08-12 | Stop reason: SDUPTHER

## 2020-07-30 ENCOUNTER — TELEPHONE (OUTPATIENT)
Dept: INTERNAL MEDICINE | Age: 45
End: 2020-07-30

## 2020-07-30 NOTE — TELEPHONE ENCOUNTER
Pt calling asking if the BMV form could be faxed to [employer]. Fax # [phone]. Form faxed and confirmation received.

## 2020-08-03 RX ORDER — ESCITALOPRAM OXALATE 5 MG/1
5 TABLET ORAL DAILY
Qty: 30 TABLET | Refills: 0 | OUTPATIENT
Start: 2020-08-03 | End: 2020-09-02

## 2020-08-03 RX ORDER — ESCITALOPRAM OXALATE 5 MG/1
5 TABLET ORAL DAILY
COMMUNITY
Start: 2020-07-04 | End: 2020-08-13 | Stop reason: SDUPTHER

## 2020-08-03 NOTE — TELEPHONE ENCOUNTER
Pt calling he was seen in [facility] and was given medication lexapro 5 mg. Pt would like a refill on medication. Pt had a VV on 7/23/20. Medication pended.

## 2020-08-11 NOTE — TELEPHONE ENCOUNTER
Request for pantoprazole, lisinopril - meds pended. Please fill if appropriate. Next Visit Date:  Future Appointments   Date Time Provider Didier Falki   9/23/2020  1:00 PM Aldo Molina MD [address]  Maintenance   Topic Date Due    Hepatitis A vaccine (2 of 3 - Hep A Twinrix risk 3-dose series) 07/10/2013    Diabetic retinal exam  06/04/2019    Diabetic foot exam  04/10/2020    Hepatitis B vaccine (2 of 3 - Hep B Twinrix risk 3-dose series) 01/24/2021 (Originally 7/10/2013)    Flu vaccine (1) 09/01/2020    A1C test (Diabetic or Prediabetic)  01/31/2021    Diabetic microalbuminuria test  02/13/2021    Lipid screen  02/13/2021    Potassium monitoring  05/11/2021    Creatinine monitoring  05/11/2021    DTaP/Tdap/Td vaccine (4 - Td) 11/13/2025    Pneumococcal 0-64 years Vaccine  Completed    HIV screen  Completed    Hib vaccine  Aged Out    Meningococcal (ACWY) vaccine  Aged Out       Hemoglobin A1C (%)   Date Value   01/31/2020 5.3   03/08/2019 6.8   08/01/2018 7.2             ( goal A1C is < 7)   Microalb/Crt.  Ratio (mcg/mg creat)   Date Value   02/13/2020 CANNOT BE CALCULATED     LDL Cholesterol (mg/dL)   Date Value   02/13/2020 33       (goal LDL is <100)   AST (U/L)   Date Value   05/11/2020 22     ALT (U/L)   Date Value   05/11/2020 12     BUN (mg/dL)   Date Value   05/11/2020 11     BP Readings from Last 3 Encounters:   06/12/20 (!) 121/53   05/11/20 113/83   01/31/20 108/67          (goal 120/80)    All Future Testing planned in [facility]  Lab Frequency Next Occurrence   EKG 12 lead Once 08/11/2020   Baseline Diagnostic Sleep Study Once 07/20/2020   Sleep Study with PAP Titration Once 07/20/2020         Patient Active Problem List:     Long-term current use of methadone for opiate dependence ([facility] [address]     Tobacco abuse     Hepatitis B     History of hepatitis C     Gastroesophageal reflux disease     Chronic diastolic congestive heart failure (HCC)     Abscess Establishing care with new doctor, encounter for     Essential hypertension     Plantar fasciitis of right foot     Polyneuropathy associated with underlying disease ([facility] [address]     Class 2 severe obesity due to excess calories with serious comorbidity and body mass index (BMI) of 38.0 to 38.9 in [facility])     Hepatic encephalopathy (HCC)     Mild intermittent asthma without complication     Lung nodules     COPD (chronic obstructive pulmonary disease) ([facility] [address]     Depression

## 2020-08-12 RX ORDER — BLOOD SUGAR DIAGNOSTIC
STRIP MISCELLANEOUS
Qty: 100 EACH | Refills: 0 | Status: SHIPPED | OUTPATIENT
Start: 2020-08-12 | End: 2020-11-02

## 2020-08-12 RX ORDER — LANCETS 28 GAUGE
1 EACH MISCELLANEOUS 2 TIMES DAILY
Qty: 100 EACH | Refills: 3 | Status: SHIPPED | OUTPATIENT
Start: 2020-08-12 | End: 2021-11-07

## 2020-08-12 RX ORDER — LISINOPRIL 5 MG/1
TABLET ORAL
Qty: 30 TABLET | Refills: 0 | Status: SHIPPED | OUTPATIENT
Start: 2020-08-12 | End: 2020-09-10 | Stop reason: SDUPTHER

## 2020-08-12 RX ORDER — PANTOPRAZOLE SODIUM 40 MG/1
TABLET, DELAYED RELEASE ORAL
Qty: 30 TABLET | Refills: 0 | Status: SHIPPED | OUTPATIENT
Start: 2020-08-12 | End: 2020-09-10 | Stop reason: SDUPTHER

## 2020-08-13 ENCOUNTER — VIRTUAL VISIT (OUTPATIENT)
Dept: INTERNAL MEDICINE | Age: 45
End: 2020-08-13
Payer: MEDICARE

## 2020-08-13 PROBLEM — G62.9 NEUROPATHY: Status: ACTIVE | Noted: 2020-08-13

## 2020-08-13 PROCEDURE — 99212 OFFICE O/P EST SF 10 MIN: CPT | Performed by: STUDENT IN AN ORGANIZED HEALTH CARE EDUCATION/TRAINING PROGRAM

## 2020-08-13 RX ORDER — LORATADINE 10 MG/1
10 TABLET ORAL NIGHTLY
Qty: 90 TABLET | Refills: 3 | Status: SHIPPED | OUTPATIENT
Start: 2020-08-13 | End: 2020-09-10 | Stop reason: SDUPTHER

## 2020-08-13 RX ORDER — TENOFOVIR DISOPROXIL FUMARATE 300 MG/1
300 TABLET, FILM COATED ORAL DAILY
COMMUNITY
Start: 2020-08-10 | End: 2022-03-30 | Stop reason: ALTCHOICE

## 2020-08-13 RX ORDER — GABAPENTIN 800 MG/1
TABLET ORAL
Qty: 90 TABLET | Refills: 2 | Status: SHIPPED | OUTPATIENT
Start: 2020-08-13 | End: 2020-09-10 | Stop reason: SDUPTHER

## 2020-08-13 RX ORDER — ESCITALOPRAM OXALATE 5 MG/1
5 TABLET ORAL DAILY
Qty: 30 TABLET | Refills: 1 | Status: SHIPPED | OUTPATIENT
Start: 2020-08-13 | End: 2020-09-10 | Stop reason: SDUPTHER

## 2020-08-13 RX ORDER — POLYETHYLENE GLYCOL 3350 17 G/17G
17 POWDER, FOR SOLUTION ORAL PRN
COMMUNITY
Start: 2020-08-09 | End: 2021-07-01

## 2020-08-13 RX ORDER — ATORVASTATIN CALCIUM 40 MG/1
TABLET, FILM COATED ORAL
Qty: 90 TABLET | Refills: 2 | Status: SHIPPED | OUTPATIENT
Start: 2020-08-13 | End: 2020-09-10 | Stop reason: SDUPTHER

## 2020-08-13 NOTE — PROGRESS NOTES
Attending Physician Statement  I have discussed the care of Gerber Mg, including pertinent history and exam findings with the resident. I have reviewed the key elements of all parts of the encounter with the resident. I agree with the assessment, and status of the problem list as documented. Diagnosis Orders   1. Other depression     2. Type 2 diabetes mellitus without complication, with long-term current use of insulin (HCC)  atorvastatin (LIPITOR) 40 MG tablet   3. Seasonal allergic rhinitis due to pollen  loratadine (CLARITIN) 10 MG tablet   4. Neuropathy  gabapentin (NEURONTIN) 800 MG tablet   5. Polyneuropathy associated with underlying disease ([facility] [address]  gabapentin (NEURONTIN) 800 MG tablet   resume gabapentin 800  D/c zoloft due to interaction with methadone  lexapro     The plan and orders should include No orders of the defined types were placed in this encounter. and this was also documented by the resident. The medication list was reviewed with the resident and is up to date. The return visit should be in 3 months .     Dr Ratna Obando MD, [address]  Associate , Department of Internal Medicine  Resident Ambulatory Site Medical Director  [address] [facility] Internal Medicine  [facility]  Internal Medicine Clerkship - Harper Urias    8/13/2020, 9:42 AM

## 2020-08-18 ENCOUNTER — HOSPITAL ENCOUNTER (EMERGENCY)
Age: 45
Discharge: HOME | End: 2020-08-18
Attending: EMERGENCY MEDICINE
Payer: MEDICARE

## 2020-08-18 ENCOUNTER — APPOINTMENT (OUTPATIENT)
Dept: CT IMAGING | Age: 45
End: 2020-08-18
Payer: MEDICARE

## 2020-08-18 VITALS
OXYGEN SATURATION: 97 % | HEART RATE: 88 BPM | BODY MASS INDEX: 29.16 KG/M2 | HEIGHT: 73 IN | DIASTOLIC BLOOD PRESSURE: 80 MMHG | TEMPERATURE: 98.1 F | WEIGHT: 220 LBS | RESPIRATION RATE: 20 BRPM | SYSTOLIC BLOOD PRESSURE: 124 MMHG

## 2020-08-18 LAB
ALBUMIN SERPL-MCNC: 3.7 G/DL (ref 3.5–5.1)
ALBUMIN SERPL-MCNC: 3.8 G/DL (ref 3.5–5.1)
ALP BLD-CCNC: 116 U/L (ref 38–126)
ALP BLD-CCNC: 120 U/L (ref 38–126)
ALT SERPL-CCNC: 27 U/L (ref 11–66)
ALT SERPL-CCNC: 29 U/L (ref 11–66)
ANION GAP SERPL CALCULATED.3IONS-SCNC: 10 MEQ/L (ref 8–16)
AST SERPL-CCNC: 30 U/L (ref 5–40)
AST SERPL-CCNC: 31 U/L (ref 5–40)
BASOPHILS # BLD: 0.6 %
BASOPHILS ABSOLUTE: 0 THOU/MM3 (ref 0–0.1)
BILIRUB SERPL-MCNC: 0.3 MG/DL (ref 0.3–1.2)
BILIRUB SERPL-MCNC: 0.4 MG/DL (ref 0.3–1.2)
BILIRUBIN DIRECT: < 0.2 MG/DL (ref 0–0.3)
BILIRUBIN URINE: NEGATIVE
BLOOD, URINE: NEGATIVE
BUN BLDV-MCNC: 8 MG/DL (ref 7–22)
CALCIUM SERPL-MCNC: 9 MG/DL (ref 8.5–10.5)
CHARACTER, URINE: CLEAR
CHLORIDE BLD-SCNC: 104 MEQ/L (ref 98–111)
CO2: 25 MEQ/L (ref 23–33)
COLOR: YELLOW
CREAT SERPL-MCNC: 0.7 MG/DL (ref 0.4–1.2)
EOSINOPHIL # BLD: 4.6 %
EOSINOPHILS ABSOLUTE: 0.2 THOU/MM3 (ref 0–0.4)
ERYTHROCYTE [DISTWIDTH] IN BLOOD BY AUTOMATED COUNT: 12.8 % (ref 11.5–14.5)
ERYTHROCYTE [DISTWIDTH] IN BLOOD BY AUTOMATED COUNT: 40.5 FL (ref 35–45)
GFR SERPL CREATININE-BSD FRML MDRD: > 90 ML/MIN/1.73M2
GLUCOSE BLD-MCNC: 186 MG/DL (ref 70–108)
GLUCOSE URINE: NEGATIVE MG/DL
HCT VFR BLD CALC: 39.5 % (ref 42–52)
HEMOGLOBIN: 13.5 GM/DL (ref 14–18)
IMMATURE GRANS (ABS): 0.03 THOU/MM3 (ref 0–0.07)
IMMATURE GRANULOCYTES: 0.6 %
KETONES, URINE: NEGATIVE
LEUKOCYTE ESTERASE, URINE: NEGATIVE
LIPASE: 40.8 U/L (ref 5.6–51.3)
LYMPHOCYTES # BLD: 26.5 %
LYMPHOCYTES ABSOLUTE: 1.4 THOU/MM3 (ref 1–4.8)
MCH RBC QN AUTO: 30 PG (ref 26–33)
MCHC RBC AUTO-ENTMCNC: 34.2 GM/DL (ref 32.2–35.5)
MCV RBC AUTO: 87.8 FL (ref 80–94)
MONOCYTES # BLD: 10.3 %
MONOCYTES ABSOLUTE: 0.5 THOU/MM3 (ref 0.4–1.3)
NITRITE, URINE: NEGATIVE
NUCLEATED RED BLOOD CELLS: 0 /100 WBC
OSMOLALITY CALCULATION: 280.7 MOSMOL/KG (ref 275–300)
PH UA: 7 (ref 5–9)
PLATELET # BLD: 118 THOU/MM3 (ref 130–400)
PMV BLD AUTO: 10.4 FL (ref 9.4–12.4)
POTASSIUM REFLEX MAGNESIUM: 4 MEQ/L (ref 3.5–5.2)
PROTEIN UA: NEGATIVE
RBC # BLD: 4.5 MILL/MM3 (ref 4.7–6.1)
SEG NEUTROPHILS: 57.4 %
SEGMENTED NEUTROPHILS ABSOLUTE COUNT: 3 THOU/MM3 (ref 1.8–7.7)
SODIUM BLD-SCNC: 139 MEQ/L (ref 135–145)
SPECIFIC GRAVITY, URINE: 1.03 (ref 1–1.03)
TOTAL PROTEIN: 6 G/DL (ref 6.1–8)
TOTAL PROTEIN: 6 G/DL (ref 6.1–8)
UROBILINOGEN, URINE: 1 EU/DL (ref 0–1)
WBC # BLD: 5.2 THOU/MM3 (ref 4.8–10.8)

## 2020-08-18 PROCEDURE — 36415 COLL VENOUS BLD VENIPUNCTURE: CPT

## 2020-08-18 PROCEDURE — 80053 COMPREHEN METABOLIC PANEL: CPT

## 2020-08-18 PROCEDURE — U0003 INFECTIOUS AGENT DETECTION BY NUCLEIC ACID (DNA OR RNA); SEVERE ACUTE RESPIRATORY SYNDROME CORONAVIRUS 2 (SARS-COV-2) (CORONAVIRUS DISEASE [COVID-19]), AMPLIFIED PROBE TECHNIQUE, MAKING USE OF HIGH THROUGHPUT TECHNOLOGIES AS DESCRIBED BY CMS-2020-01-R: HCPCS

## 2020-08-18 PROCEDURE — 99215 OFFICE O/P EST HI 40 MIN: CPT

## 2020-08-18 PROCEDURE — 99283 EMERGENCY DEPT VISIT LOW MDM: CPT

## 2020-08-18 PROCEDURE — 81003 URINALYSIS AUTO W/O SCOPE: CPT

## 2020-08-18 PROCEDURE — 83690 ASSAY OF LIPASE: CPT

## 2020-08-18 PROCEDURE — 85025 COMPLETE CBC W/AUTO DIFF WBC: CPT

## 2020-08-18 PROCEDURE — 74177 CT ABD & PELVIS W/CONTRAST: CPT

## 2020-08-18 PROCEDURE — 6360000004 HC RX CONTRAST MEDICATION: Performed by: NURSE PRACTITIONER

## 2020-08-18 RX ORDER — ACETAMINOPHEN 500 MG
500 TABLET ORAL EVERY 6 HOURS PRN
COMMUNITY
End: 2020-09-25

## 2020-08-18 RX ORDER — ONDANSETRON 4 MG/1
4 TABLET, ORALLY DISINTEGRATING ORAL EVERY 8 HOURS PRN
Qty: 20 TABLET | Refills: 0 | Status: SHIPPED | OUTPATIENT
Start: 2020-08-18

## 2020-08-18 RX ADMIN — IOPAMIDOL 80 ML: 755 INJECTION, SOLUTION INTRAVENOUS at 20:44

## 2020-08-18 ASSESSMENT — ENCOUNTER SYMPTOMS
SINUS PRESSURE: 0
STRIDOR: 0
NAUSEA: 1
TROUBLE SWALLOWING: 0
EYE PAIN: 0
SORE THROAT: 0
SHORTNESS OF BREATH: 0
BACK PAIN: 0
WHEEZING: 0
VOICE CHANGE: 0
VOMITING: 1
DIARRHEA: 1
EYE REDNESS: 0
COUGH: 0
EYE DISCHARGE: 0
ABDOMINAL PAIN: 0

## 2020-08-18 ASSESSMENT — PAIN SCALES - GENERAL: PAINLEVEL_OUTOF10: 7

## 2020-08-18 ASSESSMENT — PAIN DESCRIPTION - LOCATION: LOCATION: ABDOMEN

## 2020-08-18 NOTE — ED NOTES
Pt transferred to ED from urgent care by private car for evaluation of fever. Pt states that he was diagnosed with a stomach bug on Sunday and was seen at urgent care today. Pt states that he took a tylenol around 3 hours ago.       Roberto Lim RN  08/18/20 1925

## 2020-08-18 NOTE — PROGRESS NOTES
Patient released in stable condition. Instructed to go directly to [facility] emergency department for further evaluation and treatment. Instructed to remain NPO until seen by emergency room provider. Patient verbalized understanding. Ambulated, per self, to private car.

## 2020-08-18 NOTE — ED PROVIDER NOTES
[facility]  [facility]       Chief Complaint   Patient presents with    Nausea     fever    Diarrhea       Nurses Notes reviewed and I agree except as noted in the HPI. HISTORY OF PRESENT ILLNESS   Binta Hoffmann is a 41 y.o. male who presents with 2-day history of fever as high as 102.8 with temperatures today at 101. Patient took Tylenol prior to arrival.  He has nausea, vomiting, nonbloody diarrhea, fatigue, muscle aches. No headache, chest pain, shortness of breath, cough, dizziness, syncope, rash,  symptoms. Smokes cigarettes. History of hepatitis C and hepatitis B. previous IV drug abuse. Diabetes now controlled with weight loss and diet. Smokes cigarettes. REVIEW OF SYSTEMS     Review of Systems   Constitutional: Positive for appetite change, chills and fever. Negative for fatigue and unexpected weight change. HENT: Negative for congestion, ear discharge, ear pain, sinus pressure, sneezing, sore throat, trouble swallowing and voice change. Eyes: Negative for pain, discharge and redness. Respiratory: Negative for cough, shortness of breath, wheezing and stridor. Cardiovascular: Negative for chest pain and leg swelling. Gastrointestinal: Positive for diarrhea, nausea and vomiting. Negative for abdominal pain. Genitourinary: Negative for dysuria, frequency, hematuria and urgency. Musculoskeletal: Negative for arthralgias, back pain, myalgias and neck pain. Skin: Negative for rash. Neurological: Negative for dizziness, syncope, weakness and headaches. Hematological: Negative for adenopathy. Psychiatric/Behavioral: Negative for behavioral problems, confusion, sleep disturbance and suicidal ideas. The patient is not nervous/anxious. All other systems reviewed and are negative.       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 CURRENT MEDICATIONS       Previous Medications    ACETAMINOPHEN (TYLENOL) 500 MG TABLET    Take 500 mg by mouth every 6 hours as needed for Pain    AIMSCO ULTRA THIN LANCETS MISC    1 applicator by Does not apply route 2 times daily    ALBUTEROL SULFATE HFA (PROVENTIL HFA) 108 (90 BASE) MCG/ACT INHALER    Inhale 2 puffs into the lungs every 4 hours as needed for Wheezing or Shortness of Breath (Space out to every 6 hours as symptoms improve) Space out to every 6 hours as symptoms improve. ATORVASTATIN (LIPITOR) 40 MG TABLET    TAKE 1 TABLET BY MOUTH ONCE DAILY    EQ ASPIRIN ADULT LOW DOSE 81 MG EC TABLET    TAKE 1 TABLET BY MOUTH ONCE DAILY    ESCITALOPRAM (LEXAPRO) 5 MG TABLET    Take 1 tablet by mouth daily    FUROSEMIDE (LASIX) 40 MG TABLET    Take 1 tablet by mouth daily    GABAPENTIN (NEURONTIN) 800 MG TABLET    TAKE 1 TABLET BY MOUTH THREE TIMES DAILY    IPRATROPIUM-ALBUTEROL (DUONEB) 0.5-2.5 (3) MG/3ML SOLN NEBULIZER SOLUTION    Take 3 mLs by nebulization every 6 hours    LIDOCAINE (LIDODERM) 5 %    Place 1 patch onto the skin daily 12 hours on, 12 hours off.     LISINOPRIL (PRINIVIL;ZESTRIL) 5 MG TABLET    Take 1 tablet by mouth once daily    LORATADINE (CLARITIN) 10 MG TABLET    Take 1 tablet by mouth nightly    METHADONE HCL PO    Take 106 mg by mouth daily     ONETOUCH ULTRA STRIP    USE 1 STRIP TO CHECK GLUCOSE THREE TIMES extraocular motion. Left eye: Normal extraocular motion. Conjunctiva/sclera: Conjunctivae normal.      Pupils: Pupils are equal, round, and reactive to light. Comments: Conjunctiva clear, nonicteric   Neck:      Musculoskeletal: Normal range of motion. Thyroid: No thyromegaly. Vascular: No JVD. Comments: No meningismus  Cardiovascular:      Rate and Rhythm: Normal rate and regular rhythm. Pulses: Normal pulses. Heart sounds: Normal heart sounds, S1 normal and S2 normal. No murmur. No friction rub. No gallop. Pulmonary:      Effort: Pulmonary effort is normal. Tachypnea present. No respiratory distress. Breath sounds: Normal breath sounds. No stridor. No decreased breath sounds, wheezing, rhonchi or rales. Comments: No cough, lungs clear  Chest:      Chest wall: No tenderness. Abdominal:      General: Bowel sounds are normal. There is no distension. Palpations: Abdomen is soft. There is no mass. Tenderness: There is no abdominal tenderness. There is no right CVA tenderness, left CVA tenderness, guarding or rebound. Comments: Soft nontender   Musculoskeletal: Normal range of motion. General: No tenderness. Right lower leg: Normal.      Left lower leg: Normal.   Lymphadenopathy:      Cervical: Cervical adenopathy present. Right cervical: Superficial cervical adenopathy present. No deep cervical adenopathy. Left cervical: Superficial cervical adenopathy present. No deep cervical adenopathy. Skin:     General: Skin is warm and dry. Findings: No erythema or rash. Comments: No rash or bruising   Neurological:      Mental Status: He is alert and oriented to person, place, and time. Cranial Nerves: No cranial nerve deficit. Motor: No abnormal muscle tone. Coordination: Coordination normal.      Deep Tendon Reflexes: Reflexes are normal and symmetric.  Reflexes normal.      Comments: Appropriate, no focal findings   Psychiatric:         Behavior: Behavior normal.         Thought Content: Thought content normal.         Judgment: Judgment normal.         DIAGNOSTIC RESULTS   Labs: No results found for this visit on 08/18/20. IMAGING:  No orders to display     URGENT CARE COURSE:     Vitals:    08/18/20 1830   BP: (!) 142/80   Pulse: 94   Resp: 22   Temp: 98.1 °F (36.7 °C)   TempSrc: Temporal   SpO2: 97%   Weight: 220 lb (99.8 kg)   Height: 6' 1\" (1.854 m)       Medications - No data to display  PROCEDURES:  None  FINALIMPRESSION      1. Acute febrile illness    2. Nausea and vomiting in adult patient    3. Diarrhea of infectious origin        DISPOSITION/PLAN   DISPOSITION    Patient transferred to [facility] ED per his request.  He is stable for private vehicle transfer.   Patient accepted in transfer by Denman Dakins [facility] ED charge nurse at [address] [place] [address] Street:  to [facility] ED      to [facility] ED    DISCHARGE MEDICATIONS:  New Prescriptions    No medications on file     Current Discharge Medication List          MD Toña Sewell MD  08/18/20 8859

## 2020-08-18 NOTE — ED TRIAGE NOTES
Patient to room with c/o nausea, vomiting, and diarrhea beginning two days ago. States temperature of 102.8 two days ago. States temperature of 101 throughout today.

## 2020-08-19 ENCOUNTER — CARE COORDINATION (OUTPATIENT)
Dept: CARE COORDINATION | Age: 45
End: 2020-08-19

## 2020-08-19 NOTE — CARE COORDINATION
Patient contacted regarding [place] [facility]. Discussed COVID-19 related testing which was pending at this time. Test results were pending. Patient informed of results, if available? N/A     Care Transition Nurse/ Ambulatory Care Manager contacted the patient by telephone to perform post discharge assessment. Verified name and  with patient as identifiers. Provided introduction to self, and explanation of the CTN/ACM role, and reason for call due to risk factors for infection and/or exposure to COVID-19. Symptoms reviewed with patient who verbalized the following symptoms: fever, pain or aching joints, nausea, diarrhea, no new symptoms and no worsening symptoms. Due to no new or worsening symptoms encounter was not routed to provider for escalation. Discussed follow-up appointments. If no appointment was previously scheduled, appointment scheduling offered: Patient stated PCP is located in [place] and is now living in this area and would like to become established with a new PCP. Patient transferred to [facility] at Pre Service and f/u appointment scheduled for [facility] on 9/10 @ 10 AM with Dr. Eliza Wilde. [facility] follow up appointment(s):   Future Appointments   Date Time Provider Didier Burton   2020  1:00 PM MD Sherry Lawrence      [id number] follow up appointment(s): N/A      Advance Care Planning:   Does patient have an Advance Directive:  reviewed and current. ACP note completed. Patient has following risk factors of: heart failure and COPD. CTN/ACM reviewed discharge instructions, medical action plan and red flags such as increased shortness of breath, increasing fever and signs of decompensation with patient who verbalized understanding. Discussed exposure protocols and quarantine with CDC Guidelines What to do if you are sick with coronavirus disease .  Patient was given an opportunity for questions and concerns.  The patient agrees to contact the [facility] exposure line [phone], local health department and PCP office for questions related to their healthcare. CTN/ACM provided contact information for future needs. Reviewed and educated patient on any new and changed medications related to discharge diagnosis     Patient/family/caregiver given information for GetWell Loop and agrees to enroll - LOOP enrollment for Active Symptom Monitoring POC completed. Patient's preferred e-mail: [email or web address]    Patient's preferred phone number: [phone]   Based on Loop alert triggers, patient will be contacted by nurse care manager for worsening symptoms. Pt will be further monitored by COVID Loop Team based on severity of symptoms and risk factors. Patient called for covid-19 f/u s/p recent ED visit for c/o fever, body aches, nausea, and diarrhea. Patient denied any new/change/worsneing of symptoms. Patient stated he is feeling \"a little better today. \"  Patient denied any questions re: his discharge instructions and was encouraged to continue to follow BRAT diet as recommended. Patient was educated on importance of self quarantine until test results are returned and he verbalized understanding. Covid-19 education was reviewed and patient verbalized understanding. Patient was reminded to call covid-19 hotline number with any new symptoms or questions/concerns. Patient verbalized understanding and reported he is aware of hotline information. Patient denied any other questions, concerns, or needs.

## 2020-08-19 NOTE — CARE COORDINATION
Attempted to reach patient for covid-19 f/u s/p recent UC/ED visit for c/o fever, nausea, and diarrhea. COVID-19 testing was completed and results are pending. Patient was not available at the time of my call and generic voicemail message was left asking patient to please return call to my direct number. Will continue to work to f/u with patient in the future.

## 2020-08-20 LAB — SARS-COV-2: NOT DETECTED

## 2020-08-24 ASSESSMENT — ENCOUNTER SYMPTOMS
VOMITING: 1
RHINORRHEA: 0
DIARRHEA: 1
COUGH: 0
BACK PAIN: 0
SHORTNESS OF BREATH: 0
CHEST TIGHTNESS: 0
NAUSEA: 1

## 2020-08-24 NOTE — ED PROVIDER NOTES
[address] [address]          Pt Name: Sobia Salas  MRN: [id number]  [place] 1975  Date of evaluation: 8/18/2020  Emergency Physician: CELIO Londono [id number]       Chief Complaint   Patient presents with    Nausea     fever    Diarrhea     History obtained from the patient. HISTORY OF PRESENT ILLNESS    The history is provided by the patient. Soiba Salas is a 41 y.o. male who presents to the emergency department for evaluation of fever with a TMAX of 102.8 with temperatures today at 101. The patient was sent by . Patient took Tylenol prior to arrival.  He has nausea, vomiting, nonbloody diarrhea, fatigue, muscle aches. No headache, chest pain, shortness of breath, cough, dizziness, syncope, rash,  symptoms. Smokes cigarettes. History of hepatitis C and hepatitis B. previous IV drug abuse. Diabetes now controlled with weight loss and diet. Smokes cigarettes. The patient has no other acute complaints at this time. REVIEW OF SYSTEMS   Review of Systems   Constitutional: Positive for fatigue and fever. Negative for chills. HENT: Negative for congestion, ear discharge, ear pain, postnasal drip and rhinorrhea. Respiratory: Negative for cough, chest tightness and shortness of breath. Cardiovascular: Negative for chest pain, palpitations and leg swelling. Gastrointestinal: Positive for diarrhea, nausea and vomiting. Genitourinary: Negative for difficulty urinating, dysuria, enuresis, flank pain and hematuria. Musculoskeletal: Positive for myalgias. Negative for back pain and joint swelling. Neurological: Negative for dizziness, light-headedness, numbness and headaches. Psychiatric/Behavioral: Negative for agitation, behavioral problems and confusion.          PAST MEDICAL AND SURGICAL HISTORY     Past Medical History:   Diagnosis Date    Anxiety     Bipolar 1 disorder ([facility] [address]     applicator by Does not apply route 2 times daily, Disp: 100 each, Rfl: 3    lidocaine (LIDODERM) 5 %, Place 1 patch onto the skin daily 12 hours on, 12 hours off., Disp: 15 patch, Rfl: 0    tiotropium (SPIRIVA HANDIHALER) 18 MCG inhalation capsule, Inhale 1 puff by mouth once daily, Disp: 90 capsule, Rfl: 0    ipratropium-albuterol (DUONEB) 0.5-2.5 (3) MG/3ML SOLN nebulizer solution, Take 3 mLs by nebulization every 6 hours, Disp: 360 mL, Rfl: 3    EQ ASPIRIN ADULT LOW DOSE 81 MG EC tablet, TAKE 1 TABLET BY MOUTH ONCE DAILY (Patient not taking: Reported on 8/13/2020), Disp: 30 tablet, Rfl: 17    sucralfate (CARAFATE) 1 GM/10ML suspension, Take 10 mLs by mouth 4 times daily (Patient not taking: Reported on 8/13/2020), Disp: 200 mL, Rfl: 0    furosemide (LASIX) 40 MG tablet, Take 1 tablet by mouth daily, Disp: 30 tablet, Rfl: 2    albuterol sulfate HFA (PROVENTIL HFA) 108 (90 Base) MCG/ACT inhaler, Inhale 2 puffs into the lungs every 4 hours as needed for Wheezing or Shortness of Breath (Space out to every 6 hours as symptoms improve) Space out to every 6 hours as symptoms improve., Disp: 1 Inhaler, Rfl: 3    sodium chloride (ALTAMIST SPRAY) 0.65 % nasal spray, 1 spray by Nasal route as needed for Congestion, Disp: 1 Bottle, Rfl: 3    METHADONE HCL PO, Take 106 mg by mouth daily , Disp: , Rfl:       SOCIAL HISTORY     Social History     Social History Narrative    Not on file     Social History     Tobacco Use    Smoking status: Current Every Day Smoker     Packs/day: 0.25     Years: 30.00     Pack years: 7.50     Types: Cigarettes    Smokeless tobacco: Never Used    Tobacco comment: 6 per day    Substance Use Topics    Alcohol use: No    Drug use: No     Comment: stopped heroin in 2013         ALLERGIES     Allergies   Allergen Reactions    Flexeril [Cyclobenzaprine] Hives     Pt unsure    Klonopin [Clonazepam] Other (See Comments)     Restless leg    Morphine Hives    Quetiapine Other (See No erythema or rash. Neurological:      Mental Status: He is alert and oriented to person, place, and time. Cranial Nerves: No cranial nerve deficit. Psychiatric:         Behavior: Behavior normal.             MEDICAL DECISION MAKING   Differential Diagnosis:  Gastroenteritis, COVID, diverticulitis, colitis    Initial Assessment: the patient was seen and evaluated in the ER for nausea, vomiting and diarrhea with a fever. Appropriate labs and imaging are ordered and reviewed. Labs are reassuring. No elevated WBC. Send out COVID was sent. CT scan of the abdomen is obtained. Nothing acute is noted. He does have hepatic steatosis and splenomegaly, likely 2/2 his hepatitis. I reviewed findings with the patient. He is not nauseated or vomiting at this time. No diarrhea in the ER. He is comfortable with DC home with zofran. Plan: DC home with follow up and zofran. ED RESULTS   Laboratory results:  Labs Reviewed   CBC WITH AUTO DIFFERENTIAL - Abnormal; Notable for the following components:       Result Value    RBC 4.50 (*)     Hemoglobin 13.5 (*)     Hematocrit 39.5 (*)     Platelets 103 (*)     All other components within normal limits   COMPREHENSIVE METABOLIC PANEL W/ REFLEX TO MG FOR LOW K - Abnormal; Notable for the following components:    Glucose 186 (*)     Total Protein 6.0 (*)     All other components within normal limits   HEPATIC FUNCTION PANEL - Abnormal; Notable for the following components: Total Protein 6.0 (*)     All other components within normal limits   LIPASE   URINE RT REFLEX TO CULTURE   ANION GAP   GLOMERULAR FILTRATION RATE, ESTIMATED   OSMOLALITY   COVID-19 AMBULATORY   COVID-19   COVID-19       Radiologic studies results:  CT ABDOMEN PELVIS W IV CONTRAST Additional Contrast? None   Final Result       1. An acute process is not identified throughout the abdomen or pelvis. 2. Hepatic steatosis. 3. Splenomegaly.                **This report has been created using voice recognition software. It may contain minor errors which are inherent in voice recognition technology. **      Final report electronically signed by Dr Jeff Jernigan on 8/18/2020 9:32 PM          ED Medications administered this visit:   Medications   iopamidol (ISOVUE-370) 76 % injection 80 mL (80 mLs Intravenous Given 8/18/20 2044)         ED COURSE          Strict return precautions and follow up instructions were discussed with the patient prior to discharge, with which the patient agrees. Physical assessment findings, diagnostic testing(s) if applicable, and vital signs reviewed with patient/patient representative. Questions answered. Medications asdirected, including OTC medications for supportive care. Education provided on medications. Differential diagnosis(s) discussed with patient/patient representative. Home care/self care instructions reviewed withpatient/patient representative. Patient is to follow-up with family care provider in 2-3 days if no improvement. Patient is to go to the emergency department if symptoms worsen. Patient/patient representative isaware of care plan, questions answered, verbalizes understanding and is in agreement.      MEDICATION CHANGES     Discharge Medication List as of 8/18/2020  9:48 PM      START taking these medications    Details   ondansetron (ZOFRAN ODT) 4 MG disintegrating tablet Take 1 tablet by mouth every 8 hours as needed for Nausea, Disp-20 tablet,R-0Print               FINAL DISPOSITION     Final diagnoses:   Acute febrile illness   Nausea and vomiting in adult patient   Loose stools     DISPOSITION Decision To Discharge 08/18/2020 09:44:28 PM      to [facility] ED      to [facility] ED    Padma Madrigal [address] [address] [phone]    Schedule an appointment as soon as possible for a visit in 2 days  For follow up    DISCHARGE MEDICATIONS:  Discharge Medication List as of 8/18/2020  9:48 PM      START taking these medications    Details   ondansetron (ZOFRAN ODT) 4 MG disintegrating tablet Take 1 tablet by mouth every 8 hours as needed for Nausea, Disp-20 tablet,R-0Print             Condition: condition: good  Dispo: Discharge to home      This transcription was electronically signed. Parts of this transcriptions may have been dictated by use of voice recognition software and electronically transcribed, and parts may have been transcribed with the assistance of an ED scribe. The transcription may contain errors not detected in proofreading. Please refer to my supervising physician's documentation if my documentation differs.     Electronically Signed: Trae Stone, 08/24/20, 6:36 AM            CELIO Burger - CNP  08/24/20 8813

## 2020-09-09 ENCOUNTER — TELEPHONE (OUTPATIENT)
Dept: INTERNAL MEDICINE | Age: 45
End: 2020-09-09

## 2020-09-10 ENCOUNTER — TELEMEDICINE (OUTPATIENT)
Dept: FAMILY MEDICINE CLINIC | Age: 45
End: 2020-09-10
Payer: MEDICARE

## 2020-09-10 PROCEDURE — 99204 OFFICE O/P NEW MOD 45 MIN: CPT | Performed by: FAMILY MEDICINE

## 2020-09-10 PROCEDURE — 2022F DILAT RTA XM EVC RTNOPTHY: CPT | Performed by: FAMILY MEDICINE

## 2020-09-10 PROCEDURE — G8428 CUR MEDS NOT DOCUMENT: HCPCS | Performed by: FAMILY MEDICINE

## 2020-09-10 PROCEDURE — 3044F HG A1C LEVEL LT 7.0%: CPT | Performed by: FAMILY MEDICINE

## 2020-09-10 RX ORDER — LISINOPRIL 5 MG/1
TABLET ORAL
Qty: 90 TABLET | Refills: 3 | Status: SHIPPED | OUTPATIENT
Start: 2020-09-10 | End: 2021-06-28

## 2020-09-10 RX ORDER — TIOTROPIUM BROMIDE 18 UG/1
CAPSULE ORAL; RESPIRATORY (INHALATION)
Qty: 90 CAPSULE | Refills: 3 | Status: SHIPPED | OUTPATIENT
Start: 2020-09-10 | End: 2021-04-27 | Stop reason: SDUPTHER

## 2020-09-10 RX ORDER — IPRATROPIUM BROMIDE AND ALBUTEROL SULFATE 2.5; .5 MG/3ML; MG/3ML
1 SOLUTION RESPIRATORY (INHALATION) EVERY 6 HOURS
Qty: 360 ML | Refills: 3 | Status: SHIPPED | OUTPATIENT
Start: 2020-09-10 | End: 2021-04-12

## 2020-09-10 RX ORDER — FUROSEMIDE 40 MG/1
40 TABLET ORAL DAILY
Qty: 30 TABLET | Refills: 2 | Status: SHIPPED | OUTPATIENT
Start: 2020-09-10 | End: 2020-11-24

## 2020-09-10 RX ORDER — ATORVASTATIN CALCIUM 40 MG/1
TABLET, FILM COATED ORAL
Qty: 90 TABLET | Refills: 2 | Status: SHIPPED | OUTPATIENT
Start: 2020-09-10 | End: 2021-02-11 | Stop reason: SINTOL

## 2020-09-10 RX ORDER — GABAPENTIN 800 MG/1
TABLET ORAL
Qty: 270 TABLET | Refills: 3 | Status: SHIPPED | OUTPATIENT
Start: 2020-09-10 | End: 2021-09-23 | Stop reason: SDUPTHER

## 2020-09-10 RX ORDER — PANTOPRAZOLE SODIUM 40 MG/1
TABLET, DELAYED RELEASE ORAL
Qty: 90 TABLET | Refills: 3 | Status: SHIPPED | OUTPATIENT
Start: 2020-09-10 | End: 2021-08-24

## 2020-09-10 RX ORDER — LORATADINE 10 MG/1
10 TABLET ORAL NIGHTLY
Qty: 90 TABLET | Refills: 3 | Status: SHIPPED | OUTPATIENT
Start: 2020-09-10 | End: 2021-09-23

## 2020-09-10 RX ORDER — ESCITALOPRAM OXALATE 10 MG/1
10 TABLET ORAL DAILY
Qty: 30 TABLET | Refills: 1 | Status: SHIPPED | OUTPATIENT
Start: 2020-09-10 | End: 2020-11-24

## 2020-09-10 ASSESSMENT — ENCOUNTER SYMPTOMS
DIARRHEA: 0
SHORTNESS OF BREATH: 0
COUGH: 0
RHINORRHEA: 0
VOMITING: 0
NAUSEA: 0
BACK PAIN: 0

## 2020-09-10 NOTE — PROGRESS NOTES
Nomi,    ipratropium-albuterol (DUONEB) 0.5-2.5 (3) MG/3ML SOLN nebulizer solution Take 3 mLs by nebulization every 6 hours Yes Pete Books, DO   lisinopril (PRINIVIL;ZESTRIL) 5 MG tablet Take 1 tablet by mouth once daily Yes Pete Books, DO   loratadine (CLARITIN) 10 MG tablet Take 1 tablet by mouth nightly Yes Pete Books, DO   pantoprazole (PROTONIX) 40 MG tablet Take 1 tablet by mouth once daily Yes Pete Books, DO   tiotropium (SPIRIVA HANDIHALER) 18 MCG inhalation capsule Inhale 1 puff by mouth once daily Yes Pete Books, DO   acetaminophen (TYLENOL) 500 MG tablet Take 500 mg by mouth every 6 hours as needed for Pain  Historical Provider, MD   ondansetron (ZOFRAN ODT) 4 MG disintegrating tablet Take 1 tablet by mouth every 8 hours as needed for Nausea  CELIO Burger - CNP   polyethylene glycol (GLYCOLAX) 17 GM/SCOOP powder Take 17 g by mouth as needed  Historical Provider, MD   tenofovir disoproxil fumarate (VIREAD) 300 MG tablet Take 300 mg by mouth daily  Historical Provider, MD   ONETOUCH ULTRA strip USE 1 STRIP TO [address], MD   A[employer] Ultra Thin Lancets MISC 1 applicator by Does not apply route 2 times daily  Vicenta Peters MD   lidocaine (LIDODERM) 5 % Place 1 patch onto the skin daily 12 hours on, 12 hours off. Irene Rosales PA-C   EQ ASPIRIN ADULT LOW DOSE 81 MG EC tablet TAKE 1 TABLET BY MOUTH ONCE DAILY  Patient not taking: Reported on 8/13/2020  Rajwinder Segura MD   sucralfate (CARAFATE) 1 GM/10ML suspension Take 10 mLs by mouth 4 times daily  Patient not taking: Reported on 8/13/2020  Stevo Gongora MD   albuterol sulfate HFA (PROVENTIL HFA) 108 (90 Base) MCG/ACT inhaler Inhale 2 puffs into the lungs every 4 hours as needed for Wheezing or Shortness of Breath (Space out to every 6 hours as symptoms improve) Space out to every 6 hours as symptoms improve.   Verona Rodriguez MD   sodium chloride (ALTAMIST SPRAY) 0.65 % nasal spray the office. No medications at this time. Continue Lipitor 40 mg p.o. daily. - atorvastatin (LIPITOR) 40 MG tablet; TAKE 1 TABLET BY MOUTH ONCE DAILY  Dispense: 90 tablet; Refill: 2    4. Chronic obstructive pulmonary disease, unspecified COPD type ([address]  Controlled. Continue current medications  - DME Order for Nebulizer as OP    6. Essential hypertension  Controlled. Continue lisinopril 5 mg p.o. daily. - lisinopril (PRINIVIL;ZESTRIL) 5 MG tablet; Take 1 tablet by mouth once daily  Dispense: 90 tablet; Refill: 3    7. Seasonal allergic rhinitis due to pollen  Controlled. Continue Claritin  - loratadine (CLARITIN) 10 MG tablet; Take 1 tablet by mouth nightly  Dispense: 90 tablet; Refill: 3    8. Gastroesophageal reflux disease, esophagitis presence not specified  Controlled. Continue pantoprazole  - pantoprazole (PROTONIX) 40 MG tablet; Take 1 tablet by mouth once daily  Dispense: 90 tablet; Refill: 3      11. ANUP (obstructive sleep apnea)  Reordered baseline sleep study.  - Baseline Diagnostic Sleep Study; Future    12. Depression. Start Lexapro 10 mg p.o. daily and follow-up in 1 month. Return in about 4 weeks (around 10/8/2020). An  electronic signature was used to authenticate this note.     --Everett Ospina DO on 9/10/2020 at 10:36 AM

## 2020-09-25 ENCOUNTER — TELEPHONE (OUTPATIENT)
Dept: FAMILY MEDICINE CLINIC | Age: 45
End: 2020-09-25

## 2020-09-25 ENCOUNTER — HOSPITAL ENCOUNTER (EMERGENCY)
Age: 45
Discharge: HOME | End: 2020-09-25
Payer: MEDICARE

## 2020-09-25 VITALS
HEIGHT: 73 IN | SYSTOLIC BLOOD PRESSURE: 133 MMHG | TEMPERATURE: 98.1 F | RESPIRATION RATE: 18 BRPM | BODY MASS INDEX: 31.81 KG/M2 | OXYGEN SATURATION: 96 % | HEART RATE: 90 BPM | DIASTOLIC BLOOD PRESSURE: 73 MMHG | WEIGHT: 240 LBS

## 2020-09-25 PROCEDURE — 99213 OFFICE O/P EST LOW 20 MIN: CPT

## 2020-09-25 PROCEDURE — 99213 OFFICE O/P EST LOW 20 MIN: CPT | Performed by: NURSE PRACTITIONER

## 2020-09-25 PROCEDURE — 93005 ELECTROCARDIOGRAM TRACING: CPT | Performed by: NURSE PRACTITIONER

## 2020-09-25 ASSESSMENT — ENCOUNTER SYMPTOMS
VOMITING: 0
SHORTNESS OF BREATH: 0
COUGH: 0
NAUSEA: 0
SORE THROAT: 0

## 2020-09-25 NOTE — ED PROVIDER NOTES
[facility]  Urgent Care Encounter       CHIEF COMPLAINT       Chief Complaint   Patient presents with    Loss of Consciousness     9/24/20 at work       Nurses Notes reviewed and I agree except as noted in the HPI. HISTORY OF PRESENT ILLNESS   Ming Dee is a 41 y.o. male who presents for evaluation after a syncopal episode that occurred yesterday at work. Patient states that he is unsure of what truly happened other than that he passed out. States that he believes his blood sugar may have been low at that time. Reports that he has felt fatigued for the past few months and has also been having issues with erectile dysfunction. He denies any significant chest pain but states that he will intermittently have left arm pain. He denies any symptoms at this time. States that he did contact his PCP but was told to come here for evaluation. The history is provided by the patient. REVIEW OF SYSTEMS     Review of Systems   Constitutional: Negative for chills and fever. HENT: Negative for congestion and sore throat. Respiratory: Negative for cough and shortness of breath. Cardiovascular: Negative for chest pain. Gastrointestinal: Negative for nausea and vomiting. Musculoskeletal: Negative for arthralgias and myalgias. Skin: Negative for rash. Neurological: Positive for syncope. Negative for headaches.        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 CURRENT MEDICATIONS       Previous Medications    A[employer]SC[employer] ULTRA THIN LANCETS MISC    1 applicator by Does not apply route 2 times daily    ALBUTEROL SULFATE HFA (PROVENTIL HFA) 108 (90 BASE) MCG/ACT INHALER    Inhale 2 puffs into the lungs every 4 hours as needed for Wheezing or Shortness of Breath (Space out to every 6 hours as symptoms improve) Space out to every 6 hours as symptoms improve. ATORVASTATIN (LIPITOR) 40 MG TABLET    TAKE 1 TABLET BY MOUTH ONCE DAILY    ESCITALOPRAM (LEXAPRO) 10 MG TABLET    Take 1 tablet by mouth daily    FUROSEMIDE (LASIX) 40 MG TABLET    Take 1 tablet by mouth daily    GABAPENTIN (NEURONTIN) 800 MG TABLET    TAKE 1 TABLET BY MOUTH THREE TIMES DAILY    IPRATROPIUM-ALBUTEROL (DUONEB) 0.5-2.5 (3) MG/3ML SOLN NEBULIZER SOLUTION    Take 3 mLs by nebulization every 6 hours    LIDOCAINE (LIDODERM) 5 %    Place 1 patch onto the skin daily 12 hours on, 12 hours off.     LISINOPRIL (PRINIVIL;ZESTRIL) 5 MG TABLET    Take 1 tablet by mouth once daily    LORATADINE (CLARITIN) 10 MG TABLET    Take 1 tablet by mouth nightly    METHADONE HCL PO    Take 106 mg by mouth daily     ONDANSETRON (ZOFRAN ODT) 4 MG DISINTEGRATING TABLET    Take 1 tablet by mouth every 8 hours as needed for Nausea    ONETOUCH ULTRA STRIP    USE 1 STRIP TO CHECK GLUCOSE THREE TIMES DAILY    PANTOPRAZOLE (PROTONIX) 40 MG TABLET    Take 1 tablet by mouth once daily    POLYETHYLENE GLYCOL (GLYCOLAX) 17 GM/SCOOP POWDER    Take 17 g by mouth as needed    SODIUM CHLORIDE (ALTAMIST SPRAY) 0.65 % NASAL SPRAY    1 spray by Nasal route as needed for Congestion    SUCRALFATE (CARAFATE) 1 GM/10ML SUSPENSION    Take 10 mLs by mouth 4 times daily    TENOFOVIR DISOPROXIL FUMARATE (VIREAD) 300 MG TABLET    Take 300 mg by mouth daily    TIOTROPIUM (Marco Johnson) 18 MCG INHALATION CAPSULE    Inhale 1 puff by mouth once daily       ALLERGIES     Patient is is allergic to flexeril [cyclobenzaprine]; klonopin [clonazepam]; morphine; and quetiapine. Patients   Immunization History   Administered Date(s) Administered    Hepatitis A/Hepatitis B (Twinrix) 06/12/2013    Influenza Vaccine, unspecified formulation 11/13/2015, 09/21/2017    Influenza Virus Vaccine 11/13/2015, 01/31/2020    Influenza, Quadv, IM, (6 mo and older Fluzone, Flulaval, Fluarix and 3 yrs and older Afluria) 09/21/2017, 01/31/2020    Influenza, Quadv, IM, PF (6 mo and older Fluzone, Flulaval, Fluarix, and 3 yrs and older Afluria) 09/28/2018    Pneumococcal Conjugate 13-valent ([id number]) 07/17/2015    Pneumococcal Polysaccharide ([id number]) 09/21/2017    Tdap (Boostrix, Adacel) 10/08/2012, 06/12/2013, 11/13/2015       FAMILY HISTORY     Patient's family history includes Depression in his maternal grandmother, mother, and sister; Heart Disease in his father and mother; Substance Abuse in his brother, father, maternal aunt, maternal grandmother, maternal uncle, mother, paternal aunt, and paternal uncle. SOCIAL HISTORY     Patient  reports that he has been smoking cigarettes. He has a 7.50 pack-year smoking history. He has never used smokeless tobacco. He reports that he does not drink alcohol or use drugs. PHYSICAL EXAM     ED TRIAGE VITALS  BP: 133/73, Temp: 98.1 °F (36.7 °C), Pulse: 90, Resp: 18, SpO2: 96 %,Estimated body mass index is 31.66 kg/m² as calculated from the following:    Height as of this encounter: 6' 1\" (1.854 m). Weight as of this encounter: 240 lb (108.9 kg). ,No LMP for male patient. Physical Exam  Vitals signs and nursing note reviewed. Constitutional:       General: He is not in acute distress. Appearance: He is well-developed. He is not diaphoretic. Eyes:      Extraocular Movements: Extraocular movements intact.       Conjunctiva/sclera:      Right eye: Right conjunctiva is not injected. Left eye: Left conjunctiva is not injected. Pupils: Pupils are equal, round, and reactive to light. Pupils are equal.   Neck:      Musculoskeletal: Normal range of motion. Cardiovascular:      Rate and Rhythm: Normal rate and regular rhythm. Heart sounds: No murmur. Pulmonary:      Effort: Pulmonary effort is normal. No respiratory distress. Breath sounds: Normal breath sounds. Musculoskeletal:      Right knee: He exhibits normal range of motion. Left knee: He exhibits normal range of motion. Skin:     General: Skin is warm. Findings: No rash. Neurological:      Mental Status: He is alert and oriented to person, place, and time. GCS: GCS eye subscore is 4. GCS verbal subscore is 5. GCS motor subscore is 6. Sensory: Sensation is intact. Motor: Motor function is intact. Psychiatric:         Behavior: Behavior normal.         DIAGNOSTIC RESULTS     Labs:No results found for this visit on 09/25/20. IMAGING:    No orders to display         EKG:  Sinus rhythm rate of 80. URGENT CARE COURSE:     Vitals:    09/25/20 1150   BP: 133/73   Pulse: 90   Resp: 18   Temp: 98.1 °F (36.7 °C)   TempSrc: Temporal   SpO2: 96%   Weight: 240 lb (108.9 kg)   Height: 6' 1\" (1.854 m)       Medications - No data to display         PROCEDURES:  None    FINAL IMPRESSION      1. Syncope, unspecified syncope type    2. Encounter to obtain excuse from work          DISPOSITION/ PLAN       Physical exam is relatively benign at this time and I discussed with the patient that because he is currently asymptomatic, we will plan to provide a work note for today and discharge. He is advised that he must follow-up with his PCP as there could be some sort of a hormonal or endocrine issue going on.   Also advised that he must present directly to the emergency department if he would have another syncopal episode or any issues with chest pain, shortness of breath, dizziness, or extremity pain or numbness. Patient is agreeable to plan as discussed and follow-up on an outpatient basis as needed.     PATIENT REFERRED TO:  DO Jules Bojorquez [address] [place] [address] / [facility] [id number]      DISCHARGE MEDICATIONS:  New Prescriptions    No medications on file       Discontinued Medications    ACETAMINOPHEN (TYLENOL) 500 MG TABLET    Take 500 mg by mouth every 6 hours as needed for Pain    EQ ASPIRIN ADULT LOW DOSE 81 MG EC TABLET    TAKE 1 TABLET BY MOUTH ONCE DAILY       Current Discharge Medication List          CELIO Beal CNP    (Please note that portions of this note were completed with a voice recognition program. Efforts were made to edit the dictations but occasionally words are mis-transcribed.)           CELIO Beal CNP  09/25/20 1213

## 2020-09-25 NOTE — TELEPHONE ENCOUNTER
Patient calling in regarding extreme fatigue. Patient states he was at work on Wednesday and everything was going fine and then the next thing he remembers is someone waking him up and telling him he needed to go home. Patient states it happened again this morning was outside smoking and his wife woke him up. Patient denies any pain or fevers. Patient stated he is a diabetic and has CHF. Patient stated his blood sugars have been running low between 40-70. Stated he lost 80 pounds and no longer has to take medication for his diabetes. Patient is a new patient of Dr. London Ellsworth. Patient is concerned about the fatigue and has to go to work at [employer]. Scheduled patient for 10/1/2020. Patient is wanting to know if he should go to urgent care for this.

## 2020-09-25 NOTE — ED TRIAGE NOTES
Pt to [facility] ambulatory with passing out at work yesterday. Pt has no c/o today. Pt needs a work slip.

## 2020-09-26 LAB
EKG ATRIAL RATE: 80 BPM
EKG P AXIS: 81 DEGREES
EKG P-R INTERVAL: 160 MS
EKG Q-T INTERVAL: 406 MS
EKG QRS DURATION: 86 MS
EKG QTC CALCULATION (BAZETT): 468 MS
EKG R AXIS: 77 DEGREES
EKG T AXIS: 66 DEGREES
EKG VENTRICULAR RATE: 80 BPM

## 2020-09-26 PROCEDURE — 93010 ELECTROCARDIOGRAM REPORT: CPT | Performed by: INTERNAL MEDICINE

## 2020-09-28 ENCOUNTER — TELEPHONE (OUTPATIENT)
Dept: FAMILY MEDICINE CLINIC | Age: 45
End: 2020-09-28

## 2020-09-28 NOTE — TELEPHONE ENCOUNTER
[facility]  [address], [address]  Phone: [phone]  Fax: [phone]    September 28, 2020    [address] [facility] [address]  [address]      Dear Jennifer Rodriguez,    This letter is regarding your Emergency Department (ED) visit at [facility] on 9/25/20. Dr. Yani Luna wanted to make sure that you understand your discharge instructions and that you were able to fill any prescriptions that may have been ordered for you. Please contact the office at the above phone number if the ED advised you to follow up with Dr. Yani Luna, or if you have any further questions or needs. Also did you know -   *Visiting the ED for a non-emergency could result in higher co-pays than you would normally be subject to paying? *You can call your doctor even after hours so they can direct you to the most appropriate care. [facility]) practices can often offer you an appointment on the same day that you call. *We have some [id number] [facility] offices that offer Walk-in appointments; check our website for availability in your community, [email or web address].      *Evisits are now available for patients for $36 through [employer] for certain conditions:  * Sinus, cold and or cough       * Diarrhea            * Headache  * Heartburn                                * Poison Lena          * Back pain     * Urinary problems                         If you do not have [employer] and are interested, please contact the office and a staff member may assist you or go to [email or web address].     Sincerely,     Yani Luna DO and your [facility] [place]

## 2020-10-08 ENCOUNTER — OFFICE VISIT (OUTPATIENT)
Dept: FAMILY MEDICINE CLINIC | Age: 45
End: 2020-10-08
Payer: MEDICARE

## 2020-10-08 VITALS
BODY MASS INDEX: 30.61 KG/M2 | TEMPERATURE: 97.3 F | HEIGHT: 73 IN | OXYGEN SATURATION: 97 % | HEART RATE: 81 BPM | RESPIRATION RATE: 16 BRPM | WEIGHT: 230.98 LBS | DIASTOLIC BLOOD PRESSURE: 70 MMHG | SYSTOLIC BLOOD PRESSURE: 110 MMHG

## 2020-10-08 LAB — HBA1C MFR BLD: 5.6 % (ref 4.3–5.7)

## 2020-10-08 PROCEDURE — 4004F PT TOBACCO SCREEN RCVD TLK: CPT | Performed by: FAMILY MEDICINE

## 2020-10-08 PROCEDURE — 2022F DILAT RTA XM EVC RTNOPTHY: CPT | Performed by: FAMILY MEDICINE

## 2020-10-08 PROCEDURE — 83036 HEMOGLOBIN GLYCOSYLATED A1C: CPT | Performed by: FAMILY MEDICINE

## 2020-10-08 PROCEDURE — 3044F HG A1C LEVEL LT 7.0%: CPT | Performed by: FAMILY MEDICINE

## 2020-10-08 PROCEDURE — 99214 OFFICE O/P EST MOD 30 MIN: CPT | Performed by: FAMILY MEDICINE

## 2020-10-08 PROCEDURE — 90686 IIV4 VACC NO PRSV 0.5 ML IM: CPT | Performed by: FAMILY MEDICINE

## 2020-10-08 PROCEDURE — G8417 CALC BMI ABV UP PARAM F/U: HCPCS | Performed by: FAMILY MEDICINE

## 2020-10-08 PROCEDURE — G8427 DOCREV CUR MEDS BY ELIG CLIN: HCPCS | Performed by: FAMILY MEDICINE

## 2020-10-08 PROCEDURE — G8482 FLU IMMUNIZE ORDER/ADMIN: HCPCS | Performed by: FAMILY MEDICINE

## 2020-10-08 PROCEDURE — 90471 IMMUNIZATION ADMIN: CPT | Performed by: FAMILY MEDICINE

## 2020-10-08 SDOH — ECONOMIC STABILITY: TRANSPORTATION INSECURITY
IN THE PAST 12 MONTHS, HAS LACK OF TRANSPORTATION KEPT YOU FROM MEETINGS, WORK, OR FROM GETTING THINGS NEEDED FOR DAILY LIVING?: NO

## 2020-10-08 SDOH — ECONOMIC STABILITY: INCOME INSECURITY: HOW HARD IS IT FOR YOU TO PAY FOR THE VERY BASICS LIKE FOOD, HOUSING, MEDICAL CARE, AND HEATING?: NOT HARD AT ALL

## 2020-10-08 SDOH — ECONOMIC STABILITY: FOOD INSECURITY: WITHIN THE PAST 12 MONTHS, THE FOOD YOU BOUGHT JUST DIDN'T LAST AND YOU DIDN'T HAVE MONEY TO GET MORE.: NEVER TRUE

## 2020-10-08 SDOH — ECONOMIC STABILITY: TRANSPORTATION INSECURITY
IN THE PAST 12 MONTHS, HAS THE LACK OF TRANSPORTATION KEPT YOU FROM MEDICAL APPOINTMENTS OR FROM GETTING MEDICATIONS?: NO

## 2020-10-08 SDOH — ECONOMIC STABILITY: FOOD INSECURITY: WITHIN THE PAST 12 MONTHS, YOU WORRIED THAT YOUR FOOD WOULD RUN OUT BEFORE YOU GOT MONEY TO BUY MORE.: NEVER TRUE

## 2020-10-08 NOTE — PROGRESS NOTES
[facility] Visit    Anival Husain presents for   Chief Complaint   Patient presents with    1 Month Follow-Up     Depression       HPI: pt here to f/u on starting Lexapro 10 mg 1 month ago due to uncontrolled symptoms of depression. Still has some depressed mood. Has an appt with psychiatry on 10/14/2020. Sleep study was done in [place] previously. Has not heard from sleep study clinic.      Heriberto Laboy has been in 40s in the AM.  8AM before breakfast.  eats dinner at midnight. Passed out at work 2 weeks ago from hypoglycemia. He takes no meds now for diabetes type 2. Recently lost 60 to 70 pounds and was able to get off medication completely.     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\"    Type 2 diabetes mellitus without complication, with long-term current use of insulin ([address] 8/16/2017    Wears glasses     for reading     Allergies   Allergen Reactions    Adhesive Tape      Other reaction(s): Hives    Flexeril [Cyclobenzaprine] Hives     Pt unsure    Klonopin [Clonazepam] Other (See Comments)     Restless leg    Morphine Hives    Quetiapine Other (See Comments)     restless       Current Outpatient Medications   Medication Sig Dispense Refill    Multiple Vitamin (MULTIVITAMIN PO) Take by mouth daily      gabapentin (NEURONTIN) 800 MG tablet TAKE 1 TABLET BY MOUTH THREE TIMES DAILY 270 tablet 3    atorvastatin (LIPITOR) 40 MG tablet TAKE 1 TABLET BY MOUTH ONCE DAILY 90 tablet 2    escitalopram (LEXAPRO) 10 MG tablet Take 1 tablet by mouth daily 30 tablet 1    furosemide (LASIX) 40 MG tablet Take 1 tablet by mouth daily 30 tablet 2    ipratropium-albuterol (DUONEB) 0.5-2.5 (3) MG/3ML SOLN nebulizer solution Take 3 mLs by nebulization every 6 hours 360 mL 3    lisinopril (PRINIVIL;ZESTRIL) 5 MG tablet Take 1 tablet by mouth once daily 90 tablet 3    loratadine (CLARITIN) 10 MG tablet Take 1 tablet by mouth nightly 90 tablet 3    pantoprazole (PROTONIX) 40 MG tablet Take 1 tablet by mouth once daily 90 tablet 3    tiotropium (SPIRIVA HANDIHALER) 18 MCG inhalation capsule Inhale 1 puff by mouth once daily 90 capsule 3    ondansetron (ZOFRAN ODT) 4 MG disintegrating tablet Take 1 tablet by mouth every 8 hours as needed for Nausea 20 tablet 0    polyethylene glycol (GLYCOLAX) 17 GM/SCOOP powder Take 17 g by mouth as needed      tenofovir disoproxil fumarate (VIREAD) 300 MG tablet Take 300 mg by mouth daily      ONETOUCH ULTRA strip USE 1 STRIP TO CHECK GLUCOSE THREE TIMES DAILY 100 each 0    [employer] Ultra Thin Lancets MISC 1 applicator by Does not apply route 2 times daily 100 each 3    lidocaine (LIDODERM) 5 % Place 1 patch onto the skin daily 12 hours on, 12 hours off. 15 patch 0    sucralfate (CARAFATE) 1 GM/10ML suspension Take 10 mLs by mouth 4 times daily (Patient taking differently: Take 1 g by mouth 4 times daily as needed ) 200 mL 0    albuterol sulfate HFA (PROVENTIL HFA) 108 (90 Base) MCG/ACT inhaler Inhale 2 puffs into the lungs every 4 hours as needed for Wheezing or Shortness of Breath (Space out to every 6 hours as symptoms improve) Space out to every 6 hours as symptoms improve. 1 Inhaler 3    sodium chloride (ALTAMIST SPRAY) 0.65 % nasal spray 1 spray by Nasal route as needed for Congestion 1 Bottle 3    METHADONE HCL PO Take 106 mg by mouth daily        No current facility-administered medications for this visit.          Physical exam:  Vitals:    10/08/20 1122   BP: 110/70   Pulse: 81   Resp: 16   Temp: 97.3 °F (36.3 °C) SpO2: 97%     Cv: RRR. No m/r/g  Resp: CTA b/l. No w/r/c      Juan Hou was seen today for 1 month follow-up. Diagnoses and all orders for this visit:    Hypoglycemia- at this point I recommended consistent on a carb intake throughout the day. Send to endocrinology to see if they have any further testing or recommendations. -     External Referral To Endocrinology    ANUP (obstructive sleep apnea)- needs repeat sleep study. -     Jemima Thompson MD, [email or web address]    Type 2 diabetes mellitus without complication, without long-term current use of insulin (HCC)-A1c today. No medications at this time. Diet and exercise controlled. Depression, unspecified depression type-uncontrolled. Patient going to see psychiatry. Continue Lexapro 10 mg p.o. daily. Health Maintenance Due   Topic Date Due    Hepatitis A vaccine (2 of 3 - Hep A Twinrix risk 3-dose series) 07/10/2013    Diabetic retinal exam  06/04/2019    Diabetic foot exam  04/10/2020    Flu vaccine (1) 09/01/2020       Patient Instructions     Thank you   1. Thank you for trusting us with your healthcare needs. You may receive a survey regarding today's visit. It would help us out if you would take a few moments to provide your feedback. We value your input. 2. Please bring in ALL medications BOTTLES, including inhalers, herbal supplements, over the counter, prescribed & non-prescribed medicine. The office would like actual medication bottles and a list.   3. Please note our OFFICE POLICIES:   a. Prior to getting your labs drawn, please check with your insurance company for benefits and eligibility of lab services. Often, insurance companies cover certain tests for preventative visits only. It is patient's responsibility to see what is covered. b. We are unable to change a diagnosis after the test has been performed. c. Lab orders will not be re-printed.  Please hold onto your original lab orders and take them to your lab to be completed. d. If you no show your scheduled appointment three times, you will be dismissed from this practice. e. Nikkie Freeze must be completed 24 hours prior to your schedule appointment. 4. If the list below has been completed, PLEASE FAX RECORDS TO OUR OFFICE @ [phone]. Once the records have been received we will update your records at our office:  Health Maintenance Due   Topic Date Due    Hepatitis A vaccine (2 of 3 - Hep A Twinrix risk 3-dose series) 07/10/2013    Diabetic retinal exam  06/04/2019    Diabetic foot exam  04/10/2020    Flu vaccine (1) 09/01/2020               Return in about 6 months (around 4/8/2021).       Angelito Cobian

## 2020-10-08 NOTE — PATIENT INSTRUCTIONS
Thank you   1. Thank you for trusting us with your healthcare needs. You may receive a survey regarding today's visit. It would help us out if you would take a few moments to provide your feedback. We value your input. 2. Please bring in ALL medications BOTTLES, including inhalers, herbal supplements, over the counter, prescribed & non-prescribed medicine. The office would like actual medication bottles and a list.   3. Please note our OFFICE POLICIES:   a. Prior to getting your labs drawn, please check with your insurance company for benefits and eligibility of lab services. Often, insurance companies cover certain tests for preventative visits only. It is patient's responsibility to see what is covered. b. We are unable to change a diagnosis after the test has been performed. c. Lab orders will not be re-printed. Please hold onto your original lab orders and take them to your lab to be completed. d. If you no show your scheduled appointment three times, you will be dismissed from this practice. e. Teryl [place] must be completed 24 hours prior to your schedule appointment. 4. If the list below has been completed, PLEASE FAX RECORDS TO OUR OFFICE @ [phone].  Once the records have been received we will update your records at our office:  Health Maintenance Due   Topic Date Due    Hepatitis A vaccine (2 of 3 - Hep A Twinrix risk 3-dose series) 07/10/2013    Diabetic retinal exam  06/04/2019    Diabetic foot exam  04/10/2020    Flu vaccine (1) 09/01/2020
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
## 2020-11-02 RX ORDER — BLOOD SUGAR DIAGNOSTIC
STRIP MISCELLANEOUS
Qty: 100 EACH | Refills: 0 | Status: SHIPPED | OUTPATIENT
Start: 2020-11-02 | End: 2020-11-23

## 2020-11-06 ENCOUNTER — INITIAL CONSULT (OUTPATIENT)
Dept: PULMONOLOGY | Age: 45
End: 2020-11-06
Payer: MEDICARE

## 2020-11-06 ENCOUNTER — HOSPITAL ENCOUNTER (EMERGENCY)
Age: 45
Discharge: HOME | End: 2020-11-06
Attending: FAMILY MEDICINE
Payer: MEDICARE

## 2020-11-06 VITALS
DIASTOLIC BLOOD PRESSURE: 60 MMHG | HEIGHT: 73 IN | HEART RATE: 75 BPM | OXYGEN SATURATION: 98 % | WEIGHT: 244.6 LBS | BODY MASS INDEX: 32.42 KG/M2 | SYSTOLIC BLOOD PRESSURE: 104 MMHG | TEMPERATURE: 98 F

## 2020-11-06 VITALS
TEMPERATURE: 98.3 F | RESPIRATION RATE: 16 BRPM | OXYGEN SATURATION: 97 % | HEART RATE: 86 BPM | DIASTOLIC BLOOD PRESSURE: 78 MMHG | SYSTOLIC BLOOD PRESSURE: 130 MMHG

## 2020-11-06 PROCEDURE — G8482 FLU IMMUNIZE ORDER/ADMIN: HCPCS | Performed by: INTERNAL MEDICINE

## 2020-11-06 PROCEDURE — 99204 OFFICE O/P NEW MOD 45 MIN: CPT | Performed by: INTERNAL MEDICINE

## 2020-11-06 PROCEDURE — G8417 CALC BMI ABV UP PARAM F/U: HCPCS | Performed by: INTERNAL MEDICINE

## 2020-11-06 PROCEDURE — 3023F SPIROM DOC REV: CPT | Performed by: INTERNAL MEDICINE

## 2020-11-06 PROCEDURE — 99282 EMERGENCY DEPT VISIT SF MDM: CPT

## 2020-11-06 PROCEDURE — G8427 DOCREV CUR MEDS BY ELIG CLIN: HCPCS | Performed by: INTERNAL MEDICINE

## 2020-11-06 PROCEDURE — G8926 SPIRO NO PERF OR DOC: HCPCS | Performed by: INTERNAL MEDICINE

## 2020-11-06 PROCEDURE — 4004F PT TOBACCO SCREEN RCVD TLK: CPT | Performed by: INTERNAL MEDICINE

## 2020-11-06 RX ORDER — TOBRAMYCIN 3 MG/ML
1 SOLUTION/ DROPS OPHTHALMIC EVERY 4 HOURS
Qty: 1 BOTTLE | Refills: 0 | Status: SHIPPED | OUTPATIENT
Start: 2020-11-06 | End: 2020-11-16

## 2020-11-06 RX ORDER — TETRACAINE HYDROCHLORIDE 5 MG/ML
2 SOLUTION OPHTHALMIC ONCE
Status: DISCONTINUED | OUTPATIENT
Start: 2020-11-06 | End: 2020-11-06 | Stop reason: HOSPADM

## 2020-11-06 ASSESSMENT — ENCOUNTER SYMPTOMS
RHINORRHEA: 0
ABDOMINAL PAIN: 0
COUGH: 0
SHORTNESS OF BREATH: 0
TROUBLE SWALLOWING: 0
DIARRHEA: 0
WHEEZING: 0
EYE PAIN: 0
BLOOD IN STOOL: 0
WHEEZING: 0
EYE PAIN: 1
EYE DISCHARGE: 0
SHORTNESS OF BREATH: 1
SORE THROAT: 0
COUGH: 0
NAUSEA: 0
ABDOMINAL DISTENTION: 0
VOMITING: 0
DIARRHEA: 0
CONSTIPATION: 0
ABDOMINAL PAIN: 0

## 2020-11-06 ASSESSMENT — VISUAL ACUITY
OS: 20/15
OD: 20/30
OU: 20/20

## 2020-11-06 NOTE — PROGRESS NOTES
Chief Complaint: Peter Wang is here today as a sleep consult referred by Anirudh Eldridge for ANUP, last sleep study done on 04/15/2019, not using pap machine. Mallampati airway Class:III  Neck Circumference:16.0 Inches    [place] sleepiness score 20: {NUMBERS 1-24:46033}.       Diagnostic Data:   PS/15/2019  AHI: 11.9

## 2020-11-06 NOTE — LETTER
[address] [address] EMERGENCY DEPT  [address] [place] [address]  Phone: [phone]               November 6, 2020    Patient: Manda Weiss   YOB: 1975   Date of Visit: 11/6/2020       To Whom It May Concern: Rosa Quintana was seen and treated in our emergency department on 11/6/2020. He may return to work on 11/07/2020.       Sincerely,       LORI         Signature:__________________________________
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
## 2020-11-06 NOTE — ED TRIAGE NOTES
Patient presents for right eye pain/irritation. Patient states that he hammered on plastic last night and a piece fractured off, hitting him in the eye. Is concerned there could be a foreign body.

## 2020-11-06 NOTE — PATIENT INSTRUCTIONS
-Ordered retitration due to significant weight loss of 40 lbs since last titration   - Last was 14 cm by 10 cm   -He advised to keep good compliance with current recommended pressure to get optimal results and clinical improvement.  -Follow with my clinic in 4 weeks for clinical reevaluation of retitration and 6 weeks for pulmonary evaluation.   -He was advised to call [employer] regarding supplies if needed. -He was advised to practice good sleep hygiene techniques. He was provided with a hand out.  -He was advised call my office for earlier appointment if needed for worsening of sleep symptoms.  -Theodore Valadez educated about my impression and plan.  Patient verbalizes understanding.  - Schedule patient for a consultation [facility] for further evaluation of COPD and lung nodules   - CXR PA and lateral views prior to [facility] evaluation   - Last PFT in March 2019

## 2020-11-23 RX ORDER — BLOOD SUGAR DIAGNOSTIC
STRIP MISCELLANEOUS
Qty: 100 EACH | Refills: 0 | Status: SHIPPED | OUTPATIENT
Start: 2020-11-23 | End: 2021-01-13

## 2020-11-24 RX ORDER — FUROSEMIDE 40 MG/1
TABLET ORAL
Qty: 30 TABLET | Refills: 5 | Status: SHIPPED | OUTPATIENT
Start: 2020-11-24 | End: 2021-06-28

## 2020-11-24 RX ORDER — ESCITALOPRAM OXALATE 10 MG/1
TABLET ORAL
Qty: 30 TABLET | Refills: 5 | Status: SHIPPED | OUTPATIENT
Start: 2020-11-24 | End: 2021-05-20

## 2020-11-24 RX ORDER — ESCITALOPRAM OXALATE 10 MG/1
TABLET ORAL
Qty: 30 TABLET | Refills: 5 | Status: CANCELLED | OUTPATIENT
Start: 2020-11-24

## 2020-11-24 NOTE — TELEPHONE ENCOUNTER
Spec fransico on his appt, pt medication did not get refilled as a result.  fransico to 2/25/21     escitalopram (LEXAPRO) 10 MG tablet [[id number]]  ENDED    pref pharm [facility] on [address]

## 2021-01-13 DIAGNOSIS — E11.9 NEW ONSET TYPE 2 DIABETES MELLITUS (HCC): ICD-10-CM

## 2021-01-13 RX ORDER — BLOOD SUGAR DIAGNOSTIC
STRIP MISCELLANEOUS
Qty: 100 EACH | Refills: 0 | Status: SHIPPED | OUTPATIENT
Start: 2021-01-13 | End: 2021-03-01

## 2021-01-21 ENCOUNTER — OFFICE VISIT (OUTPATIENT)
Dept: FAMILY MEDICINE CLINIC | Age: 46
End: 2021-01-21
Payer: MEDICARE

## 2021-01-21 VITALS
SYSTOLIC BLOOD PRESSURE: 128 MMHG | OXYGEN SATURATION: 95 % | HEIGHT: 73 IN | WEIGHT: 259.8 LBS | HEART RATE: 85 BPM | RESPIRATION RATE: 16 BRPM | BODY MASS INDEX: 34.43 KG/M2 | TEMPERATURE: 97.9 F | DIASTOLIC BLOOD PRESSURE: 62 MMHG

## 2021-01-21 DIAGNOSIS — R07.9 CHEST PAIN, UNSPECIFIED TYPE: Primary | ICD-10-CM

## 2021-01-21 DIAGNOSIS — R60.0 BILATERAL LEG EDEMA: ICD-10-CM

## 2021-01-21 DIAGNOSIS — R53.83 OTHER FATIGUE: ICD-10-CM

## 2021-01-21 DIAGNOSIS — Z86.79 HISTORY OF CHRONIC CHF: ICD-10-CM

## 2021-01-21 PROCEDURE — 93000 ELECTROCARDIOGRAM COMPLETE: CPT | Performed by: NURSE PRACTITIONER

## 2021-01-21 PROCEDURE — G8417 CALC BMI ABV UP PARAM F/U: HCPCS | Performed by: NURSE PRACTITIONER

## 2021-01-21 PROCEDURE — G8427 DOCREV CUR MEDS BY ELIG CLIN: HCPCS | Performed by: NURSE PRACTITIONER

## 2021-01-21 PROCEDURE — 4004F PT TOBACCO SCREEN RCVD TLK: CPT | Performed by: NURSE PRACTITIONER

## 2021-01-21 PROCEDURE — G8482 FLU IMMUNIZE ORDER/ADMIN: HCPCS | Performed by: NURSE PRACTITIONER

## 2021-01-21 PROCEDURE — 99214 OFFICE O/P EST MOD 30 MIN: CPT | Performed by: NURSE PRACTITIONER

## 2021-01-21 RX ORDER — TIZANIDINE 4 MG/1
TABLET ORAL
COMMUNITY
End: 2021-02-08 | Stop reason: SDUPTHER

## 2021-01-21 ASSESSMENT — ENCOUNTER SYMPTOMS
CONSTIPATION: 0
EYE DISCHARGE: 0
BLOOD IN STOOL: 0
ABDOMINAL PAIN: 0
COLOR CHANGE: 0
NAUSEA: 0
SHORTNESS OF BREATH: 0
SORE THROAT: 0
COUGH: 0
ABDOMINAL DISTENTION: 0
DIARRHEA: 0
ANAL BLEEDING: 0
EYE REDNESS: 0
RHINORRHEA: 0

## 2021-01-21 NOTE — PATIENT INSTRUCTIONS
Thank you   1. Thank you for trusting us with your healthcare needs. You may receive a survey regarding today's visit. It would help us out if you would take a few moments to provide your feedback. We value your input. 2. Please bring in ALL medications BOTTLES, including inhalers, herbal supplements, over the counter, prescribed & non-prescribed medicine. The office would like actual medication bottles and a list.   3. Please note our OFFICE POLICIES:   a. Prior to getting your labs drawn, please check with your insurance company for benefits and eligibility of lab services. Often, insurance companies cover certain tests for preventative visits only. It is patient's responsibility to see what is covered. b. We are unable to change a diagnosis after the test has been performed. c. Lab orders will not be re-printed. Please hold onto your original lab orders and take them to your lab to be completed. d. If you no show your scheduled appointment three times, you will be dismissed from this practice. e. Quinton Kerry must be completed 24 hours prior to your schedule appointment. 4. If the list below has been completed, PLEASE FAX RECORDS TO OUR OFFICE @ [phone].  Once the records have been received we will update your records at our office:  Health Maintenance Due   Topic Date Due    Hepatitis A vaccine (2 of 3 - Hep A Twinrix risk 3-dose series) 07/10/2013    Diabetic retinal exam  06/04/2019    Diabetic foot exam  04/10/2020     Will refer to Cardiology    Will get some repeat labs     Compression stockings - wear daily    ER if symptoms worsen at all    back as scheduled, sooner as needed             Patient Education        Fatigue: Care Instructions  Your Care Instructions Fatigue is a feeling of tiredness, exhaustion, or lack of energy. You may feel fatigue because of too much or not enough activity. It can also come from stress, lack of sleep, boredom, and poor diet. Many medical problems, such as viral infections, can cause fatigue. Emotional problems, especially depression, are often the cause of fatigue. Fatigue is most often a symptom of another problem. Treatment for fatigue depends on the cause. For example, if you have fatigue because you have a certain health problem, treating this problem also treats your fatigue. If depression or anxiety is the cause, treatment may help. Follow-up care is a key part of your treatment and safety. Be sure to make and go to all appointments, and call your doctor if you are having problems. It's also a good idea to know your test results and keep a list of the medicines you take. How can you care for yourself at home? · Get regular exercise. But don't overdo it. Go back and forth between rest and exercise. · Get plenty of rest.  · Eat a healthy diet. Do not skip meals, especially breakfast.  · Reduce your use of caffeine, tobacco, and alcohol. Caffeine is most often found in coffee, tea, cola drinks, and chocolate. · Limit medicines that can cause fatigue. This includes tranquilizers and cold and allergy medicines. When should you call for help? Watch closely for changes in your health, and be sure to contact your doctor if:    · You have new symptoms such as fever or a rash.     · Your fatigue gets worse.     · You have been feeling down, depressed, or hopeless. Or you may have lost interest in things that you usually enjoy.     · You are not getting better as expected. Where can you learn more? Go to https://magalys.Quu. org and sign in to your Riskonnect account. Enter Y651 in the Push IO box to learn more about \"Fatigue: Care Instructions. \" If you do not have an account, please click on the \"Sign Up Now\" link. Current as of: June 26, 2019               Content Version: 12.6  © 2006-2020 Days [employer] Wonder, Incorporated. Care instructions adapted under license by [facility] ([facility]). If you have questions about a medical condition or this instruction, always ask your healthcare professional. [facility] [address] any warranty or liability for your use of this information. Patient Education        Leg and Ankle Edema: Care Instructions  Your Care Instructions  Swelling in the legs, ankles, and feet is called edema. It is common after you sit or stand for a while. Long plane flights or car rides often cause swelling in the legs and feet. You may also have swelling if you have to stand for long periods of time at your job. Problems with the veins in the legs (varicose veins) and changes in hormones can also cause swelling. Sometimes the swelling in the ankles and feet is caused by a more serious problem, such as heart failure, infection, blood clots, or liver or kidney disease. Follow-up care is a key part of your treatment and safety. Be sure to make and go to all appointments, and call your doctor if you are having problems. It's also a good idea to know your test results and keep a list of the medicines you take. How can you care for yourself at home? · If your doctor gave you medicine, take it as prescribed. Call your doctor if you think you are having a problem with your medicine. · Whenever you are resting, raise your legs up. Try to keep the swollen area higher than the level of your heart. · Take breaks from standing or sitting in one position. ? Walk around to increase the blood flow in your lower legs. ? Move your feet and ankles often while you stand, or tighten and relax your leg muscles. · Wear support stockings. Put them on in the morning, before swelling gets worse. · Eat a balanced diet. Lose weight if you need to. · Limit the amount of salt (sodium) in your diet. Salt holds fluid in the body and may increase swelling. When should you call for help? Call 911 anytime you think you may need emergency care. For example, call if:    · You have symptoms of a blood clot in your lung (called a pulmonary embolism). These may include:  ? Sudden chest pain. ? Trouble breathing. ? Coughing up blood. Call your doctor now or seek immediate medical care if:    · You have signs of a blood clot, such as:  ? Pain in your calf, back of the knee, thigh, or groin. ? Redness and swelling in your leg or groin.     · You have symptoms of infection, such as:  ? Increased pain, swelling, warmth, or redness. ? Red streaks or pus. ? A fever. Watch closely for changes in your health, and be sure to contact your doctor if:    · Your swelling is getting worse.     · You have new or worsening pain in your legs.     · You do not get better as expected. Where can you learn more? Go to https://RocksBox.Nurigene. org and sign in to your Barcheyacht account. Enter T181 in the myContactCard box to learn more about \"Leg and Ankle Edema: Care Instructions. \"     If you do not have an account, please click on the \"Sign Up Now\" link. Current as of: June 26, 2019               Content Version: 12.6  © 7343-6165 51 Give, Incorporated. Care instructions adapted under license by [facility] ([facility]). If you have questions about a medical condition or this instruction, always ask your healthcare professional. [place] [address] any warranty or liability for your use of this information. Patient Education        Anxiety Disorder: Care Instructions  Your Care Instructions     Anxiety is a normal reaction to stress. Difficult situations can cause you to have symptoms such as sweaty palms and a nervous feeling. In an anxiety disorder, the symptoms are far more severe. Constant worry, muscle tension, trouble sleeping, nausea and diarrhea, and other symptoms can make normal daily activities difficult or impossible. These symptoms may occur for no reason, and they can affect your work, school, or social life. Medicines, counseling, and self-care can all help. Follow-up care is a key part of your treatment and safety. Be sure to make and go to all appointments, and call your doctor if you are having problems. It's also a good idea to know your test results and keep a list of the medicines you take. How can you care for yourself at home? · Take medicines exactly as directed. Call your doctor if you think you are having a problem with your medicine. · Go to your counseling sessions and follow-up appointments. · Recognize and accept your anxiety. Then, when you are in a situation that makes you anxious, say to yourself, \"This is not an emergency. I feel uncomfortable, but I am not in danger. I can keep going even if I feel anxious. \"  · Be kind to your body:  ? Relieve tension with exercise or a massage. ? Get enough rest.  ? Avoid alcohol, caffeine, nicotine, and illegal drugs. They can increase your anxiety level and cause sleep problems. ? Learn and do relaxation techniques. See below for more about these techniques. · Engage your mind. Get out and do something you enjoy. Go to a funny movie, or take a walk or hike. Plan your day. Having too much or too little to do can make you anxious. · Keep a record of your symptoms. Discuss your fears with a good friend or family member, or join a support group for people with similar problems. Talking to others sometimes relieves stress. · Get involved in social groups, or volunteer to help others. Being alone sometimes makes things seem worse than they are. · Get at least 30 minutes of exercise on most days of the week to relieve stress. Walking is a good choice. You also may want to do other activities, such as running, swimming, cycling, or playing tennis or team sports. Relaxation techniques  Do relaxation exercises 10 to 20 minutes a day. You can play soothing, relaxing music while you do them, if you wish. · Tell others in your house that you are going to do your relaxation exercises. Ask them not to disturb you. · Find a comfortable place, away from all distractions and noise. · Lie down on your back, or sit with your back straight. · Focus on your breathing. Make it slow and steady. · Breathe in through your nose. Breathe out through either your nose or mouth. · Breathe deeply, filling up the area between your navel and your rib cage. Breathe so that your belly goes up and down. · Do not hold your breath. · Breathe like this for 5 to 10 minutes. Notice the feeling of calmness throughout your whole body. As you continue to breathe slowly and deeply, relax by doing the following for another 5 to 10 minutes:  · Tighten and relax each muscle group in your body. You can begin at your toes and work your way up to your head. · Imagine your muscle groups relaxing and becoming heavy. · Empty your mind of all thoughts. · Let yourself relax more and more deeply. · Become aware of the state of calmness that surrounds you. · When your relaxation time is over, you can bring yourself back to alertness by moving your fingers and toes and then your hands and feet and then stretching and moving your entire body. Sometimes people fall asleep during relaxation, but they usually wake up shortly afterward. · Always give yourself time to return to full alertness before you drive a car or do anything that might cause an accident if you are not fully alert. Never play a relaxation tape while you drive a car. When should you call for help? Call 911 anytime you think you may need emergency care. For example, call if:    · You feel you cannot stop from hurting yourself or someone else. Keep the numbers for these national suicide hotlines: 8-132-315-TALK (7-483.992.4132) and 6-761-BYISRTQ (6-512.734.5687). If you or someone you know talks about suicide or feeling hopeless, get help right away. Watch closely for changes in your health, and be sure to contact your doctor if:    · You have anxiety or fear that affects your life.     · You have symptoms of anxiety that are new or different from those you had before. Where can you learn more? Go to https://TactoTek.ShareThe. org and sign in to your Duable Chinese account. Enter P754 in the Viibar box to learn more about \"Anxiety Disorder: Care Instructions. \"     If you do not have an account, please click on the \"Sign Up Now\" link. Current as of: January 31, 2020               Content Version: 12.6  © 2006-2020 "Quryon[employer] Inc.". Care instructions adapted under license by [facility] ([facility]). If you have questions about a medical condition or this instruction, always ask your healthcare professional. [place] [address] any warranty or liability for your use of this information. Patient Education        Learning About Anxiety Disorders  What are anxiety disorders? Anxiety disorders are a type of medical problem. They cause severe anxiety. When you feel anxious, you feel that something bad is about to happen. This feeling interferes with your life. These disorders include:  · Generalized anxiety disorder. You feel worried and stressed about many everyday events and activities. This goes on for several months and disrupts your life on most days. · Panic disorder. You have repeated panic attacks. A panic attack is a sudden, intense fear or anxiety. It may make you feel short of breath. Your heart may pound. · Social anxiety disorder. You feel very anxious about what you will say or do in front of people. For example, you may be scared to talk or eat in public. This problem affects your daily life. · Phobias. You are very scared of a specific object, situation, or activity. For example, you may fear spiders, high places, or small spaces. What are the symptoms? Generalized anxiety disorder  Symptoms may include:  · Feeling worried and stressed about many things almost every day. · Feeling tired or irritable. You may have a hard time concentrating. · Having headaches or muscle aches. · Having a hard time getting to sleep or staying asleep. Panic disorder  You may have repeated panic attacks when there is no reason for feeling afraid. You may change your daily activities because you worry that you will have another attack. Symptoms may include:  · Intense fear, terror, or anxiety. · Trouble breathing or very fast breathing. · Chest pain or tightness. · A heartbeat that races or is not regular. Social anxiety disorder  Symptoms may include:  · Fear about a social situation, such as eating in front of others or speaking in public. You may worry a lot. Or you may be afraid that something bad will happen. · Anxiety that can cause you to blush, sweat, and feel shaky. · A heartbeat that is faster than normal.  · A hard time focusing. Phobias  Symptoms may include:  · More fear than most people of being around an object, being in a situation, or doing an activity. You might also be stressed about the chance of being around the thing you fear. · Worry about losing control, panicking, fainting, or having physical symptoms like a faster heartbeat when you are around the situation or object. How are these disorders treated? Anxiety disorders can be treated with medicines or counseling. A combination of both may be used.   Medicines may include: · Antidepressants. These may help your symptoms by keeping chemicals in your brain in balance. · Benzodiazepines. These may give you short-term relief of your symptoms. Some people use cognitive-behavioral therapy. A therapist helps you learn to change stressful or bad thoughts into helpful thoughts. Lead a healthy lifestyle  A healthy lifestyle may help you feel better. · Get at least 30 minutes of exercise on most days of the week. Walking is a good choice. · Eat a healthy diet. Include fruits, vegetables, lean proteins, and whole grains in your diet each day. · Try to go to bed at the same time every night. Try for 8 hours of sleep a night. · Find ways to manage stress. Try relaxation exercises. · Avoid alcohol and illegal drugs. Follow-up care is a key part of your treatment and safety. Be sure to make and go to all appointments, and call your doctor if you are having problems. It's also a good idea to know your test results and keep a list of the medicines you take. Where can you learn more? Go to https://Maana Mobile.Element Financial Corporation. org and sign in to your Spout account. Enter I455 in the Answers Corporation box to learn more about \"Learning About Anxiety Disorders. \"     If you do not have an account, please click on the \"Sign Up Now\" link. Current as of: January 31, 2020               Content Version: 12.6  © 0450-3206 LitRes, Incorporated. Care instructions adapted under license by [facility] ([facility]). If you have questions about a medical condition or this instruction, always ask your healthcare professional. [place] [address] any warranty or liability for your use of this information.          Patient Education        Heart Failure: Care Instructions  Your Care Instructions   · Your doctor may suggest that you limit sodium. Your doctor can tell you how much sodium is right for you. An example is less than 3,000 mg a day. This includes all the salt you eat in cooking or in packaged foods. People get most of their sodium from processed foods. Fast food and restaurant meals also tend to be very high in sodium.     · Ask your doctor how much liquid you can drink each day. You may have to limit liquids. Weight    · Weigh yourself without clothing at the same time each day. Record your weight. Call your doctor if you have a sudden weight gain, such as more than 2 to 3 pounds in a day or 5 pounds in a week. (Your doctor may suggest a different range of weight gain.) A sudden weight gain may mean that your heart failure is getting worse. Activity level    · Start light exercise (if your doctor says it is okay). Even if you can only do a small amount, exercise will help you get stronger, have more energy, and manage your weight and your stress. Walking is an easy way to get exercise. Start out by walking a little more than you did before. Bit by bit, increase the amount you walk.     · When you exercise, watch for signs that your heart is working too hard. You are pushing yourself too hard if you cannot talk while you are exercising. If you become short of breath or dizzy or have chest pain, stop, sit down, and rest.     · If you feel \"wiped out\" the day after you exercise, walk slower or for a shorter distance until you can work up to a better pace.     · Get enough rest at night. Sleeping with 1 or 2 pillows under your upper body and head may help you breathe easier. Lifestyle changes    · Do not smoke. Smoking can make a heart condition worse. If you need help quitting, talk to your doctor about stop-smoking programs and medicines. These can increase your chances of quitting for good. Quitting smoking may be the most important step you can take to protect your heart.   · Limit alcohol to 2 drinks a day for men and 1 drink a day for women. Too much alcohol can cause health problems.     · Avoid getting sick from colds and the flu. Get a pneumococcal vaccine shot. If you have had one before, ask your doctor whether you need another dose. Get a flu shot each year. If you must be around people with colds or the flu, wash your hands often. When should you call for help? Call 911 if you have symptoms of sudden heart failure such as:    · You have severe trouble breathing.     · You cough up pink, foamy mucus.     · You have a new irregular or rapid heartbeat. Call your doctor now or seek immediate medical care if:    · You have new or increased shortness of breath.     · You are dizzy or lightheaded, or you feel like you may faint.     · You have sudden weight gain, such as more than 2 to 3 pounds in a day or 5 pounds in a week. (Your doctor may suggest a different range of weight gain.)     · You have increased swelling in your legs, ankles, or feet.     · You are suddenly so tired or weak that you cannot do your usual activities. Watch closely for changes in your health, and be sure to contact your doctor if you develop new symptoms. Where can you learn more? Go to https://Nerd Kingdom.Voovio aka 3Ditize. org and sign in to your ColibrÃ­ account. Enter F836 in the KosherSwitch Technologies box to learn more about \"Heart Failure: Care Instructions. \"     If you do not have an account, please click on the \"Sign Up Now\" link. Current as of: December 16, 2019               Content Version: 12.6  © 9361-7875 Powervation, Incorporated. Care instructions adapted under license by [facility] ([facility]). If you have questions about a medical condition or this instruction, always ask your healthcare professional. [address] any warranty or liability for your use of this information.

## 2021-01-21 NOTE — PROGRESS NOTES
[address] [facility]  [phone] (phone)  [phone] (fax)    Visit Date: 1/21/2021    Mili Iglesias is a 41 y.o. male who presents today for:  Chief Complaint   Patient presents with    Edema     bilateral legs swollen, broken blood vessels left leg from swelling patient states that he is just retaining water over whole body    Muscle Pain     Soreness    Fatigue     HPI:     Diagnosed with barretts esophagus, viral hep B, hepatic encephalopathy, cirrhosis of liver, GERD - GI is [facility]    Having leg swelling with broken blood vessels, muscle pain, and fatigue. Legs are swollen at the end of the day - does not wear compression stockings. Had toenails removed in [place] - both big toes - done a couple years ago - left great toe seems to have some nail growing back. Having chest pain - center of the chest - feels like stabbing pain - started off and on for a month. Nothing makes the pain worse. nothing seems to make the pain better.  Seems to occur with his anxiety    History of congestive heart failure but not seeing Cardiology    Lots a fatigue - no energy - short of breat at times    Weight 11/6/2020 was 244 lb 9.6 oz  Weight 01/21/2021 is 259 lb 12.8 oz  HPI  Health Maintenance   Topic Date Due    Hepatitis A vaccine (2 of 3 - Hep A Twinrix risk 3-dose series) 07/10/2013    Diabetic retinal exam  06/04/2019    Hepatitis B vaccine (2 of 3 - Hep B Twinrix risk 3-dose series) 01/24/2021 (Originally 7/10/2013)    Diabetic microalbuminuria test  02/13/2021    Lipid screen  02/13/2021    Potassium monitoring  08/18/2021    Creatinine monitoring  08/18/2021    A1C test (Diabetic or Prediabetic)  10/08/2021    Diabetic foot exam  01/21/2022    DTaP/Tdap/Td vaccine (4 - Td) 11/13/2025    Flu vaccine  Completed    Pneumococcal 0-64 years Vaccine  Completed    HIV screen  Completed    Hib vaccine  Aged Out    Meningococcal (ACWY) vaccine  Aged Out     Past Medical History: Diagnosis Date    Anxiety     Bipolar 1 disorder ([facility]     Chronic diastolic congestive heart failure ([facility] 3/16/2018    patient denies    Cirrhosis ([facility] 01/2021    Constipation     Gastroesophageal reflux disease 3/16/2018    Hard of hearing     left ear, no hearing aid    Hep C w/o coma, chronic (HCC)     Hepatitis B     Kidney stones     hx of    Post traumatic stress disorder (PTSD)     Substance abuse ([facility]     \"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\"    Type 2 diabetes mellitus without complication, with long-term current use of insulin ([facility] 8/16/2017    Wears glasses     for reading      Past Surgical History:   Procedure Laterality Date    ANKLE SURGERY Left     KIDNEY STONE SURGERY      x 4     Family History   Problem Relation Age of Onset    Substance Abuse Mother     Depression Mother     Heart Disease Mother     Substance Abuse Father     Heart Disease Father     Depression Sister     Substance Abuse Brother     Substance Abuse Maternal Aunt     Substance Abuse Maternal Uncle     Substance Abuse Paternal Aunt     Substance Abuse Paternal Uncle     Substance Abuse Maternal Grandmother     Depression Maternal Grandmother      Social History     Tobacco Use    Smoking status: Current Every Day Smoker     Packs/day: 0.25     Years: 30.00     Pack years: 7.50     Types: Cigarettes    Smokeless tobacco: Never Used    Tobacco comment: 6 per day    Substance Use Topics    Alcohol use: No      Current Outpatient Medications   Medication Sig Dispense Refill    ONETOUCH ULTRA strip USE 1 STRIP TO CHECK GLUCOSE THREE TIMES DAILY 100 each 0    escitalopram (LEXAPRO) 10 MG tablet Take 1 tablet by mouth once daily 30 tablet 5    furosemide (LASIX) 40 MG tablet Take 1 tablet by mouth once daily 30 tablet 5    Multiple Vitamin (MULTIVITAMIN PO) Take by mouth daily      gabapentin (NEURONTIN) 800 MG tablet TAKE 1 TABLET BY MOUTH THREE TIMES DAILY 270 tablet 3  atorvastatin (LIPITOR) 40 MG tablet TAKE 1 TABLET BY MOUTH ONCE DAILY 90 tablet 2    ipratropium-albuterol (DUONEB) 0.5-2.5 (3) MG/3ML SOLN nebulizer solution Take 3 mLs by nebulization every 6 hours (Patient taking differently: Take 1 vial by nebulization every 6 hours as needed ) 360 mL 3    lisinopril (PRINIVIL;ZESTRIL) 5 MG tablet Take 1 tablet by mouth once daily 90 tablet 3    loratadine (CLARITIN) 10 MG tablet Take 1 tablet by mouth nightly 90 tablet 3    pantoprazole (PROTONIX) 40 MG tablet Take 1 tablet by mouth once daily 90 tablet 3    tiotropium (SPIRIVA HANDIHALER) 18 MCG inhalation capsule Inhale 1 puff by mouth once daily 90 capsule 3    ondansetron (ZOFRAN ODT) 4 MG disintegrating tablet Take 1 tablet by mouth every 8 hours as needed for Nausea 20 tablet 0    polyethylene glycol (GLYCOLAX) 17 GM/SCOOP powder Take 17 g by mouth as needed      tenofovir disoproxil fumarate (VIREAD) 300 MG tablet Take 300 mg by mouth daily      Aimsco Ultra Thin Lancets MISC 1 applicator by Does not apply route 2 times daily 100 each 3    lidocaine (LIDODERM) 5 % Place 1 patch onto the skin daily 12 hours on, 12 hours off. 15 patch 0    sucralfate (CARAFATE) 1 GM/10ML suspension Take 10 mLs by mouth 4 times daily (Patient taking differently: Take 1 g by mouth 4 times daily as needed ) 200 mL 0    albuterol sulfate HFA (PROVENTIL HFA) 108 (90 Base) MCG/ACT inhaler Inhale 2 puffs into the lungs every 4 hours as needed for Wheezing or Shortness of Breath (Space out to every 6 hours as symptoms improve) Space out to every 6 hours as symptoms improve. 1 Inhaler 3    sodium chloride (ALTAMIST SPRAY) 0.65 % nasal spray 1 spray by Nasal route as needed for Congestion 1 Bottle 3    METHADONE HCL PO Take 106 mg by mouth daily       tiZANidine (ZANAFLEX) 4 MG tablet tizanidine 4 mg tablet   Take 1 tablet(s) every 6 hours by oral route for 31 days. No current facility-administered medications for this visit. Allergies   Allergen Reactions    Adhesive Tape      Other reaction(s): Hives    Flexeril [Cyclobenzaprine] Hives     Pt unsure    Morphine Hives    Quetiapine Other (See Comments)     restless       Subjective:    Review of Systems   Constitutional: Positive for fatigue. Negative for chills and fever. HENT: Negative for congestion, ear pain, postnasal drip, rhinorrhea and sore throat. Eyes: Negative for discharge and redness. Respiratory: Negative for cough and shortness of breath. Cardiovascular: Positive for leg swelling. Negative for chest pain. Gastrointestinal: Negative for abdominal distention, abdominal pain, anal bleeding, blood in stool, constipation, diarrhea and nausea. Musculoskeletal: Positive for arthralgias and myalgias. Skin: Negative for color change and rash. Neurological: Negative for facial asymmetry, speech difficulty and weakness. Hematological: Does not bruise/bleed easily. Psychiatric/Behavioral: Negative for agitation and confusion. Objective:     Vitals:    01/21/21 0806   BP: 128/62   Site: Left Upper Arm   Position: Sitting   Cuff Size: Large Adult   Pulse: 85   Resp: 16   Temp: 97.9 °F (36.6 °C)   TempSrc: Temporal   SpO2: 95%   Weight: 259 lb 12.8 oz (117.8 kg)   Height: 6' 1\" (1.854 m)       Body mass index is 34.28 kg/m². Wt Readings from Last 3 Encounters:   01/21/21 259 lb 12.8 oz (117.8 kg)   11/06/20 244 lb 9.6 oz (110.9 kg)   10/08/20 230 lb 15.7 oz (104.8 kg)     BP Readings from Last 3 Encounters:   01/21/21 128/62   11/06/20 130/78   11/06/20 104/60     Physical Exam  Constitutional:       General: He is not in acute distress. Appearance: He is well-developed. He is not ill-appearing or diaphoretic. HENT:      Head: Normocephalic and atraumatic. Right Ear: Hearing and external ear normal. No decreased hearing noted. Left Ear: Hearing and external ear normal. No decreased hearing noted. Nose: Nose normal. No nasal deformity. Eyes:      General:         Right eye: No discharge. Left eye: No discharge. Conjunctiva/sclera: Conjunctivae normal.   Neck:      Musculoskeletal: Normal range of motion and neck supple. Cardiovascular:      Rate and Rhythm: Regular rhythm. Comments: Slight edema - 1+ non pitting bilateral lower extremities   Pulmonary:      Effort: Pulmonary effort is normal. No respiratory distress. Abdominal:      General: There is no distension. Tenderness: There is no guarding. Musculoskeletal: Normal range of motion. General: No tenderness or deformity. Skin:     Coloration: Skin is not pale. Findings: No erythema or rash (On exposed areas). Neurological:      Mental Status: He is alert. Gait: Gait normal.   Psychiatric:         Speech: Speech normal.         Behavior: Behavior normal.         Thought Content: Thought content normal.         Judgment: Judgment normal.         Lab Results   Component Value Date    WBC 5.2 08/18/2020    HGB 13.5 (L) 08/18/2020    HCT 39.5 (L) 08/18/2020     (L) 08/18/2020    CHOL 90 02/13/2020    TRIG 143 02/13/2020    HDL 28 (L) 02/13/2020    AST 30 08/18/2020    AST 31 08/18/2020     08/18/2020    K 4.0 08/18/2020     08/18/2020    CREATININE 0.7 08/18/2020    BUN 8 08/18/2020    CO2 25 08/18/2020    TSH 1.78 10/11/2017    INR 1.2 05/01/2018    LABA1C 5.6 10/08/2020    LABMICR CANNOT BE CALCULATED 02/13/2020    LABGLOM >90 08/18/2020    MG 1.9 10/13/2016    CALCIUM 9.0 08/18/2020     Assessment:       Diagnosis Orders   1. Chest pain, unspecified type  HM DIABETES FOOT EXAM    Compression Stocking    EKG 12 Lead    IL ELECTROCARDIOGRAM, COMPLETE    [place]bo Johnson MD, Cardiology, [address] [place] [address]    Brain Natriuretic Peptide   2.  History of chronic CHF  Francisco Sinha MD, Cardiology, [address] [place] [address] 3. Other fatigue  CBC Auto Differential    Iron and TIBC   4. Bilateral leg edema  CBC Auto Differential    Brain Natriuretic Peptide       Plan: Will refer to Cardiology    Will get some repeat labs     Compression stockings - wear daily    ER if symptoms worsen at all    back as scheduled, sooner as needed   Return in about 12 weeks (around 4/15/2021), or if symptoms worsen or fail to improve. Orders Placed:  Orders Placed This Encounter   Procedures    CBC Auto Differential    Iron and TIBC    Brain Natriuretic Peptide    Vignesh Car MD, Cardiology, Christos EKG 12 Lead     DIABETES FOOT EXAM    DC ELECTROCARDIOGRAM, COMPLETE    Compression Stocking     Medications Prescribed:  No orders of the defined types were placed in this encounter. Future Appointments   Date Time Provider Didier Burton   2/25/2021 10:00 AM Juan Beaver MD [facility]   4/15/2021  9:00 AM Buzz Dinning, DO [facility]      Patient given educational materials - see patient instructions. Discussed use, benefit, and side effects of prescribedmedications. All patient questions answered. Pt voiced understanding. Reviewed health maintenance. Instructed to continue current medications, diet and exercise. Patient agreed with treatment plan. Follow up as directed.     Electronically signed by CELIO Damon CNP on 1/21/2021 at 8:42 AM

## 2021-02-08 RX ORDER — TIZANIDINE 4 MG/1
TABLET ORAL
Qty: 60 TABLET | Refills: 2 | Status: SHIPPED | OUTPATIENT
Start: 2021-02-08 | End: 2021-02-11 | Stop reason: SDUPTHER

## 2021-02-08 NOTE — TELEPHONE ENCOUNTER
[facility] called requesting a refill on the following medications:  Requested Prescriptions     Pending Prescriptions Disp Refills    tiZANidine (ZANAFLEX) 4 MG tablet       Pharmacy verified:YES  .[email or web address]      Date of last visit:   Date of next visit (if applicable): 4/42/5500

## 2021-02-11 ENCOUNTER — OFFICE VISIT (OUTPATIENT)
Dept: FAMILY MEDICINE CLINIC | Age: 46
End: 2021-02-11
Payer: MEDICARE

## 2021-02-11 ENCOUNTER — TELEPHONE (OUTPATIENT)
Dept: FAMILY MEDICINE CLINIC | Age: 46
End: 2021-02-11

## 2021-02-11 ENCOUNTER — NURSE ONLY (OUTPATIENT)
Dept: LAB | Age: 46
End: 2021-02-11

## 2021-02-11 VITALS
SYSTOLIC BLOOD PRESSURE: 128 MMHG | DIASTOLIC BLOOD PRESSURE: 72 MMHG | OXYGEN SATURATION: 98 % | BODY MASS INDEX: 34.3 KG/M2 | TEMPERATURE: 97.5 F | HEART RATE: 83 BPM | HEIGHT: 73 IN | RESPIRATION RATE: 16 BRPM | WEIGHT: 258.8 LBS

## 2021-02-11 DIAGNOSIS — R07.9 CHEST PAIN, UNSPECIFIED TYPE: ICD-10-CM

## 2021-02-11 DIAGNOSIS — B18.1: ICD-10-CM

## 2021-02-11 DIAGNOSIS — R60.0 BILATERAL LEG EDEMA: ICD-10-CM

## 2021-02-11 DIAGNOSIS — R53.83 OTHER FATIGUE: ICD-10-CM

## 2021-02-11 DIAGNOSIS — G72.9 MYOPATHY: Primary | ICD-10-CM

## 2021-02-11 DIAGNOSIS — F11.20 LONG-TERM CURRENT USE OF METHADONE FOR OPIATE DEPENDENCE (HCC): ICD-10-CM

## 2021-02-11 DIAGNOSIS — K74.69 OTHER CIRRHOSIS OF LIVER (HCC): ICD-10-CM

## 2021-02-11 PROBLEM — K76.82 HEPATIC ENCEPHALOPATHY: Status: RESOLVED | Noted: 2020-07-23 | Resolved: 2021-02-11

## 2021-02-11 LAB
BASOPHILS # BLD: 0.7 %
BASOPHILS ABSOLUTE: 0.1 THOU/MM3 (ref 0–0.1)
EOSINOPHIL # BLD: 4.6 %
EOSINOPHILS ABSOLUTE: 0.3 THOU/MM3 (ref 0–0.4)
ERYTHROCYTE [DISTWIDTH] IN BLOOD BY AUTOMATED COUNT: 13.6 % (ref 11.5–14.5)
ERYTHROCYTE [DISTWIDTH] IN BLOOD BY AUTOMATED COUNT: 42.8 FL (ref 35–45)
HCT VFR BLD CALC: 41.5 % (ref 42–52)
HEMOGLOBIN: 14.5 GM/DL (ref 14–18)
IMMATURE GRANS (ABS): 0.05 THOU/MM3 (ref 0–0.07)
IMMATURE GRANULOCYTES: 0.7 %
IRON: 83 UG/DL (ref 65–195)
LYMPHOCYTES # BLD: 23.6 %
LYMPHOCYTES ABSOLUTE: 1.7 THOU/MM3 (ref 1–4.8)
MCH RBC QN AUTO: 30.7 PG (ref 26–33)
MCHC RBC AUTO-ENTMCNC: 34.9 GM/DL (ref 32.2–35.5)
MCV RBC AUTO: 87.7 FL (ref 80–94)
MONOCYTES # BLD: 8.9 %
MONOCYTES ABSOLUTE: 0.6 THOU/MM3 (ref 0.4–1.3)
NUCLEATED RED BLOOD CELLS: 0 /100 WBC
PLATELET # BLD: 148 THOU/MM3 (ref 130–400)
PMV BLD AUTO: 12.4 FL (ref 9.4–12.4)
PRO-BNP: 31.5 PG/ML (ref 0–450)
RBC # BLD: 4.73 MILL/MM3 (ref 4.7–6.1)
SEG NEUTROPHILS: 61.5 %
SEGMENTED NEUTROPHILS ABSOLUTE COUNT: 4.4 THOU/MM3 (ref 1.8–7.7)
TOTAL IRON BINDING CAPACITY: 327 UG/DL (ref 171–450)
WBC # BLD: 7.2 THOU/MM3 (ref 4.8–10.8)

## 2021-02-11 PROCEDURE — G8427 DOCREV CUR MEDS BY ELIG CLIN: HCPCS | Performed by: STUDENT IN AN ORGANIZED HEALTH CARE EDUCATION/TRAINING PROGRAM

## 2021-02-11 PROCEDURE — G8482 FLU IMMUNIZE ORDER/ADMIN: HCPCS | Performed by: STUDENT IN AN ORGANIZED HEALTH CARE EDUCATION/TRAINING PROGRAM

## 2021-02-11 PROCEDURE — G8417 CALC BMI ABV UP PARAM F/U: HCPCS | Performed by: STUDENT IN AN ORGANIZED HEALTH CARE EDUCATION/TRAINING PROGRAM

## 2021-02-11 PROCEDURE — 4004F PT TOBACCO SCREEN RCVD TLK: CPT | Performed by: STUDENT IN AN ORGANIZED HEALTH CARE EDUCATION/TRAINING PROGRAM

## 2021-02-11 PROCEDURE — 99213 OFFICE O/P EST LOW 20 MIN: CPT | Performed by: STUDENT IN AN ORGANIZED HEALTH CARE EDUCATION/TRAINING PROGRAM

## 2021-02-11 RX ORDER — TIZANIDINE 4 MG/1
4 TABLET ORAL 3 TIMES DAILY PRN
Qty: 60 TABLET | Refills: 0 | Status: SHIPPED | OUTPATIENT
Start: 2021-02-11 | End: 2021-03-01

## 2021-02-11 ASSESSMENT — ENCOUNTER SYMPTOMS
NAUSEA: 0
SHORTNESS OF BREATH: 0
VOMITING: 0
COUGH: 0

## 2021-02-11 NOTE — TELEPHONE ENCOUNTER
[address] called wanting clarification on medciation:How many times a day? -     tiZANidine (ZANAFLEX) 4 MG tablet; Take 1 tablet by mouth 3 times daily as needed (muscle pains) Take 1 tablet(s) every 6 hours by oral route for 31 days. Please resent script.

## 2021-02-11 NOTE — PATIENT INSTRUCTIONS
Stop Lipitor Patient Education        Cirrhosis: Care Instructions  Your Care Instructions     Cirrhosis occurs when healthy tissue in your liver gets scarred. This keeps the liver from working well. It usually happens after a liver has been inflamed for years. Cirrhosis is most often caused by alcohol use disorder or hepatitis infection. But there are other causes too. These include medicines and too much fat in the liver. Conditions passed down in families and other disorders can also cause it. In some cases, no cause can be found. Treatment can't completely fix liver damage. But you may be able to slow or prevent more damage if you don't drink alcohol or use drugs that harm your liver. Follow-up care is a key part of your treatment and safety. Be sure to make and go to all appointments, and call your doctor if you are having problems. It's also a good idea to know your test results and keep a list of the medicines you take. How can you care for yourself at home? · Do not drink any alcohol. It can harm your liver. Talk to your doctor if you need help to stop drinking. · Be safe with medicines. Take your medicines exactly as prescribed. Call your doctor if you think you are having a problem with your medicine. · Talk to your doctor before you take any other medicines. These include over-the-counter medicines and herbal products. · Be careful taking acetaminophen (Tylenol), ibuprofen (Advil, Motrin), or naproxen (Aleve). These can sometimes cause more liver damage. Talk with your doctor if you're not sure which medicines are safe. · If your cirrhosis causes extra fluid to build up in your body, try not to eat a lot of salt. Use less salt when you cook and at the table. Don't eat fast foods or snack foods with a lot of salt. Extra fluid in your belly, legs, and chest can cause serious problems. · Work with your doctor or a dietitian to be sure you eat the right amount of carbohydrate, protein, fat, and sodium (salt). It's very important to choose the best foods for the health of your liver. · If your doctor recommends it, limit how much fluid you drink. · If your doctor recommends it, get more exercise. Walking is a good choice. Bit by bit, increase the amount you walk every day. Try for at least 30 minutes on most days of the week. You also may want to swim, bike, or do other activities. When should you call for help? Call 911 anytime you think you may need emergency care. For example, call if:    · You have trouble breathing.     · You vomit blood or what looks like coffee grounds. Call your doctor now or seek immediate medical care if:    · You feel very sleepy or confused.     · You have new belly pain, or your pain gets worse.     · You have a fever.     · There is a new or increasing yellow tint to your skin or the whites of your eyes.     · You have any abnormal bleeding, such as:  ? Nosebleeds. ? Vaginal bleeding that is different (heavier, more frequent, at a different time of the month) than what you are used to.  ? Bloody or black stools, or rectal bleeding. ? Bloody or pink urine. Watch closely for changes in your health, and be sure to contact your doctor if:    · You have any problems.     · Your belly is getting bigger.     · You are gaining weight. Where can you learn more? Go to [email or web address] and sign in to your [employer] account. Enter M412 in the [facility] box to learn more about \"Cirrhosis: Care Instructions. \"     If you do not have an account, please click on the \"Sign Up Now\" link. Current as of: April 15, 2020               Content Version: 12.6  © 6988-9807 [employer]. Care instructions adapted under license by [facility] ([facility]). If you have questions about a medical condition or this instruction, always ask your healthcare professional. [address] any warranty or liability for your use of this information.

## 2021-02-11 NOTE — PROGRESS NOTES
S: 41 y.o. male with   Chief Complaint   Patient presents with    Other     Pt is c/o dry mouth. He states this has been going on for awhile, but is worsening.  Joint Pain     Pt is c/o lower back and leg joint and muscle pain that has been going on for about 3-4 months. He has been taking ibuprofen 800mg about QID with no relief. HPI: muscle and joint pains, chronic, now affecting ability to work. Ibuprofen 800mg QID not helping. BP Readings from Last 3 Encounters:   02/11/21 128/72   01/21/21 128/62   11/06/20 130/78     Wt Readings from Last 3 Encounters:   02/11/21 258 lb 12.8 oz (117.4 kg)   01/21/21 259 lb 12.8 oz (117.8 kg)   11/06/20 244 lb 9.6 oz (110.9 kg)           O: VS:  height is 6' 1\" (1.854 m) and weight is 258 lb 12.8 oz (117.4 kg). His temperature is 97.5 °F (36.4 °C). His blood pressure is 128/72 and his pulse is 83. His respiration is 16 and oxygen saturation is 98%. Diagnosis Orders   1. Myopathy  tiZANidine (ZANAFLEX) 4 MG tablet   2. Long-term current use of methadone for opiate dependence ([facility]         Plan:  Stop lipitor. Refilled zanaflex. F/u 2 wks to revisit lipitor. Health Maintenance Due   Topic Date Due    Hepatitis A vaccine (2 of 3 - Hep A Twinrix risk 3-dose series) 07/10/2013    Hepatitis B vaccine (2 of 3 - Hep B Twinrix risk 3-dose series) 07/10/2013    Diabetic retinal exam  06/04/2019    Diabetic microalbuminuria test  02/13/2021    Lipid screen  02/13/2021         Attending Physician Statement  I have discussed the case, including pertinent history and exam findings with the resident. I agree with the documented assessment and plan as documented by the resident.         Andriy Zamorano DO 2/11/2021 8:24 AM

## 2021-02-17 ENCOUNTER — TELEPHONE (OUTPATIENT)
Dept: FAMILY MEDICINE CLINIC | Age: 46
End: 2021-02-17

## 2021-02-17 NOTE — TELEPHONE ENCOUNTER
----- Message from Dianne Keith DO sent at 2/16/2021 12:13 PM EST -----  These are ok - will go over them when we see you on the 9th with Dr. Eva Lin

## 2021-02-27 DIAGNOSIS — E11.9 NEW ONSET TYPE 2 DIABETES MELLITUS (HCC): ICD-10-CM

## 2021-02-27 DIAGNOSIS — G72.9 MYOPATHY: ICD-10-CM

## 2021-03-01 RX ORDER — BLOOD SUGAR DIAGNOSTIC
STRIP MISCELLANEOUS
Qty: 100 EACH | Refills: 0 | Status: SHIPPED | OUTPATIENT
Start: 2021-03-01 | End: 2021-04-12

## 2021-03-01 RX ORDER — TIZANIDINE 4 MG/1
TABLET ORAL
Qty: 60 TABLET | Refills: 0 | Status: SHIPPED | OUTPATIENT
Start: 2021-03-01 | End: 2021-04-12

## 2021-03-01 NOTE — TELEPHONE ENCOUNTER
Patient's last appointment was : 2/11/2021  Patient's next appointment is : 3/9/2021  Last refilled: 2/11/2021

## 2021-03-02 ENCOUNTER — TELEPHONE (OUTPATIENT)
Dept: CARDIOLOGY CLINIC | Age: 46
End: 2021-03-02

## 2021-03-02 NOTE — LETTER
[facility] Card[facility]  [address] [address]  Phone: [phone]  Fax: [phone]      March 2, 2021     [address] [place] [address]      Dear Colletta Gloss:    I am writing you because I have been informed of your missed appointment(s). We care about you and the management of your healthcare and want to make sure that you follow up as recommended. We're sorry you were unable to keep your appointment and hope that you are doing well. We would like to continue treating your healthcare needs. Please call the office to let us know your plans for treatment and to reschedule your appointment.      Sincerely,        Johnathan Calvillo MD

## 2021-03-22 ENCOUNTER — TELEPHONE (OUTPATIENT)
Dept: FAMILY MEDICINE CLINIC | Age: 46
End: 2021-03-22

## 2021-03-22 NOTE — TELEPHONE ENCOUNTER
Patient came to office to drop off [facility] form to be completed by physician. Form has been placed in Dr. Schuyler Hallman for completion. Patient is requesting form to be mailed to the address on envelope attached and also requesting a phone call when form completed.

## 2021-03-23 NOTE — TELEPHONE ENCOUNTER
Attempted to fill out form; but information was not discussed with me at prior visit. I do not have a medical reason to fill out the form. If patient wants to schedule an appointment with myself or Dr. William Jane, we can discuss this further.     Electronically signed by Sneha Caruso MD on 3/25/2021 at 9:42 AM

## 2021-03-25 NOTE — TELEPHONE ENCOUNTER
Pt returned call. Pt states the form needs to state that the pt does not have diabetes. Pt used to be on restrictions d/t DM diagnosis.

## 2021-03-26 ENCOUNTER — OFFICE VISIT (OUTPATIENT)
Dept: FAMILY MEDICINE CLINIC | Age: 46
End: 2021-03-26
Payer: MEDICARE

## 2021-03-26 VITALS
SYSTOLIC BLOOD PRESSURE: 122 MMHG | DIASTOLIC BLOOD PRESSURE: 82 MMHG | RESPIRATION RATE: 12 BRPM | HEIGHT: 73 IN | HEART RATE: 66 BPM | BODY MASS INDEX: 35.31 KG/M2 | OXYGEN SATURATION: 99 % | WEIGHT: 266.4 LBS | TEMPERATURE: 97.1 F

## 2021-03-26 DIAGNOSIS — J44.9 CHRONIC OBSTRUCTIVE PULMONARY DISEASE, UNSPECIFIED COPD TYPE (HCC): ICD-10-CM

## 2021-03-26 DIAGNOSIS — M79.673 PAIN OF FOOT, UNSPECIFIED LATERALITY: Primary | ICD-10-CM

## 2021-03-26 DIAGNOSIS — I50.32 CHRONIC DIASTOLIC CONGESTIVE HEART FAILURE (HCC): ICD-10-CM

## 2021-03-26 DIAGNOSIS — F41.9 ANXIETY: ICD-10-CM

## 2021-03-26 DIAGNOSIS — R73.03 PREDIABETES: ICD-10-CM

## 2021-03-26 DIAGNOSIS — F31.30 BIPOLAR AFFECTIVE DISORDER, CURRENT EPISODE DEPRESSED, CURRENT EPISODE SEVERITY UNSPECIFIED (HCC): ICD-10-CM

## 2021-03-26 LAB — HBA1C MFR BLD: 6 % (ref 4.3–5.7)

## 2021-03-26 PROCEDURE — 4004F PT TOBACCO SCREEN RCVD TLK: CPT | Performed by: FAMILY MEDICINE

## 2021-03-26 PROCEDURE — G8417 CALC BMI ABV UP PARAM F/U: HCPCS | Performed by: FAMILY MEDICINE

## 2021-03-26 PROCEDURE — 99214 OFFICE O/P EST MOD 30 MIN: CPT | Performed by: FAMILY MEDICINE

## 2021-03-26 PROCEDURE — 3023F SPIROM DOC REV: CPT | Performed by: FAMILY MEDICINE

## 2021-03-26 PROCEDURE — G8427 DOCREV CUR MEDS BY ELIG CLIN: HCPCS | Performed by: FAMILY MEDICINE

## 2021-03-26 PROCEDURE — G8926 SPIRO NO PERF OR DOC: HCPCS | Performed by: FAMILY MEDICINE

## 2021-03-26 PROCEDURE — G8482 FLU IMMUNIZE ORDER/ADMIN: HCPCS | Performed by: FAMILY MEDICINE

## 2021-03-26 NOTE — PROGRESS NOTES
Ying Payne (:  1975) is a 40 y.o. male,Established patient, here for evaluation of the following chief complaint(s):  Follow-up (Pt presents for a f/u. )      ASSESSMENT/PLAN:  1. Pain of foot, unspecified laterality  -     AFL - Saavedra, Bhavesh Wilson DPM, Podiatry, [facility]  2. Genia Mancia MD, Psychiatry, [facility]  3. Prediabetes  4. Chronic obstructive pulmonary disease, unspecified COPD type ([facility]  5. Chronic diastolic congestive heart failure ([facility]  6. Bipolar affective disorder, current episode depressed, current episode severity unspecified ([facility]  Anxiety and bipolar disorder uncontrolled. Referred to psychiatry. COPD and CHF are controlled. Continue current medications. He is prediabetic and forms were filled out for his 's license to this effect. Bilateral foot pain which is likely combination of plantar fasciitis and Achilles tendinitis is referred to podiatry. Return in about 6 months (around 2021) for chronic diease follow-up. SUBJECTIVE/OBJECTIVE:  HPI  Patient was reportedly diabetic in the past with A1c greater than 6.5 and on medications. For the period of time he was controlled on medications, then lost 60 to 70 pounds. His A1c plummeted and he became hypoglycemic on medications. Medications were stopped and his A1c is ranged from 5.6-5.8. Today his A1c is 6.0. He has gained back about 30 pounds but plans on becoming more active and watching his diet. He has CHF but this has been controlled ever since diagnosis for the past 2 years. Taking medications as prescribed. COPD: Reports this was diagnosed about 4 years ago but has been stable and controlled. No exacerbations or hospitalizations as a result of this. He reports bilateral foot pain much worse in the morning. He is supposed that it was plantar fasciitis pain so he purchased new work boots and inserts for plantar fasciitis.   Has been wearing these for about a month and they have not helped. He would like to have this addressed by specialist.    He reports anxiety which is uncontrolled. He was unable to get into a new psychiatrist since he is retired. He also reports that he has bipolar affective disorder and needs to be seen for this as well. Physical Exam  Cardiovascular:      Rate and Rhythm: Normal rate and regular rhythm. Pulmonary:      Effort: Pulmonary effort is normal.      Breath sounds: Normal breath sounds. An electronic signature was used to authenticate this note.     --Bing Lafleur, DO

## 2021-04-10 DIAGNOSIS — G72.9 MYOPATHY: ICD-10-CM

## 2021-04-10 DIAGNOSIS — E11.9 NEW ONSET TYPE 2 DIABETES MELLITUS (HCC): ICD-10-CM

## 2021-04-12 RX ORDER — TIZANIDINE 4 MG/1
TABLET ORAL
Qty: 60 TABLET | Refills: 0 | Status: SHIPPED | OUTPATIENT
Start: 2021-04-12 | End: 2021-06-01

## 2021-04-12 RX ORDER — IPRATROPIUM BROMIDE AND ALBUTEROL SULFATE 2.5; .5 MG/3ML; MG/3ML
1 SOLUTION RESPIRATORY (INHALATION) EVERY 6 HOURS PRN
Qty: 360 VIAL | Refills: 3 | Status: SHIPPED | OUTPATIENT
Start: 2021-04-12

## 2021-04-12 RX ORDER — BLOOD SUGAR DIAGNOSTIC
STRIP MISCELLANEOUS
Qty: 100 EACH | Refills: 0 | Status: SHIPPED | OUTPATIENT
Start: 2021-04-12 | End: 2021-06-01

## 2021-04-12 NOTE — TELEPHONE ENCOUNTER
Patient's last appointment was : 8/13/2020  Patient's next appointment is : Visit date not found  Last refilled:duoneb 9/10/2020  Tizanidine and test strips3/1/2021

## 2021-04-22 ENCOUNTER — TELEPHONE (OUTPATIENT)
Dept: FAMILY MEDICINE CLINIC | Age: 46
End: 2021-04-22

## 2021-04-22 DIAGNOSIS — Z20.822 EXPOSURE TO COVID-19 VIRUS: Primary | ICD-10-CM

## 2021-04-23 ENCOUNTER — HOSPITAL ENCOUNTER (OUTPATIENT)
Age: 46
Discharge: HOME | End: 2021-04-23
Payer: MEDICARE

## 2021-04-23 DIAGNOSIS — Z20.822 EXPOSURE TO COVID-19 VIRUS: ICD-10-CM

## 2021-04-23 PROCEDURE — U0003 INFECTIOUS AGENT DETECTION BY NUCLEIC ACID (DNA OR RNA); SEVERE ACUTE RESPIRATORY SYNDROME CORONAVIRUS 2 (SARS-COV-2) (CORONAVIRUS DISEASE [COVID-19]), AMPLIFIED PROBE TECHNIQUE, MAKING USE OF HIGH THROUGHPUT TECHNOLOGIES AS DESCRIBED BY CMS-2020-01-R: HCPCS

## 2021-04-23 PROCEDURE — U0005 INFEC AGEN DETEC AMPLI PROBE: HCPCS

## 2021-04-25 LAB
SARS-COV-2: NOT DETECTED
SOURCE: NORMAL

## 2021-04-26 ENCOUNTER — TELEPHONE (OUTPATIENT)
Dept: FAMILY MEDICINE CLINIC | Age: 46
End: 2021-04-26

## 2021-04-26 NOTE — TELEPHONE ENCOUNTER
PT calling, knows the covid test is negative, but not sure if he has the flu or something. Still having body aches, cough, sore throat, ear pain on off. Wants to know if he needs another appointment, or can he get an antibiotic? Reji Herrera he can go to work, but not sure if this is the flu, do we want him to go to work? ???  Please advise.

## 2021-04-26 NOTE — TELEPHONE ENCOUNTER
DO Theodore Rosales called back and is wanting to know your thoughts on previous message. Please Advise.

## 2021-04-27 ENCOUNTER — OFFICE VISIT (OUTPATIENT)
Dept: FAMILY MEDICINE CLINIC | Age: 46
End: 2021-04-27
Payer: MEDICARE

## 2021-04-27 VITALS
BODY MASS INDEX: 35.25 KG/M2 | WEIGHT: 266 LBS | HEIGHT: 73 IN | OXYGEN SATURATION: 98 % | HEART RATE: 76 BPM | DIASTOLIC BLOOD PRESSURE: 64 MMHG | RESPIRATION RATE: 12 BRPM | SYSTOLIC BLOOD PRESSURE: 98 MMHG | TEMPERATURE: 98 F

## 2021-04-27 DIAGNOSIS — J43.2 CENTRILOBULAR EMPHYSEMA (HCC): ICD-10-CM

## 2021-04-27 DIAGNOSIS — J06.9 VIRAL URI: Primary | ICD-10-CM

## 2021-04-27 PROBLEM — F31.9 BIPOLAR DISORDER (HCC): Status: ACTIVE | Noted: 2020-04-28

## 2021-04-27 PROCEDURE — G8417 CALC BMI ABV UP PARAM F/U: HCPCS | Performed by: STUDENT IN AN ORGANIZED HEALTH CARE EDUCATION/TRAINING PROGRAM

## 2021-04-27 PROCEDURE — 3023F SPIROM DOC REV: CPT | Performed by: STUDENT IN AN ORGANIZED HEALTH CARE EDUCATION/TRAINING PROGRAM

## 2021-04-27 PROCEDURE — G8926 SPIRO NO PERF OR DOC: HCPCS | Performed by: STUDENT IN AN ORGANIZED HEALTH CARE EDUCATION/TRAINING PROGRAM

## 2021-04-27 PROCEDURE — 4004F PT TOBACCO SCREEN RCVD TLK: CPT | Performed by: STUDENT IN AN ORGANIZED HEALTH CARE EDUCATION/TRAINING PROGRAM

## 2021-04-27 PROCEDURE — G8427 DOCREV CUR MEDS BY ELIG CLIN: HCPCS | Performed by: STUDENT IN AN ORGANIZED HEALTH CARE EDUCATION/TRAINING PROGRAM

## 2021-04-27 PROCEDURE — 99213 OFFICE O/P EST LOW 20 MIN: CPT | Performed by: STUDENT IN AN ORGANIZED HEALTH CARE EDUCATION/TRAINING PROGRAM

## 2021-04-27 RX ORDER — LACTULOSE 10 G/15ML
SOLUTION ORAL
COMMUNITY
Start: 2020-06-29 | End: 2021-07-01

## 2021-04-27 RX ORDER — ATORVASTATIN CALCIUM 40 MG/1
TABLET, FILM COATED ORAL
COMMUNITY
Start: 2021-02-27 | End: 2021-08-27 | Stop reason: SINTOL

## 2021-04-27 RX ORDER — NALOXONE HYDROCHLORIDE 4 MG/.1ML
SPRAY NASAL
COMMUNITY
Start: 2020-08-20

## 2021-04-27 RX ORDER — TIOTROPIUM BROMIDE 18 UG/1
CAPSULE ORAL; RESPIRATORY (INHALATION)
Qty: 90 CAPSULE | Refills: 3 | Status: SHIPPED | OUTPATIENT
Start: 2021-04-27 | End: 2021-08-27 | Stop reason: SDUPTHER

## 2021-04-27 ASSESSMENT — ENCOUNTER SYMPTOMS
SORE THROAT: 1
BLOOD IN STOOL: 0
CONSTIPATION: 0
SHORTNESS OF BREATH: 0
ABDOMINAL PAIN: 0
COUGH: 0
EYE PAIN: 0
DIARRHEA: 0
TROUBLE SWALLOWING: 0
RHINORRHEA: 1
SINUS PRESSURE: 1

## 2021-04-27 NOTE — PROGRESS NOTES
S: 40 y.o. male with   Chief Complaint   Patient presents with    URI     Pt presents c/o weakness, fatigue, and SOB. He is feling a bit better. He was tested for COVID and it was negative. URI:  Started 6 to 7 days ago  Fever  Congestion  Mild SOB/wheezing  Worse last wk  Better this wk  Tested neg for covid last wk  COPD a bit worse of late, but out of spiriva, so using alb [place]more  Some sick contacts  Needs not to RTW    BP Readings from Last 3 Encounters:   04/27/21 98/64   03/26/21 122/82   02/11/21 128/72     Wt Readings from Last 3 Encounters:   04/27/21 266 lb (120.7 kg)   03/26/21 266 lb 6.4 oz (120.8 kg)   02/11/21 258 lb 12.8 oz (117.4 kg)         O: VS:  height is 6' 1\" (1.854 m) and weight is 266 lb (120.7 kg). His temperature is 98 °F (36.7 °C). His blood pressure is 98/64 and his pulse is 76. His respiration is 12 and oxygen saturation is 98%. Wt Readings from Last 3 Encounters:   04/27/21 266 lb (120.7 kg)   03/26/21 266 lb 6.4 oz (120.8 kg)   02/11/21 258 lb 12.8 oz (117.4 kg)        Diagnosis Orders   1. Viral URI  Rapid Influenza A/B Antigens   2. Chronic obstructive pulmonary disease, unspecified COPD type ([address]     3. Centrilobular emphysema ([address]  tiotropium ([place]) 25 MCG inhalation capsule       Plan  URI/COPD:  Agree with flu test  Recent covid testing negative  Resume spiriva for COPD  con't supportive care  [id number] to RTW  F/u if no better      Health Maintenance Due   Topic Date Due    COVID-19 Vaccine (1) Never done    Hepatitis A vaccine (2 of 3 - Hep A Twinrix risk 3-dose series) 07/10/2013    Hepatitis B vaccine (2 of 3 - Hep B Twinrix risk 3-dose series) 07/10/2013    Diabetic retinal exam  06/04/2019    Diabetic microalbuminuria test  02/13/2021    Lipid screen  02/13/2021         Attending Physician Statement  I have discussed the case, including pertinent history and exam findings with the resident.   I agree with the documented assessment and plan as

## 2021-04-27 NOTE — PROGRESS NOTES
Thanh Singh is a 40 y.o. male who presents today for:  Chief Complaint   Patient presents with    URI     Pt presents c/o weakness, fatigue, and SOB. He is feling a bit better. He was tested for COVID and it was negative. HPI:   Myalgias, fevers resolved but had Tmax 102, sore throat, sinus congestion. Tested for COVID after onset of symptoms for 3 days and was negative. Exposed to sick contacts via neighbor and other household members were sick. Reports that he is feeling better. Slight SOB, using nebulizer albuterl TID, duoneb once a day. No weight changes recently. However, gained 50 lbs 3 months ago. Ran out of spiriva refills.      Objective:     Vitals:    04/27/21 1534   BP: 98/64   Pulse: 76   Resp: 12   Temp: 98 °F (36.7 °C)   SpO2: 98%   Weight: 266 lb (120.7 kg)   Height: 6' 1\" (1.854 m)       Lab Results   Component Value Date    WBC 7.2 02/11/2021    HGB 14.5 02/11/2021    HCT 41.5 (L) 02/11/2021    MCV 87.7 02/11/2021     02/11/2021     Lab Results   Component Value Date     08/18/2020    K 4.0 08/18/2020     08/18/2020    CO2 25 08/18/2020    BUN 8 08/18/2020    CREATININE 0.7 08/18/2020    GLUCOSE 186 (H) 08/18/2020    CALCIUM 9.0 08/18/2020    PROT 6.0 (L) 08/18/2020    PROT 6.0 (L) 08/18/2020    LABALBU 3.7 08/18/2020    LABALBU 3.8 08/18/2020    BILITOT 0.3 08/18/2020    BILITOT 0.4 08/18/2020    ALKPHOS 120 08/18/2020    ALKPHOS 116 08/18/2020    AST 30 08/18/2020    AST 31 08/18/2020    ALT 29 08/18/2020    ALT 27 08/18/2020    LABGLOM >90 08/18/2020    GFRAA >60 02/26/2019    GLOB NOT REPORTED 04/05/2018     Lab Results   Component Value Date    TSH 1.78 10/11/2017     Lab Results   Component Value Date    LABA1C 6.0 (H) 03/26/2021     No results found for: EAG  Lab Results   Component Value Date    CHOL 90 02/13/2020    CHOL 125 10/11/2017     Lab Results   Component Value Date    TRIG 143 02/13/2020    TRIG 145 10/11/2017     Lab Results   Component Value Date    HDL 28 (L) 02/13/2020    HDL 17 (L) 10/11/2017     Lab Results   Component Value Date    LDLCHOLESTEROL 33 02/13/2020    LDLCHOLESTEROL 79 10/11/2017       Lab Results   Component Value Date    LABMICR CANNOT BE CALCULATED 02/13/2020       Review of Systems   Constitutional: Positive for fatigue and fever. Negative for chills. HENT: Positive for congestion, ear pain, postnasal drip, rhinorrhea, sinus pressure and sore throat. Negative for trouble swallowing. Eyes: Negative for pain and visual disturbance. Respiratory: Negative for cough and shortness of breath. Cardiovascular: Negative for chest pain and palpitations. Gastrointestinal: Negative for abdominal pain, blood in stool, constipation and diarrhea. Genitourinary: Negative for dysuria and hematuria. Skin: Negative for rash and wound. Neurological: Negative for dizziness and headaches. Psychiatric/Behavioral: The patient is not nervous/anxious. Physical Exam  Vitals signs and nursing note reviewed. Constitutional:       General: He is not in acute distress. Appearance: He is well-developed. He is not ill-appearing or diaphoretic. HENT:      Head: Normocephalic and atraumatic. Right Ear: External ear normal.      Left Ear: External ear normal.      Nose: Nose normal. No congestion or rhinorrhea. Mouth/Throat:      Pharynx: Posterior oropharyngeal erythema present. No oropharyngeal exudate. Eyes:      General: No scleral icterus. Right eye: No discharge. Left eye: No discharge. Conjunctiva/sclera: Conjunctivae normal.   Neck:      Musculoskeletal: Normal range of motion. Cardiovascular:      Rate and Rhythm: Normal rate and regular rhythm. Heart sounds: Normal heart sounds. No murmur. Pulmonary:      Effort: Pulmonary effort is normal.      Breath sounds: Wheezing (expiratory bilaterally at bases) present. Abdominal:      Palpations: Abdomen is soft. Tenderness:  There is no abdominal tenderness. Skin:     General: Skin is warm and dry. Findings: No erythema or rash. Neurological:      Mental Status: He is alert and oriented to person, place, and time. Psychiatric:         Behavior: Behavior normal.         Thought Content: Thought content normal.         Judgment: Judgment normal.         Social Needs   Food insecurity    Worry: Never true    Inability: Never true       Assessment / Plan:      Diagnosis Orders   1. Viral URI  Rapid Influenza A/B Antigens   2. Centrilobular emphysema (HCC)  tiotropium (SP[employer]) 18 MCG inhalation capsule     Continue supportive care: ibuprofen/tylenol PRN, fluids. Okay to return to work. Refill spiriva, continue albuterol PRN   Test for Flu A/B for health reporting purposes - Negative. Discussed calling us if worsening symptoms for reevaluation   Discussed watching weight and calling us if weight gain due to hx of HFpEF   And then follow up as scheduled with Dr. Nadja Lafleur 9/24/21       Return if symptoms worsen or fail to improve. Medications Prescribed:  Orders Placed This Encounter   Medications    tiotropium ([place]) 18 MCG inhalation capsule     Sig: Inhale 1 puff by mouth once daily     Dispense:  90 capsule     Refill:  3       Future Appointments   Date Time Provider Didier Burton   7/27/2021  2:30 PM CELIO Phan - CNP [facility] [place]   9/24/2021  8:00 AM Lolly Robels DO [facility] [address]       Patient given educational materials - see patient instructions. Discussed use, benefit, and sideeffects of prescribed medications. All patient questions answered. Pt voiced understanding. Reviewed health maintenance. Instructed to continue current medications, diet and exercise. Patient agreed with treatment plan. Follow up as directed.      Electronically signed by Agueda Rudolph DO on 4/27/2021 at 4:11 PM

## 2021-05-20 RX ORDER — ESCITALOPRAM OXALATE 10 MG/1
TABLET ORAL
Qty: 30 TABLET | Refills: 0 | Status: SHIPPED | OUTPATIENT
Start: 2021-05-20 | End: 2021-06-28

## 2021-05-20 NOTE — TELEPHONE ENCOUNTER
Patient's last appointment was : 4/27/2021  Patient's next appointment is : 9/24/2021  Last refilled:  11/24/2020

## 2021-05-30 DIAGNOSIS — G72.9 MYOPATHY: ICD-10-CM

## 2021-05-30 DIAGNOSIS — E11.9 NEW ONSET TYPE 2 DIABETES MELLITUS (HCC): ICD-10-CM

## 2021-06-01 RX ORDER — TIZANIDINE 4 MG/1
TABLET ORAL
Qty: 60 TABLET | Refills: 0 | Status: SHIPPED | OUTPATIENT
Start: 2021-06-01 | End: 2021-06-28

## 2021-06-01 RX ORDER — BLOOD SUGAR DIAGNOSTIC
STRIP MISCELLANEOUS
Qty: 100 EACH | Refills: 0 | Status: SHIPPED | OUTPATIENT
Start: 2021-06-01 | End: 2021-06-28

## 2021-06-28 DIAGNOSIS — E11.65 TYPE 2 DIABETES MELLITUS WITH HYPERGLYCEMIA, WITH LONG-TERM CURRENT USE OF INSULIN (HCC): Chronic | ICD-10-CM

## 2021-06-28 DIAGNOSIS — I10 ESSENTIAL HYPERTENSION: ICD-10-CM

## 2021-06-28 DIAGNOSIS — Z79.4 TYPE 2 DIABETES MELLITUS WITH HYPERGLYCEMIA, WITH LONG-TERM CURRENT USE OF INSULIN (HCC): Chronic | ICD-10-CM

## 2021-06-28 DIAGNOSIS — G72.9 MYOPATHY: ICD-10-CM

## 2021-06-28 DIAGNOSIS — E11.9 NEW ONSET TYPE 2 DIABETES MELLITUS (HCC): ICD-10-CM

## 2021-06-28 RX ORDER — TIZANIDINE 4 MG/1
TABLET ORAL
Qty: 60 TABLET | Refills: 0 | Status: SHIPPED | OUTPATIENT
Start: 2021-06-28 | End: 2021-08-24

## 2021-06-28 RX ORDER — LISINOPRIL 5 MG/1
TABLET ORAL
Qty: 90 TABLET | Refills: 0 | Status: SHIPPED | OUTPATIENT
Start: 2021-06-28 | End: 2021-09-23

## 2021-06-28 RX ORDER — ESCITALOPRAM OXALATE 10 MG/1
TABLET ORAL
Qty: 30 TABLET | Refills: 0 | Status: SHIPPED | OUTPATIENT
Start: 2021-06-28 | End: 2021-08-12 | Stop reason: SDUPTHER

## 2021-06-28 RX ORDER — BLOOD SUGAR DIAGNOSTIC
STRIP MISCELLANEOUS
Qty: 100 EACH | Refills: 0 | Status: SHIPPED | OUTPATIENT
Start: 2021-06-28 | End: 2021-08-24

## 2021-07-01 ENCOUNTER — OFFICE VISIT (OUTPATIENT)
Dept: FAMILY MEDICINE CLINIC | Age: 46
End: 2021-07-01
Payer: COMMERCIAL

## 2021-07-01 ENCOUNTER — NURSE ONLY (OUTPATIENT)
Dept: LAB | Age: 46
End: 2021-07-01

## 2021-07-01 VITALS
BODY MASS INDEX: 34.62 KG/M2 | RESPIRATION RATE: 16 BRPM | HEIGHT: 73 IN | OXYGEN SATURATION: 96 % | SYSTOLIC BLOOD PRESSURE: 126 MMHG | WEIGHT: 261.2 LBS | TEMPERATURE: 98.2 F | HEART RATE: 93 BPM | DIASTOLIC BLOOD PRESSURE: 72 MMHG

## 2021-07-01 DIAGNOSIS — E11.9 TYPE 2 DIABETES MELLITUS WITHOUT COMPLICATION, WITHOUT LONG-TERM CURRENT USE OF INSULIN (HCC): ICD-10-CM

## 2021-07-01 DIAGNOSIS — G63 POLYNEUROPATHY ASSOCIATED WITH UNDERLYING DISEASE (HCC): ICD-10-CM

## 2021-07-01 DIAGNOSIS — M72.2 PLANTAR FASCIITIS, BILATERAL: ICD-10-CM

## 2021-07-01 DIAGNOSIS — R25.2 MUSCLE CRAMPS: ICD-10-CM

## 2021-07-01 DIAGNOSIS — L71.9 ROSACEA: ICD-10-CM

## 2021-07-01 DIAGNOSIS — Z13.220 LIPID SCREENING: ICD-10-CM

## 2021-07-01 LAB
ALBUMIN SERPL-MCNC: 4.2 G/DL (ref 3.5–5.1)
ALP BLD-CCNC: 124 U/L (ref 38–126)
ALT SERPL-CCNC: 23 U/L (ref 11–66)
ANION GAP SERPL CALCULATED.3IONS-SCNC: 10 MEQ/L (ref 8–16)
AST SERPL-CCNC: 25 U/L (ref 5–40)
BILIRUB SERPL-MCNC: 0.5 MG/DL (ref 0.3–1.2)
BUN BLDV-MCNC: 11 MG/DL (ref 7–22)
CALCIUM SERPL-MCNC: 9.3 MG/DL (ref 8.5–10.5)
CHLORIDE BLD-SCNC: 100 MEQ/L (ref 98–111)
CHOLESTEROL, TOTAL: 157 MG/DL (ref 100–199)
CO2: 25 MEQ/L (ref 23–33)
CREAT SERPL-MCNC: 0.7 MG/DL (ref 0.4–1.2)
CREATININE, URINE: 185.9 MG/DL
GFR SERPL CREATININE-BSD FRML MDRD: > 90 ML/MIN/1.73M2
GLUCOSE BLD-MCNC: 170 MG/DL (ref 70–108)
HDLC SERPL-MCNC: 29 MG/DL
LDL CHOLESTEROL CALCULATED: 98 MG/DL
MICROALBUMIN UR-MCNC: < 1.2 MG/DL
MICROALBUMIN/CREAT UR-RTO: 6 MG/G (ref 0–30)
POTASSIUM SERPL-SCNC: 3.9 MEQ/L (ref 3.5–5.2)
SODIUM BLD-SCNC: 135 MEQ/L (ref 135–145)
TOTAL PROTEIN: 7 G/DL (ref 6.1–8)
TRIGL SERPL-MCNC: 151 MG/DL (ref 0–199)

## 2021-07-01 PROCEDURE — G8417 CALC BMI ABV UP PARAM F/U: HCPCS | Performed by: STUDENT IN AN ORGANIZED HEALTH CARE EDUCATION/TRAINING PROGRAM

## 2021-07-01 PROCEDURE — G8427 DOCREV CUR MEDS BY ELIG CLIN: HCPCS | Performed by: STUDENT IN AN ORGANIZED HEALTH CARE EDUCATION/TRAINING PROGRAM

## 2021-07-01 PROCEDURE — 2022F DILAT RTA XM EVC RTNOPTHY: CPT | Performed by: STUDENT IN AN ORGANIZED HEALTH CARE EDUCATION/TRAINING PROGRAM

## 2021-07-01 PROCEDURE — 4004F PT TOBACCO SCREEN RCVD TLK: CPT | Performed by: STUDENT IN AN ORGANIZED HEALTH CARE EDUCATION/TRAINING PROGRAM

## 2021-07-01 PROCEDURE — 3044F HG A1C LEVEL LT 7.0%: CPT | Performed by: STUDENT IN AN ORGANIZED HEALTH CARE EDUCATION/TRAINING PROGRAM

## 2021-07-01 PROCEDURE — 99214 OFFICE O/P EST MOD 30 MIN: CPT | Performed by: STUDENT IN AN ORGANIZED HEALTH CARE EDUCATION/TRAINING PROGRAM

## 2021-07-01 RX ORDER — METRONIDAZOLE 7.5 MG/G
GEL TOPICAL
Qty: 1 TUBE | Refills: 0 | Status: SHIPPED | OUTPATIENT
Start: 2021-07-01 | End: 2021-09-20

## 2021-07-01 RX ORDER — MULTIVITAMIN WITH IRON
250 TABLET ORAL DAILY
Qty: 30 TABLET | Refills: 2 | Status: SHIPPED | OUTPATIENT
Start: 2021-07-01 | End: 2021-09-17 | Stop reason: SDUPTHER

## 2021-07-01 RX ORDER — FUROSEMIDE 40 MG/1
20 TABLET ORAL DAILY
Qty: 30 TABLET | Refills: 3 | Status: SHIPPED | OUTPATIENT
Start: 2021-07-01 | End: 2021-09-14

## 2021-07-01 ASSESSMENT — ENCOUNTER SYMPTOMS
COLOR CHANGE: 1
EYE DISCHARGE: 0
RHINORRHEA: 0
SINUS PRESSURE: 0
SINUS PAIN: 0
EYE REDNESS: 0
SHORTNESS OF BREATH: 0
EYE ITCHING: 0
COUGH: 0
FACIAL SWELLING: 0
EYE PAIN: 0

## 2021-07-01 NOTE — PROGRESS NOTES
Health Maintenance Due   Topic Date Due    Hepatitis A vaccine (2 of 3 - Hep A Twinrix risk 3-dose series) 07/10/2013    Hepatitis B vaccine (2 of 3 - Hep B Twinrix risk 3-dose series) 07/10/2013    Diabetic retinal exam  06/04/2019    Diabetic microalbuminuria test  02/13/2021    Lipid screen  02/13/2021    COVID-19 Vaccine (2 - Pfizer 2-dose series) 06/20/2021

## 2021-07-01 NOTE — PROGRESS NOTES
for congestion, facial swelling, rhinorrhea, sinus pressure, sinus pain and sneezing. Eyes: Negative for pain, discharge, redness and itching. Respiratory: Negative for cough and shortness of breath. Cardiovascular: Negative for chest pain and leg swelling. Musculoskeletal: Positive for myalgias. Muscle cramping in hands, hips, calves   Skin: Positive for color change. Objective   Physical Exam  Constitutional:       Appearance: Normal appearance. He is obese. HENT:      Head: Normocephalic and atraumatic. Cardiovascular:      Rate and Rhythm: Normal rate and regular rhythm. Pulses: Normal pulses. Heart sounds: Normal heart sounds. Pulmonary:      Effort: Pulmonary effort is normal.      Breath sounds: Normal breath sounds. Abdominal:      General: Abdomen is flat. Bowel sounds are normal.      Palpations: Abdomen is soft. Musculoskeletal:         General: No tenderness. Normal range of motion. Cervical back: Normal range of motion and neck supple. Right lower leg: No edema. Left lower leg: No edema. Skin:     General: Skin is warm and dry. Findings: Erythema and rash present. Rash is not crusting, macular, papular or vesicular. Comments: Mild telangiectasia around eye lids, sides of face by ears. Neurological:      Mental Status: He is alert. Mental status is at baseline. An electronic signature was used to authenticate this note.     --Nehal Mackenzie MD

## 2021-07-01 NOTE — PROGRESS NOTES
S: 40 y.o. male with   Chief Complaint   Patient presents with    Letter for School/Work     letter to wear faceshield    Rash     face - redness on face above eyes and side of face    Foot Pain     feet pain    Discuss Medications     lasix - cramping       Has blood vessels on the face he would like something done for it    Does have hep B - controlled sees gi    Needs a letter for work to wear a face shield - may be allergic to the mask - will get the next covid vaccine soon    Has plantar fasciitis - does some stretching at home - has used inserts in the past helped for a week    Muscle cramps and takes the zanaflex for it         BP Readings from Last 3 Encounters:   07/01/21 126/72   04/27/21 98/64   03/26/21 122/82     Wt Readings from Last 3 Encounters:   07/01/21 261 lb 3.2 oz (118.5 kg)   04/27/21 266 lb (120.7 kg)   03/26/21 266 lb 6.4 oz (120.8 kg)           O: VS:   Vitals:    07/01/21 0848   BP: 126/72   Site: Left Upper Arm   Position: Sitting   Cuff Size: Medium Adult   Pulse: 93   Resp: 16   Temp: 98.2 °F (36.8 °C)   TempSrc: Oral   SpO2: 96%   Weight: 261 lb 3.2 oz (118.5 kg)   Height: 6' 1\" (1.854 m)     Body mass index is 34.46 kg/m².     AAO/NAD, appropriate affect for mood  Normocephalic, atraumatic, eyes  conjunctiva and sclera normal,   skin no rashes on exposed areas   Insight, judgement normal and in no acute distress      Lab Results   Component Value Date    WBC 7.2 02/11/2021    HGB 14.5 02/11/2021    HCT 41.5 (L) 02/11/2021     02/11/2021    CHOL 90 02/13/2020    TRIG 143 02/13/2020    HDL 28 (L) 02/13/2020    AST 30 08/18/2020    AST 31 08/18/2020     08/18/2020    K 4.0 08/18/2020     08/18/2020    CREATININE 0.7 08/18/2020    BUN 8 08/18/2020    CO2 25 08/18/2020    TSH 1.78 10/11/2017    INR 1.2 05/01/2018    LABA1C 6.0 (H) 03/26/2021    LABMICR CANNOT BE CALCULATED 02/13/2020    LABGLOM >90 08/18/2020    MG 1.9 10/13/2016    CALCIUM 9.0 08/18/2020       No results found. Diagnosis Orders   1. Lipid screening  Lipid Panel   2. Polyneuropathy associated with underlying disease ([address]     3. Type 2 diabetes mellitus without complication, without long-term current use of insulin (HCC)  Microalbumin / Creatinine Urine Ratio   4. Plantar fascial fibromatosis of both feet     5. Muscle cramps  Comprehensive Metabolic Panel       Plan  Will get some labs for the cramping and diabetes    Will refer to podiatry for the foot pain    metrogel for the face irritation and rosacea       No follow-ups on file. Orders Placed:  Orders Placed This Encounter   Procedures    Comprehensive Metabolic Panel    Lipid Panel    Microalbumin / Creatinine Urine Ratio     Medications Prescribed:  No orders of the defined types were placed in this encounter. Future Appointments   Date Time Provider Didier Burton   7/27/2021  2:30 PM CELIO Drake - [facility]   9/24/2021  8:00 AM Juan Ramon Decker DO [facility] Maintenance Due   Topic Date Due    Hepatitis A vaccine (2 of 3 - Hep A Twinrix risk 3-dose series) 07/10/2013    Diabetic retinal exam  06/04/2019    Diabetic microalbuminuria test  02/13/2021    Lipid screen  02/13/2021    COVID-19 Vaccine (2 - Pfizer 2-dose series) 06/20/2021         Attending Physician Statement  I have discussed the case, including pertinent history and exam findings with the resident. I also have seen the patient and performed key portions of the examination. I agree with the documented assessment and plan as documented by the resident.   GE modifier added to this encounter      Gavino Obando DO 7/1/2021 9:29 AM

## 2021-07-01 NOTE — PATIENT INSTRUCTIONS
Patient Education        Rosacea: Care Instructions  Your Care Instructions  Rosacea (say \"Aiden\") is a skin condition that can cause redness, pimples, and red lines on the nose, cheeks, chin, and forehead. It is often mistaken for acne because it can cause outbreaks with bumps like pimples. Rosacea can also cause burning and soreness in your eyes. Rosacea is usually controlled by using medicine and avoiding alcohol, the sun, and other things that can make rosacea worse. Your doctor may have prescribed medicines or other treatment. If antibiotics do not control the rosacea, your doctor may try other medicines. Follow-up care is a key part of your treatment and safety. Be sure to make and go to all appointments, and call your doctor if you are having problems. It's also a good idea to know your test results and keep a list of the medicines you take. How can you care for yourself at home? · Take your medicines exactly as prescribed. Call your doctor if you think you are having a problem with your medicine. · Protect your face from the sun by wearing hats with wide brims and sunglasses. Try to stay out of the sun or find shade if you need to be outdoors. Use a sunscreen for sensitive skin with an SPF of 30 or higher on any exposed skin. · Use soaps, lotions, and makeup made for sensitive skin or rosacea. These do not contain alcohol, are not abrasive, and will not clog pores. · There are over-the-counter skin care products available that are specifically for people with rosacea. These products can help mask facial redness without irritating your skin. · Avoid rubbing or scrubbing your face. · If you have rosacea on your eyelids, put a warm, wet towel, or compress, on your eyes several times a day. Gently wash your eyelids with a washcloth or an eyelid cleanser that is sold in [employer]. Use artificial tears if your eyes feel dry.   · Make a list or keep a diary of things that may trigger your rosacea. Use the diary every day for several weeks. Avoid whatever you find that makes your rosacea worse. These triggers may include:  ? Harsh weather. Wear a hat and scarf to shield your face from the cold and wind. Use a moisturizer during the winter to keep your face moist.  ? Stress. Eat a healthy diet and get plenty of exercise and sleep. ? Alcohol, spicy foods, or hot drinks. Avoid or limit these if they make your rosacea worse. ? Getting too hot when you exercise. Try working out for a shorter time. In the summer, exercise during the cool morning hours. ? Hot showers. Take warm or cool showers and avoid hot tubs and saunas. When should you call for help? Watch closely for changes in your health, and be sure to contact your doctor if:    · You do not get better as expected. Where can you learn more? Go to https://[employer]. org and sign in to your [employer] account. Enter Y127 in the [employer] box to learn more about \"Rosacea: Care Instructions. \"     If you do not have an account, please click on the \"Sign Up Now\" link. Current as of: March 3, 2021               Content Version: 12.9  © 2006-2021 [employer] Inc[employer]. Care instructions adapted under license by [facility] [address] If you have questions about a medical condition or this instruction, always ask your healthcare professional. [place] [address] any warranty or liability for your use of this information.

## 2021-07-07 NOTE — PROGRESS NOTES
 Heart Disease Mother     Substance Abuse Father     Heart Disease Father     Depression Sister     Substance Abuse Brother     Substance Abuse Maternal Aunt     Substance Abuse Maternal Uncle     Substance Abuse Paternal Aunt     Substance Abuse Paternal Uncle     Substance Abuse Maternal Grandmother     Depression Maternal Grandmother      Social History     Tobacco Use    Smoking status: Current Every Day Smoker     Packs/day: 1.00     Years: 30.00     Pack years: 30.00     Types: Cigarettes    Smokeless tobacco: Never Used   Substance Use Topics    Alcohol use: No      Current Outpatient Medications   Medication Sig Dispense Refill    magnesium (MAGNESIUM-OXIDE) 250 MG TABS tablet Take 1 tablet by mouth 2 times daily 120 tablet 0    metroNIDAZOLE (METROGEL) 0.75 % gel Apply topically 2 times daily.  1 Tube 0    furosemide (LASIX) 40 MG tablet Take 0.5 tablets by mouth daily 30 tablet 3    tiZANidine (ZANAFLEX) 4 MG tablet TAKE 1 TABLET BY MOUTH THREE TIMES DAILY AS NEEDED FOR MUSCLE PAIN 60 tablet 0    escitalopram (LEXAPRO) 10 MG tablet Take 1 tablet by mouth once daily 30 tablet 0    lisinopril (PRINIVIL;ZESTRIL) 5 MG tablet Take 1 tablet by mouth once daily 90 tablet 0    ONETOUCH ULTRA strip USE 1 STRIP TO CHECK GLUCOSE THREE TIMES DAILY 100 each 0    atorvastatin (LIPITOR) 40 MG tablet TAKE 1 TABLET BY MOUTH ONCE DAILY      naloxone 4 MG/0.1ML LIQD nasal spray Naloxone Hcl Active 4 MG NA One Time Only 1 1 August 20th, 2020 10:59am      tiotropium (SPIRIVA HANDIHALER) 18 MCG inhalation capsule Inhale 1 puff by mouth once daily 90 capsule 3    ipratropium-albuterol (DUONEB) 0.5-2.5 (3) MG/3ML SOLN nebulizer solution Take 3 mLs by nebulization every 6 hours as needed for Shortness of Breath 360 vial 3    Multiple Vitamin (MULTIVITAMIN PO) Take by mouth daily      gabapentin (NEURONTIN) 800 MG tablet TAKE 1 TABLET BY MOUTH THREE TIMES DAILY 270 tablet 3    loratadine (CLARITIN) 10 MG tablet Take 1 tablet by mouth nightly 90 tablet 3    pantoprazole (PROTONIX) 40 MG tablet Take 1 tablet by mouth once daily 90 tablet 3    ondansetron (ZOFRAN ODT) 4 MG disintegrating tablet Take 1 tablet by mouth every 8 hours as needed for Nausea 20 tablet 0    tenofovir disoproxil fumarate (VIREAD) 300 MG tablet Take 300 mg by mouth daily      Aimsco Ultra Thin Lancets MISC 1 applicator by Does not apply route 2 times daily 100 each 3    lidocaine (LIDODERM) 5 % Place 1 patch onto the skin daily 12 hours on, 12 hours off. 15 patch 0    albuterol sulfate HFA (PROVENTIL HFA) 108 (90 Base) MCG/ACT inhaler Inhale 2 puffs into the lungs every 4 hours as needed for Wheezing or Shortness of Breath (Space out to every 6 hours as symptoms improve) Space out to every 6 hours as symptoms improve. 1 Inhaler 3    sodium chloride (ALTAMIST SPRAY) 0.65 % nasal spray 1 spray by Nasal route as needed for Congestion 1 Bottle 3    METHADONE HCL PO Take 106 mg by mouth daily        No current facility-administered medications for this visit.      Allergies   Allergen Reactions    Adhesive Tape      Other reaction(s): Hives    Flexeril [Cyclobenzaprine] Hives     Pt unsure    Morphine Hives    Quetiapine Other (See Comments)     restless       Health Maintenance   Topic Date Due    Hepatitis A vaccine (2 of 3 - Hep A Twinrix risk 3-dose series) 07/10/2013    Diabetic retinal exam  06/04/2019    Colon cancer screen colonoscopy  Never done    Hepatitis B vaccine (2 of 3 - Hep B Twinrix risk 3-dose series) 07/01/2022 (Originally 7/10/2013)    Flu vaccine (1) 09/01/2021    Diabetic foot exam  01/21/2022    A1C test (Diabetic or Prediabetic)  03/26/2022    Diabetic microalbuminuria test  07/01/2022    Lipid screen  07/01/2022    Potassium monitoring  07/01/2022    Creatinine monitoring  07/01/2022    DTaP/Tdap/Td vaccine (4 - Td or Tdap) 11/13/2025    Pneumococcal 0-64 years Vaccine (2 of 2 - PPSV23) 02/18/2041  COVID-19 Vaccine  Completed    HIV screen  Completed    Hib vaccine  Aged Out    Meningococcal (ACWY) vaccine  Aged Out       Subjective:      Review of Systems   Constitutional: Negative for chills and fever. Respiratory: Positive for cough and shortness of breath. Cardiovascular: Negative for chest pain and palpitations. Gastrointestinal: Positive for nausea. Negative for vomiting. Musculoskeletal: Positive for myalgias. Negative for joint swelling. Skin: Negative for color change and rash. Neurological: Positive for headaches. Negative for dizziness. Objective:     Vitals:    07/08/21 0920   BP: 130/78   Pulse: 80   Temp: 97.9 °F (36.6 °C)   TempSrc: Oral   SpO2: 99%   Weight: 259 lb 3.2 oz (117.6 kg)   Height: 6' 1.23\" (1.86 m)       Body mass index is 33.98 kg/m². Wt Readings from Last 3 Encounters:   07/08/21 259 lb 3.2 oz (117.6 kg)   07/01/21 261 lb 3.2 oz (118.5 kg)   04/27/21 266 lb (120.7 kg)     BP Readings from Last 3 Encounters:   07/08/21 130/78   07/01/21 126/72   04/27/21 98/64       Physical Exam  Vitals reviewed. Constitutional:       Appearance: He is obese. HENT:      Head: Normocephalic and atraumatic. Right Ear: External ear normal.      Left Ear: External ear normal.      Nose: Nose normal.   Eyes:      Conjunctiva/sclera: Conjunctivae normal.   Cardiovascular:      Rate and Rhythm: Normal rate and regular rhythm. Pulses: Normal pulses. Heart sounds: Normal heart sounds. Pulmonary:      Effort: Pulmonary effort is normal.      Breath sounds: Normal breath sounds. No wheezing. Abdominal:      Palpations: Abdomen is soft. Tenderness: There is no abdominal tenderness. Musculoskeletal:         General: No swelling or tenderness. Right lower leg: No edema. Left lower leg: No edema. Skin:     General: Skin is warm and dry. Neurological:      Mental Status: He is oriented to person, place, and time.    Psychiatric: Mood and Affect: Mood normal.         Behavior: Behavior normal.         Lab Results   Component Value Date    WBC 7.2 02/11/2021    HGB 14.5 02/11/2021    HCT 41.5 (L) 02/11/2021     02/11/2021    CHOL 157 07/01/2021    TRIG 151 07/01/2021    HDL 29 07/01/2021    LDLCALC 98 07/01/2021    AST 25 07/01/2021     07/01/2021    K 3.9 07/01/2021     07/01/2021    CREATININE 0.7 07/01/2021    BUN 11 07/01/2021    CO2 25 07/01/2021    TSH 1.78 10/11/2017    INR 1.2 05/01/2018    LABA1C 6.0 (H) 03/26/2021    LABMICR < 1.20 07/01/2021    LABGLOM >90 07/01/2021    MG 1.9 10/13/2016    CALCIUM 9.3 07/01/2021       Imaging Results:    No results found. Assessment / Plan: Vinnie Coon was seen today for nausea, emesis, headache, shortness of breath, cough and congestion. Diagnoses and all orders for this visit:    Adverse effect of COVID-19 vaccine:  - Typical side effects. Improvement noted. Supportive care    Muscle cramps:  - Improving slightly. Increase to Mag Ox 250mg BID instead of 1x daily. Advised to cut back if has diarrhea. -     magnesium (MAGNESIUM-OXIDE) 250 MG TABS tablet; Take 1 tablet by mouth 2 times daily    Type 2 diabetes mellitus without complication, without long-term current use of insulin ([facility]  - Recheck A1c in September. Refer to Optometry. -     Amb External Referral To Optometry    Class 1 obesity due to excess calories without serious comorbidity with body mass index (BMI) of 33.0 to 33.9 in adult:  - Discussed diet and exercise. Decrease carb intake, decrease soda intake at this time. Return in about 4 weeks (around 8/5/2021) for already has an appointment.       Medications Prescribed:  Orders Placed This Encounter   Medications    magnesium (MAGNESIUM-OXIDE) 250 MG TABS tablet     Sig: Take 1 tablet by mouth 2 times daily     Dispense:  120 tablet     Refill:  0       Future Appointments   Date Time Provider Didier Burton   7/27/2021  2:30 PM [facility] [place]   8/2/2021  8:40 AM Katy Quach, DO [facility] [place]   9/24/2021  8:00 AM Katy Quach, DO [facility] [place]       Patient given educational materials - see patient instructions. Discussed use, benefit, and sideeffects of prescribed medications. All patient questions answered. Pt voiced understanding. Reviewed health maintenance. Instructed to continue current medications, diet and exercise. Patient agreed with treatment plan. Follow up as directed.      Electronically signed by Aubrie Arshad MD on 7/8/2021 at 9:56 AM

## 2021-07-08 ENCOUNTER — OFFICE VISIT (OUTPATIENT)
Dept: FAMILY MEDICINE CLINIC | Age: 46
End: 2021-07-08
Payer: COMMERCIAL

## 2021-07-08 VITALS
OXYGEN SATURATION: 99 % | DIASTOLIC BLOOD PRESSURE: 78 MMHG | HEIGHT: 73 IN | SYSTOLIC BLOOD PRESSURE: 130 MMHG | HEART RATE: 80 BPM | TEMPERATURE: 97.9 F | WEIGHT: 259.2 LBS | BODY MASS INDEX: 34.35 KG/M2

## 2021-07-08 DIAGNOSIS — R25.2 MUSCLE CRAMPS: ICD-10-CM

## 2021-07-08 DIAGNOSIS — E11.9 TYPE 2 DIABETES MELLITUS WITHOUT COMPLICATION, WITHOUT LONG-TERM CURRENT USE OF INSULIN (HCC): ICD-10-CM

## 2021-07-08 DIAGNOSIS — T50.B95A ADVERSE EFFECT OF COVID-19 VACCINE: Primary | ICD-10-CM

## 2021-07-08 DIAGNOSIS — E66.09 CLASS 1 OBESITY DUE TO EXCESS CALORIES WITHOUT SERIOUS COMORBIDITY WITH BODY MASS INDEX (BMI) OF 33.0 TO 33.9 IN ADULT: ICD-10-CM

## 2021-07-08 PROCEDURE — 3044F HG A1C LEVEL LT 7.0%: CPT | Performed by: STUDENT IN AN ORGANIZED HEALTH CARE EDUCATION/TRAINING PROGRAM

## 2021-07-08 PROCEDURE — G8417 CALC BMI ABV UP PARAM F/U: HCPCS | Performed by: STUDENT IN AN ORGANIZED HEALTH CARE EDUCATION/TRAINING PROGRAM

## 2021-07-08 PROCEDURE — 99213 OFFICE O/P EST LOW 20 MIN: CPT | Performed by: STUDENT IN AN ORGANIZED HEALTH CARE EDUCATION/TRAINING PROGRAM

## 2021-07-08 PROCEDURE — G8427 DOCREV CUR MEDS BY ELIG CLIN: HCPCS | Performed by: STUDENT IN AN ORGANIZED HEALTH CARE EDUCATION/TRAINING PROGRAM

## 2021-07-08 PROCEDURE — 2022F DILAT RTA XM EVC RTNOPTHY: CPT | Performed by: STUDENT IN AN ORGANIZED HEALTH CARE EDUCATION/TRAINING PROGRAM

## 2021-07-08 PROCEDURE — 4004F PT TOBACCO SCREEN RCVD TLK: CPT | Performed by: STUDENT IN AN ORGANIZED HEALTH CARE EDUCATION/TRAINING PROGRAM

## 2021-07-08 RX ORDER — ONDANSETRON 4 MG/1
4 TABLET, ORALLY DISINTEGRATING ORAL 3 TIMES DAILY PRN
Qty: 21 TABLET | Refills: 0 | Status: CANCELLED | OUTPATIENT
Start: 2021-07-08

## 2021-07-08 RX ORDER — MULTIVITAMIN WITH IRON
250 TABLET ORAL 2 TIMES DAILY
Qty: 120 TABLET | Refills: 0 | Status: SHIPPED | OUTPATIENT
Start: 2021-07-08 | End: 2021-09-23 | Stop reason: SDUPTHER

## 2021-07-08 ASSESSMENT — ENCOUNTER SYMPTOMS
COUGH: 1
COLOR CHANGE: 0
SHORTNESS OF BREATH: 1
VOMITING: 0
NAUSEA: 1

## 2021-07-08 NOTE — PATIENT INSTRUCTIONS
Patient Education        COVID-19 (coronavirus 2019) vaccine, Pfizer  Pronunciation:  KOE vid-19 iesha BRYAN na vye maria de jesus VAX een  Brand:  Pfizer-BioNTech COVID-19 Vaccine PF  What is the most important information I should know about this vaccine? The FDA has authorized emergency use of this vaccine as it may help prevent infection with COVID-19. This vaccine has not been approved to prevent or treat coronavirus or COVID-19. Becoming infected with COVID-19 is much more dangerous to your health than receiving this vaccine. If you have a certain type of reaction after the first dose of this vaccine, your healthcare provider will determine if you can safely receive the second dose. What is the COVID-19 vaccine? COVID-19 is a serious disease caused by a coronavirus called SARS-CoV-2 (Serious Acute Respiratory Syndrome Coronavirus 2). COVID-19 is spread from person to person through the air. COVID-19 can affect your lungs or other organs. Symptoms may be mild or serious and include fever, chills, cough, sore throat, shortness of breath, tiredness, body aches, headache, loss of taste or smell, runny or stuffy nose, nausea, vomiting, or diarrhea. The [employer] and Drug Administration (FDA) has authorized emergency use of COVID-19 vaccine to help prevent infection with SARS-CoV-2. The Pfizer vaccine is for use in people who are at least 12years old. This vaccine may help your body develop immunity to SARS-CoV-2. However, this vaccine has not been approved to prevent or treat coronavirus or COVID-19. COVID-19 vaccine is experimental and all of its risks are not yet known. The COVID-19 vaccine does not contain coronavirus and cannot give you COVID-19. Like any vaccine, COVID-19 vaccine may not provide protection in every person. What should I discuss with my healthcare provider before receiving this vaccine? You should not receive this vaccine if you've ever had an allergic reaction to a COVID-19 vaccine.  Your healthcare provider will determine whether any reaction you have would prevent you from safely getting the second dose. If you are infected with COVID-19, are waiting for testing results, or are exposed to someone infected with COVID-19: You may not be able to receive this vaccine until you have no symptoms and/or your required quarantine period has ended. Receiving this vaccine will not make you less contagious to other people if you are infected with COVID-19 but you have no symptoms. If you had COVID-19 and were treated with monoclonal antibodies or convalescent plasma: You should wait 90 days before getting a COVID-19 vaccine. Ask your doctor if you are unsure about any COVID-19 treatments you received. Tell the person giving you this vaccine if you have a fever, or if:  · you've received any treatment or medication for COVID-19 infection in the past 90 days;  · you've received or will receive any other vaccine within 14 days before or after your COVID-19 vaccine;  · you have a weak immune system caused by disease or by using certain medicine (this vaccine may not be as effective if you are immunosuppressed);  · you have bleeding problems;  · you use a blood thinner (such as warfarin, Coumadin, or Rich Balsam); or  · you are pregnant or breastfeeding. COVID-19 is more likely to cause serious illness or death in a pregnant woman. Not all risks are known yet, but this vaccine is likely to be less harmful than becoming infected with COVID-19 during pregnancy. How is this vaccine given? Read all vaccine information sheets provided to you. COVID-19 vaccine is given as an injection (shot) into a muscle. The Pfizer vaccine is given in a series of 2 shots given 3 weeks apart. Your first and second shot should both be the Celestin Peter type of COVID-19 vaccine. The Pfizer COVID-19 vaccine is transported and stored at ultra-cold temperatures.  However, this vaccine will be at room temperature when it is injected into your arm.  You will receive a reminder card showing the date and type of your first injection. Take this card with you when you get your second shot. You will be \"fully vaccinated\" if it has been at least 2 weeks since you received your second dose of this vaccine. You may become infected with COVID-19 if the vaccine has not had enough time to provide protection. Receiving this vaccine will not make you less contagious to other people if you are already infected with COVID-19 but you have no symptoms. Keep using infection control methods such as self-isolation, social distancing, hand-washing, using protective face covering, disinfecting surfaces you touch a lot, and not sharing personal items with others. Receiving a COVID-19 vaccine will not cause you to test positive on a coronavirus test. However, once your body develops immunity to COVID-19, you could test positive on an antibody test (a test to detect immunity in your body from previous exposure to coronavirus). It is not known how long this vaccine will protect you from infection with COVID-19. It also is not known how long immunity will last in a person who's been infected with and recovered from COVID-19. COVID-19 vaccine is still being studied and all of its risks are not yet known. Updated federal public health recommendations may be found at [employer]  What happens if I miss a dose? Be sure to receive all recommended doses of COVID-19 vaccine or you may not be fully protected. Contact your vaccination provider or health department if you miss your second dose. What happens if I overdose? An overdose of this vaccine is unlikely to occur. What should I avoid after receiving this vaccine? Avoid receiving other vaccines without first seeking medical advice. What are the possible side effects of this vaccine?   Get emergency medical help if you have signs of an allergic reaction: hives, itching; confusion, dizziness, fainting; vomiting, diarrhea; fast heartbeats, wheezing, difficult breathing; swelling of your face, lips, tongue, or throat. An allergic reaction is more likely to occur within 30 minutes after you receive the vaccine. You will be treated quickly if you have a reaction. You should not receive the second vaccine if the first shot caused an allergic reaction. Your healthcare provider will determine if you can safely receive the second dose. Becoming infected with COVID-19 is much more dangerous to your health than receiving this vaccine. Serious side effects other than an allergic reaction may include:  · pale or clammy skin, sweating, feeling warm or cold;  · feeling anxious, nauseated, weak, or light-headed;  · slow heartbeats, rapid breathing; or  · changes in vision or hearing. Fever may be a normal symptom as your body begins to develop immunity to COVID-19. However, you should call your doctor right away if you have any side effects that concern you. Common side effects may include:  · fever, chills, swollen glands;  · pain, redness, or swelling where the shot was given;  · nausea, not feeling well;  · feeling tired; or  · headache, muscle pain, joint pain. You may be able to treat these effects with an over-the-counter pain reliever such as acetaminophen (Tylenol) or ibuprofen (Motrin, Advil, and others). Follow the label directions or your vaccination provider's instructions. Other side effects, mild or serious, may occur with more widespread use of COVID-19 vaccine. This is not a complete list of side effects and others may occur. Call your doctor for medical advice about side effects. You may report vaccine side effects to the [place] [address] and Human Services at [phone].   You may also use a smartphone-based program called V-safe to communicate with the Centers for Disease Control and Prevention (CDC) about any health problems you have after receiving a COVID-19 vaccine: www.cdc.gov/vsafe. What other drugs will affect this vaccine? Before receiving this vaccine, tell your vaccination provider about all other vaccines you have received in the past 14 days. Also tell the provider about all your current medicines. This includes prescription and over-the-counter medicines, vitamins, and herbal products. Not all possible interactions are listed here. Where can I get more information? Your vaccination provider, pharmacist, or doctor can provide more information about this vaccine. Additional information is available from your local health department or the Centers for Disease Control and Prevention. Remember, keep this and all other medicines out of the reach of children, never share your medicines with others, and use this medication only for the indication prescribed. Every effort has been made to ensure that the information provided by [address] is accurate, up-to-date, and complete, but no guarantee is made to that effect. Drug information contained herein may be time sensitive. [facility] information has been compiled for use by healthcare practitioners and consumers in the [place] and therefore [facility] does not warrant that uses outside of the [place] are appropriate, unless specifically indicated otherwise. [facility]'s drug information does not endorse drugs, diagnose patients or recommend therapy. [facility] drug information is an informational resource designed to assist licensed healthcare practitioners in caring for their patients and/or to serve consumers viewing this service as a supplement to, and not a substitute for, the expertise, skill, knowledge and judgment of healthcare practitioners. The absence of a warning for a given drug or drug combination in no way should be construed to indicate that the drug or drug combination is safe, effective or appropriate for any given patient.  [employer] does not assume any responsibility for any aspect of healthcare administered with the aid of information [facility] provides. The information contained herein is not intended to cover all possible uses, directions, precautions, warnings, drug interactions, allergic reactions, or adverse effects. If you have questions about the drugs you are taking, check with your doctor, nurse or pharmacist.  Copyright 1131-7466 [address]: 2.01. Revision date: 3/8/2021. Care instructions adapted under license by [facility] ([facility]). If you have questions about a medical condition or this instruction, always ask your healthcare professional. [place] [address] any warranty or liability for your use of this information.

## 2021-07-08 NOTE — PROGRESS NOTES
Health Maintenance Due   Topic Date Due    Hepatitis A vaccine (2 of 3 - Hep A Twinrix risk 3-dose series) 07/10/2013    Diabetic retinal exam  06/04/2019 pending     Colon cancer screen colonoscopy  Never done Pam Read MD - 12/30/2020 2:00 PM EST

## 2021-07-08 NOTE — PROGRESS NOTES
S: 40 y.o. male with   Chief Complaint   Patient presents with    Nausea     yesterday     Emesis     yesterday     Headache     since vaccine on Thursday , 7 days    Shortness of Breath    Cough    Congestion       HPI: please see resident note for HPI and ROS. BP Readings from Last 3 Encounters:   07/08/21 130/78   07/01/21 126/72   04/27/21 98/64     Wt Readings from Last 3 Encounters:   07/08/21 259 lb 3.2 oz (117.6 kg)   07/01/21 261 lb 3.2 oz (118.5 kg)   04/27/21 266 lb (120.7 kg)       O: VS:  height is 6' 1.23\" (1.86 m) and weight is 259 lb 3.2 oz (117.6 kg). His oral temperature is 97.9 °F (36.6 °C). His blood pressure is 130/78 and his pulse is 80. His oxygen saturation is 99%. Diagnosis Orders   1. Adverse effect of COVID-19 vaccine     2. Muscle cramps  magnesium (MAGNESIUM-OXIDE) 250 MG TABS tablet   3. Type 2 diabetes mellitus without complication, without long-term current use of insulin (HCC)  Amb External Referral To Optometry   4. Class 1 obesity due to excess calories without serious comorbidity with body mass index (BMI) of 33.0 to 33.9 in adult         Plan:  Improved. Refer to optometry. Increase magnesium daily dose to bid. Prn zanaflex. Stretches encouraged. Health Maintenance Due   Topic Date Due    Hepatitis A vaccine (2 of 3 - Hep A Twinrix risk 3-dose series) 07/10/2013    Diabetic retinal exam  06/04/2019    Colon cancer screen colonoscopy  Never done       Attending Physician Statement  I have discussed the case, including pertinent history and exam findings with the resident. I agree with the documented assessment and plan as documented by the resident.         Barbie Torres DO 7/8/2021 9:40 AM

## 2021-08-11 RX ORDER — ESCITALOPRAM OXALATE 10 MG/1
TABLET ORAL
Qty: 30 TABLET | Refills: 0 | OUTPATIENT
Start: 2021-08-11

## 2021-08-12 RX ORDER — ESCITALOPRAM OXALATE 10 MG/1
TABLET ORAL
Qty: 90 TABLET | Refills: 1 | Status: SHIPPED | OUTPATIENT
Start: 2021-08-12 | End: 2022-04-08 | Stop reason: SDUPTHER

## 2021-08-16 ENCOUNTER — TELEPHONE (OUTPATIENT)
Dept: FAMILY MEDICINE CLINIC | Age: 46
End: 2021-08-16

## 2021-08-16 NOTE — LETTER
[address] [place] [address]  Phone: [phone]  Fax: [address] [facility], DO        August 23, 2021    [address] [place] [address]      Dear [facility]:    We have made several attempts to contact you by phone and have   been unsuccessful. Please call our office at your earliest convenience  At [phone] opt [phone]. Thank you.       Sincerely,        Lakeshia Hernández DO

## 2021-08-16 NOTE — TELEPHONE ENCOUNTER
----- Message from [facility] sent at 8/13/2021  2:09 PM EDT -----  Subject: Message to Provider    QUESTIONS  Information for Provider? Patient is returning a call today on 8/13/21   from Dr. Orlando Castle office from 3 weeks ago in regarding an podiatrist   appointment . Please call patient back soon as possible at [phone].  ---------------------------------------------------------------------------  --------------  [address]  What is the best way for the office to contact you? OK to leave message on   voicemail, OK to respond with electronic message via [employer] portal (only   for patients who have registered [employer] account)  Preferred Call Back Phone Number?  [phone]  ---------------------------------------------------------------------------  --------------  SCRIPT ANSWERS  undefined

## 2021-08-24 DIAGNOSIS — G72.9 MYOPATHY: ICD-10-CM

## 2021-08-24 DIAGNOSIS — E11.9 NEW ONSET TYPE 2 DIABETES MELLITUS (HCC): ICD-10-CM

## 2021-08-24 DIAGNOSIS — A04.8 H. PYLORI INFECTION: ICD-10-CM

## 2021-08-24 DIAGNOSIS — K21.9 GASTROESOPHAGEAL REFLUX DISEASE: ICD-10-CM

## 2021-08-24 RX ORDER — BLOOD SUGAR DIAGNOSTIC
STRIP MISCELLANEOUS
Qty: 100 EACH | Refills: 0 | Status: SHIPPED | OUTPATIENT
Start: 2021-08-24 | End: 2021-09-23 | Stop reason: SDUPTHER

## 2021-08-24 RX ORDER — TIZANIDINE 4 MG/1
TABLET ORAL
Qty: 60 TABLET | Refills: 0 | Status: SHIPPED | OUTPATIENT
Start: 2021-08-24 | End: 2021-10-22

## 2021-08-24 RX ORDER — PANTOPRAZOLE SODIUM 40 MG/1
TABLET, DELAYED RELEASE ORAL
Qty: 90 TABLET | Refills: 0 | Status: SHIPPED | OUTPATIENT
Start: 2021-08-24 | End: 2021-12-06

## 2021-08-24 NOTE — TELEPHONE ENCOUNTER
Patient's last appointment was : 7/8/2021  Patient's next appointment is : 8/27/2021  Last refilled:onetouch ultra strip 6/28/2021  protonix 9/10/2020  zanaflex 6/28/2021

## 2021-08-27 ENCOUNTER — OFFICE VISIT (OUTPATIENT)
Dept: FAMILY MEDICINE CLINIC | Age: 46
End: 2021-08-27
Payer: COMMERCIAL

## 2021-08-27 VITALS
RESPIRATION RATE: 16 BRPM | HEART RATE: 75 BPM | TEMPERATURE: 97 F | WEIGHT: 255.2 LBS | OXYGEN SATURATION: 98 % | DIASTOLIC BLOOD PRESSURE: 64 MMHG | BODY MASS INDEX: 33.82 KG/M2 | SYSTOLIC BLOOD PRESSURE: 110 MMHG | HEIGHT: 73 IN

## 2021-08-27 DIAGNOSIS — M25.50 POLYARTHRALGIA: ICD-10-CM

## 2021-08-27 DIAGNOSIS — B19.10 HEPATITIS B INFECTION WITHOUT DELTA AGENT WITHOUT HEPATIC COMA, UNSPECIFIED CHRONICITY: ICD-10-CM

## 2021-08-27 DIAGNOSIS — J43.2 CENTRILOBULAR EMPHYSEMA (HCC): ICD-10-CM

## 2021-08-27 DIAGNOSIS — R73.01 IFG (IMPAIRED FASTING GLUCOSE): ICD-10-CM

## 2021-08-27 DIAGNOSIS — R91.8 PULMONARY NODULES/LESIONS, MULTIPLE: ICD-10-CM

## 2021-08-27 DIAGNOSIS — R06.02 SHORTNESS OF BREATH: ICD-10-CM

## 2021-08-27 DIAGNOSIS — R25.2 MUSCLE CRAMPS: Primary | ICD-10-CM

## 2021-08-27 LAB — HBA1C MFR BLD: 5.7 % (ref 4.3–5.7)

## 2021-08-27 PROCEDURE — 4004F PT TOBACCO SCREEN RCVD TLK: CPT | Performed by: STUDENT IN AN ORGANIZED HEALTH CARE EDUCATION/TRAINING PROGRAM

## 2021-08-27 PROCEDURE — 83036 HEMOGLOBIN GLYCOSYLATED A1C: CPT | Performed by: STUDENT IN AN ORGANIZED HEALTH CARE EDUCATION/TRAINING PROGRAM

## 2021-08-27 PROCEDURE — G8926 SPIRO NO PERF OR DOC: HCPCS | Performed by: STUDENT IN AN ORGANIZED HEALTH CARE EDUCATION/TRAINING PROGRAM

## 2021-08-27 PROCEDURE — G8427 DOCREV CUR MEDS BY ELIG CLIN: HCPCS | Performed by: STUDENT IN AN ORGANIZED HEALTH CARE EDUCATION/TRAINING PROGRAM

## 2021-08-27 PROCEDURE — G8417 CALC BMI ABV UP PARAM F/U: HCPCS | Performed by: STUDENT IN AN ORGANIZED HEALTH CARE EDUCATION/TRAINING PROGRAM

## 2021-08-27 PROCEDURE — 99214 OFFICE O/P EST MOD 30 MIN: CPT | Performed by: STUDENT IN AN ORGANIZED HEALTH CARE EDUCATION/TRAINING PROGRAM

## 2021-08-27 PROCEDURE — 3023F SPIROM DOC REV: CPT | Performed by: STUDENT IN AN ORGANIZED HEALTH CARE EDUCATION/TRAINING PROGRAM

## 2021-08-27 RX ORDER — TIOTROPIUM BROMIDE 18 UG/1
CAPSULE ORAL; RESPIRATORY (INHALATION)
Qty: 90 CAPSULE | Refills: 3 | Status: SHIPPED | OUTPATIENT
Start: 2021-08-27 | End: 2022-03-30 | Stop reason: ALTCHOICE

## 2021-08-27 ASSESSMENT — ENCOUNTER SYMPTOMS
EYE PAIN: 0
COUGH: 0
TROUBLE SWALLOWING: 0
ABDOMINAL PAIN: 0
DIARRHEA: 0
SHORTNESS OF BREATH: 1
CONSTIPATION: 0
BLOOD IN STOOL: 0

## 2021-08-27 NOTE — PROGRESS NOTES
CO2 25 07/01/2021    BUN 11 07/01/2021    CREATININE 0.7 07/01/2021    GLUCOSE 170 (H) 07/01/2021    CALCIUM 9.3 07/01/2021    PROT 7.0 07/01/2021    LABALBU 4.2 07/01/2021    BILITOT 0.5 07/01/2021    ALKPHOS 124 07/01/2021    AST 25 07/01/2021    ALT 23 07/01/2021    LABGLOM >90 07/01/2021    GFRAA >60 02/26/2019    GLOB NOT REPORTED 04/05/2018     Lab Results   Component Value Date    TSH 1.78 10/11/2017    FT3 3.37 10/11/2017    T4FREE 1.14 05/07/2013     Lab Results   Component Value Date    LABA1C 5.7 08/27/2021     No results found for: EAG  Lab Results   Component Value Date    CHOL 157 07/01/2021    CHOL 90 02/13/2020    CHOL 125 10/11/2017     Lab Results   Component Value Date    TRIG 151 07/01/2021    TRIG 143 02/13/2020    TRIG 145 10/11/2017     Lab Results   Component Value Date    HDL 29 07/01/2021    HDL 28 (L) 02/13/2020    HDL 17 (L) 10/11/2017     Lab Results   Component Value Date    LDLCHOLESTEROL 33 02/13/2020    LDLCHOLESTEROL 79 10/11/2017    LDLCALC 98 07/01/2021       Lab Results   Component Value Date    LABMICR < 1.20 07/01/2021       Review of Systems   Constitutional: Positive for fatigue. Negative for chills and fever. HENT: Negative for congestion, ear pain, postnasal drip and trouble swallowing. Eyes: Negative for pain and visual disturbance. Respiratory: Positive for shortness of breath. Negative for cough. Cardiovascular: Positive for chest pain (intermittent ,not with exertion). Negative for palpitations. Gastrointestinal: Negative for abdominal pain, blood in stool, constipation and diarrhea. Genitourinary: Negative for dysuria and hematuria. Musculoskeletal: Positive for arthralgias and myalgias (lower legs). Skin: Positive for rash (chest ). Negative for wound. Neurological: Negative for dizziness and headaches. Psychiatric/Behavioral: The patient is not nervous/anxious. Physical Exam  Vitals and nursing note reviewed.    Constitutional: General: He is not in acute distress. Appearance: He is well-developed. He is not diaphoretic. HENT:      Head: Normocephalic and atraumatic. Right Ear: External ear normal.      Left Ear: External ear normal.      Nose: Nose normal.   Eyes:      General: No scleral icterus. Right eye: No discharge. Left eye: No discharge. Conjunctiva/sclera: Conjunctivae normal.   Cardiovascular:      Rate and Rhythm: Normal rate and regular rhythm. Heart sounds: Normal heart sounds. No murmur heard. Pulmonary:      Effort: Pulmonary effort is normal.      Breath sounds: Rales (RLL) present. Abdominal:      Palpations: Abdomen is soft. Tenderness: There is no abdominal tenderness. Musculoskeletal:      Cervical back: Normal range of motion. Right lower leg: No edema. Left lower leg: No edema. Skin:     General: Skin is warm and dry. Coloration: Skin is not jaundiced. Findings: Lesion (red spider angiomas of left side of chest ) present. No erythema or rash. Neurological:      Mental Status: He is alert and oriented to person, place, and time. Psychiatric:         Mood and Affect: Mood normal.         Behavior: Behavior normal.         Thought Content:  Thought content normal.         Judgment: Judgment normal.         Immunization History   Administered Date(s) Administered    COVID-19, Pfizer, PF, 30mcg/0.3mL 05/30/2021, 07/01/2021    Hepatitis A/Hepatitis B (Twinrix) 06/12/2013    Influenza Vaccine, unspecified formulation 11/13/2015, 09/21/2017    Influenza Virus Vaccine 11/13/2015, 01/31/2020    Influenza, Quadv, IM, (6 mo and older Fluzone, Flulaval, Fluarix and 3 yrs and older Afluria) 09/21/2017, 01/31/2020    Influenza, Quadv, IM, PF (6 mo and older Fluzone, Flulaval, Fluarix, and 3 yrs and older Afluria) 09/28/2018, 10/08/2020    Pneumococcal Conjugate 13-valent ([id number]) 07/17/2015    Pneumococcal Polysaccharide ([id number]) 09/21/2017 Dispense:  90 capsule     Refill:  3       Future Appointments   Date Time Provider Didier Josephine   9/23/2021 11:00 AM Edel Gvoea DO [facility]  [facility] [address]       Patient given educational materials - see patient instructions. Discussed use, benefit, and sideeffects of prescribed medications. All patient questions answered. Pt voiced understanding. Reviewed health maintenance. Instructed to continue current medications, diet and exercise. Patient agreed with treatment plan. Follow up as directed.      Electronically signed by Terell Grady DO on 8/27/2021 at 8:39 PM

## 2021-08-27 NOTE — PROGRESS NOTES
Health Maintenance Due   Topic Date Due    Hepatitis A vaccine (2 of 3 - Hep A Twinrix risk 3-dose series) 07/10/2013    Diabetic retinal exam  06/04/2019    Colon cancer screen colonoscopy  Never done

## 2021-08-27 NOTE — PROGRESS NOTES
Attending attestation:  I personally performed and participated key or critical portions of the evaluation and management including personally performing the exam and medical decision making. I verify the accuracy of the documentation by the resident with the following addition or changes:  Here for chronic follow-up. Hx hep B on treatment; following with GI; sees next month. Will transition care locally. On Lipitor getting muscle cramps that coincided with start. A1C 5.7 today; has dropped 60 pounds with resolution of diabetes. Short of breath. Out of spiriva. Using duonebs. Recent CT chest with lung nodules. Follow-up with repeat was recommended. No peripheral edema. On lasix. Lungs largely clear on my exam.   The 10-year ASCVD risk score (Jacinto Haynes., et al., 2013) is: 11.9%    Values used to calculate the score:      Age: 40 years      Sex: Male      Is Non- : No      Diabetic: Yes      Tobacco smoker: Yes      Systolic Blood Pressure: 568 mmHg      Is BP treated: Yes      HDL Cholesterol: 29 mg/dL      Total Cholesterol: 157 mg/dL    Plan for transition GI care locally; follow-up with prior GI until this happens. Will check labs, D/C statin. Consider re-start to minimize cardiac risk if CK normal.  Resume inhaler. Check CT chest.  Follow-up post results. Mood seemed stable on exam today. I note that he has been referred to psychiatry. Should confirm referral went through in follow-up. A bit unusual for someone with diagnosis of bipolar 1 to only be on SSRI since risk of this precipitating melani. Did note also that methadone is on med list but not present on [place] report when I pulled data. Should discuss this with patient in follow-up also.       Electronically signed by Yessica Jordan MD on 8/27/2021 at 11:15 AM

## 2021-08-30 ENCOUNTER — TELEPHONE (OUTPATIENT)
Dept: FAMILY MEDICINE CLINIC | Age: 46
End: 2021-08-30

## 2021-08-30 NOTE — TELEPHONE ENCOUNTER
Spoke with Claude Abbott and informed him that we have not received the form from the [facility] at this time and I informed him that he will need to get ahold of the [facility] and have them Re-fax to [phone]. Theodore Valadez voiced understanding. Awaiting Fax.

## 2021-08-30 NOTE — TELEPHONE ENCOUNTER
----- Message from Joshua Gupta sent at 8/27/2021 12:22 PM EDT -----  Subject: Message to Provider    QUESTIONS  Information for Provider? Pt is stating that the power company was   supposed to fax over form for you to fill out. Did they receive it and   sent it back in? Please call pt to advise.  ---------------------------------------------------------------------------  --------------  CALL BACK INFO  What is the best way for the office to contact you? OK to leave message on   voicemail  Preferred Call Back Phone Number? [phone]  ---------------------------------------------------------------------------  --------------  SCRIPT ANSWERS  Relationship to Patient?  Self
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
## 2021-08-31 NOTE — TELEPHONE ENCOUNTER
Patient is informed that the office receive the fax, AEP forms, yesterday and will complete them and send them back asap. Patient verified understanding.

## 2021-09-13 ENCOUNTER — TELEPHONE (OUTPATIENT)
Dept: FAMILY MEDICINE CLINIC | Age: 46
End: 2021-09-13

## 2021-09-13 NOTE — TELEPHONE ENCOUNTER
----- Message from Farshad An sent at 9/13/2021  4:45 PM EDT -----  Subject: Message to Provider    QUESTIONS  Information for Provider? Pt was exposed to covid over the weekend. He now   has cough, ha, body aches, and sob. Please call pt to advise what he   should do.   ---------------------------------------------------------------------------  --------------  CALL BACK INFO  What is the best way for the office to contact you? OK to leave message on   voicemail  Preferred Call Back Phone Number? [phone]  ---------------------------------------------------------------------------  --------------  SCRIPT ANSWERS  Relationship to Patient?  Self

## 2021-09-14 ENCOUNTER — HOSPITAL ENCOUNTER (EMERGENCY)
Age: 46
Discharge: HOME | End: 2021-09-14
Payer: COMMERCIAL

## 2021-09-14 ENCOUNTER — APPOINTMENT (OUTPATIENT)
Dept: GENERAL RADIOLOGY | Age: 46
End: 2021-09-14
Payer: COMMERCIAL

## 2021-09-14 VITALS
HEIGHT: 73 IN | HEART RATE: 84 BPM | OXYGEN SATURATION: 97 % | RESPIRATION RATE: 18 BRPM | WEIGHT: 250 LBS | SYSTOLIC BLOOD PRESSURE: 121 MMHG | BODY MASS INDEX: 33.13 KG/M2 | DIASTOLIC BLOOD PRESSURE: 80 MMHG | TEMPERATURE: 98.3 F

## 2021-09-14 DIAGNOSIS — Z20.822 LAB TEST NEGATIVE FOR COVID-19 VIRUS: ICD-10-CM

## 2021-09-14 DIAGNOSIS — J06.9 UPPER RESPIRATORY TRACT INFECTION, UNSPECIFIED TYPE: Primary | ICD-10-CM

## 2021-09-14 LAB
FLU A ANTIGEN: NEGATIVE
INFLUENZA B AG, EIA: NEGATIVE
SARS-COV-2, NAA: NOT  DETECTED

## 2021-09-14 PROCEDURE — 87635 SARS-COV-2 COVID-19 AMP PRB: CPT

## 2021-09-14 PROCEDURE — 71046 X-RAY EXAM CHEST 2 VIEWS: CPT

## 2021-09-14 PROCEDURE — 87804 INFLUENZA ASSAY W/OPTIC: CPT

## 2021-09-14 PROCEDURE — 99213 OFFICE O/P EST LOW 20 MIN: CPT | Performed by: NURSE PRACTITIONER

## 2021-09-14 PROCEDURE — 99213 OFFICE O/P EST LOW 20 MIN: CPT

## 2021-09-14 RX ORDER — DOXYCYCLINE HYCLATE 100 MG
100 TABLET ORAL 2 TIMES DAILY
Qty: 14 TABLET | Refills: 0 | Status: SHIPPED | OUTPATIENT
Start: 2021-09-14 | End: 2021-09-21

## 2021-09-14 RX ORDER — PREDNISONE 20 MG/1
40 TABLET ORAL DAILY
Qty: 10 TABLET | Refills: 0 | Status: SHIPPED | OUTPATIENT
Start: 2021-09-14 | End: 2021-09-19

## 2021-09-14 ASSESSMENT — ENCOUNTER SYMPTOMS
NAUSEA: 0
COUGH: 1
SINUS PRESSURE: 0
DIARRHEA: 0
EYE REDNESS: 0
WHEEZING: 0
VOMITING: 0
SORE THROAT: 0
SHORTNESS OF BREATH: 1
EYE ITCHING: 0
ABDOMINAL PAIN: 0

## 2021-09-14 NOTE — ED TRIAGE NOTES
Pt to [facility] ambulatory with wanting a COVID-19 test.  Pt has been exposed to COVID-19. Pt has a cough, headache, and a fever. This started on Sunday.

## 2021-09-14 NOTE — ED PROVIDER NOTES
[address]       Chief Complaint   Patient presents with    Headache    Fever    Cough       Nurses Notes reviewed and I agree except as noted in the HPI. HISTORY OF PRESENT ILLNESS   Michel Melo is a 40 y.o. male who presents for COVID-19 testing after being exposed. Vaccinated. Him started Sunday with cough, headache, and fever. Extensive medical history as below. Smoker. REVIEW OF SYSTEMS     Review of Systems   Constitutional: Positive for chills, fatigue and fever (102.8 yesterday). HENT: Negative for congestion, ear pain, sinus pressure and sore throat. Eyes: Negative for redness and itching. Respiratory: Positive for cough and shortness of breath. Negative for wheezing. Cardiovascular: Negative for chest pain and palpitations. Gastrointestinal: Negative for abdominal pain, diarrhea, nausea and vomiting. Musculoskeletal: Positive for myalgias. Negative for joint swelling. Skin: Negative for rash. Allergic/Immunologic: Negative for environmental allergies and food allergies. Neurological: Negative for dizziness and headaches.        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 CURRENT MEDICATIONS       Previous Medications    AIMSCO ULTRA THIN LANCETS MISC    1 applicator by Does not apply route 2 times daily    ALBUTEROL SULFATE HFA (PROVENTIL HFA) 108 (90 BASE) MCG/ACT INHALER    Inhale 2 puffs into the lungs every 4 hours as needed for Wheezing or Shortness of Breath (Space out to every 6 hours as symptoms improve) Space out to every 6 hours as symptoms improve. ESCITALOPRAM (LEXAPRO) 10 MG TABLET    Take 1 tablet by mouth once daily    GABAPENTIN (NEURONTIN) 800 MG TABLET    TAKE 1 TABLET BY MOUTH THREE TIMES DAILY    IPRATROPIUM-ALBUTEROL (DUONEB) 0.5-2.5 (3) MG/3ML SOLN NEBULIZER SOLUTION    Take 3 mLs by nebulization every 6 hours as needed for Shortness of Breath    LIDOCAINE (LIDODERM) 5 %    Place 1 patch onto the skin daily 12 hours on, 12 hours off. LISINOPRIL (PRINIVIL;ZESTRIL) 5 MG TABLET    Take 1 tablet by mouth once daily    LORATADINE (CLARITIN) 10 MG TABLET    Take 1 tablet by mouth nightly    MAGNESIUM (MAGNESIUM-OXIDE) 250 MG TABS TABLET    Take 1 tablet by mouth 2 times daily    METHADONE HCL PO    Take 106 mg by mouth daily     METRONIDAZOLE (METROGEL) 0.75 % GEL    Apply topically 2 times daily.     MULTIPLE VITAMIN (MULTIVITAMIN PO)    Take by mouth daily    NALOXONE 4 MG/0.1ML LIQD NASAL SPRAY    Naloxone Hcl Active 4 MG NA One Time Only 1 1 August 20th, 2020 10:59am    ONDANSETRON (ZOFRAN ODT) 4 MG DISINTEGRATING TABLET    Take 1 tablet by mouth every 8 hours as needed for Nausea    ONETOUCH ULTRA STRIP    USE 1 STRIP TO CHECK GLUCOSE THREE TIMES DAILY    PANTOPRAZOLE (PROTONIX) 40 MG TABLET    Take 1 tablet by mouth once daily    TENOFOVIR DISOPROXIL FUMARATE (VIREAD) 300 MG TABLET    Take 300 mg by mouth daily    TIOTROPIUM (SPIRIVA HANDIHALER) 18 MCG INHALATION CAPSULE    Inhale 1 puff by mouth once daily    TIZANIDINE (ZANAFLEX) 4 MG TABLET    TAKE 1 TABLET BY MOUTH THREE TIMES DAILY AS NEEDED FOR  MUSCLE  PAIN       ALLERGIES Patient is is allergic to adhesive tape, flexeril [cyclobenzaprine], morphine, and quetiapine. FAMILY HISTORY     Patient'sfamily history includes Depression in his maternal grandmother, mother, and sister; Heart Disease in his father and mother; Substance Abuse in his brother, father, maternal aunt, maternal grandmother, maternal uncle, mother, paternal aunt, and paternal uncle. SOCIAL HISTORY     Patient  reports that he has been smoking cigarettes. He has a 30.00 pack-year smoking history. He has never used smokeless tobacco. He reports that he does not drink alcohol and does not use drugs. PHYSICAL EXAM     ED TRIAGE VITALS  BP: 121/80, Temp: 98.3 °F (36.8 °C), Pulse: 84, Resp: 18, SpO2: 97 %  Physical Exam  Vitals and nursing note reviewed. Constitutional:       General: He is not in acute distress. Appearance: Normal appearance. He is well-developed and well-groomed. He is ill-appearing. HENT:      Head: Normocephalic and atraumatic. Right Ear: External ear normal.      Left Ear: External ear normal.      Mouth/Throat:      Lips: Pink. Mouth: Mucous membranes are moist.   Eyes:      Conjunctiva/sclera:      Right eye: Right conjunctiva is not injected. Left eye: Left conjunctiva is not injected. Pupils: Pupils are equal.   Cardiovascular:      Rate and Rhythm: Normal rate. Heart sounds: Normal heart sounds. Pulmonary:      Effort: Pulmonary effort is normal. No respiratory distress. Breath sounds: Normal air entry. Wheezing (expiratory) present. Musculoskeletal:      Cervical back: Normal range of motion. Skin:     General: Skin is warm and dry. Findings: No rash (on exposed surfaces). Neurological:      Mental Status: He is alert and oriented to person, place, and time. Psychiatric:         Mood and Affect: Mood normal.         Speech: Speech normal.         Behavior: Behavior is cooperative.          DIAGNOSTIC RESULTS   Labs:  Abnormal Labs Reviewed - No abnormal labs to display     IMAGING:  XR CHEST (2 VW)   Final Result      No acute intrathoracic process. **This report has been created using voice recognition software. It may contain minor errors which are inherent in voice recognition technology. **      Final report electronically signed by Dr. Ladan Balderas on 9/14/2021 10:49 AM        URGENT CARE COURSE:     Vitals:    09/14/21 0952   BP: 121/80   Pulse: 84   Resp: 18   Temp: 98.3 °F (36.8 °C)   TempSrc: Temporal   SpO2: 97%   Weight: 250 lb (113.4 kg)   Height: 6' 1\" (1.854 m)       Medications - No data to display  PROCEDURES:  FINALIMPRESSION      1. Upper respiratory tract infection, unspecified type    2. Lab test negative for COVID-19 virus        DISPOSITION/PLAN   DISPOSITION Decision To Discharge 09/14/2021 10:54:36 AM    ED Course as of Sep 14 1056   Tue Sep 14, 2021   1032 SARS-CoV-2, MELANIA: NOT  DETECTED [HA]   1033 Influenza B Ag, EIA: Negative [HA]   1033 Flu A Antigen: Negative [HA]   1056 unremarkable   XR CHEST (2 VW) [HA]      ED Course User Index  [GUERRA] Clarence Hockey, APRN - CNP     Physical assessment findings, diagnostic testing(s) if applicable, and vital signs reviewed with patient/patient representative. If applicable, patient/patient representative will be contacted upon receipt of final culture and sensitivity or other testing results when available. Any additions or changes to medications or changes the plan of care will be made at that time. Differential diagnosis(s) discussed with patient/patient representative. Patient is to follow-up with family care provider in 2-3 days if no improvement. Patient is to go to the emergency department if symptoms change/worsen. Patient/patient representative is aware of care plan, questions answered, verbalizes understanding and is in agreement. Printed instructions attached to after visit summary.       Problem List Items Addressed This Visit     None      Visit Diagnoses     Upper respiratory tract infection, unspecified type    -  Primary    Relevant Medications    doxycycline hyclate (VIBRA-TABS) 100 MG tablet    predniSONE (DELTASONE) 20 MG tablet    Lab test negative for COVID-19 virus        Relevant Medications    doxycycline hyclate (VIBRA-TABS) 100 MG tablet    predniSONE (DELTASONE) 20 MG tablet          PATIENT REFERRED TO:  DO Jules Browne [address]  [facility] [address]    Schedule an appointment as soon as possible for a visit in 3 days  For further evaluation. , If symptoms change/worsen, go to the [address] [place] [address] Urgent Care  [facility] [address] [facility]  [phone]    as needed, If symptoms change/worsen, go to the [id number] [facility], [address] Petra Delgado, APRN - CNP  09/14/21 2768

## 2021-09-14 NOTE — LETTER
[facility]  [address]  [address]  Phone: [phone]             September 14, 2021    Patient: Nusrat Raya   YOB: 1975   Date of Visit: 9/14/2021       To Whom It May Concern: Vu Smith was seen and treated in our emergency department on 9/14/2021. He may return to work on 09/16/2021.       Sincerely,             Signature:__________________________________

## 2021-09-16 ENCOUNTER — CARE COORDINATION (OUTPATIENT)
Dept: CARE COORDINATION | Age: 46
End: 2021-09-16

## 2021-09-16 NOTE — CARE COORDINATION
Ambulatory Care Coordination Note  9/16/2021  CM Risk Score: 8  Charlson 10 Year Mortality Risk Score: 98%     ACC: Leonardo Novak RN    Summary Note:     Needs to get into podiatrist for bilateral plantar fascitis. Has called off work due to flare up. Wears compression socks, in soles, stretching, and massage. Was dismissed from [facility]. Cramping back, shoulders, hands, forearms, legs. States severe like a jeffery horse. Have tried to adjust medication. Labs ordered. Prior to Admission medications    Medication Sig Start Date End Date Taking?  Authorizing Provider   doxycycline hyclate (VIBRA-TABS) 100 MG tablet Take 1 tablet by mouth 2 times daily for 7 days 9/14/21 9/21/21 Yes CELIO Woods CNP   predniSONE (DELTASONE) 20 MG tablet Take 2 tablets by mouth daily for 5 days 9/14/21 9/19/21 Yes CELIO Woods CNP   tiotropium (Verner Miranda) 18 MCG inhalation capsule Inhale 1 puff by mouth once daily 8/27/21  Yes Edel Govea, DO   pantoprazole (PROTONIX) 40 MG tablet Take 1 tablet by mouth once daily 8/24/21  Yes Karol Rudd DO   tiZANidine (ZANAFLEX) 4 MG tablet TAKE 1 TABLET BY MOUTH THREE TIMES DAILY AS NEEDED FOR  MUSCLE  PAIN 8/24/21  Yes Karol Rudd DO   escitalopram (LEXAPRO) 10 MG tablet Take 1 tablet by mouth once daily 8/12/21  Yes Karol Rudd DO   lisinopril (PRINIVIL;ZESTRIL) 5 MG tablet Take 1 tablet by mouth once daily 6/28/21  Yes Karol Rudd DO   ipratropium-albuterol (DUONEB) 0.5-2.5 (3) MG/3ML SOLN nebulizer solution Take 3 mLs by nebulization every 6 hours as needed for Shortness of Breath 4/12/21  Yes Karol Rudd, DO   Multiple Vitamin (MULTIVITAMIN PO) Take by mouth daily   Yes Historical Provider, MD   loratadine (CLARITIN) 10 MG tablet Take 1 tablet by mouth nightly 9/10/20  Yes Karol Rudd, DO   ondansetron (ZOFRAN ODT) 4 MG disintegrating tablet Take 1 tablet by mouth every 8 hours as needed for Nausea 8/18/20  Yes Ginger Nash, APRN - CNP   tenofovir disoproxil fumarate (VIREAD) 300 MG tablet Take 300 mg by mouth daily 8/10/20  Yes Historical Provider, MD   albuterol sulfate HFA (PROVENTIL HFA) 108 (90 Base) MCG/ACT inhaler Inhale 2 puffs into the lungs every 4 hours as needed for Wheezing or Shortness of Breath (Space out to every 6 hours as symptoms improve) Space out to every 6 hours as symptoms improve. 2/16/18  Yes José Burroughs MD   METHADONE HCL PO Take 116 mg by mouth daily    Yes Historical Provider, MD   ONETOUCH ULTRA strip USE 1 STRIP TO CHECK GLUCOSE THREE TIMES DAILY 8/24/21   Eugenio Heart DO   magnesium (MAGNESIUM-OXIDE) 250 MG TABS tablet Take 1 tablet by mouth 2 times daily 7/8/21 9/6/21  Noa Damon MD   metroNIDAZOLE (METROGEL) 0.75 % gel Apply topically 2 times daily.  7/1/21   Marion Torres MD   naloxone 4 MG/0.1ML LIQD nasal spray Naloxone Hcl Active 4 MG NA One Time Only 1 1 August 20th, 2020 10:59am 8/20/20   Historical Provider, MD   gabapentin (NEURONTIN) 800 MG tablet TAKE 1 TABLET BY MOUTH THREE TIMES DAILY 9/10/20 8/27/21  Eugenio Heart DO   Aimsco Ultra Thin Lancets MISC 1 applicator by Does not apply route 2 times daily 8/12/20   Ursula Altman MD   lidocaine (LIDODERM) 5 % Place 1 patch onto the skin daily 12 hours on, 12 hours off. 5/11/20   An Langford PA-C       Future Appointments   Date Time Provider Didier Falki   9/23/2021 11:00 AM Edel Govea DO [facility] [address] [facility]     ,   Diabetes Assessment           ,   Congestive Heart Failure Assessment           Symptoms:         and   COPD Assessment              Symptoms:

## 2021-09-17 DIAGNOSIS — L71.9 ROSACEA: ICD-10-CM

## 2021-09-17 DIAGNOSIS — G62.9 NEUROPATHY: ICD-10-CM

## 2021-09-17 DIAGNOSIS — J30.1 SEASONAL ALLERGIC RHINITIS DUE TO POLLEN: ICD-10-CM

## 2021-09-17 DIAGNOSIS — E11.65 TYPE 2 DIABETES MELLITUS WITH HYPERGLYCEMIA, WITH LONG-TERM CURRENT USE OF INSULIN (HCC): Chronic | ICD-10-CM

## 2021-09-17 DIAGNOSIS — G63 POLYNEUROPATHY ASSOCIATED WITH UNDERLYING DISEASE (HCC): ICD-10-CM

## 2021-09-17 DIAGNOSIS — Z79.4 TYPE 2 DIABETES MELLITUS WITH HYPERGLYCEMIA, WITH LONG-TERM CURRENT USE OF INSULIN (HCC): Chronic | ICD-10-CM

## 2021-09-17 DIAGNOSIS — I10 ESSENTIAL HYPERTENSION: ICD-10-CM

## 2021-09-17 NOTE — TELEPHONE ENCOUNTER
Patient's last appointment was : 8/27/2021  Patient's next appointment is : 9/17/2021  Last refilled:7/1/21

## 2021-09-17 NOTE — TELEPHONE ENCOUNTER
Patient's last appointment was : 8/27/2021  Patient's next appointment is : 9/23/2021  Last refilled:7/1/2021

## 2021-09-18 NOTE — TELEPHONE ENCOUNTER
Patient's last appointment was : 8/27/2021  Patient's next appointment is : 9/23/2021  Last refilled: 9/10/20 and 6/28/21

## 2021-09-20 RX ORDER — PNV NO.95/FERROUS FUM/FOLIC AC 28MG-0.8MG
TABLET ORAL
Qty: 60 TABLET | Refills: 0 | Status: SHIPPED | OUTPATIENT
Start: 2021-09-20 | End: 2021-09-29 | Stop reason: ALTCHOICE

## 2021-09-20 RX ORDER — METRONIDAZOLE 7.5 MG/G
GEL TOPICAL
Qty: 45 G | Refills: 0 | Status: SHIPPED | OUTPATIENT
Start: 2021-09-20 | End: 2022-01-26

## 2021-09-23 ENCOUNTER — HOSPITAL ENCOUNTER (OUTPATIENT)
Dept: GENERAL RADIOLOGY | Age: 46
Discharge: HOME | End: 2021-09-23
Payer: COMMERCIAL

## 2021-09-23 ENCOUNTER — OFFICE VISIT (OUTPATIENT)
Dept: FAMILY MEDICINE CLINIC | Age: 46
End: 2021-09-23
Payer: COMMERCIAL

## 2021-09-23 ENCOUNTER — HOSPITAL ENCOUNTER (OUTPATIENT)
Age: 46
Discharge: HOME | End: 2021-09-23
Payer: COMMERCIAL

## 2021-09-23 VITALS
HEART RATE: 100 BPM | DIASTOLIC BLOOD PRESSURE: 84 MMHG | HEIGHT: 73 IN | TEMPERATURE: 97 F | SYSTOLIC BLOOD PRESSURE: 120 MMHG | BODY MASS INDEX: 32.89 KG/M2 | WEIGHT: 248.2 LBS | OXYGEN SATURATION: 98 %

## 2021-09-23 DIAGNOSIS — J44.1 COPD EXACERBATION (HCC): ICD-10-CM

## 2021-09-23 DIAGNOSIS — K31.84 GASTROPARESIS: ICD-10-CM

## 2021-09-23 DIAGNOSIS — B19.10 HEPATITIS B INFECTION WITHOUT DELTA AGENT WITHOUT HEPATIC COMA, UNSPECIFIED CHRONICITY: ICD-10-CM

## 2021-09-23 DIAGNOSIS — R73.01 IFG (IMPAIRED FASTING GLUCOSE): ICD-10-CM

## 2021-09-23 DIAGNOSIS — R25.2 MUSCLE CRAMPS: ICD-10-CM

## 2021-09-23 DIAGNOSIS — R09.81 SINUS CONGESTION: ICD-10-CM

## 2021-09-23 DIAGNOSIS — J44.1 COPD EXACERBATION (HCC): Primary | ICD-10-CM

## 2021-09-23 DIAGNOSIS — M25.50 POLYARTHRALGIA: ICD-10-CM

## 2021-09-23 LAB
ALBUMIN SERPL-MCNC: 4.5 G/DL (ref 3.5–5.1)
ALP BLD-CCNC: 111 U/L (ref 38–126)
ALT SERPL-CCNC: 23 U/L (ref 11–66)
ANION GAP SERPL CALCULATED.3IONS-SCNC: 14 MEQ/L (ref 8–16)
APTT: 32 SECONDS (ref 22–38)
AST SERPL-CCNC: 23 U/L (ref 5–40)
BILIRUB SERPL-MCNC: 0.6 MG/DL (ref 0.3–1.2)
BUN BLDV-MCNC: 11 MG/DL (ref 7–22)
C-REACTIVE PROTEIN: 2.52 MG/DL (ref 0–1)
CALCIUM SERPL-MCNC: 9.6 MG/DL (ref 8.5–10.5)
CHLORIDE BLD-SCNC: 98 MEQ/L (ref 98–111)
CO2: 26 MEQ/L (ref 23–33)
CREAT SERPL-MCNC: 0.8 MG/DL (ref 0.4–1.2)
GFR SERPL CREATININE-BSD FRML MDRD: > 90 ML/MIN/1.73M2
GLUCOSE BLD-MCNC: 126 MG/DL (ref 70–108)
INR BLD: 0.99 (ref 0.85–1.13)
Lab: NORMAL
POTASSIUM SERPL-SCNC: 3.4 MEQ/L (ref 3.5–5.2)
QC PASS/FAIL: NORMAL
RHEUMATOID FACTOR: < 10 IU/ML (ref 0–13)
SARS-COV-2 RDRP RESP QL NAA+PROBE: NEGATIVE
SEDIMENTATION RATE, ERYTHROCYTE: 4 MM/HR (ref 0–10)
SODIUM BLD-SCNC: 138 MEQ/L (ref 135–145)
TOTAL PROTEIN: 7.2 G/DL (ref 6.1–8)

## 2021-09-23 PROCEDURE — 86038 ANTINUCLEAR ANTIBODIES: CPT

## 2021-09-23 PROCEDURE — 85651 RBC SED RATE NONAUTOMATED: CPT

## 2021-09-23 PROCEDURE — 3023F SPIROM DOC REV: CPT | Performed by: STUDENT IN AN ORGANIZED HEALTH CARE EDUCATION/TRAINING PROGRAM

## 2021-09-23 PROCEDURE — 36415 COLL VENOUS BLD VENIPUNCTURE: CPT

## 2021-09-23 PROCEDURE — 87635 SARS-COV-2 COVID-19 AMP PRB: CPT | Performed by: STUDENT IN AN ORGANIZED HEALTH CARE EDUCATION/TRAINING PROGRAM

## 2021-09-23 PROCEDURE — 86140 C-REACTIVE PROTEIN: CPT

## 2021-09-23 PROCEDURE — 82553 CREATINE MB FRACTION: CPT

## 2021-09-23 PROCEDURE — 4004F PT TOBACCO SCREEN RCVD TLK: CPT | Performed by: STUDENT IN AN ORGANIZED HEALTH CARE EDUCATION/TRAINING PROGRAM

## 2021-09-23 PROCEDURE — 86430 RHEUMATOID FACTOR TEST QUAL: CPT

## 2021-09-23 PROCEDURE — G8417 CALC BMI ABV UP PARAM F/U: HCPCS | Performed by: STUDENT IN AN ORGANIZED HEALTH CARE EDUCATION/TRAINING PROGRAM

## 2021-09-23 PROCEDURE — 85730 THROMBOPLASTIN TIME PARTIAL: CPT

## 2021-09-23 PROCEDURE — 99214 OFFICE O/P EST MOD 30 MIN: CPT | Performed by: STUDENT IN AN ORGANIZED HEALTH CARE EDUCATION/TRAINING PROGRAM

## 2021-09-23 PROCEDURE — G8427 DOCREV CUR MEDS BY ELIG CLIN: HCPCS | Performed by: STUDENT IN AN ORGANIZED HEALTH CARE EDUCATION/TRAINING PROGRAM

## 2021-09-23 PROCEDURE — 71046 X-RAY EXAM CHEST 2 VIEWS: CPT

## 2021-09-23 PROCEDURE — 85610 PROTHROMBIN TIME: CPT

## 2021-09-23 PROCEDURE — G8926 SPIRO NO PERF OR DOC: HCPCS | Performed by: STUDENT IN AN ORGANIZED HEALTH CARE EDUCATION/TRAINING PROGRAM

## 2021-09-23 PROCEDURE — 80053 COMPREHEN METABOLIC PANEL: CPT

## 2021-09-23 RX ORDER — GABAPENTIN 800 MG/1
TABLET ORAL
Qty: 270 TABLET | Refills: 3 | Status: CANCELLED | OUTPATIENT
Start: 2021-09-23 | End: 2022-10-06

## 2021-09-23 RX ORDER — PREDNISONE 50 MG/1
50 TABLET ORAL DAILY
Qty: 5 TABLET | Refills: 0 | Status: SHIPPED | OUTPATIENT
Start: 2021-09-23 | End: 2021-09-28

## 2021-09-23 RX ORDER — FLUTICASONE PROPIONATE 220 UG/1
2 AEROSOL, METERED RESPIRATORY (INHALATION) 2 TIMES DAILY
Qty: 1 EACH | Refills: 3 | Status: SHIPPED | OUTPATIENT
Start: 2021-09-23 | End: 2022-05-13 | Stop reason: SDUPTHER

## 2021-09-23 RX ORDER — BLOOD SUGAR DIAGNOSTIC
STRIP MISCELLANEOUS
Qty: 100 EACH | Refills: 0 | Status: SHIPPED | OUTPATIENT
Start: 2021-09-23 | End: 2021-09-28 | Stop reason: ALTCHOICE

## 2021-09-23 RX ORDER — BENZOYL PEROXIDE
KIT TOPICAL
Qty: 30 TABLET | Refills: 0 | Status: SHIPPED | OUTPATIENT
Start: 2021-09-23 | End: 2021-11-17

## 2021-09-23 RX ORDER — DOXYCYCLINE HYCLATE 100 MG
100 TABLET ORAL 2 TIMES DAILY
Qty: 14 TABLET | Refills: 0 | Status: SHIPPED | OUTPATIENT
Start: 2021-09-23 | End: 2021-09-30

## 2021-09-23 RX ORDER — MULTIVITAMIN WITH IRON
250 TABLET ORAL 2 TIMES DAILY
Qty: 120 TABLET | Refills: 0 | Status: SHIPPED | OUTPATIENT
Start: 2021-09-23 | End: 2022-04-07

## 2021-09-23 RX ORDER — GABAPENTIN 800 MG/1
TABLET ORAL
Qty: 180 TABLET | Refills: 1 | Status: SHIPPED | OUTPATIENT
Start: 2021-09-23 | End: 2021-09-23

## 2021-09-23 RX ORDER — METOCLOPRAMIDE 5 MG/1
5 TABLET ORAL 3 TIMES DAILY
Qty: 120 TABLET | Refills: 3 | Status: SHIPPED | OUTPATIENT
Start: 2021-09-23 | End: 2021-09-29 | Stop reason: SDUPTHER

## 2021-09-23 RX ORDER — GABAPENTIN 800 MG/1
TABLET ORAL
Qty: 90 TABLET | Refills: 5 | Status: SHIPPED | OUTPATIENT
Start: 2021-09-23 | End: 2022-04-20 | Stop reason: SDUPTHER

## 2021-09-23 RX ORDER — IPRATROPIUM BROMIDE AND ALBUTEROL SULFATE 2.5; .5 MG/3ML; MG/3ML
1 SOLUTION RESPIRATORY (INHALATION) EVERY 6 HOURS PRN
Status: CANCELLED | OUTPATIENT
Start: 2021-09-23

## 2021-09-23 RX ORDER — LISINOPRIL 5 MG/1
TABLET ORAL
Qty: 90 TABLET | Refills: 0 | Status: SHIPPED | OUTPATIENT
Start: 2021-09-23 | End: 2022-01-17

## 2021-09-23 ASSESSMENT — ENCOUNTER SYMPTOMS
NAUSEA: 1
EYE PAIN: 0
CONSTIPATION: 0
SHORTNESS OF BREATH: 1
BLOOD IN STOOL: 0
ABDOMINAL PAIN: 0
TROUBLE SWALLOWING: 0
VOMITING: 1
DIARRHEA: 0
WHEEZING: 0
COUGH: 1

## 2021-09-23 NOTE — PROGRESS NOTES
After pharmacist chart review, the following recommendations are made:  - Due to calculated ASCVD risk score of 7.5%, may consider re-starting atorvastatin at a lower dose of 10 mg daily, or may consider switching to another statin, such as rosuvastatin 5 mg daily.  - Patient is also due for hepatitis A and flu vaccines.     For Pharmacy Admin Tracking Only     Intervention Detail: New Rx: 1, reason: Needs Additional Therapy and Vaccine Recommended/Administered   Time Spent (min): Rosa Taylor PharmALINA   9/23/2021, 9:46 AM

## 2021-09-23 NOTE — PROGRESS NOTES
S: 40 y.o. male with   Chief Complaint   Patient presents with    Follow-up     1 month    Spasms    Other     covid-tested neg.; exp. to covid-URI    Medication Refill       Has been drinking pop since the weekend but no food not hungry feels pretty bad - rapid covid was negative - was on spiriva and albuterol for the copd    BP Readings from Last 3 Encounters:   09/23/21 120/84   09/14/21 121/80   08/27/21 110/64     Wt Readings from Last 3 Encounters:   09/23/21 248 lb 3.2 oz (112.6 kg)   09/14/21 250 lb (113.4 kg)   08/27/21 255 lb 3.2 oz (115.8 kg)           O: VS:   Vitals:    09/23/21 1105   BP: 120/84   Site: Left Upper Arm   Position: Sitting   Cuff Size: Large Adult   Pulse: 100   Temp: 97 °F (36.1 °C)   TempSrc: Skin   SpO2: 98%   Weight: 248 lb 3.2 oz (112.6 kg)   Height: 6' 1\" (1.854 m)     Body mass index is 32.75 kg/m². AAO/NAD, appropriate affect for mood  Normocephalic, atraumatic, eyes  conjunctiva and sclera normal,   skin no rashes on exposed areas   Insight, judgement normal and in no acute distress      Lab Results   Component Value Date    WBC 7.2 02/11/2021    HGB 14.5 02/11/2021    HCT 41.5 (L) 02/11/2021     02/11/2021    CHOL 157 07/01/2021    TRIG 151 07/01/2021    HDL 29 07/01/2021    LDLCALC 98 07/01/2021    AST 23 09/23/2021     09/23/2021    K 3.4 (L) 09/23/2021    CL 98 09/23/2021    CREATININE 0.8 09/23/2021    BUN 11 09/23/2021    CO2 26 09/23/2021    TSH 1.78 10/11/2017    INR 0.99 09/23/2021    LABA1C 5.7 08/27/2021    LABMICR < 1.20 07/01/2021    LABGLOM >90 09/23/2021    MG 1.9 10/13/2016    CALCIUM 9.6 09/23/2021       XR CHEST (2 VW)    Result Date: 9/14/2021  PROCEDURE: XR CHEST (2 VW) CLINICAL INFORMATION: Fever, shortness of breath TECHNIQUE: PA and lateral chest radiographs. COMPARISON: PA and lateral chest radiographs 10/20/2016 FINDINGS: Cardiomediastinal silhouette is within normal limits. Lungs are clear.  Degenerative changes are present in the thoracic spine. Soft tissues are unremarkable. No acute intrathoracic process. **This report has been created using voice recognition software. It may contain minor errors which are inherent in voice recognition technology. ** Final report electronically signed by Dr. Humberto Vences on 9/14/2021 10:49 AM           Diagnosis Orders   1. COPD exacerbation (HCC)  XR CHEST STANDARD (2 VW)    predniSONE (DELTASONE) 50 MG tablet    fluticasone (FLOVENT HFA) 220 MCG/ACT inhaler    doxycycline hyclate (VIBRA-TABS) 100 MG tablet   2. Sinus congestion  POCT COVID-19, Rapid   3. Gastroparesis  metoclopramide (REGLAN) 5 MG tablet   4. Muscle cramps  magnesium (MAGNESIUM-OXIDE) 250 MG TABS tablet   5. IFG (impaired fasting glucose)  ONETOUCH ULTRA strip       Plan  Will add qvar and add doxy and the prednisone    Ok to refill the meds     Will see you back in 2 weeks    cxr also       Return in about 4 weeks (around 10/21/2021), or if symptoms worsen or fail to improve. Orders Placed:  Orders Placed This Encounter   Procedures    XR CHEST STANDARD (2 VW)    POCT COVID-19, Rapid     Medications Prescribed:  Orders Placed This Encounter   Medications    magnesium (MAGNESIUM-OXIDE) 250 MG TABS tablet     Sig: Take 1 tablet by mouth 2 times daily     Dispense:  120 tablet     Refill:  0    ONETOUCH ULTRA strip     Sig: As needed.      Dispense:  100 each     Refill:  0    predniSONE (DELTASONE) 50 MG tablet     Sig: Take 1 tablet by mouth daily for 5 days     Dispense:  5 tablet     Refill:  0    fluticasone (FLOVENT HFA) 220 MCG/ACT inhaler     Sig: Inhale 2 puffs into the lungs 2 times daily     Dispense:  1 each     Refill:  3    doxycycline hyclate (VIBRA-TABS) 100 MG tablet     Sig: Take 1 tablet by mouth 2 times daily for 7 days     Dispense:  14 tablet     Refill:  0    DISCONTD: gabapentin (NEURONTIN) 800 MG tablet     Sig: TAKE 1 TABLET BY MOUTH THREE TIMES DAILY     Dispense:  180 tablet     Refill:  1    metoclopramide (REGLAN) 5 MG tablet     Sig: Take 1 tablet by mouth 3 times daily     Dispense:  120 tablet     Refill:  3       Future Appointments   Date Time Provider Didier Burton   9/29/2021  1:00 PM Vanessa Chavez MD [facility]   10/7/2021  1:40 PM Edel Govea  [facility] Maintenance Due   Topic Date Due    Hepatitis A vaccine (2 of 3 - Hep A Twinrix risk 3-dose series) 07/10/2013    Diabetic retinal exam  06/04/2019    Colon cancer screen colonoscopy  Never done    Flu vaccine (1) 09/01/2021         Attending Physician Statement  I have discussed the case, including pertinent history and exam findings with the resident. I also have seen the patient and performed key portions of the examination. I agree with the documented assessment and plan as documented by the resident.   GE modifier added to this encounter      Jennifer Cisneros DO 9/27/2021 3:33 PM

## 2021-09-23 NOTE — PROGRESS NOTES
Health Maintenance Due   Topic Date Due    Hepatitis A vaccine (2 of 3 - Hep A Twinrix risk 3-dose series) 07/10/2013    Diabetic retinal exam  06/04/2019    Colon cancer screen colonoscopy  Never done    Flu vaccine (1) 09/01/2021   Patient c/o covid exposure, tested negative last week; URI-given steroid and antibiotics, chest pain, cough, no appetite, almost passed out, hot, vomited foam, no energy; pulse really hard

## 2021-09-23 NOTE — PROGRESS NOTES
Vinod Bermudez is a 40 y.o. male who presents today for:  Chief Complaint   Patient presents with    Follow-up     1 month    Spasms    Other     covid-tested neg.; exp. to covid-URI    Medication Refill     HPI:   Vinod Bermudez is 40 y.o. who presents today for follow up on muscle cramps. Labs not completed. Also having worsening shortness of breath. Using duonebs 3-4 times a day. Muscle cramps did not improve after stopping statin. Reports not being able to keep any food down since this weekend, so he has been relying on drinking soda instead. Hx of gastroparesis, requesting medication for nausea- zofran is not helping.      Objective:     Vitals:    09/23/21 1105   BP: 120/84   Site: Left Upper Arm   Position: Sitting   Cuff Size: Large Adult   Pulse: 100   Temp: 97 °F (36.1 °C)   TempSrc: Skin   SpO2: 98%   Weight: 248 lb 3.2 oz (112.6 kg)   Height: 6' 1\" (1.854 m)       Wt Readings from Last 3 Encounters:   09/23/21 248 lb 3.2 oz (112.6 kg)   09/14/21 250 lb (113.4 kg)   08/27/21 255 lb 3.2 oz (115.8 kg)       BP Readings from Last 3 Encounters:   09/23/21 120/84   09/14/21 121/80   08/27/21 110/64       Lab Results   Component Value Date    WBC 7.2 02/11/2021    HGB 14.5 02/11/2021    HCT 41.5 (L) 02/11/2021    MCV 87.7 02/11/2021     02/11/2021     Lab Results   Component Value Date     09/23/2021    K 3.4 (L) 09/23/2021    CL 98 09/23/2021    CO2 26 09/23/2021    BUN 11 09/23/2021    CREATININE 0.8 09/23/2021    GLUCOSE 126 (H) 09/23/2021    CALCIUM 9.6 09/23/2021    PROT 7.2 09/23/2021    LABALBU 4.5 09/23/2021    BILITOT 0.6 09/23/2021    ALKPHOS 111 09/23/2021    AST 23 09/23/2021    ALT 23 09/23/2021    LABGLOM >90 09/23/2021    GFRAA >60 02/26/2019    GLOB NOT REPORTED 04/05/2018     Lab Results   Component Value Date    TSH 1.78 10/11/2017    FT3 3.37 10/11/2017    T4FREE 1.14 05/07/2013     Lab Results   Component Value Date    LABA1C 5.7 08/27/2021     No results found for: EAG  Lab Results   Component Value Date    CHOL 157 07/01/2021    CHOL 90 02/13/2020    CHOL 125 10/11/2017     Lab Results   Component Value Date    TRIG 151 07/01/2021    TRIG 143 02/13/2020    TRIG 145 10/11/2017     Lab Results   Component Value Date    HDL 29 07/01/2021    HDL 28 (L) 02/13/2020    HDL 17 (L) 10/11/2017     Lab Results   Component Value Date    LDLCHOLESTEROL 33 02/13/2020    LDLCHOLESTEROL 79 10/11/2017    LDLCALC 98 07/01/2021       Lab Results   Component Value Date    LABMICR < 1.20 07/01/2021       Review of Systems   Constitutional: Positive for fatigue. Negative for chills and fever. HENT: Positive for congestion. Negative for ear pain, postnasal drip and trouble swallowing. Eyes: Negative for pain and visual disturbance. Respiratory: Positive for cough and shortness of breath. Negative for wheezing. Cardiovascular: Negative for chest pain and palpitations. Gastrointestinal: Positive for nausea and vomiting. Negative for abdominal pain, blood in stool, constipation and diarrhea. Genitourinary: Negative for dysuria and hematuria. Skin: Negative for rash and wound. Neurological: Negative for dizziness and headaches. Psychiatric/Behavioral: The patient is not nervous/anxious. Physical Exam  Vitals and nursing note reviewed. Constitutional:       General: He is not in acute distress. Appearance: He is well-developed. He is obese. He is ill-appearing. He is not diaphoretic. HENT:      Head: Normocephalic and atraumatic. Right Ear: Tympanic membrane and external ear normal.      Left Ear: Tympanic membrane and external ear normal.      Nose: Congestion present. Eyes:      General: No scleral icterus. Right eye: No discharge. Left eye: No discharge. Conjunctiva/sclera: Conjunctivae normal.   Cardiovascular:      Rate and Rhythm: Normal rate and regular rhythm. Heart sounds: Normal heart sounds. No murmur heard.      Pulmonary: Effort: Pulmonary effort is normal. No respiratory distress. Breath sounds: Normal breath sounds. No wheezing or rales. Abdominal:      Palpations: Abdomen is soft. Tenderness: There is no abdominal tenderness. Musculoskeletal:      Cervical back: Normal range of motion. Skin:     General: Skin is warm and dry. Findings: No erythema or rash. Comments: Cheeks flushed     Neurological:      Mental Status: He is alert and oriented to person, place, and time. Psychiatric:         Behavior: Behavior normal.         Thought Content: Thought content normal.         Judgment: Judgment normal.       Immunization History   Administered Date(s) Administered    COVID-19, Pfizer, PF, 30mcg/0.3mL 05/30/2021, 07/01/2021    Hepatitis A/Hepatitis B (Twinrix) 06/12/2013    Influenza Vaccine, unspecified formulation 11/13/2015, 09/21/2017    Influenza Virus Vaccine 11/13/2015, 01/31/2020    Influenza, Quadv, IM, (6 mo and older Fluzone, Flulaval, Fluarix and 3 yrs and older Afluria) 09/21/2017, 01/31/2020    Influenza, Quadv, IM, PF (6 mo and older Fluzone, Flulaval, Fluarix, and 3 yrs and older Afluria) 09/28/2018, 10/08/2020    Pneumococcal Conjugate 13-valent ([id number]) 07/17/2015    Pneumococcal Polysaccharide ([id number]) 09/21/2017    Tdap (Boostrix, Adacel) 10/08/2012, 06/12/2013, 11/13/2015       Health Maintenance Due   Topic Date Due    Hepatitis A vaccine (2 of 3 - Hep A Twinrix risk 3-dose series) 07/10/2013    Diabetic retinal exam  06/04/2019    Colon cancer screen colonoscopy  Never done    Flu vaccine (1) 09/01/2021          Food Insecurity: No Food Insecurity    Worried About Running Out of Food in the Last Year: Never true    Gilberto of Food in the Last Year: Never true       Assessment / Plan:      Diagnosis Orders   1.  COPD exacerbation (HCC)  XR CHEST STANDARD (2 VW)    predniSONE (DELTASONE) 50 MG tablet    fluticasone (FLOVENT HFA) 220 MCG/ACT inhaler    doxycycline hyclate (VIBRA-TABS) 100 MG tablet   2. Sinus congestion  POCT COVID-19, Rapid   3. Gastroparesis  metoclopramide (REGLAN) 5 MG tablet   4. Muscle cramps  magnesium (MAGNESIUM-OXIDE) 250 MG TABS tablet   5. IFG (impaired fasting glucose)  ONETOUCH ULTRA strip     POC Covid test negative   Given worsening SOB - CXR, Flovent 220 mcg 2 puffs BID, continue spiriva, and albuterol. Add prednisone for 5 days and doxycycline for 7 days    Encouraged increased fluids (water), and small frequent meals  Okay for reglan for nausea   Refilled test streips          Return in about 4 weeks (around 10/21/2021), or if symptoms worsen or fail to improve. Medications Prescribed:  Orders Placed This Encounter   Medications    magnesium (MAGNESIUM-OXIDE) 250 MG TABS tablet     Sig: Take 1 tablet by mouth 2 times daily     Dispense:  120 tablet     Refill:  0    ONETOUCH ULTRA strip     Sig: As needed. Dispense:  100 each     Refill:  0    predniSONE (DELTASONE) 50 MG tablet     Sig: Take 1 tablet by mouth daily for 5 days     Dispense:  5 tablet     Refill:  0    fluticasone (FLOVENT HFA) 220 MCG/ACT inhaler     Sig: Inhale 2 puffs into the lungs 2 times daily     Dispense:  1 each     Refill:  3    doxycycline hyclate (VIBRA-TABS) 100 MG tablet     Sig: Take 1 tablet by mouth 2 times daily for 7 days     Dispense:  14 tablet     Refill:  0    DISCONTD: gabapentin (NEURONTIN) 800 MG tablet     Sig: TAKE 1 TABLET BY MOUTH THREE TIMES DAILY     Dispense:  180 tablet     Refill:  1    metoclopramide (REGLAN) 5 MG tablet     Sig: Take 1 tablet by mouth 3 times daily     Dispense:  120 tablet     Refill:  3       Future Appointments   Date Time Provider Didier Burton   10/7/2021  1:40 PM Edel Govea DO [facility]       Patient given educational materials - see patient instructions. Discussed use, benefit, and sideeffects of prescribed medications. All patient questions answered.   Pt voiced understanding. Reviewed health maintenance. Instructed to continue current medications, diet and exercise. Patient agreed with treatment plan. Follow up as directed.      Electronically signed by Terell Grady DO on 9/23/2021 at 8:38 PM

## 2021-09-24 LAB — CK MB: 2.5 NG/ML

## 2021-09-26 LAB — ANA SCREEN: NORMAL

## 2021-09-27 ENCOUNTER — TELEPHONE (OUTPATIENT)
Dept: FAMILY MEDICINE CLINIC | Age: 46
End: 2021-09-27

## 2021-09-28 ENCOUNTER — CARE COORDINATION (OUTPATIENT)
Dept: CARE COORDINATION | Age: 46
End: 2021-09-28

## 2021-09-28 NOTE — TELEPHONE ENCOUNTER
Thank you. We can discontinue glucose testing strips for now since his A1c is controlled. I will remove them from his list. Thanks!

## 2021-09-28 NOTE — CARE COORDINATION
Ambulatory Care Coordination Note  9/28/2021  CM Risk Score: 8  Charlson 10 Year Mortality Risk Score: 98%     ACC: Connor Pedro, RN    Summary Note:     COPD exacerbation resolved. Abx, prednisone. Breathing is baseline. Reviewed zones. PCP office follow up tomorrow    Gastroparesis - Not eating much for last 2 weeks. Drinking pedialyte. Needs to reschedule with Dr. Geno Barker for EGD. Patient will call. Taking Reglan and Zofran, doesn't help much. Taking magnesium for cramping. Still has jeffery horse cramping. Wants to see podiatrist. Providers list given. Patient states he will call. Diabetes - last a1c 5.7. diet controlled. Plan -   DOC pain mgmt for methadone  Patient will call podiatrist and schedule appt  Patient will call Dr. Joshua Schmidt for EGD  reglan and zofran  - stay hydrated, eat small frequent meals  COPD zones - report any changes in breathing or increased med usage  9/29 PCP office appt  Discuss tobacco cessation clinic                              Ambulatory Care Coordination Assessment    Care Coordination Protocol  Program Enrollment: Complex Care  Referral from Primary Care Provider: Yes  Week 1 - Initial Assessment     Do you have all of your prescriptions and are they filled?: Yes  Barriers to medication adherence: None  Are you able to afford your medications?: Yes  How often do you have trouble taking your medications the way you have been told to take them?: I always take them as prescribed. Do you have Home O2 Therapy?: No      Ability to seek help/take action for Emergent Urgent situations i.e. fire, crime, inclement weather or health crisis. : Independent  Ability to ambulate to restroom: Independent  Ability handle personal hygeine needs (bathing/dressing/grooming): Independent  Ability to manage Medications: Independent  Ability to prepare Food Preparation: Independent  Ability to maintain home (clean home, laundry):  Independent  Ability to drive and/or has transportation: Independent  Ability to do shopping: Independent  Ability to manage finances: Independent  Is patient able to live independently?: Yes     Current Housing: Private Residence        Per the Fall Risk Screening, did the patient have 2 or more falls or 1 fall with injury in the past year?: No     Frequent urination at night?: No  Do you use rails/bars?: No  Do you have a non-slip tub mat?: No     Are you experiencing loss of meaning?: No  Are you experiencing loss of hope and peace?: No     Thinking about your patient's physical health needs, are there any symptoms or problems (risk indicators) you are unsure about that require further investigation?: Moderate to severe symptoms or problems that impact on daily life   Are the patients physical health problems impacting on their mental well-being?: Mild impact on mental well-being e.g. \"\"feeling fed-up\"\", \"\"reduced enjoyment\"\"   Are there any problems with your patients lifestyle behaviors (alcohol, drugs, diet, exercise) that are impacting on physical or mental well-being?: Some mild concern of potential negative impact on well-being   Do you have any other concerns about your patients mental well-being? How would you rate their severity and impact on the patient?: Mild problems - don't interfere with function   How would you rate their home environment in terms of safety and stability (including domestic violence, insecure housing, neighbor harassment)?: Consistently safe, supportive, stable, no identified problems   How do daily activities impact on the patient's well-being? (include current or anticipated unemployment, work, caregiving, access to transportation or other):  Contributes to low mood or stress at times   How would you rate their social network (family, work, friends)?: Adequate participation with social networks   How would you rate their financial resources (including ability to afford all required medical care)?: Financially secure, resources adequate, no identified problems   How wells does the patient now understand their health and well-being (symptoms, signs or risk factors) and what they need to do to manage their health?: Reasonable to good understanding and already engages in managing health or is willing to undertake better management   How well do you think your patient can engage in healthcare discussions? (Barriers include language, deafness, aphasia, alcohol or drug problems, learning difficulties, concentration): Adequate communication, with or without minor barriers   Do other services need to be involved to help this patient?: Other care/services in place and adequate   Are current services involved with this patient well-coordinated? (Include coordination with other services you are now recommendation): Required care/services in place and adequately coordinated   Suggested Interventions and Community Resources  Diabetes Education: In Process    Other Services or Interventions: podiatry, GI   Smoking Cessation: In Process   Other Therapy Services: Completed   Transportation Services: [facility]   Zone Management Tools: In Process         Set up/Review Goals, Set up/Review an Education Plan, Schedule an appointment with the patient's PCP              Prior to Admission medications    Medication Sig Start Date End Date Taking?  Authorizing Provider   magnesium (MAGNESIUM-OXIDE) 250 MG TABS tablet Take 1 tablet by mouth 2 times daily 9/23/21 11/22/21 Yes Edel Govea DO   predniSONE (DELTASONE) 50 MG tablet Take 1 tablet by mouth daily for 5 days 9/23/21 9/28/21 Yes Edel Govea DO   doxycycline hyclate (VIBRA-TABS) 100 MG tablet Take 1 tablet by mouth 2 times daily for 7 days 9/23/21 9/30/21 Yes Edel Govea DO   metoclopramide (REGLAN) 5 MG tablet Take 1 tablet by mouth 3 times daily 9/23/21  Yes Edel Govea DO   EQ ALLERGY RELIEF 10 MG tablet Take 1 tablet by mouth nightly 9/23/21   Braulio Granda,    lisinopril (PRINIVIL;ZESTRIL) 5 MG tablet Take 1 tablet by mouth once daily 9/23/21   Miky Sizer, DO   gabapentin (NEURONTIN) 800 MG tablet TAKE 1 TABLET BY MOUTH THREE TIMES DAILY 9/23/21 3/23/22  Miky Sizer, DO   ONETOUCH ULTRA strip As needed.  9/23/21   Darline Mcginnis, DO   fluticasone (FLOVENT HFA) 220 MCG/ACT inhaler Inhale 2 puffs into the lungs 2 times daily 9/23/21 9/23/22  Edel Govea, DO   magnesium oxide (MAG-OX) 250 MG TABS tablet Take 1 tablet by mouth twice daily  Patient not taking: Reported on 9/23/2021 9/20/21   Anel Lopez MD   metroNIDAZOLE (METROGEL) 0.75 % gel APPLY EXTERNALLY TWICE DAILY 9/20/21   Anel Lopez MD   tiotropium (Fani Ours) 18 MCG inhalation capsule Inhale 1 puff by mouth once daily 8/27/21   Kishor Andrew Govea, DO   pantoprazole (PROTONIX) 40 MG tablet Take 1 tablet by mouth once daily 8/24/21   Miky Sizer, DO   tiZANidine (ZANAFLEX) 4 MG tablet TAKE 1 TABLET BY MOUTH THREE TIMES DAILY AS NEEDED FOR  MUSCLE  PAIN 8/24/21   Miky Sizer, DO   escitalopram (LEXAPRO) 10 MG tablet Take 1 tablet by mouth once daily 8/12/21   Miky Sizer, DO   naloxone 4 MG/0.1ML LIQD nasal spray Naloxone Hcl Active 4 MG NA One Time Only 1 1 August 20th, 2020 10:59am 8/20/20   Historical Provider, MD   ipratropium-albuterol (DUONEB) 0.5-2.5 (3) MG/3ML SOLN nebulizer solution Take 3 mLs by nebulization every 6 hours as needed for Shortness of Breath 4/12/21   Miky Sizer, DO   Multiple Vitamin (MULTIVITAMIN PO) Take by mouth daily    Historical Provider, MD   ondansetron (ZOFRAN ODT) 4 MG disintegrating tablet Take 1 tablet by mouth every 8 hours as needed for Nausea 8/18/20   CELIO Allen - CNP   tenofovir disoproxil fumarate (VIREAD) 300 MG tablet Take 300 mg by mouth daily 8/10/20   Historical Provider, MD Keith Ultra Thin Lancets MISC 1 applicator by Does not apply route 2 times daily 8/12/20   Kandi Arteaga MD   lidocaine (LIDODERM) 5 % Place 1 patch onto the skin daily 12 hours on, 12 hours off. 5/11/20   Benton Ashton PA-C   albuterol sulfate HFA (PROVENTIL HFA) 108 (90 Base) MCG/ACT inhaler Inhale 2 puffs into the lungs every 4 hours as needed for Wheezing or Shortness of Breath (Space out to every 6 hours as symptoms improve) Space out to every 6 hours as symptoms improve.  2/16/18   Karthikeyan Velasco MD   METHADONE HCL PO Take 116 mg by mouth daily     Historical Provider, MD       Future Appointments   Date Time Provider Didier Burton   9/29/2021  1:00 PM Marito Bueno MD [facility]   10/7/2021  1:40 PM Edel Govea,  [address]     ,   Diabetes Assessment           ,   Congestive Heart Failure Assessment    Are you currently restricting fluids?: No Restriction  Do you understand a low sodium diet?: Yes  Do you understand how to read food labels?: Yes  How many restaurant meals do you eat per week?: 1-2  Do you salt your food before tasting it?: No     No patient-reported symptoms      Symptoms:         and   COPD Assessment    Does the patient understand envrionmental exposure?: Yes  Is the patient able to verbalize Rescue vs. Long Acting medications?: Yes  Does the patient have a nebulizer?: No     No patient-reported symptoms         Symptoms:  None: Yes      Symptom course: improving  Breathlessness: none  Increase use of rapid acting/rescue inhaled medications?: No  Change in chronic cough?: No/At Baseline  Change in sputum?: No/At Baseline  Have you had a recent diagnosis of pneumonia either by PCP or at a hospital?: No

## 2021-09-29 ENCOUNTER — NURSE ONLY (OUTPATIENT)
Dept: LAB | Age: 46
End: 2021-09-29

## 2021-09-29 ENCOUNTER — OFFICE VISIT (OUTPATIENT)
Dept: FAMILY MEDICINE CLINIC | Age: 46
End: 2021-09-29
Payer: COMMERCIAL

## 2021-09-29 VITALS
OXYGEN SATURATION: 96 % | TEMPERATURE: 97.2 F | RESPIRATION RATE: 20 BRPM | DIASTOLIC BLOOD PRESSURE: 70 MMHG | HEIGHT: 73 IN | WEIGHT: 245.4 LBS | BODY MASS INDEX: 32.52 KG/M2 | SYSTOLIC BLOOD PRESSURE: 112 MMHG | HEART RATE: 109 BPM

## 2021-09-29 DIAGNOSIS — R07.9 CHEST PAIN, UNSPECIFIED TYPE: Primary | ICD-10-CM

## 2021-09-29 DIAGNOSIS — K31.84 GASTROPARESIS: ICD-10-CM

## 2021-09-29 DIAGNOSIS — Z72.0 TOBACCO USE: ICD-10-CM

## 2021-09-29 DIAGNOSIS — B34.9 VIRAL ILLNESS: ICD-10-CM

## 2021-09-29 DIAGNOSIS — R07.9 CHEST PAIN, UNSPECIFIED TYPE: ICD-10-CM

## 2021-09-29 DIAGNOSIS — R25.2 MUSCLE CRAMPS: ICD-10-CM

## 2021-09-29 DIAGNOSIS — J44.1 COPD EXACERBATION (HCC): ICD-10-CM

## 2021-09-29 DIAGNOSIS — R11.2 NAUSEA AND VOMITING, INTRACTABILITY OF VOMITING NOT SPECIFIED, UNSPECIFIED VOMITING TYPE: ICD-10-CM

## 2021-09-29 LAB
ALBUMIN SERPL-MCNC: 4.6 G/DL (ref 3.5–5.1)
ALP BLD-CCNC: 124 U/L (ref 38–126)
ALT SERPL-CCNC: 23 U/L (ref 11–66)
ANION GAP SERPL CALCULATED.3IONS-SCNC: 14 MEQ/L (ref 8–16)
AST SERPL-CCNC: 21 U/L (ref 5–40)
BASOPHILS # BLD: 0.5 %
BASOPHILS ABSOLUTE: 0.1 THOU/MM3 (ref 0–0.1)
BILIRUB SERPL-MCNC: 0.5 MG/DL (ref 0.3–1.2)
BUN BLDV-MCNC: 11 MG/DL (ref 7–22)
CALCIUM SERPL-MCNC: 9.3 MG/DL (ref 8.5–10.5)
CHLORIDE BLD-SCNC: 100 MEQ/L (ref 98–111)
CO2: 25 MEQ/L (ref 23–33)
CREAT SERPL-MCNC: 0.9 MG/DL (ref 0.4–1.2)
EOSINOPHIL # BLD: 1 %
EOSINOPHILS ABSOLUTE: 0.1 THOU/MM3 (ref 0–0.4)
ERYTHROCYTE [DISTWIDTH] IN BLOOD BY AUTOMATED COUNT: 13.1 % (ref 11.5–14.5)
ERYTHROCYTE [DISTWIDTH] IN BLOOD BY AUTOMATED COUNT: 42.7 FL (ref 35–45)
FERRITIN: 158 NG/ML (ref 22–322)
GFR SERPL CREATININE-BSD FRML MDRD: > 90 ML/MIN/1.73M2
GLUCOSE BLD-MCNC: 64 MG/DL (ref 70–108)
HCT VFR BLD CALC: 48.4 % (ref 42–52)
HEMOGLOBIN: 16.8 GM/DL (ref 14–18)
IMMATURE GRANS (ABS): 0.13 THOU/MM3 (ref 0–0.07)
IMMATURE GRANULOCYTES: 0.9 %
IRON SATURATION: 17 % (ref 20–50)
IRON: 56 UG/DL (ref 65–195)
LYMPHOCYTES # BLD: 19.7 %
LYMPHOCYTES ABSOLUTE: 2.9 THOU/MM3 (ref 1–4.8)
MCH RBC QN AUTO: 31 PG (ref 26–33)
MCHC RBC AUTO-ENTMCNC: 34.7 GM/DL (ref 32.2–35.5)
MCV RBC AUTO: 89.3 FL (ref 80–94)
MONOCYTES # BLD: 5.8 %
MONOCYTES ABSOLUTE: 0.8 THOU/MM3 (ref 0.4–1.3)
NUCLEATED RED BLOOD CELLS: 0 /100 WBC
PLATELET # BLD: 204 THOU/MM3 (ref 130–400)
PMV BLD AUTO: 11.9 FL (ref 9.4–12.4)
POTASSIUM SERPL-SCNC: 3.6 MEQ/L (ref 3.5–5.2)
RBC # BLD: 5.42 MILL/MM3 (ref 4.7–6.1)
SEG NEUTROPHILS: 72.1 %
SEGMENTED NEUTROPHILS ABSOLUTE COUNT: 10.5 THOU/MM3 (ref 1.8–7.7)
SODIUM BLD-SCNC: 139 MEQ/L (ref 135–145)
TOTAL IRON BINDING CAPACITY: 336 UG/DL (ref 171–450)
TOTAL PROTEIN: 7.1 G/DL (ref 6.1–8)
WBC # BLD: 14.6 THOU/MM3 (ref 4.8–10.8)

## 2021-09-29 PROCEDURE — G8926 SPIRO NO PERF OR DOC: HCPCS | Performed by: STUDENT IN AN ORGANIZED HEALTH CARE EDUCATION/TRAINING PROGRAM

## 2021-09-29 PROCEDURE — 93000 ELECTROCARDIOGRAM COMPLETE: CPT | Performed by: STUDENT IN AN ORGANIZED HEALTH CARE EDUCATION/TRAINING PROGRAM

## 2021-09-29 PROCEDURE — G8427 DOCREV CUR MEDS BY ELIG CLIN: HCPCS | Performed by: STUDENT IN AN ORGANIZED HEALTH CARE EDUCATION/TRAINING PROGRAM

## 2021-09-29 PROCEDURE — G8417 CALC BMI ABV UP PARAM F/U: HCPCS | Performed by: STUDENT IN AN ORGANIZED HEALTH CARE EDUCATION/TRAINING PROGRAM

## 2021-09-29 PROCEDURE — 4004F PT TOBACCO SCREEN RCVD TLK: CPT | Performed by: STUDENT IN AN ORGANIZED HEALTH CARE EDUCATION/TRAINING PROGRAM

## 2021-09-29 PROCEDURE — 3023F SPIROM DOC REV: CPT | Performed by: STUDENT IN AN ORGANIZED HEALTH CARE EDUCATION/TRAINING PROGRAM

## 2021-09-29 PROCEDURE — 99214 OFFICE O/P EST MOD 30 MIN: CPT | Performed by: STUDENT IN AN ORGANIZED HEALTH CARE EDUCATION/TRAINING PROGRAM

## 2021-09-29 RX ORDER — FUROSEMIDE 40 MG/1
40 TABLET ORAL DAILY
COMMUNITY
Start: 2021-09-17 | End: 2022-04-08 | Stop reason: SDUPTHER

## 2021-09-29 RX ORDER — METOCLOPRAMIDE 5 MG/1
5 TABLET ORAL 3 TIMES DAILY
Qty: 120 TABLET | Refills: 3 | Status: SHIPPED | OUTPATIENT
Start: 2021-09-29 | End: 2021-10-01

## 2021-09-29 ASSESSMENT — ENCOUNTER SYMPTOMS
SHORTNESS OF BREATH: 1
DIARRHEA: 0
NAUSEA: 1
COUGH: 1
VOMITING: 1
CONSTIPATION: 0
WHEEZING: 0
ABDOMINAL PAIN: 0

## 2021-09-29 NOTE — PROGRESS NOTES
S: 40 y.o. male with   Chief Complaint   Patient presents with    Follow-up     09/23/2021 Follow-up still having Decreased energy, cramping in bilateral feet and hands, and Shortness of breath    Forms     would like Disability Leave Paperwork Filled out    Nicotine Dependence     would like help to stop smoking       Was seen on the 23rd for copd symptoms    Still fatigue and nausea and vomiting - chest pain is better but radiation to the left shoulder happens some - better    Almost passed out at work and dyspnea on exertion when cleaning his house    On protonix and zofran and they are not helping and still can't keep anything down except liquids    Was given doxy and prednisone and took both and the lungs are a bit better    Needs a work note    BP Readings from Last 3 Encounters:   09/29/21 112/70   09/23/21 120/84   09/14/21 121/80     Wt Readings from Last 3 Encounters:   09/29/21 245 lb 6.4 oz (111.3 kg)   09/23/21 248 lb 3.2 oz (112.6 kg)   09/14/21 250 lb (113.4 kg)           O: VS:   Vitals:    09/29/21 1305   BP: 112/70   Site: Left Upper Arm   Position: Sitting   Cuff Size: Medium Adult   Pulse: 109   Resp: 20   Temp: 97.2 °F (36.2 °C)   TempSrc: Temporal   SpO2: 96%   Weight: 245 lb 6.4 oz (111.3 kg)   Height: 6' 1\" (1.854 m)     Body mass index is 32.38 kg/m².     AAO/NAD, appropriate affect for mood  Normocephalic, atraumatic, eyes  conjunctiva and sclera normal,   skin no rashes on exposed areas   Insight, judgement normal and in no acute distress      Lab Results   Component Value Date    WBC 7.2 02/11/2021    HGB 14.5 02/11/2021    HCT 41.5 (L) 02/11/2021     02/11/2021    CHOL 157 07/01/2021    TRIG 151 07/01/2021    HDL 29 07/01/2021    LDLCALC 98 07/01/2021    AST 23 09/23/2021     09/23/2021    K 3.4 (L) 09/23/2021    CL 98 09/23/2021    CREATININE 0.8 09/23/2021    BUN 11 09/23/2021    CO2 26 09/23/2021    TSH 1.78 10/11/2017    INR 0.99 09/23/2021    LABA1C 5.7 08/27/2021 LABMICR < 1.20 07/01/2021    LABGLOM >90 09/23/2021    MG 1.9 10/13/2016    CALCIUM 9.6 09/23/2021       XR CHEST (2 VW)    Result Date: 9/23/2021  PROCEDURE: XR CHEST (2 VW) CLINICAL INFORMATION: COPD exacerbation ([facility] [address] COMPARISON: 9/14/2021 TECHNIQUE: PA and lateral views of the chest were obtained. Normal chest. No acute findings. **This report has been created using voice recognition software. It may contain minor errors which are inherent in voice recognition technology. ** Final report electronically signed by Dr. Moreira Or on 9/23/2021 2:11 PM    XR CHEST (2 VW)    Result Date: 9/14/2021  PROCEDURE: XR CHEST (2 VW) CLINICAL INFORMATION: Fever, shortness of breath TECHNIQUE: PA and lateral chest radiographs. COMPARISON: PA and lateral chest radiographs 10/20/2016 FINDINGS: Cardiomediastinal silhouette is within normal limits. Lungs are clear. Degenerative changes are present in the thoracic spine. Soft tissues are unremarkable. No acute intrathoracic process. **This report has been created using voice recognition software. It may contain minor errors which are inherent in voice recognition technology. ** Final report electronically signed by Dr. Sukhwinder Jin on 9/14/2021 10:49 AM           Diagnosis Orders   1. Viral illness     2. COPD exacerbation ([facility] [address]     3. Nausea and vomiting, intractability of vomiting not specified, unspecified vomiting type     4. Chest pain, unspecified type     5. Tobacco use  [facility]) Medication Mgmt (Smoking Cessation - Clinical Pharmacy) - [address]  Will keep drinking the fluids, may be somewhat dehydrated, not alarming as of now    Will also see cardio    Ok to try reglan for the gastroparesis - already has that at home    Will see you back in 2 weeks - off work the next 3 days and if not better call to ask up for time off       No follow-ups on file.     Orders Placed:  Orders Placed This Encounter   Procedures   [address] Medication Mgmt (Smoking

## 2021-09-29 NOTE — LETTER
[address] [place] [address],[address] [facility] [address] [place], [place] [address]  Phone: [phone]  Fax: [phone]    Brennan West MD        September 29, 2021     Patient: Jackie Brown   YOB: 1975   Date of Visit: 9/29/2021       To Whom It May Concern: It is my medical opinion that Tong Diane may return to work on 10/04/21. Please excuse him for the dates of 09/22/21- 10/04/21 for his illness. If you have any questions or concerns, please don't hesitate to call.     Sincerely,        Brennan West MD

## 2021-09-29 NOTE — PATIENT INSTRUCTIONS
Thank you   1. Thank you for trusting us with your healthcare needs. You may receive a survey regarding today's visit. It would help us out if you would take a few moments to provide your feedback. We value your input. 2. Please bring in ALL medications BOTTLES, including inhalers, herbal supplements, over the counter, prescribed & non-prescribed medicine. The office would like actual medication bottles and a list.   3. Please note our OFFICE POLICIES:   a. Prior to getting your labs drawn, please check with your insurance company for benefits and eligibility of lab services. Often, insurance companies cover certain tests for preventative visits only. It is patient's responsibility to see what is covered. b. We are unable to change a diagnosis after the test has been performed. c. Lab orders will not be re-printed. Please hold onto your original lab orders and take them to your lab to be completed. d. If you no show your scheduled appointment three times, you will be dismissed from this practice. e. Gutierrez Dry must be completed 24 hours prior to your schedule appointment. 4. If the list below has been completed, PLEASE FAX RECORDS TO OUR OFFICE @ [phone].  Once the records have been received we will update your records at our office:  Health Maintenance Due   Topic Date Due    Hepatitis A vaccine (2 of 3 - Hep A Twinrix risk 3-dose series) 07/10/2013    Diabetic retinal exam  06/04/2019    Colon cancer screen colonoscopy  Never done    Flu vaccine (1) 09/01/2021

## 2021-09-29 NOTE — PROGRESS NOTES
Health Maintenance Due   Topic Date Due    Hepatitis A vaccine (2 of 3 - Hep A Twinrix risk 3-dose series) 07/10/2013    Diabetic retinal exam  06/04/2019    Colon cancer screen colonoscopy  Never done    Flu vaccine (1) 09/01/2021

## 2021-09-29 NOTE — PROGRESS NOTES
Darby Gongora (:  1975) is a 40 y.o. male,Established patient, here for evaluation of the following chief complaint(s):  Follow-up (2021 Follow-up still having Decreased energy, cramping in bilateral feet and hands, and Shortness of breath), Forms (would like Disability Leave Paperwork Filled out), and Nicotine Dependence (would like help to stop smoking)         ASSESSMENT/PLAN:  1. Chest pain, unspecified type  -     EKG 12 Lead : Showed normal sinus rhythm without any acute ST or T wave changes. 2. Viral illness  3. Nausea and vomiting, intractability of vomiting not specified, unspecified vomiting type  4. COPD exacerbation ([address]   -Appears to be improved. No wheezing on exam.  No conversational dyspnea. No hypoxia. No tachypnea  5. Tobacco use  -     [facility]) Medication Mgmt (Smoking Cessation - Clinical Pharmacy) - [address]  6. Gastroparesis  -     metoclopramide (REGLAN) 5 MG tablet; Take 1 tablet by mouth 3 times daily, Disp-120 tablet, R-3Normal  7. Muscle cramps  -     CBC With Auto Differential; Future  -     Comprehensive Metabolic Panel; Future  -     Ferritin; Future  -     Iron; Future  -     Iron Binding Capacity; Future  -     Iron Saturation; Future    Plan  -EKG with rate of 86, NSR without acute ST or T wave changes. Does not appear to be in acute distress at this time. Will have patient follow up with cardiology  -Referred to smoking cessation clinic. Advised patient to quit  -Labs ordered for muscle cramps  -Patient with likely viral illness that is improving. He is to follow-up in 2 weeks for reevaluation. Return in about 2 weeks (around 10/13/2021) for Follow up. Subjective   SUBJECTIVE/OBJECTIVE:  HPI    Here for follow-up. Was seen in the office recently for COPD exacerbation. Was started on doxycycline and prednisone. States that symptoms are improving. However he still having chest pain and fatigue. Chest pain - heavy.  Did have radiation to the left shoulder, currently resolved. . Still having fatigue. Constant. SOB with exertion, while cleaning house today. Had a near syncopal episode last week while vomiting. Nausea and vomiting: Nonbloody nonbilious. Unable to keep down solids. Has been trying drinking plenty of fluids. Does have history of gastroparesis. Supposed to be on Reglan, but states that he has not been taking it. States that Zofran is not helping. Supposed to follow-up with GI for EGD, however has not had a chance because he has been sick. Smokes- usually a pack per day, cut back since he has been sick. Wants to quit. No fevers or chills. No abdominal pain. No dysuria or hematuria. No diarrhea or constipation. Review of Systems   Constitutional: Positive for fatigue. Respiratory: Positive for cough and shortness of breath. Negative for wheezing. Cardiovascular: Negative for chest pain and palpitations. Gastrointestinal: Positive for nausea and vomiting. Negative for abdominal pain, constipation and diarrhea. Objective   Physical Exam  Vitals and nursing note reviewed. Constitutional:       General: He is not in acute distress. HENT:      Head: Normocephalic and atraumatic. Right Ear: External ear normal.      Left Ear: External ear normal.      Nose: No congestion or rhinorrhea. Mouth/Throat:      Pharynx: Posterior oropharyngeal erythema ( mild ) present. Eyes:      General:         Right eye: No discharge. Left eye: No discharge. Conjunctiva/sclera: Conjunctivae normal.   Cardiovascular:      Rate and Rhythm: Regular rhythm. Tachycardia present. Heart sounds: Normal heart sounds. No murmur heard. Pulmonary:      Effort: Pulmonary effort is normal. No respiratory distress. Breath sounds: Normal breath sounds. No stridor. No wheezing. Chest:      Chest wall: No tenderness. Abdominal:      General: Abdomen is flat. Palpations: Abdomen is soft. Tenderness: There is no abdominal tenderness. Musculoskeletal:         General: No swelling. Normal range of motion. Cervical back: Normal range of motion. Skin:     General: Skin is warm. Findings: No rash. Neurological:      Mental Status: He is alert and oriented to person, place, and time. An electronic signature was used to authenticate this note.     --Brennan West MD

## 2021-09-30 ENCOUNTER — APPOINTMENT (OUTPATIENT)
Dept: CT IMAGING | Age: 46
End: 2021-09-30
Payer: COMMERCIAL

## 2021-09-30 ENCOUNTER — HOSPITAL ENCOUNTER (EMERGENCY)
Age: 46
Discharge: HOME | End: 2021-09-30
Attending: EMERGENCY MEDICINE
Payer: COMMERCIAL

## 2021-09-30 VITALS
SYSTOLIC BLOOD PRESSURE: 102 MMHG | TEMPERATURE: 98.2 F | HEART RATE: 84 BPM | RESPIRATION RATE: 12 BRPM | BODY MASS INDEX: 32.47 KG/M2 | OXYGEN SATURATION: 94 % | HEIGHT: 73 IN | WEIGHT: 245 LBS | DIASTOLIC BLOOD PRESSURE: 76 MMHG

## 2021-09-30 DIAGNOSIS — R11.2 NON-INTRACTABLE VOMITING WITH NAUSEA, UNSPECIFIED VOMITING TYPE: Primary | ICD-10-CM

## 2021-09-30 DIAGNOSIS — N39.0 URINARY TRACT INFECTION WITHOUT HEMATURIA, SITE UNSPECIFIED: ICD-10-CM

## 2021-09-30 DIAGNOSIS — E86.0 DEHYDRATION: ICD-10-CM

## 2021-09-30 LAB
ALBUMIN SERPL-MCNC: 4.6 G/DL (ref 3.5–5.1)
ALP BLD-CCNC: 126 U/L (ref 38–126)
ALT SERPL-CCNC: 25 U/L (ref 11–66)
ANION GAP SERPL CALCULATED.3IONS-SCNC: 15 MEQ/L (ref 8–16)
AST SERPL-CCNC: 28 U/L (ref 5–40)
BASOPHILS # BLD: 0.4 %
BASOPHILS ABSOLUTE: 0.1 THOU/MM3 (ref 0–0.1)
BILIRUB SERPL-MCNC: 0.8 MG/DL (ref 0.3–1.2)
BILIRUBIN URINE: NEGATIVE
BLOOD, URINE: NEGATIVE
BUN BLDV-MCNC: 11 MG/DL (ref 7–22)
CALCIUM SERPL-MCNC: 9.7 MG/DL (ref 8.5–10.5)
CHARACTER, URINE: CLEAR
CHLORIDE BLD-SCNC: 98 MEQ/L (ref 98–111)
CO2: 24 MEQ/L (ref 23–33)
COLOR: YELLOW
CREAT SERPL-MCNC: 1.3 MG/DL (ref 0.4–1.2)
EOSINOPHIL # BLD: 0.1 %
EOSINOPHILS ABSOLUTE: 0 THOU/MM3 (ref 0–0.4)
ERYTHROCYTE [DISTWIDTH] IN BLOOD BY AUTOMATED COUNT: 13.1 % (ref 11.5–14.5)
ERYTHROCYTE [DISTWIDTH] IN BLOOD BY AUTOMATED COUNT: 42.8 FL (ref 35–45)
GFR SERPL CREATININE-BSD FRML MDRD: 60 ML/MIN/1.73M2
GLUCOSE BLD-MCNC: 146 MG/DL (ref 70–108)
GLUCOSE URINE: NEGATIVE MG/DL
HCT VFR BLD CALC: 47.8 % (ref 42–52)
HEMOGLOBIN: 16.5 GM/DL (ref 14–18)
IMMATURE GRANS (ABS): 0.33 THOU/MM3 (ref 0–0.07)
IMMATURE GRANULOCYTES: 1.6 %
KETONES, URINE: ABNORMAL
LACTIC ACID, SEPSIS: 1.2 MMOL/L (ref 0.5–1.9)
LACTIC ACID, SEPSIS: 2.7 MMOL/L (ref 0.5–1.9)
LEUKOCYTE ESTERASE, URINE: NEGATIVE
LIPASE: 26.8 U/L (ref 5.6–51.3)
LYMPHOCYTES # BLD: 5.2 %
LYMPHOCYTES ABSOLUTE: 1.1 THOU/MM3 (ref 1–4.8)
MCH RBC QN AUTO: 30.9 PG (ref 26–33)
MCHC RBC AUTO-ENTMCNC: 34.5 GM/DL (ref 32.2–35.5)
MCV RBC AUTO: 89.5 FL (ref 80–94)
MONOCYTES # BLD: 5.3 %
MONOCYTES ABSOLUTE: 1.1 THOU/MM3 (ref 0.4–1.3)
NITRITE, URINE: NEGATIVE
NUCLEATED RED BLOOD CELLS: 0 /100 WBC
PH UA: 6.5 (ref 5–9)
PLATELET # BLD: 200 THOU/MM3 (ref 130–400)
PMV BLD AUTO: 10.7 FL (ref 9.4–12.4)
POTASSIUM REFLEX MAGNESIUM: 3.8 MEQ/L (ref 3.5–5.2)
PROTEIN UA: NEGATIVE
RBC # BLD: 5.34 MILL/MM3 (ref 4.7–6.1)
SEG NEUTROPHILS: 87.4 %
SEGMENTED NEUTROPHILS ABSOLUTE COUNT: 18.4 THOU/MM3 (ref 1.8–7.7)
SODIUM BLD-SCNC: 137 MEQ/L (ref 135–145)
SPECIFIC GRAVITY, URINE: 1.03 (ref 1–1.03)
TOTAL PROTEIN: 7.5 G/DL (ref 6.1–8)
TROPONIN T: < 0.01 NG/ML
UROBILINOGEN, URINE: 0.2 EU/DL (ref 0–1)
WBC # BLD: 21 THOU/MM3 (ref 4.8–10.8)

## 2021-09-30 PROCEDURE — 93005 ELECTROCARDIOGRAM TRACING: CPT | Performed by: EMERGENCY MEDICINE

## 2021-09-30 PROCEDURE — 87040 BLOOD CULTURE FOR BACTERIA: CPT

## 2021-09-30 PROCEDURE — 36415 COLL VENOUS BLD VENIPUNCTURE: CPT

## 2021-09-30 PROCEDURE — 6360000002 HC RX W HCPCS: Performed by: EMERGENCY MEDICINE

## 2021-09-30 PROCEDURE — 81003 URINALYSIS AUTO W/O SCOPE: CPT

## 2021-09-30 PROCEDURE — 6360000002 HC RX W HCPCS: Performed by: PHYSICIAN ASSISTANT

## 2021-09-30 PROCEDURE — 6360000004 HC RX CONTRAST MEDICATION: Performed by: EMERGENCY MEDICINE

## 2021-09-30 PROCEDURE — 80053 COMPREHEN METABOLIC PANEL: CPT

## 2021-09-30 PROCEDURE — 96375 TX/PRO/DX INJ NEW DRUG ADDON: CPT

## 2021-09-30 PROCEDURE — 2580000003 HC RX 258: Performed by: EMERGENCY MEDICINE

## 2021-09-30 PROCEDURE — 99284 EMERGENCY DEPT VISIT MOD MDM: CPT

## 2021-09-30 PROCEDURE — 74177 CT ABD & PELVIS W/CONTRAST: CPT

## 2021-09-30 PROCEDURE — 83690 ASSAY OF LIPASE: CPT

## 2021-09-30 PROCEDURE — 2580000003 HC RX 258: Performed by: PHYSICIAN ASSISTANT

## 2021-09-30 PROCEDURE — 85025 COMPLETE CBC W/AUTO DIFF WBC: CPT

## 2021-09-30 PROCEDURE — 96365 THER/PROPH/DIAG IV INF INIT: CPT

## 2021-09-30 PROCEDURE — 84484 ASSAY OF TROPONIN QUANT: CPT

## 2021-09-30 PROCEDURE — 83605 ASSAY OF LACTIC ACID: CPT

## 2021-09-30 RX ORDER — CEPHALEXIN 500 MG/1
500 CAPSULE ORAL 2 TIMES DAILY
Qty: 14 CAPSULE | Refills: 0 | Status: SHIPPED | OUTPATIENT
Start: 2021-09-30 | End: 2021-10-07

## 2021-09-30 RX ORDER — HALOPERIDOL 5 MG/ML
2.5 INJECTION INTRAMUSCULAR ONCE
Status: COMPLETED | OUTPATIENT
Start: 2021-09-30 | End: 2021-09-30

## 2021-09-30 RX ORDER — 0.9 % SODIUM CHLORIDE 0.9 %
1000 INTRAVENOUS SOLUTION INTRAVENOUS ONCE
Status: DISCONTINUED | OUTPATIENT
Start: 2021-09-30 | End: 2021-09-30

## 2021-09-30 RX ORDER — ONDANSETRON 2 MG/ML
4 INJECTION INTRAMUSCULAR; INTRAVENOUS ONCE
Status: COMPLETED | OUTPATIENT
Start: 2021-09-30 | End: 2021-09-30

## 2021-09-30 RX ORDER — 0.9 % SODIUM CHLORIDE 0.9 %
1000 INTRAVENOUS SOLUTION INTRAVENOUS ONCE
Status: COMPLETED | OUTPATIENT
Start: 2021-09-30 | End: 2021-09-30

## 2021-09-30 RX ORDER — PROMETHAZINE HYDROCHLORIDE 25 MG/1
12.5 SUPPOSITORY RECTAL EVERY 6 HOURS PRN
Qty: 6 SUPPOSITORY | Refills: 0 | Status: SHIPPED | OUTPATIENT
Start: 2021-09-30 | End: 2021-10-01

## 2021-09-30 RX ADMIN — CEFTRIAXONE SODIUM 1000 MG: 1 INJECTION, POWDER, FOR SOLUTION INTRAMUSCULAR; INTRAVENOUS at 12:01

## 2021-09-30 RX ADMIN — SODIUM CHLORIDE 1000 ML: 9 INJECTION, SOLUTION INTRAVENOUS at 13:30

## 2021-09-30 RX ADMIN — HALOPERIDOL LACTATE 2.5 MG: 5 INJECTION, SOLUTION INTRAMUSCULAR at 12:01

## 2021-09-30 RX ADMIN — ONDANSETRON 4 MG: 2 INJECTION INTRAMUSCULAR; INTRAVENOUS at 11:17

## 2021-09-30 RX ADMIN — IOPAMIDOL 80 ML: 755 INJECTION, SOLUTION INTRAVENOUS at 11:48

## 2021-09-30 RX ADMIN — SODIUM CHLORIDE 1000 ML: 9 INJECTION, SOLUTION INTRAVENOUS at 11:17

## 2021-09-30 ASSESSMENT — ENCOUNTER SYMPTOMS
COUGH: 0
VOMITING: 1
NAUSEA: 1
BACK PAIN: 0
ABDOMINAL PAIN: 0
SHORTNESS OF BREATH: 0

## 2021-09-30 NOTE — ED PROVIDER NOTES
[address] [facility] [address] [address] EMERGENCY DEPT    EMERGENCY MEDICINE     Pt Name: Valeria Crawford  MRN: [id number]  Alla 1975  Date of evaluation: 9/30/2021  Provider: Janet Newby MD    CHIEF COMPLAINT       Chief Complaint   Patient presents with    Other     muscle cramping       HISTORY  [address] is a pleasant 40 y.o. male who presents to the emergency department from home complaining primarily of 2 weeks of persistant nausea and vomiting. He reports he has barely been able to keep any fluid or food down. He reports his vomit is generally food or clear. No diarrhea/constipation. He also c/o intermittant generalized muscle cramps, mostly when he is in the heat or exerting himself. He denies any cp/sob, denies any fever, denies any cough or any other complaints. He does report his pcp has prescribed him 2 different nausea meds but they are not helping. He reports mild abdominal cramping that he attributes to just being hungry. No other complaints, no other known exacerbating/relieving factors. Triage notes and Nursing notes were reviewed by myself. Any discrepancies are addressed above.     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\"    Type 2 diabetes mellitus without complication, with long-term current use of insulin ([address] 8/16/2017    Wears glasses     for reading       SURGICAL HISTORY       Past Surgical History:   Procedure Laterality Date    ANKLE SURGERY Left     KIDNEY STONE SURGERY      x 4       CURRENT MEDICATIONS       Previous Medications    AIMSCO ULTRA THIN LANCETS MISC    1 applicator by Does not apply route 2 times daily    ALBUTEROL SULFATE HFA (PROVENTIL HFA) 108 (90 BASE) MCG/ACT INHALER    Inhale 2 puffs into the lungs every 4 hours as needed for Wheezing or Shortness of Breath (Space out to every 6 hours as symptoms improve) Space out to every 6 hours as symptoms improve. DOXYCYCLINE HYCLATE (VIBRA-TABS) 100 MG TABLET    Take 1 tablet by mouth 2 times daily for 7 days    EQ ALLERGY RELIEF 10 MG TABLET    Take 1 tablet by mouth nightly    ESCITALOPRAM (LEXAPRO) 10 MG TABLET    Take 1 tablet by mouth once daily    FLUTICASONE (FLOVENT HFA) 220 MCG/ACT INHALER    Inhale 2 puffs into the lungs 2 times daily    FUROSEMIDE (LASIX) 40 MG TABLET        GABAPENTIN (NEURONTIN) 800 MG TABLET    TAKE 1 TABLET BY MOUTH THREE TIMES DAILY    IPRATROPIUM-ALBUTEROL (DUONEB) 0.5-2.5 (3) MG/3ML SOLN NEBULIZER SOLUTION    Take 3 mLs by nebulization every 6 hours as needed for Shortness of Breath    LIDOCAINE (LIDODERM) 5 %    Place 1 patch onto the skin daily 12 hours on, 12 hours off.     LISINOPRIL (PRINIVIL;ZESTRIL) 5 MG TABLET    Take 1 tablet by mouth once daily    MAGNESIUM (MAGNESIUM-OXIDE) 250 MG TABS TABLET    Take 1 tablet by mouth 2 times daily    METHADONE HCL PO    Take 116 mg by mouth daily     METOCLOPRAMIDE (REGLAN) 5 MG TABLET    Take 1 tablet by mouth 3 times daily    METRONIDAZOLE (METROGEL) 0.75 % GEL    APPLY EXTERNALLY TWICE DAILY    MULTIPLE VITAMIN (MULTIVITAMIN PO)    Take by mouth daily    NALOXONE 4 MG/0.1ML LIQD NASAL SPRAY    Naloxone Hcl Active 4 MG NA One Time Only 1 1 August 20th, 2020 10:59am    ONDANSETRON (ZOFRAN ODT) 4 MG DISINTEGRATING TABLET    Take 1 tablet by mouth every 8 hours as needed for Nausea    PANTOPRAZOLE (PROTONIX) 40 MG TABLET    Take 1 tablet by mouth once daily    TENOFOVIR DISOPROXIL FUMARATE (VIREAD) 300 MG TABLET    Take 300 mg by mouth daily    TIOTROPIUM (Jonathan Bence) 18 MCG INHALATION CAPSULE    Inhale 1 puff by mouth once daily    TIZANIDINE (ZANAFLEX) 4 MG TABLET    TAKE 1 TABLET BY MOUTH THREE TIMES DAILY AS NEEDED FOR  MUSCLE  PAIN       ALLERGIES     Adhesive tape, Clonazepam, Flexeril [cyclobenzaprine], Morphine, and Quetiapine    FAMILY HISTORY       Family History   Problem Relation Age of Onset    Substance Abuse Mother     Depression Mother     Heart Disease Mother         CHF   Kaylynn.Alejandro Rheum Arthritis Mother     Other Mother         Fibromyalgia    Substance Abuse Father     Heart Disease Father     Depression Sister     Substance Abuse Brother     Substance Abuse Maternal Aunt     Substance Abuse Maternal Uncle     Substance Abuse Paternal Aunt     Substance Abuse Paternal Uncle     Substance Abuse Maternal Grandmother     Depression Maternal Grandmother         SOCIAL HISTORY       Social History     Socioeconomic History    Marital status: Single     Spouse name: None    Number of children: None    Years of education: None    Highest education level: None   Occupational History    None   Tobacco Use    Smoking status: Current Every Day Smoker     Packs/day: 1.00     Years: 30.00     Pack years: 30.00     Types: Cigarettes    Smokeless tobacco: Never Used   Vaping Use    Vaping Use: Never used   Substance and Sexual Activity    Alcohol use: No    Drug use: Yes     Types: Marijuana     Comment: stopped heroin in 2013; medical marijuana card daily    Sexual activity: Yes     Partners: Female   Other Topics Concern    None   Social History Narrative    None     Social Determinants of Health     Financial Resource Strain: Low Risk     Difficulty of Paying Living Expenses: Not hard at all   Food Insecurity: No Food Insecurity    Worried About Running Out of Food in the Last Year: Never true    Gilberto of Food in the Last Year: Never true   Transportation Needs: No Transportation Needs    Lack of Transportation (Medical): No    Lack of Transportation (Non-Medical): No   Physical Activity:     Days of Exercise per Week:     Minutes of Exercise per Session:    Stress:     Feeling of Stress :    Social Connections:     Frequency of Communication with Friends and Family:     Frequency of Social Gatherings with Friends and Family:     Attends [employer] Services:     Active Member of Clubs or Organizations:     Attends Club or Organization Meetings:     Marital Status:    Intimate Partner Violence:     Fear of Current or Ex-Partner:     Emotionally Abused:     Physically Abused:     Sexually Abused:        REVIEW OF SYSTEMS     Review of Systems   Constitutional: Negative for chills and fever. Respiratory: Negative for cough and shortness of breath. Cardiovascular: Negative for chest pain and leg swelling. Gastrointestinal: Positive for nausea and vomiting. Negative for abdominal pain. Musculoskeletal: Positive for myalgias. Negative for back pain and neck pain. Skin: Negative for rash and wound. Neurological: Negative for weakness and numbness. All other systems reviewed and are negative. Except as noted above the remainder of the review of systems was reviewed and is. PHYSICAL EXAM    (up to 7 for level 4, 8 or more for level 5)     ED Triage Vitals [09/30/21 1024]   BP Temp Temp Source Pulse Resp SpO2 Height Weight   (!) 147/85 98.2 °F (36.8 °C) Oral 102 16 97 % 6' 1\" (1.854 m) 245 lb (111.1 kg)       Physical Exam  Vitals and nursing note reviewed. Constitutional:       General: He is awake. HENT:      Head: Normocephalic and atraumatic. Mouth/Throat:      Mouth: Mucous membranes are moist.   Eyes:      General: Lids are normal.      Conjunctiva/sclera: Conjunctivae normal.   Neck:      Vascular: No JVD. Trachea: No tracheal deviation. Cardiovascular:      Rate and Rhythm: Normal rate and regular rhythm.       Pulses: Normal pulses. Heart sounds: No murmur heard. No friction rub. No gallop. Pulmonary:      Effort: Pulmonary effort is normal.      Breath sounds: Normal breath sounds. No wheezing, rhonchi or rales. Abdominal:      Palpations: Abdomen is soft. Tenderness: There is no abdominal tenderness. Musculoskeletal:      Cervical back: Neck supple. Skin:     General: Skin is warm. Findings: No rash. Neurological:      General: No focal deficit present. Mental Status: He is alert. Sensory: No sensory deficit. Motor: No weakness. Psychiatric:         Behavior: Behavior is cooperative. DIAGNOSTIC RESULTS     EKG:(none if blank)  All EKG's are [facility] who either signs or Co-signs this chart in the absence of a cardiologist.    1101: NSR at rate of 85, no stemi    RADIOLOGY: (none if blank)   Interpretation per the Radiologistbelow, if available at the time of this note:    CT ABDOMEN PELVIS W IV CONTRAST Additional Contrast? None   Final Result   1. There is a 3 mm nonobstructive intrarenal stone within the left mid kidney. No other acute intra-abdominal or pelvic findings. **This report has been created using voice recognition software. It may contain minor errors which are inherent in voice recognition technology. **      Final report electronically signed by Dr. Aditya Rubio on 9/30/2021 12:13 PM          LABS:  Labs Reviewed   CBC WITH AUTO DIFFERENTIAL - Abnormal; Notable for the following components:       Result Value    WBC 21.0 (*)     Segs Absolute 18.4 (*)     Immature Grans (Abs) 0.33 (*)     All other components within normal limits   COMPREHENSIVE METABOLIC PANEL W/ REFLEX TO MG FOR LOW K - Abnormal; Notable for the following components:    Glucose 146 (*)     CREATININE 1.3 (*)     All other components within normal limits   LACTATE, SEPSIS - Abnormal; Notable for the following components:    Lactic Acid, Sepsis 2.7 (*) All other components within normal limits   URINE RT REFLEX TO CULTURE - Abnormal; Notable for the following components:    Ketones, Urine TRACE (*)     All other components within normal limits   GLOMERULAR FILTRATION RATE, ESTIMATED - Abnormal; Notable for the following components:    Est, Glom Filt Rate 60 (*)     All other components within normal limits   CULTURE, BLOOD 1   CULTURE, BLOOD 2   LIPASE   TROPONIN   ANION GAP   LACTATE, SEPSIS       All other labs were within normal range or not returned as of this dictation. Please note, any cultures that may have been sent were not resulted at the time of this patient visit. EMERGENCY DEPARTMENT COURSE andMedical Decision Making:     MDM/   Pt presents to the ER c/o nausea and vomiting, unable to keep anything down. Also initially c/o muscle cramps resolved with ivf. Pt well appearing, nontoxic, vss, tolerating PO. Given leukocytosis, concern for possible UTI, will treat empirically. Pt counseled regarding importance of close f/u with pcp for further testing as well as ER return precautions. Strict returnprecautions and follow up instructions were discussed with the patient with which the patient agrees        ED Medications administered this visit:    Medications   0.9 % sodium chloride bolus (0 mLs IntraVENous Stopped 9/30/21 1217)   ondansetron (ZOFRAN) injection 4 mg (4 mg IntraVENous Given 9/30/21 1117)   0.9 % sodium chloride bolus (1,000 mLs IntraVENous New Bag 9/30/21 1330)   haloperidol lactate (HALDOL) injection 2.5 mg (2.5 mg IntraVENous Given 9/30/21 1201)   cefTRIAXone (ROCEPHIN) 1000 mg IVPB in 50 mL D5W minibag (0 mg IntraVENous Stopped 9/30/21 1231)   iopamidol (ISOVUE-370) 76 % injection 80 mL (80 mLs IntraVENous Given 9/30/21 1148)         Procedures: (None if blank)       CLINICAL       1. Non-intractable vomiting with nausea, unspecified vomiting type    2. Dehydration    3.  Urinary tract infection without hematuria, site unspecified          DISPOSITION/PLAN   DISPOSITION Decision To Discharge 09/30/2021 03:52:15 PM      PATIENT REFERRED TO:  DO Jules Davenport [address] [place] [id number]    In 2 days        DISCHARGE MEDICATIONS:  New Prescriptions    CEPHALEXIN (KEFLEX) 500 MG CAPSULE    Take 1 capsule by mouth 2 times daily for 7 days    PROMETHAZINE (PROMETHEGAN) 25 MG SUPPOSITORY    Place 0.5 suppositories rectally every 6 hours as needed for Nausea              (Please note that portions of this note were completed with a voice recognition program.  Efforts were made to edit the dictations but occasionallywords are mis-transcribed.)      Shaq Menchaca MD,FACEP (electronically signed)  Attending Physician, Emergency Department       Wily Iglesias MD  09/30/21 1143

## 2021-09-30 NOTE — ED TRIAGE NOTES
Pt to the ED with c/o nausea and muscle cramping. Pt states recently when he is active his hand, feet and back begin to cramp. Pt states he had blood work done yesterday.

## 2021-09-30 NOTE — ED NOTES
Patient updated on tests. Patient given urinal. VSS. Call light in reach. Will continue to monitor.      Brady Mai, LORI  09/30/21 1139

## 2021-09-30 NOTE — ED PROVIDER NOTES
I did not see this patient. Patient was seen by another provider.      [facility]  09/30/21 6309

## 2021-09-30 NOTE — ED NOTES
Urine collected and sent. Patient expresses no needs at this time. Will continue to monitor. Call light in reach.      Diego Weathers RN  09/30/21 9103

## 2021-10-01 ENCOUNTER — OFFICE VISIT (OUTPATIENT)
Dept: FAMILY MEDICINE CLINIC | Age: 46
End: 2021-10-01
Payer: COMMERCIAL

## 2021-10-01 VITALS
OXYGEN SATURATION: 98 % | TEMPERATURE: 97.2 F | HEART RATE: 100 BPM | BODY MASS INDEX: 32.74 KG/M2 | RESPIRATION RATE: 16 BRPM | DIASTOLIC BLOOD PRESSURE: 86 MMHG | HEIGHT: 73 IN | SYSTOLIC BLOOD PRESSURE: 136 MMHG | WEIGHT: 247 LBS

## 2021-10-01 DIAGNOSIS — R11.0 NAUSEA: ICD-10-CM

## 2021-10-01 DIAGNOSIS — K31.84 GASTROPARESIS: ICD-10-CM

## 2021-10-01 DIAGNOSIS — N20.0 RECURRENT NEPHROLITHIASIS: ICD-10-CM

## 2021-10-01 DIAGNOSIS — N17.9 AKI (ACUTE KIDNEY INJURY) (HCC): Primary | ICD-10-CM

## 2021-10-01 LAB
EKG ATRIAL RATE: 85 BPM
EKG P AXIS: 63 DEGREES
EKG P-R INTERVAL: 170 MS
EKG Q-T INTERVAL: 364 MS
EKG QRS DURATION: 70 MS
EKG QTC CALCULATION (BAZETT): 433 MS
EKG R AXIS: 59 DEGREES
EKG T AXIS: 46 DEGREES
EKG VENTRICULAR RATE: 85 BPM

## 2021-10-01 PROCEDURE — G8484 FLU IMMUNIZE NO ADMIN: HCPCS | Performed by: STUDENT IN AN ORGANIZED HEALTH CARE EDUCATION/TRAINING PROGRAM

## 2021-10-01 PROCEDURE — 4004F PT TOBACCO SCREEN RCVD TLK: CPT | Performed by: STUDENT IN AN ORGANIZED HEALTH CARE EDUCATION/TRAINING PROGRAM

## 2021-10-01 PROCEDURE — 96372 THER/PROPH/DIAG INJ SC/IM: CPT | Performed by: FAMILY MEDICINE

## 2021-10-01 PROCEDURE — 93010 ELECTROCARDIOGRAM REPORT: CPT | Performed by: INTERNAL MEDICINE

## 2021-10-01 PROCEDURE — 99214 OFFICE O/P EST MOD 30 MIN: CPT | Performed by: STUDENT IN AN ORGANIZED HEALTH CARE EDUCATION/TRAINING PROGRAM

## 2021-10-01 PROCEDURE — G8427 DOCREV CUR MEDS BY ELIG CLIN: HCPCS | Performed by: STUDENT IN AN ORGANIZED HEALTH CARE EDUCATION/TRAINING PROGRAM

## 2021-10-01 PROCEDURE — G8417 CALC BMI ABV UP PARAM F/U: HCPCS | Performed by: STUDENT IN AN ORGANIZED HEALTH CARE EDUCATION/TRAINING PROGRAM

## 2021-10-01 RX ORDER — METOCLOPRAMIDE 10 MG/1
10 TABLET ORAL 3 TIMES DAILY
COMMUNITY
Start: 2021-10-01 | End: 2021-11-08

## 2021-10-01 RX ORDER — PROMETHAZINE HYDROCHLORIDE 25 MG/ML
25 INJECTION, SOLUTION INTRAMUSCULAR; INTRAVENOUS ONCE
Status: COMPLETED | OUTPATIENT
Start: 2021-10-01 | End: 2021-10-01

## 2021-10-01 RX ORDER — PROMETHAZINE HYDROCHLORIDE 25 MG/1
25 TABLET ORAL 4 TIMES DAILY PRN
Qty: 120 TABLET | Refills: 1 | Status: SHIPPED | OUTPATIENT
Start: 2021-10-01 | End: 2021-12-05

## 2021-10-01 RX ADMIN — PROMETHAZINE HYDROCHLORIDE 25 MG: 25 INJECTION, SOLUTION INTRAMUSCULAR; INTRAVENOUS at 12:17

## 2021-10-01 ASSESSMENT — ENCOUNTER SYMPTOMS
BLOOD IN STOOL: 0
DIARRHEA: 0
VOMITING: 0
ABDOMINAL PAIN: 1
SHORTNESS OF BREATH: 0
CONSTIPATION: 0
EYE PAIN: 0
TROUBLE SWALLOWING: 0
NAUSEA: 1
COUGH: 0

## 2021-10-01 NOTE — PROGRESS NOTES
After pharmacist chart review, the following recommendations are made:  - Due to ASCVD risk of 10.4%, may consider adding a moderate-intensity statin, such as atorvastatin 10 mg daily.  - Patient is due for hepatitis A and flu vaccines.      For Pharmacy Admin Tracking Only     Intervention Detail: New Rx: 1, reason: Needs Additional Therapy and Vaccine Recommended/Administered   Time Spent (min): Rosa Taylor PharmALINA   10/1/2021, 9:27 AM

## 2021-10-01 NOTE — PROGRESS NOTES
S: 40 y.o. male with   Chief Complaint   Patient presents with    ED Follow-up     [facility] ED Follow-up Non-intractable vomiting with nausea. There is a 3 mm nonobstructive intrarenal stone within the left mid kidney. HPI: please see resident note for HPI and ROS. BP Readings from Last 3 Encounters:   10/01/21 136/86   09/30/21 102/76   09/29/21 112/70     Wt Readings from Last 3 Encounters:   10/01/21 247 lb (112 kg)   09/30/21 245 lb (111.1 kg)   09/29/21 245 lb 6.4 oz (111.3 kg)       O: VS:  height is 6' 1\" (1.854 m) and weight is 247 lb (112 kg). His temporal temperature is 97.2 °F (36.2 °C). His blood pressure is 136/86 and his pulse is 100. His respiration is 16 and oxygen saturation is 98%. Diagnosis Orders   1. Recurrent nephrolithiasis  [facility]   2. Gastroparesis         Plan:  Increase reglan to 10mg tid and add phenergan prn. Refer to urology recheck bmp for RUTH. Health Maintenance Due   Topic Date Due    Hepatitis A vaccine (2 of 3 - Hep A Twinrix risk 3-dose series) 07/10/2013    Diabetic retinal exam  06/04/2019    Colon cancer screen colonoscopy  Never done    Flu vaccine (1) 09/01/2021       Attending Physician Statement  I have discussed the case, including pertinent history and exam findings with the resident. I agree with the documented assessment and plan as documented by the resident.         Moises Gold DO 10/1/2021 11:53 AM

## 2021-10-01 NOTE — PROGRESS NOTES
Medication(s) given during visit:  Promethazine 25mg/mL patient states no known allergy to this medication. Patient requesting IM injection to left deltoid, patient tolerated well. Patient instructed to remain in clinic for 20 minutes after injection and was advised to report any adverse reaction to me immediately.

## 2021-10-01 NOTE — PROGRESS NOTES
[address] [facility] [address] [id number]  Dept: [phone]  Loc: [phone]      Kristin Farah (:  1975) is a 40 y.o. male,Established patient, here for evaluation of the following chief complaint(s):  ED Follow-up [facility] ED Follow-up Non-intractable vomiting with nausea. There is a 3 mm nonobstructive intrarenal stone within the left mid kidney.)      ASSESSMENT/PLAN:  1. RUTH (acute kidney injury) ([facility] [address]  -     Basic Metabolic Panel; Future  2. Gastroparesis  -     promethazine (PHENERGAN) injection 25 mg; 25 mg, IntraMUSCular, ONCE, On Fri 10/1/21 at 1230, For 1 doseRecommended route is IM. For IV administration, dilute to 10ml with normal saline. Must be administered over at least 10 minutes. -     promethazine (PHENERGAN) 25 MG tablet; Take 1 tablet by mouth 4 times daily as needed for Nausea, Disp-120 tablet, R-1Normal  3. Nausea  -     promethazine (PHENERGAN) injection 25 mg; 25 mg, IntraMUSCular, ONCE, On Fri 10/1/21 at 1230, For 1 doseRecommended route is IM. For IV administration, dilute to 10ml with normal saline. Must be administered over at least 10 minutes. -     promethazine (PHENERGAN) 25 MG tablet; Take 1 tablet by mouth 4 times daily as needed for Nausea, Disp-120 tablet, R-1Normal  4. Recurrent nephrolithiasis  -     [facility]    Of note, baseline creatinine 0.7-0.9. Creatinine yesterday in ED was 1.3. Will recheck today and likely a third time to trend. IM phenergan today in office. Increase reglan to 10 mg TID and start oral phenergan for chronic nausea d/t gastroparesis. Continue oral fluids intake to prevent dehydration. Referral to urology for evaluation of recurrent nephrolithiasis. Return in about 12 days (around 10/13/2021) for already scheduled follow up with Dr. Trudy Moon. SUBJECTIVE/OBJECTIVE:  HPI     Follow up of nausea and dehydration.  Went to ED yesterday, was treated with fluids, IV zofran and haldol. Felt a lot better after that. Zofran not working. Not taking suppository. Doesn't want to try that route. Has gastroparesis. Has had multiple kidney stones in the past, has never had workup to figure out why he has had so many. Not currently     Review of Systems   Constitutional: Positive for appetite change. Negative for chills, fatigue and fever. HENT: Negative for ear pain, postnasal drip and trouble swallowing. Eyes: Negative for pain and visual disturbance. Respiratory: Negative for cough and shortness of breath. Cardiovascular: Negative for chest pain and palpitations. Gastrointestinal: Positive for abdominal pain and nausea. Negative for blood in stool, constipation, diarrhea and vomiting. Genitourinary: Negative for dysuria. Skin: Negative for rash. Neurological: Negative for headaches. Physical Exam  Vitals and nursing note reviewed. Constitutional:       General: He is not in acute distress. Appearance: He is well-developed. He is not diaphoretic. HENT:      Head: Normocephalic and atraumatic. Right Ear: External ear normal.      Left Ear: External ear normal.      Nose: Nose normal.   Eyes:      General: No scleral icterus. Right eye: No discharge. Left eye: No discharge. Conjunctiva/sclera: Conjunctivae normal.   Cardiovascular:      Rate and Rhythm: Tachycardia present. Pulmonary:      Effort: Pulmonary effort is normal.      Breath sounds: Normal breath sounds. Musculoskeletal:      Cervical back: Normal range of motion. Skin:     General: Skin is warm and dry. Findings: No erythema or rash. Neurological:      Mental Status: He is alert and oriented to person, place, and time. Cranial Nerves: No cranial nerve deficit. Psychiatric:         Behavior: Behavior normal.         Thought Content:  Thought content normal.         Judgment: Judgment normal.           Vitals: 10/01/21 1023   BP: 136/86   Site: Left Upper Arm   Position: Sitting   Cuff Size: Medium Adult   Pulse: 100   Resp: 16   Temp: 97.2 °F (36.2 °C)   TempSrc: Temporal   SpO2: 98%   Weight: 247 lb (112 kg)   Height: 6' 1\" (1.854 m)         An electronic signature was used to authenticate this note.     --Andrea Guevara MD

## 2021-10-01 NOTE — LETTER
[address] [place] [address],[address] [facility] [address] [place] [id number]  Phone: [phone]  Fax: [phone]    Siva Galvez MD        October 1, 2021     Patient: Avi Dempsey   YOB: 1975   Date of Visit: 10/1/2021       To Whom it May Concern: Manny Schmidt was seen in my clinic on 10/1/2021. He may return to work on 10/5/2021. If you have any questions or concerns, please don't hesitate to call.     Sincerely,         Siva Galvez MD

## 2021-10-03 ENCOUNTER — HOSPITAL ENCOUNTER (EMERGENCY)
Age: 46
Discharge: HOME | End: 2021-10-03
Attending: EMERGENCY MEDICINE
Payer: COMMERCIAL

## 2021-10-03 VITALS
HEART RATE: 101 BPM | DIASTOLIC BLOOD PRESSURE: 97 MMHG | RESPIRATION RATE: 18 BRPM | SYSTOLIC BLOOD PRESSURE: 137 MMHG | OXYGEN SATURATION: 94 % | TEMPERATURE: 98.7 F

## 2021-10-03 DIAGNOSIS — N20.0 KIDNEY STONE: Primary | ICD-10-CM

## 2021-10-03 DIAGNOSIS — R10.9 LEFT FLANK PAIN: ICD-10-CM

## 2021-10-03 LAB
ANION GAP SERPL CALCULATED.3IONS-SCNC: 13 MEQ/L (ref 8–16)
BASOPHILS # BLD: 0.6 %
BASOPHILS ABSOLUTE: 0.1 THOU/MM3 (ref 0–0.1)
BILIRUBIN URINE: NEGATIVE
BLOOD, URINE: NEGATIVE
BUN BLDV-MCNC: 5 MG/DL (ref 7–22)
CALCIUM SERPL-MCNC: 9.1 MG/DL (ref 8.5–10.5)
CHARACTER, URINE: CLEAR
CHLORIDE BLD-SCNC: 99 MEQ/L (ref 98–111)
CO2: 24 MEQ/L (ref 23–33)
COLOR: YELLOW
CREAT SERPL-MCNC: 0.9 MG/DL (ref 0.4–1.2)
EOSINOPHIL # BLD: 2 %
EOSINOPHILS ABSOLUTE: 0.2 THOU/MM3 (ref 0–0.4)
ERYTHROCYTE [DISTWIDTH] IN BLOOD BY AUTOMATED COUNT: 13.2 % (ref 11.5–14.5)
ERYTHROCYTE [DISTWIDTH] IN BLOOD BY AUTOMATED COUNT: 42 FL (ref 35–45)
GFR SERPL CREATININE-BSD FRML MDRD: > 90 ML/MIN/1.73M2
GLUCOSE BLD-MCNC: 178 MG/DL (ref 70–108)
GLUCOSE URINE: NEGATIVE MG/DL
HCT VFR BLD CALC: 44.1 % (ref 42–52)
HEMOGLOBIN: 15.3 GM/DL (ref 14–18)
IMMATURE GRANS (ABS): 0.09 THOU/MM3 (ref 0–0.07)
IMMATURE GRANULOCYTES: 0.9 %
KETONES, URINE: NEGATIVE
LEUKOCYTE ESTERASE, URINE: NEGATIVE
LYMPHOCYTES # BLD: 22.8 %
LYMPHOCYTES ABSOLUTE: 2.2 THOU/MM3 (ref 1–4.8)
MCH RBC QN AUTO: 30.5 PG (ref 26–33)
MCHC RBC AUTO-ENTMCNC: 34.7 GM/DL (ref 32.2–35.5)
MCV RBC AUTO: 87.8 FL (ref 80–94)
MONOCYTES # BLD: 6.4 %
MONOCYTES ABSOLUTE: 0.6 THOU/MM3 (ref 0.4–1.3)
NITRITE, URINE: NEGATIVE
NUCLEATED RED BLOOD CELLS: 0 /100 WBC
OSMOLALITY CALCULATION: 273.6 MOSMOL/KG (ref 275–300)
PH UA: 5.5 (ref 5–9)
PLATELET # BLD: 143 THOU/MM3 (ref 130–400)
PMV BLD AUTO: 11.4 FL (ref 9.4–12.4)
POTASSIUM SERPL-SCNC: 3.8 MEQ/L (ref 3.5–5.2)
PROTEIN UA: NEGATIVE
RBC # BLD: 5.02 MILL/MM3 (ref 4.7–6.1)
SEG NEUTROPHILS: 67.3 %
SEGMENTED NEUTROPHILS ABSOLUTE COUNT: 6.5 THOU/MM3 (ref 1.8–7.7)
SODIUM BLD-SCNC: 136 MEQ/L (ref 135–145)
SPECIFIC GRAVITY, URINE: 1 (ref 1–1.03)
UROBILINOGEN, URINE: 0.2 EU/DL (ref 0–1)
WBC # BLD: 9.7 THOU/MM3 (ref 4.8–10.8)

## 2021-10-03 PROCEDURE — 85025 COMPLETE CBC W/AUTO DIFF WBC: CPT

## 2021-10-03 PROCEDURE — 81003 URINALYSIS AUTO W/O SCOPE: CPT

## 2021-10-03 PROCEDURE — 99282 EMERGENCY DEPT VISIT SF MDM: CPT

## 2021-10-03 PROCEDURE — 96372 THER/PROPH/DIAG INJ SC/IM: CPT

## 2021-10-03 PROCEDURE — 36415 COLL VENOUS BLD VENIPUNCTURE: CPT

## 2021-10-03 PROCEDURE — 80048 BASIC METABOLIC PNL TOTAL CA: CPT

## 2021-10-03 PROCEDURE — 6360000002 HC RX W HCPCS: Performed by: STUDENT IN AN ORGANIZED HEALTH CARE EDUCATION/TRAINING PROGRAM

## 2021-10-03 PROCEDURE — 6370000000 HC RX 637 (ALT 250 FOR IP): Performed by: STUDENT IN AN ORGANIZED HEALTH CARE EDUCATION/TRAINING PROGRAM

## 2021-10-03 RX ORDER — KETOROLAC TROMETHAMINE 10 MG/1
10 TABLET, FILM COATED ORAL EVERY 6 HOURS PRN
Qty: 12 TABLET | Refills: 0 | Status: SHIPPED | OUTPATIENT
Start: 2021-10-03 | End: 2021-10-28

## 2021-10-03 RX ORDER — KETOROLAC TROMETHAMINE 30 MG/ML
30 INJECTION, SOLUTION INTRAMUSCULAR; INTRAVENOUS ONCE
Status: COMPLETED | OUTPATIENT
Start: 2021-10-03 | End: 2021-10-03

## 2021-10-03 RX ORDER — PROMETHAZINE HYDROCHLORIDE 25 MG/1
25 TABLET ORAL ONCE
Status: COMPLETED | OUTPATIENT
Start: 2021-10-03 | End: 2021-10-03

## 2021-10-03 RX ADMIN — KETOROLAC TROMETHAMINE 30 MG: 30 INJECTION, SOLUTION INTRAMUSCULAR; INTRAVENOUS at 18:57

## 2021-10-03 RX ADMIN — PROMETHAZINE HYDROCHLORIDE 25 MG: 25 TABLET ORAL at 18:57

## 2021-10-03 ASSESSMENT — PAIN DESCRIPTION - ORIENTATION: ORIENTATION: LEFT

## 2021-10-03 ASSESSMENT — PAIN DESCRIPTION - LOCATION: LOCATION: FLANK

## 2021-10-03 ASSESSMENT — ENCOUNTER SYMPTOMS
EYE PAIN: 0
CONSTIPATION: 0
NAUSEA: 1
DIARRHEA: 0
TROUBLE SWALLOWING: 0
VOMITING: 0
COUGH: 0
ABDOMINAL PAIN: 0
BLOOD IN STOOL: 0
SHORTNESS OF BREATH: 0

## 2021-10-03 ASSESSMENT — PAIN DESCRIPTION - PAIN TYPE: TYPE: ACUTE PAIN

## 2021-10-03 ASSESSMENT — PAIN SCALES - GENERAL: PAINLEVEL_OUTOF10: 8

## 2021-10-03 ASSESSMENT — PAIN DESCRIPTION - DESCRIPTORS: DESCRIPTORS: THROBBING

## 2021-10-03 NOTE — ED PROVIDER NOTES
[address] [address]          Pt Name: Anna Kendall  MRN: [id number]  [place] 1975  Date of evaluation: 10/3/2021  Treating Resident Physician: Sukh Portillo DO  Supervising Physician: Dr. Grecia Russ       Chief Complaint   Patient presents with    Flank Pain     left     History obtained from the patient. HISTORY OF PRESENT ILLNESS    HPI  Anna Kendall is a 40 y.o. male who presents to the emergency department for evaluation of left flank pain. Patient states that he was recently seen in the ER on 9/30/2021 told that he had a kidney stone. He has a history of 3 kidney stones in the past around 10 years ago. States that at that time he was mostly feeling cramping and nausea however this improved with Phenergan. States that he began to experience severe pain stabbing flank pain starting this morning. Rates pain as an 8 out of 10. States that the pain starts in the lower flank and radiates to his left groin. States that he has also been explained pain and vomited twice on the way to the emergency department. Patient also noted to have mildly elevated creatinine and elevated lactic acid level at her last ED visit this improved with IV hydration. He was given Keflex for possible UTI however did not  this medication yet. The patient has no other acute complaints at this time. REVIEW OF SYSTEMS   Review of Systems   Constitutional: Negative for chills, fatigue and fever. HENT: Negative for ear pain, postnasal drip and trouble swallowing. Eyes: Negative for pain and visual disturbance. Respiratory: Negative for cough and shortness of breath. Cardiovascular: Negative for chest pain and palpitations. Gastrointestinal: Positive for nausea. Negative for abdominal pain, blood in stool, constipation, diarrhea and vomiting. Genitourinary: Positive for flank pain. Negative for dysuria and urgency. Skin: Negative for rash and wound. Neurological: Negative for dizziness and headaches. Psychiatric/Behavioral: Negative for dysphoric mood. The patient is not nervous/anxious. PAST MEDICAL AND SURGICAL HISTORY     Past Medical History:   Diagnosis Date    Anxiety     Bipolar 1 disorder ([facility] [address] [address]     Chronic diastolic congestive heart failure ([facility] [address] 3/16/2018    patient denies    Cirrhosis ([facility] [address] 01/2021    Constipation     Gastroesophageal reflux disease 3/16/2018    Hard of hearing     left ear, no hearing aid    Hep C w/o coma, chronic (HCC)     Hepatic encephalopathy ([facility] [address] 7/23/2020    Hepatitis B     Kidney stones     hx of    Post traumatic stress disorder (PTSD)     Substance abuse ([facility] [address] [facility]     \"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     Current Outpatient Medications:     ketorolac (TORADOL) 10 MG tablet, Take 1 tablet by mouth every 6 hours as needed for Pain, Disp: 12 tablet, Rfl: 0    metoclopramide (REGLAN) 10 MG tablet, Take 1 tablet by mouth 3 times daily, Disp: , Rfl:     promethazine (PHENERGAN) 25 MG tablet, Take 1 tablet by mouth 4 times daily as needed for Nausea, Disp: 120 tablet, Rfl: 1    cephALEXin (KEFLEX) 500 MG capsule, Take 1 capsule by mouth 2 times daily for 7 days, Disp: 14 capsule, Rfl: 0    furosemide (LASIX) 40 MG tablet, , Disp: , Rfl:     EQ ALLERGY RELIEF 10 MG tablet, Take 1 tablet by mouth nightly, Disp: 30 tablet, Rfl: 0    lisinopril (PRINIVIL;ZESTRIL) 5 MG tablet, Take 1 tablet by mouth once daily, Disp: 90 tablet, Rfl: 0    gabapentin (NEURONTIN) 800 MG tablet, TAKE 1 TABLET BY MOUTH THREE TIMES DAILY, Disp: 90 tablet, Rfl: 5    magnesium (MAGNESIUM-OXIDE) 250 MG TABS tablet, Take 1 tablet by mouth 2 times daily, Disp: 120 tablet, Rfl: 0    fluticasone (FLOVENT HFA) 220 MCG/ACT inhaler, Inhale 2 puffs into the lungs 2 times daily, Disp: 1 each, Rfl: 3    metroNIDAZOLE (METROGEL) 0.75 % gel, APPLY EXTERNALLY TWICE DAILY, Disp: 45 g, Rfl: 0    tiotropium (SPIRIVA HANDIHALER) 18 MCG inhalation capsule, Inhale 1 puff by mouth once daily, Disp: 90 capsule, Rfl: 3    pantoprazole (PROTONIX) 40 MG tablet, Take 1 tablet by mouth once daily, Disp: 90 tablet, Rfl: 0    tiZANidine (ZANAFLEX) 4 MG tablet, TAKE 1 TABLET BY MOUTH THREE TIMES DAILY AS NEEDED FOR  MUSCLE  PAIN, Disp: 60 tablet, Rfl: 0    escitalopram (LEXAPRO) 10 MG tablet, Take 1 tablet by mouth once daily, Disp: 90 tablet, Rfl: 1    naloxone 4 MG/0.1ML LIQD nasal spray, Naloxone Hcl Active 4 MG NA One Time Only 1 1 August 20th, 2020 10:59am, Disp: , Rfl:     ipratropium-albuterol (DUONEB) 0.5-2.5 (3) MG/3ML SOLN nebulizer solution, Take 3 mLs by nebulization every 6 hours as needed for Shortness of Breath, Disp: 360 vial, Rfl: 3    Multiple Vitamin (MULTIVITAMIN PO), Take by mouth daily, Disp: , Rfl:     ondansetron (ZOFRAN ODT) 4 MG disintegrating tablet, Take 1 tablet by mouth every 8 hours as needed for Nausea, Disp: 20 tablet, Rfl: 0    tenofovir disoproxil fumarate (VIREAD) 300 MG tablet, Take 300 mg by mouth daily, Disp: , Rfl:     Aimsco Ultra Thin Lancets MISC, 1 applicator by Does not apply route 2 times daily, Disp: 100 each, Rfl: 3    lidocaine (LIDODERM) 5 %, Place 1 patch onto the skin daily 12 hours on, 12 hours off., Disp: 15 patch, Rfl: 0    albuterol sulfate HFA (PROVENTIL HFA) 108 (90 Base) MCG/ACT inhaler, Inhale 2 puffs into the lungs every 4 hours as needed for Wheezing or Shortness of Breath (Space out to every 6 hours as symptoms improve) Space out to every 6 hours as symptoms improve., Disp: 1 Inhaler, Rfl: 3    METHADONE HCL PO, Take 116 mg by mouth daily , Disp: , Rfl:     SOCIAL HISTORY     Social History     Social History Narrative    Not on file     Social History     Tobacco Use    Smoking status: Current Every Day Smoker     Packs/day: 1.00     Years: 30.00     Pack years: 30.00     Types: Cigarettes    Smokeless tobacco: Never Used   Vaping Use    Vaping Use: Never used   Substance Use Topics    Alcohol use: No    Drug use: Yes     Types: Marijuana     Comment: stopped heroin in 2013; medical marijuana card daily       ALLERGIES     Allergies   Allergen Reactions    Adhesive Tape      Other reaction(s): Hives    Clonazepam Other (See Comments)     Restless leg    Flexeril [Cyclobenzaprine] Hives     Pt unsure    Morphine Hives    Quetiapine Other (See Comments)     restless       FAMILY HISTORY     Family History   Problem Relation Age of Onset    Substance Abuse Mother     Depression Mother     Heart Disease Mother         CHF   Wanda Sheldon Rheum Arthritis Mother     Other Mother         Fibromyalgia    Substance Abuse Father     Heart Disease Father     Depression Sister     Substance Abuse Brother     Substance Abuse Maternal Aunt     Substance Abuse Maternal Uncle     Substance Abuse Paternal Aunt     Substance Abuse Paternal Uncle     Substance Abuse Maternal Grandmother     Depression Maternal Grandmother        PREVIOUS RECORDS   Previous records reviewed: Chart reviewed    PHYSICAL EXAM     ED Triage Vitals [10/03/21 1754]   BP Temp Temp Source Pulse Resp SpO2 Height Weight   (!) 137/97 98.7 °F (37.1 °C) Oral 101 18 94 % -- --     Initial vital signs and nursing assessment reviewed and abnormal from Elevated BP at 137/97. Pulsoximetry is normal per my interpretation. Additional Vital Signs:  Vitals:    10/03/21 1754   BP: (!) 137/97   Pulse: 101   Resp: 18   Temp: 98.7 °F (37.1 °C)   SpO2: 94%       Physical Exam  Vitals and nursing note reviewed. Constitutional:       General: He is not in acute distress. Appearance: He is well-developed. He is not diaphoretic. HENT:      Head: Normocephalic and atraumatic. Right Ear: External ear normal.      Left Ear: External ear normal.      Nose: Nose normal.   Eyes:      General: No scleral icterus. Right eye: No discharge. Left eye: No discharge. Conjunctiva/sclera: Conjunctivae normal.   Cardiovascular:      Rate and Rhythm: Normal rate and regular rhythm. Heart sounds: Normal heart sounds. No murmur heard. Pulmonary:      Effort: Pulmonary effort is normal.      Breath sounds: Normal breath sounds. Abdominal:      General: Abdomen is flat. Palpations: Abdomen is soft. Tenderness: There is no abdominal tenderness. There is left CVA tenderness. There is no right CVA tenderness. Musculoskeletal:      Cervical back: Normal range of motion. Skin:     General: Skin is warm and dry. Findings: No erythema or rash. Neurological:      Mental Status: He is alert and oriented to person, place, and time. Cranial Nerves: No cranial nerve deficit. Psychiatric:         Behavior: Behavior normal.         Thought Content: Thought content normal.         Judgment: Judgment normal.         MEDICAL DECISION MAKING   Initial Assessment: Patient was seen and evaluated. Patient was in no acute distress. Vitals signs were stable. Differential diagnosis includes ureterolithiasis, nephrolithiasis,, UTI, pyelonephritis. Plan: Appropriate labs and imaging was ordered. CBC, BMP, UA ordered.     ED RESULTS   Laboratory results:  Labs Reviewed   CBC WITH AUTO DIFFERENTIAL - Abnormal; Notable for the following components:       Result Value    Immature Grans (Abs) 0.09 (*)     All other components within normal limits   BASIC METABOLIC PANEL - Abnormal; Notable for the following components:    Glucose 178 (*)     BUN 5 (*)     All other components within normal limits   OSMOLALITY - Abnormal; Notable for the following components: Osmolality Calc 273.6 (*)     All other components within normal limits   URINE RT REFLEX TO CULTURE   ANION GAP   GLOMERULAR FILTRATION RATE, ESTIMATED       Radiologic studies results:  No orders to display       ED Medications administered this visit:   Medications   ketorolac (TORADOL) injection 30 mg (30 mg IntraMUSCular Given 10/3/21 1857)   promethazine (PHENERGAN) tablet 25 mg (25 mg Oral Given 10/3/21 1857)       ED COURSE   Patient is a 43-year-old male who presented to the emergency department with left flank pain. Patient with known nephrolithiasis of a 3 mm stone in the left midpole of the kidney as seen on prior CT scan. Flank pain worsened today possibly secondary to stone passage. 1 dose of Toradol and Phenergan in the ED with improvement in his symptoms. Discussed with patient that due to the size of the stone he may not need surgical intervention for this kidney stone. Discussed with patient that should his symptoms continue or fail to improve that he should call his family doctor or go to the stone clinic with [facility]rology. Patient will be given 3-day course of Toradol for pain. Patient has Phenergan at home for his nausea. Patient voiced understanding of plan and was in agreement. Strict return precautions and follow up instructions were discussed with the patient prior to discharge, with which the patient agrees. MEDICATION CHANGES     Discharge Medication List as of 10/3/2021  7:55 PM      START taking these medications    Details   ketorolac (TORADOL) 10 MG tablet Take 1 tablet by mouth every 6 hours as needed for Pain, Disp-12 tablet, R-0Normal             FINAL DISPOSITION     Final diagnoses:   Kidney stone   Left flank pain     Condition: condition: good  Dispo: Discharge to home    This transcription was electronically signed.  Parts of this transcriptions may have been dictated by use of voice recognition software and electronically transcribed, and parts may have been transcribed with the assistance of an ED scribe. The transcription may contain errors not detected in proofreading. Please refer to my supervising physician's documentation if my documentation differs.     Electronically Signed: Luna Braga DO, 10/03/21, 8:26 PM         Luna Braga DO  Resident  10/03/21 2026

## 2021-10-03 NOTE — ED TRIAGE NOTES
PT presents to the ED for left flank pain. Pt states he had a ct scan and was told he has a kidney stone. Pt states he took ibuprofen today with no relief.

## 2021-10-04 ENCOUNTER — CARE COORDINATION (OUTPATIENT)
Dept: CARE COORDINATION | Age: 46
End: 2021-10-04

## 2021-10-04 ENCOUNTER — NURSE ONLY (OUTPATIENT)
Dept: LAB | Age: 46
End: 2021-10-04

## 2021-10-04 ENCOUNTER — TELEPHONE (OUTPATIENT)
Dept: FAMILY MEDICINE CLINIC | Age: 46
End: 2021-10-04

## 2021-10-04 DIAGNOSIS — N17.9 AKI (ACUTE KIDNEY INJURY) (HCC): ICD-10-CM

## 2021-10-04 LAB
ANION GAP SERPL CALCULATED.3IONS-SCNC: 8 MEQ/L (ref 8–16)
BUN BLDV-MCNC: 6 MG/DL (ref 7–22)
CALCIUM SERPL-MCNC: 9.1 MG/DL (ref 8.5–10.5)
CHLORIDE BLD-SCNC: 104 MEQ/L (ref 98–111)
CO2: 29 MEQ/L (ref 23–33)
CREAT SERPL-MCNC: 0.9 MG/DL (ref 0.4–1.2)
GFR SERPL CREATININE-BSD FRML MDRD: > 90 ML/MIN/1.73M2
GLUCOSE BLD-MCNC: 111 MG/DL (ref 70–108)
POTASSIUM SERPL-SCNC: 3.7 MEQ/L (ref 3.5–5.2)
SODIUM BLD-SCNC: 141 MEQ/L (ref 135–145)

## 2021-10-04 NOTE — TELEPHONE ENCOUNTER
Dr. Be Feeling called and states that on the Form that was scanned into [employer] 09/30/2021 states that question number 1 needs to answer Yes and have dates that patient was incapacitated. Please Advise.

## 2021-10-04 NOTE — CARE COORDINATION
Ambulatory Care Coordination  ED Follow up Call    Reason for ED visit:  Left flank pain   Status:     not changed    Left flank pain continues. Thinks kidney stone was moving since pain intensified which made him go back to ED. Urology referral was place 10/1. Appt needed. ED prescribed toradol. Spoke with urology office. Scheduled 10/7 at 930, [id number], wear mask, insurance card, ID, med list.  Needs work off slip until Friday. Note routed to Dr. Lester Paz. Can pull note from my chart. Advised to increase water intake, walk around frequently, and take toradol as ordered. Advised to report new or worsening symptoms to PCP or ACM. Work letter in chart. Patient will pull from my chart. Did you call your PCP prior to going to the ED? No      Did you receive a discharge instructions from the Emergency Room? Yes  Review of Instructions:     Understands what to report/when to return?:  Yes   Understands discharge instructions?:  Yes   Following discharge instructions?:  Yes   If not why? Are there any new complaints of pain? Yes ongoing left flank pain  New Pain Meds? Yes    Constipation prophylaxis needed? No    If you have a wound is the dressing clean, dry, and intact? N/A  Understands wound care regimen? N/A    Are there any other complaints/concerns that you wish to tell your provider? FU appts/Provider:    Future Appointments   Date Time Provider Didier Burton   10/4/2021  1:30 PM SCHEDULE, [facility] [place]   10/7/2021  9:30 AM Andrew Arshad MD [address] [place] [address]   10/13/2021  3:40 PM Edel Govea, DO [facility]           New Medications?:   Yes      Medication Reconciliation by phone - Yes  Understands Medications? Yes  Taking Medications? Yes  Can you swallow your pills? Yes    Any further needs in the home i.e. Equipment?   No    Link to services in community?:  N/A   Which services:

## 2021-10-04 NOTE — TELEPHONE ENCOUNTER
Thank you for getting him set up with urology, Sonu Contreras. Letter written and is in his chart. Thanks.  MAGDA

## 2021-10-05 ENCOUNTER — TELEPHONE (OUTPATIENT)
Dept: FAMILY MEDICINE CLINIC | Age: 46
End: 2021-10-05

## 2021-10-05 NOTE — TELEPHONE ENCOUNTER
----- Message from Chico Benedict MD sent at 10/4/2021  5:36 PM EDT -----  Kidney function is back to baseline. We can hold off on repeating this for now. Thank you for getting labs done. Hope you are feeling better.

## 2021-10-06 ENCOUNTER — TELEPHONE (OUTPATIENT)
Dept: PHARMACY | Age: 46
End: 2021-10-06

## 2021-10-06 LAB
BLOOD CULTURE, ROUTINE: NORMAL
BLOOD CULTURE, ROUTINE: NORMAL

## 2021-10-06 NOTE — TELEPHONE ENCOUNTER
I called pt back at this time regarding smoking cessation referral.  Pt stated he just had a procedure done and is in the process of passing kidney stones. I asked pt if he would prefer we call back next week to explain the program to him. Pt stated he would prefer we call back next week. I told him we will call next Wednesday, 10/13, as we are only in the office on Wednesday. Pt understood and had no questions.

## 2021-10-06 NOTE — TELEPHONE ENCOUNTER
Referral to [facility] received from Dr. William Egan for Smoking Cessation Counseling and Medication Management per Consult Agreement. Called pt. He requests call back this afternoon to discuss.

## 2021-10-07 ENCOUNTER — OFFICE VISIT (OUTPATIENT)
Dept: UROLOGY | Age: 46
End: 2021-10-07
Payer: COMMERCIAL

## 2021-10-07 ENCOUNTER — HOSPITAL ENCOUNTER (OUTPATIENT)
Age: 46
Discharge: HOME | End: 2021-10-07
Payer: COMMERCIAL

## 2021-10-07 ENCOUNTER — HOSPITAL ENCOUNTER (OUTPATIENT)
Dept: GENERAL RADIOLOGY | Age: 46
Discharge: HOME | End: 2021-10-07
Payer: COMMERCIAL

## 2021-10-07 VITALS — WEIGHT: 255 LBS | BODY MASS INDEX: 33.8 KG/M2 | HEIGHT: 73 IN | RESPIRATION RATE: 14 BRPM

## 2021-10-07 DIAGNOSIS — R10.9 FLANK PAIN: ICD-10-CM

## 2021-10-07 DIAGNOSIS — N20.0 NEPHROLITHIASIS: ICD-10-CM

## 2021-10-07 DIAGNOSIS — N20.0 NEPHROLITHIASIS: Primary | ICD-10-CM

## 2021-10-07 LAB
BILIRUBIN URINE: NEGATIVE
BLOOD URINE, POC: NEGATIVE
CHARACTER, URINE: CLEAR
COLOR, URINE: YELLOW
GLUCOSE URINE: NEGATIVE MG/DL
KETONES, URINE: NEGATIVE
LEUKOCYTE CLUMPS, URINE: NEGATIVE
NITRITE, URINE: NEGATIVE
PH, URINE: 6.5 (ref 5–9)
PROTEIN, URINE: NEGATIVE MG/DL
SPECIFIC GRAVITY, URINE: 1.01 (ref 1–1.03)
UROBILINOGEN, URINE: 0.2 EU/DL (ref 0–1)

## 2021-10-07 PROCEDURE — G8417 CALC BMI ABV UP PARAM F/U: HCPCS | Performed by: UROLOGY

## 2021-10-07 PROCEDURE — 4004F PT TOBACCO SCREEN RCVD TLK: CPT | Performed by: UROLOGY

## 2021-10-07 PROCEDURE — G8484 FLU IMMUNIZE NO ADMIN: HCPCS | Performed by: UROLOGY

## 2021-10-07 PROCEDURE — 99204 OFFICE O/P NEW MOD 45 MIN: CPT | Performed by: UROLOGY

## 2021-10-07 PROCEDURE — G8427 DOCREV CUR MEDS BY ELIG CLIN: HCPCS | Performed by: UROLOGY

## 2021-10-07 PROCEDURE — 74018 RADEX ABDOMEN 1 VIEW: CPT

## 2021-10-07 RX ORDER — TAMSULOSIN HYDROCHLORIDE 0.4 MG/1
0.4 CAPSULE ORAL DAILY
Qty: 30 CAPSULE | Refills: 1 | Status: SHIPPED | OUTPATIENT
Start: 2021-10-07 | End: 2021-11-08 | Stop reason: ALTCHOICE

## 2021-10-07 NOTE — PATIENT INSTRUCTIONS
Patient Education        Stopping Smoking: Care Instructions  Your Care Instructions     Cigarette smokers crave the nicotine in cigarettes. Giving it up is much harder than simply changing a habit. Your body has to stop craving the nicotine. It is hard to quit, but you can do it. There are many tools that people use to quit smoking. You may find that combining tools works best for you. There are several steps to quitting. First you get ready to quit. Then you get support to help you. After that, you learn new skills and behaviors to become a nonsmoker. For many people, a necessary step is getting and using medicine. Your doctor will help you set up the plan that best meets your needs. You may want to attend a smoking cessation program to help you quit smoking. When you choose a program, look for one that has proven success. Ask your doctor for ideas. You will greatly increase your chances of success if you take medicine as well as get counseling or join a cessation program.  Some of the changes you feel when you first quit tobacco are uncomfortable. Your body will miss the nicotine at first, and you may feel short-tempered and grumpy. You may have trouble sleeping or concentrating. Medicine can help you deal with these symptoms. You may struggle with changing your smoking habits and rituals. The last step is the tricky one: Be prepared for the smoking urge to continue for a time. This is a lot to deal with, but keep at it. You will feel better. Follow-up care is a key part of your treatment and safety. Be sure to make and go to all appointments, and call your doctor if you are having problems. It's also a good idea to know your test results and keep a list of the medicines you take. How can you care for yourself at home? · Ask your family, friends, and coworkers for support. You have a better chance of quitting if you have help and support.   · Join a support group, such as Nicotine Anonymous, for people who are trying to quit smoking. · Consider signing up for a smoking cessation program, such as the American Lung Association's Freedom from Smoking program.  · Get text messaging support. Go to the website at www.smokefree. gov to sign up for the [facility] program.  · Set a quit date. Pick your date carefully so that it is not right in the middle of a big deadline or stressful time. Once you quit, do not even take a puff. Get rid of all ashtrays and lighters after your last cigarette. Clean your house and your clothes so that they do not smell of smoke. · Learn how to be a nonsmoker. Think about ways you can avoid those things that make you reach for a cigarette. ? Avoid situations that put you at greatest risk for smoking. For some people, it is hard to have a drink with friends without smoking. For others, they might skip a coffee break with coworkers who smoke. ? Change your daily routine. Take a different route to work or eat a meal in a different place. · Cut down on stress. Calm yourself or release tension by doing an activity you enjoy, such as reading a book, taking a hot bath, or gardening. · Talk to your doctor or pharmacist about nicotine replacement therapy, which replaces the nicotine in your body. You still get nicotine but you do not use tobacco. Nicotine replacement products help you slowly reduce the amount of nicotine you need. These products come in several forms, many of them available over-the-counter:  ? Nicotine patches  ? Nicotine gum and lozenges  ? Nicotine inhaler  · Ask your doctor about bupropion (Wellbutrin) or varenicline (Chantix), which are prescription medicines. They do not contain nicotine. They help you by reducing withdrawal symptoms, such as stress and anxiety. · Some people find hypnosis, acupuncture, and massage helpful for ending the smoking habit. · Eat a healthy diet and get regular exercise.  Having healthy habits will help your body move past its craving for nicotine. · Be prepared to keep trying. Most people are not successful the first few times they try to quit. Do not get mad at yourself if you smoke again. Make a list of things you learned and think about when you want to try again, such as next week, next month, or next year. Where can you learn more? Go to https://cloud.IQpepicewadolfo.TLabs. org and sign in to your [facility] account. Enter O480 in the Rails[employer] box to learn more about \"Stopping Smoking: Care Instructions. \"     If you do not have an account, please click on the \"Sign Up Now\" link. Current as of: February 11, 2021               Content Version: 13.0  © 2006-2021 Healthwise, Incorporated. Care instructions adapted under license by [facility] ([place] [facility]). If you have questions about a medical condition or this instruction, always ask your healthcare professional. [address] any warranty or liability for your use of this information.

## 2021-10-07 NOTE — PROGRESS NOTES
Ericka Nice MD   Urology Clinic office Visit      Patient: Michaela Taveras  YOB: 1975  Date: 10/7/2021    HISTORY OF PRESENT ILLNESS:   The patient is a 40 y.o. male who presents today for evaluation of the following problem(s):      1. Nephrolithiasis    2. Flank pain           Overall the problem(s) : are worsening. Associated Symptoms: No dysuria, gross hematuria. Pain Severity:      Summary of old records: hx of stones  (Patient's old records, notes and chart reviewed and summarized above.)      Went to ER1 week ago, left flank pain, severe, vomiting. Was given tramadol. ibuprofen works for him  Also pain in chest and back  Was on abx for 3 weeks    I independently reviewed and verified the images and reports from:   9/30/2021 CT  There is a 3 mm nonobstructive intrarenal stone within the left mid kidney. No other acute intra-abdominal or pelvic findings.       Last several PSA's:  No results found for: PSA    Last total testosterone:  No results found for: TESTOSTERONE    Urinalysis today:  Results for POC orders placed in visit on 10/07/21   POCT Urinalysis No Micro (Auto)   Result Value Ref Range    Glucose, Ur Negative NEGATIVE mg/dl    Bilirubin Urine Negative     Ketones, Urine Negative NEGATIVE    Specific Gravity, Urine 1.015 1.002 - 1.030    Blood, UA POC Negative NEGATIVE    pH, Urine 6.50 5.0 - 9.0    Protein, Urine Negative NEGATIVE mg/dl    Urobilinogen, Urine 0.20 0.0 - 1.0 eu/dl    Nitrite, Urine Negative NEGATIVE    Leukocyte Clumps, Urine Negative NEGATIVE    Color, Urine Yellow YELLOW-STRAW    Character, Urine Clear [id number].[id number]         Last BUN and creatinine:  Lab Results   Component Value Date    BUN 6 (L) 10/05/2021     Lab Results   Component Value Date    CREATININE 0.8 10/05/2021       Imaging Reviewed during this Office Visit:   (results were independently reviewed by physician and radiology report verified)    PAST MEDICAL, FAMILY AND SOCIAL HISTORY:  Past Medical History:   Diagnosis Date    Anxiety     [facility]     Chronic diastolic congestive heart failure ([facility] 3/16/2018    patient denies    Cirrhosis ([facility] 01/2021    Constipation     Gastroesophageal reflux disease 3/16/2018    Hard of hearing     left ear, no hearing aid    Hep C w/o coma, chronic (HCC)     Hepatic encephalopathy ([facility] 7/23/2020    Hepatitis B     Kidney stones     hx of    Post traumatic stress disorder (PTSD)     Substance abuse ([facility]     \"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\"    Type 2 diabetes mellitus without complication, with long-term current use of insulin ([facility] 8/16/2017    Wears glasses     for reading     Past Surgical History:   Procedure Laterality Date    ANKLE SURGERY Left     KIDNEY STONE SURGERY      x 4     Family History   Problem Relation Age of Onset    Substance Abuse Mother     Depression Mother     Heart Disease Mother         CHF   Lavona Euler Rheum Arthritis Mother     Other Mother         Fibromyalgia    Substance Abuse Father     Heart Disease Father     Depression Sister     Substance Abuse Brother     Substance Abuse Maternal Aunt     Substance Abuse Maternal Uncle     Substance Abuse Paternal Aunt     Substance Abuse Paternal Uncle     Substance Abuse Maternal Grandmother     Depression Maternal Grandmother      Outpatient Medications Marked as Taking for the 10/7/21 encounter (Office Visit) with Arturo Mckinney MD   Medication Sig Dispense Refill    tamsulosin (FLOMAX) 0.4 MG capsule Take 1 capsule by mouth daily 30 capsule 1    metoclopramide (REGLAN) 10 MG tablet Take 1 tablet by mouth 3 times daily      promethazine (PHENERGAN) 25 MG tablet Take 1 tablet by mouth 4 times daily as needed for Nausea 120 tablet 1    cephALEXin (KEFLEX) 500 MG capsule Take 1 capsule by mouth 2 times daily for 7 days 14 capsule 0    furosemide (LASIX) 40 MG tablet       EQ ALLERGY RELIEF 10 MG tablet Take 1 tablet by mouth nightly 30 tablet 0    lisinopril (PRINIVIL;ZESTRIL) 5 MG tablet Take 1 tablet by mouth once daily 90 tablet 0    gabapentin (NEURONTIN) 800 MG tablet TAKE 1 TABLET BY MOUTH THREE TIMES DAILY 90 tablet 5    magnesium (MAGNESIUM-OXIDE) 250 MG TABS tablet Take 1 tablet by mouth 2 times daily 120 tablet 0    fluticasone (FLOVENT HFA) 220 MCG/ACT inhaler Inhale 2 puffs into the lungs 2 times daily 1 each 3    metroNIDAZOLE (METROGEL) 0.75 % gel APPLY EXTERNALLY TWICE DAILY 45 g 0    tiotropium (SPIRIVA HANDIHALER) 18 MCG inhalation capsule Inhale 1 puff by mouth once daily 90 capsule 3    pantoprazole (PROTONIX) 40 MG tablet Take 1 tablet by mouth once daily 90 tablet 0    tiZANidine (ZANAFLEX) 4 MG tablet TAKE 1 TABLET BY MOUTH THREE TIMES DAILY AS NEEDED FOR  MUSCLE  PAIN 60 tablet 0    escitalopram (LEXAPRO) 10 MG tablet Take 1 tablet by mouth once daily 90 tablet 1    naloxone 4 MG/0.1ML LIQD nasal spray Naloxone Hcl Active 4 MG NA One Time Only 1 1 August 20th, 2020 10:59am      ipratropium-albuterol (DUONEB) 0.5-2.5 (3) MG/3ML SOLN nebulizer solution Take 3 mLs by nebulization every 6 hours as needed for Shortness of Breath 360 vial 3    Multiple Vitamin (MULTIVITAMIN PO) Take by mouth daily      ondansetron (ZOFRAN ODT) 4 MG disintegrating tablet Take 1 tablet by mouth every 8 hours as needed for Nausea 20 tablet 0    tenofovir disoproxil fumarate (VIREAD) 300 MG tablet Take 300 mg by mouth daily      Aimsco Ultra Thin Lancets MISC 1 applicator by Does not apply route 2 times daily 100 each 3    lidocaine (LIDODERM) 5 % Place 1 patch onto the skin daily 12 hours on, 12 hours off. 15 patch 0    albuterol sulfate HFA (PROVENTIL HFA) 108 (90 Base) MCG/ACT inhaler Inhale 2 puffs into the lungs every 4 hours as needed for Wheezing or Shortness of Breath (Space out to every 6 hours as symptoms improve) Space out to every 6 hours as symptoms improve.  1 Inhaler 3    METHADONE HCL PO Take 116 mg by mouth daily Adhesive tape, Clonazepam, Flexeril [cyclobenzaprine], Morphine, and Quetiapine  Social History     Tobacco Use   Smoking Status Current Every Day Smoker    Packs/day: 1.00    Years: 30.00    Pack years: 30.00    Types: Cigarettes   Smokeless Tobacco Never Used       Social History     Substance and Sexual Activity   Alcohol Use No       REVIEW OF SYSTEMS:  Constitutional: negative  Eyes: negative  Respiratory: negative  Cardiovascular: negative  Gastrointestinal: negative  Musculoskeletal: negative  Genitourinary: negative except for what is in HPI  Skin: negative   Neurological: negative  Hematological/Lymphatic: negative  Psychological: negative    Physical Exam:    This a 40 y.o. male   Vitals:    10/07/21 0925   Resp: 14     Constitutional: Patient in no acute distress; Neuro: alert and oriented to person place and time. Psych: Mood and affect normal.  Skin: Normal  Lungs: Respiratory effort normal  Cardiovascular:  Normal peripheral pulses  Abdomen: Soft, non-tender, non-distended   Bladder non-tender and not distended. Lymphatics: no palpable lymphadenopathy  Gait is within normal limits  Musculoskeletal: Normal range of motion       Assessment and Plan      1. Nephrolithiasis    2. Flank pain         3 mm nonobstructive intrarenal stone within the left mid kidney: unlikely cause of symptoms    Stone management: Discussed all options of stone management- Medical expulsion therapy, surgery in form of ureteroscopy, ESWL or observation. Discussed risks, benefits, alternatives of each. Discussed complication and expectations. Check KUB; if stone visible then will schedule LEFT ESWL, if not then cysto LEFT works    Slow stream and hesitancy: start Flomax 0.4 mg po qd for BPH symptoms.         Prescriptions Ordered:  Orders Placed This Encounter   Medications    tamsulosin (FLOMAX) 0.4 MG capsule     Sig: Take 1 capsule by mouth daily     Dispense:  30 capsule     Refill:  1      Orders Placed:  Orders Placed This Encounter   Procedures    XR ABDOMEN (KUB) (SINGLE AP VIEW)     Standing Status:   Future     Standing Expiration Date:   10/7/2022    POCT Urinalysis No Micro (Auto)     Ordered by an unspecified provider              MD Ranjana Fish M.D, MD  [facility]

## 2021-10-08 ENCOUNTER — TELEPHONE (OUTPATIENT)
Dept: UROLOGY | Age: 46
End: 2021-10-08

## 2021-10-08 NOTE — TELEPHONE ENCOUNTER
SURGERY [address]  [address] [place] [address] [address], [address]      Phone *[phone] *[phone]   Surgical Scheduling Direct Line Phone *[phone] Fax *[phone] 1975 male    [address] [place] [address]   Marital Status:          Home Phone: [phone]      Cell Phone:    Telephone Information:   Mobile [phone]          Surgeon: Dr. Vlad Varghese Surgery Date: 10/26/21   Time: 1:00 pm    Procedure: Cystoscopy, left ureteroscopy, laser lithotripsy, basket retrieval of stone fragments, and possible left ureteral stent. Diagnosis: Kidney Stone     Important Medical History: In [facility]    Special Inst/Equip:     CPT Codes:    03877  Latex Allergy: no     Cardiac Device:  no    Anesthesia:  General          Admission Type:  Same Day                        Admit Prior to Day of Surgery: no    Case Location:  Main OR            Preadmission Testing:  Phone Call          PAT Date and Time:______________________________________________________    PAT Confirmation #: ______________________________________________________    Post Op Visit: ___________________________________________________________    Need Preop Cardiac Clearance: no    Does Patient have Cardiologist/physician?      None    Surgery Confirmation #: __________________________________________________    : ________________________   Date: __________________________     [employer] Name: [facility]

## 2021-10-08 NOTE — TELEPHONE ENCOUNTER
Patient scheduled for surgery with Dr Giancarlo Escobar on 10/26/21. Surgery consent on arrival. Patient does not need any pre op testing. Surgery instructions gone over verbally. Arrival of 11:00 am to [facility], have a , NPO after midnight.

## 2021-10-12 NOTE — PROGRESS NOTES
Jeevan Ptael is a 40 y.o. male who presents today for:  Chief Complaint   Patient presents with    Follow-up     2 weeks; sx for kidney stones scheduled, appt. with GI Dr. Sugar Dove Emesis     vomiting everything, taking medications as prescribed    Forms     for work       HPI:   Jeevan Patel is 40 y.o. who presents today for follow-up on COPD exacerbation, emesis, FMLA. His breathing has improved. He continues to have daily emesis. He is taking Zofran, Phenergan, Reglan. He does smoke prescribed marijuana daily. He has seen Dr. Mark Cárdenas at [address] [facility]. He had an EGD. He returns tomorrow for follow-up. He is tolerating liquid p.o. Having bowel movements 1-2 times weekly which is his norm.        Objective:     Vitals:    10/13/21 1537   BP: 124/80   Site: Left Upper Arm   Position: Sitting   Cuff Size: Medium Adult   Pulse: 90   Temp: 96.6 °F (35.9 °C)   TempSrc: Skin   SpO2: 98%   Weight: 246 lb 9.6 oz (111.9 kg)   Height: 6' 1\" (1.854 m)       Wt Readings from Last 3 Encounters:   10/13/21 246 lb 9.6 oz (111.9 kg)   10/07/21 255 lb (115.7 kg)   10/01/21 247 lb (112 kg)       BP Readings from Last 3 Encounters:   10/13/21 124/80   10/03/21 (!) 137/97   10/01/21 136/86       Lab Results   Component Value Date    WBC 6.7 10/05/2021    HGB 14.5 10/05/2021    HCT 42.6 10/05/2021    MCV 90.4 10/05/2021     10/05/2021     Lab Results   Component Value Date     10/05/2021    K 3.8 10/05/2021     10/05/2021    CO2 27 10/05/2021    BUN 6 (L) 10/05/2021    CREATININE 0.8 10/05/2021    GLUCOSE 207 (H) 10/05/2021    CALCIUM 8.7 10/05/2021    PROT 6.2 10/05/2021    LABALBU 4.2 10/05/2021    BILITOT 0.3 10/05/2021    ALKPHOS 121 10/05/2021    AST 21 10/05/2021    ALT 24 10/05/2021    LABGLOM >90 10/05/2021    GFRAA >60 02/26/2019    GLOB NOT REPORTED 04/05/2018     Lab Results   Component Value Date    TSH 1.78 10/11/2017    FT3 3.37 10/11/2017    T4FREE 1.14 05/07/2013     Lab Results   Component Value Date    LABA1C 5.7 08/27/2021     No results found for: EAG  Lab Results   Component Value Date    CHOL 157 07/01/2021    CHOL 90 02/13/2020    CHOL 125 10/11/2017     Lab Results   Component Value Date    TRIG 151 07/01/2021    TRIG 143 02/13/2020    TRIG 145 10/11/2017     Lab Results   Component Value Date    HDL 29 07/01/2021    HDL 28 (L) 02/13/2020    HDL 17 (L) 10/11/2017     Lab Results   Component Value Date    LDLCHOLESTEROL 33 02/13/2020    LDLCHOLESTEROL 79 10/11/2017    LDLCALC 98 07/01/2021       Lab Results   Component Value Date    LABMICR < 1.20 07/01/2021       Review of Systems   Constitutional: Positive for fatigue. Negative for chills and fever. HENT: Negative for congestion, ear pain, postnasal drip and trouble swallowing. Eyes: Negative for pain and visual disturbance. Respiratory: Negative for cough and shortness of breath. Cardiovascular: Negative for chest pain and palpitations. Gastrointestinal: Positive for vomiting. Negative for abdominal pain, blood in stool, constipation and diarrhea. Genitourinary: Negative for dysuria and hematuria. Skin: Negative for rash and wound. Neurological: Negative for dizziness and headaches. Psychiatric/Behavioral: The patient is not nervous/anxious. Physical Exam  Vitals and nursing note reviewed. Constitutional:       General: He is not in acute distress. Appearance: He is well-developed. He is obese. He is not diaphoretic. HENT:      Head: Normocephalic and atraumatic. Right Ear: External ear normal.      Left Ear: External ear normal.      Nose: Nose normal.   Eyes:      General: No scleral icterus. Right eye: No discharge. Left eye: No discharge. Conjunctiva/sclera: Conjunctivae normal.   Cardiovascular:      Rate and Rhythm: Normal rate and regular rhythm. Heart sounds: Normal heart sounds. No murmur heard.      Pulmonary:      Effort: Pulmonary effort is normal. Breath sounds: Normal breath sounds. No wheezing. Abdominal:      Palpations: Abdomen is soft. Tenderness: There is no abdominal tenderness. Musculoskeletal:      Cervical back: Normal range of motion. Skin:     General: Skin is warm and dry. Findings: No erythema or rash. Neurological:      Mental Status: He is alert and oriented to person, place, and time. Psychiatric:         Behavior: Behavior normal.         Thought Content: Thought content normal.         Judgment: Judgment normal.         Immunization History   Administered Date(s) Administered    COVID-19, Pfizer, PF, 30mcg/0.3mL 05/30/2021, 07/01/2021    Hepatitis A/Hepatitis B (Twinrix) 06/12/2013    Influenza Vaccine, unspecified formulation 11/13/2015, 09/21/2017    Influenza Virus Vaccine 11/13/2015, 01/31/2020    Influenza, Quadv, IM, (6 mo and older Fluzone, Flulaval, Fluarix and 3 yrs and older Afluria) 09/21/2017, 01/31/2020    Influenza, Quadv, IM, PF (6 mo and older Fluzone, Flulaval, Fluarix, and 3 yrs and older Afluria) 09/28/2018, 10/08/2020    Pneumococcal Conjugate 13-valent (Nuinafr44) 07/17/2015    Pneumococcal Polysaccharide (Eenznmaca33) 09/21/2017    Tdap (Boostrix, Adacel) 10/08/2012, 06/12/2013, 11/13/2015       Health Maintenance Due   Topic Date Due    Hepatitis A vaccine (2 of 3 - Hep A Twinrix risk 3-dose series) 07/10/2013    Diabetic retinal exam  06/04/2019    Colon cancer screen colonoscopy  Never done    Flu vaccine (1) 09/01/2021          Food Insecurity: No Food Insecurity    Worried About Running Out of Food in the Last Year: Never true    Gilberto of Food in the Last Year: Never true       Assessment / Plan:      Diagnosis Orders   1. Bipolar affective disorder, currently depressed, moderate ([address]  External Referral To Psychiatry   2.  Gastroparesis         F/u with GI as scheduled tomorrow   Consider decreasing marijuana use to see if that helps with gastroparesis  Continue current medications  FMLA paperwork extension until after cystoscopy/ureteroscopy         No follow-ups on file. Medications Prescribed:  No orders of the defined types were placed in this encounter. No future appointments. Patient given educational materials - see patient instructions. Discussed use, benefit, and sideeffects of prescribed medications. All patient questions answered. Pt voiced understanding. Reviewed health maintenance. Instructed to continue current medications, diet and exercise. Patient agreed with treatment plan. Follow up as directed.      Electronically signed by Janelle Hancock DO on 10/13/2021 at 5:13 PM

## 2021-10-13 ENCOUNTER — OFFICE VISIT (OUTPATIENT)
Dept: FAMILY MEDICINE CLINIC | Age: 46
End: 2021-10-13
Payer: COMMERCIAL

## 2021-10-13 VITALS
WEIGHT: 246.6 LBS | OXYGEN SATURATION: 98 % | BODY MASS INDEX: 32.68 KG/M2 | HEART RATE: 90 BPM | HEIGHT: 73 IN | SYSTOLIC BLOOD PRESSURE: 124 MMHG | DIASTOLIC BLOOD PRESSURE: 80 MMHG | TEMPERATURE: 96.6 F

## 2021-10-13 DIAGNOSIS — F31.32 BIPOLAR AFFECTIVE DISORDER, CURRENTLY DEPRESSED, MODERATE (HCC): Primary | ICD-10-CM

## 2021-10-13 DIAGNOSIS — K31.84 GASTROPARESIS: ICD-10-CM

## 2021-10-13 PROCEDURE — G8427 DOCREV CUR MEDS BY ELIG CLIN: HCPCS | Performed by: STUDENT IN AN ORGANIZED HEALTH CARE EDUCATION/TRAINING PROGRAM

## 2021-10-13 PROCEDURE — G8484 FLU IMMUNIZE NO ADMIN: HCPCS | Performed by: STUDENT IN AN ORGANIZED HEALTH CARE EDUCATION/TRAINING PROGRAM

## 2021-10-13 PROCEDURE — 4004F PT TOBACCO SCREEN RCVD TLK: CPT | Performed by: STUDENT IN AN ORGANIZED HEALTH CARE EDUCATION/TRAINING PROGRAM

## 2021-10-13 PROCEDURE — G8417 CALC BMI ABV UP PARAM F/U: HCPCS | Performed by: STUDENT IN AN ORGANIZED HEALTH CARE EDUCATION/TRAINING PROGRAM

## 2021-10-13 PROCEDURE — 99213 OFFICE O/P EST LOW 20 MIN: CPT | Performed by: STUDENT IN AN ORGANIZED HEALTH CARE EDUCATION/TRAINING PROGRAM

## 2021-10-13 SDOH — ECONOMIC STABILITY: FOOD INSECURITY: WITHIN THE PAST 12 MONTHS, YOU WORRIED THAT YOUR FOOD WOULD RUN OUT BEFORE YOU GOT MONEY TO BUY MORE.: NEVER TRUE

## 2021-10-13 SDOH — ECONOMIC STABILITY: FOOD INSECURITY: WITHIN THE PAST 12 MONTHS, THE FOOD YOU BOUGHT JUST DIDN'T LAST AND YOU DIDN'T HAVE MONEY TO GET MORE.: NEVER TRUE

## 2021-10-13 ASSESSMENT — ENCOUNTER SYMPTOMS
CONSTIPATION: 0
EYE PAIN: 0
BLOOD IN STOOL: 0
DIARRHEA: 0
SHORTNESS OF BREATH: 0
VOMITING: 1
ABDOMINAL PAIN: 0
TROUBLE SWALLOWING: 0
COUGH: 0

## 2021-10-13 ASSESSMENT — SOCIAL DETERMINANTS OF HEALTH (SDOH): HOW HARD IS IT FOR YOU TO PAY FOR THE VERY BASICS LIKE FOOD, HOUSING, MEDICAL CARE, AND HEATING?: NOT HARD AT ALL

## 2021-10-13 NOTE — PROGRESS NOTES
Health Maintenance Due   Topic Date Due    Hepatitis A vaccine (2 of 3 - Hep A Twinrix risk 3-dose series) 07/10/2013    Diabetic retinal exam  06/04/2019    Colon cancer screen colonoscopy  Never done    Flu vaccine (1) 09/01/2021   Patient states that he has kidney stones; sx 10/26/21; patient c/o sick daily, vomiting everything; went to doctor in [place] this morning-vomited, did not eat anything

## 2021-10-13 NOTE — PROGRESS NOTES
S: 40 y.o. male with   Chief Complaint   Patient presents with    Follow-up     2 weeks; sx for kidney stones scheduled, appt. with GI Dr. Devendra Fermin Emesis     vomiting everything, taking medications as prescribed    Forms     for work       HPI: please see resident note for HPI and ROS. BP Readings from Last 3 Encounters:   10/13/21 124/80   10/03/21 (!) 137/97   10/01/21 136/86     Wt Readings from Last 3 Encounters:   10/13/21 246 lb 9.6 oz (111.9 kg)   10/07/21 255 lb (115.7 kg)   10/01/21 247 lb (112 kg)       O: VS:  height is 6' 1\" (1.854 m) and weight is 246 lb 9.6 oz (111.9 kg). His skin temperature is 96.6 °F (35.9 °C). His blood pressure is 124/80 and his pulse is 90. His oxygen saturation is 98%. Diagnosis Orders   1. Bipolar affective disorder, currently depressed, moderate ([address]  External Referral To Psychiatry   2. Gastroparesis         Plan:  F/u with GI and urology as scheduled. la paperwork completed. refer to psych. Health Maintenance Due   Topic Date Due    Hepatitis A vaccine (2 of 3 - Hep A Twinrix risk 3-dose series) 07/10/2013    Diabetic retinal exam  06/04/2019    Colon cancer screen colonoscopy  Never done    Flu vaccine (1) 09/01/2021       Attending Physician Statement  I have discussed the case, including pertinent history and exam findings with the resident. I agree with the documented assessment and plan as documented by the resident.         Beatrice Caldera,  10/13/2021 4:43 PM

## 2021-10-14 ENCOUNTER — TELEPHONE (OUTPATIENT)
Dept: FAMILY MEDICINE CLINIC | Age: 46
End: 2021-10-14

## 2021-10-14 NOTE — TELEPHONE ENCOUNTER
Phone number listed is pt's number. Debbie's number is on pt's [facility] ([phone]). She never got the forms from yesterday. Forms refaxed.

## 2021-10-14 NOTE — TELEPHONE ENCOUNTER
----- Message from Kimmy Means sent at 10/14/2021 10:48 AM EDT -----  Subject: Message to Provider    QUESTIONS  Information for Provider? Martínez Ocampo needs to speak with someone regarding   patient's leave of absence. She needs to know if they've had a follow up   visit, has he been referred to cardiologist, or if he needs to be of from   work longer. Please call back asap. Tried to transfer to the office but   there was a busy signal for a long time. ---------------------------------------------------------------------------  --------------  Luciano ARCINIEGA  What is the best way for the office to contact you? OK to leave message on   voicemail  Preferred Call Back Phone Number? [phone]  ---------------------------------------------------------------------------  --------------  SCRIPT ANSWERS  Relationship to Patient? Third Party  Representative Name?  Travis Baez

## 2021-10-19 ENCOUNTER — CARE COORDINATION (OUTPATIENT)
Dept: CARE COORDINATION | Age: 46
End: 2021-10-19

## 2021-10-19 NOTE — CARE COORDINATION
Attempted care management follow up. Left message to return phone call to ambulatory care manager at [phone].

## 2021-10-20 DIAGNOSIS — G72.9 MYOPATHY: ICD-10-CM

## 2021-10-20 NOTE — TELEPHONE ENCOUNTER
Patient's last appointment was : 10/13/2021  Patient's next appointment is : Visit date not found  Last refilled:8/24/2021

## 2021-10-22 RX ORDER — TIZANIDINE 4 MG/1
TABLET ORAL
Qty: 60 TABLET | Refills: 0 | Status: SHIPPED | OUTPATIENT
Start: 2021-10-22 | End: 2021-11-17

## 2021-10-25 ENCOUNTER — ANESTHESIA EVENT (OUTPATIENT)
Dept: OPERATING ROOM | Age: 46
End: 2021-10-25
Payer: COMMERCIAL

## 2021-10-26 ENCOUNTER — ANESTHESIA (OUTPATIENT)
Dept: OPERATING ROOM | Age: 46
End: 2021-10-26
Payer: COMMERCIAL

## 2021-10-26 ENCOUNTER — HOSPITAL ENCOUNTER (OUTPATIENT)
Age: 46
Setting detail: OUTPATIENT SURGERY
Discharge: HOME | End: 2021-10-26
Attending: UROLOGY | Admitting: UROLOGY
Payer: COMMERCIAL

## 2021-10-26 VITALS — TEMPERATURE: 95 F | OXYGEN SATURATION: 96 % | SYSTOLIC BLOOD PRESSURE: 127 MMHG | DIASTOLIC BLOOD PRESSURE: 77 MMHG

## 2021-10-26 VITALS
SYSTOLIC BLOOD PRESSURE: 161 MMHG | HEART RATE: 88 BPM | TEMPERATURE: 98.1 F | WEIGHT: 250.8 LBS | BODY MASS INDEX: 33.24 KG/M2 | DIASTOLIC BLOOD PRESSURE: 84 MMHG | HEIGHT: 73 IN | OXYGEN SATURATION: 93 % | RESPIRATION RATE: 16 BRPM

## 2021-10-26 DIAGNOSIS — R10.9 FLANK PAIN: Primary | ICD-10-CM

## 2021-10-26 LAB
GLUCOSE BLD-MCNC: 154 MG/DL (ref 70–108)
GLUCOSE BLD-MCNC: 196 MG/DL (ref 70–108)
POTASSIUM SERPL-SCNC: 4 MEQ/L (ref 3.5–5.2)

## 2021-10-26 PROCEDURE — 6360000002 HC RX W HCPCS: Performed by: ANESTHESIOLOGY

## 2021-10-26 PROCEDURE — 7100000010 HC PHASE II RECOVERY - FIRST 15 MIN: Performed by: UROLOGY

## 2021-10-26 PROCEDURE — 3700000000 HC ANESTHESIA ATTENDED CARE: Performed by: UROLOGY

## 2021-10-26 PROCEDURE — 6360000002 HC RX W HCPCS: Performed by: NURSE ANESTHETIST, CERTIFIED REGISTERED

## 2021-10-26 PROCEDURE — 82948 REAGENT STRIP/BLOOD GLUCOSE: CPT

## 2021-10-26 PROCEDURE — 2580000003 HC RX 258

## 2021-10-26 PROCEDURE — 7100000000 HC PACU RECOVERY - FIRST 15 MIN: Performed by: UROLOGY

## 2021-10-26 PROCEDURE — C1769 GUIDE WIRE: HCPCS | Performed by: UROLOGY

## 2021-10-26 PROCEDURE — 6370000000 HC RX 637 (ALT 250 FOR IP)

## 2021-10-26 PROCEDURE — 6370000000 HC RX 637 (ALT 250 FOR IP): Performed by: UROLOGY

## 2021-10-26 PROCEDURE — 6360000002 HC RX W HCPCS

## 2021-10-26 PROCEDURE — 3600000003 HC SURGERY LEVEL 3 BASE: Performed by: UROLOGY

## 2021-10-26 PROCEDURE — 84132 ASSAY OF SERUM POTASSIUM: CPT

## 2021-10-26 PROCEDURE — C2617 STENT, NON-COR, TEM W/O DEL: HCPCS | Performed by: UROLOGY

## 2021-10-26 PROCEDURE — C1713 ANCHOR/SCREW BN/BN,TIS/BN: HCPCS | Performed by: UROLOGY

## 2021-10-26 PROCEDURE — 2709999900 HC NON-CHARGEABLE SUPPLY: Performed by: UROLOGY

## 2021-10-26 PROCEDURE — 2500000003 HC RX 250 WO HCPCS: Performed by: NURSE ANESTHETIST, CERTIFIED REGISTERED

## 2021-10-26 PROCEDURE — 2720000010 HC SURG SUPPLY STERILE: Performed by: UROLOGY

## 2021-10-26 PROCEDURE — C1758 CATHETER, URETERAL: HCPCS | Performed by: UROLOGY

## 2021-10-26 PROCEDURE — 7100000001 HC PACU RECOVERY - ADDTL 15 MIN: Performed by: UROLOGY

## 2021-10-26 PROCEDURE — 7100000011 HC PHASE II RECOVERY - ADDTL 15 MIN: Performed by: UROLOGY

## 2021-10-26 PROCEDURE — 36415 COLL VENOUS BLD VENIPUNCTURE: CPT

## 2021-10-26 PROCEDURE — 3600000013 HC SURGERY LEVEL 3 ADDTL 15MIN: Performed by: UROLOGY

## 2021-10-26 PROCEDURE — 3700000001 HC ADD 15 MINUTES (ANESTHESIA): Performed by: UROLOGY

## 2021-10-26 DEVICE — URETERAL STENT
Type: IMPLANTABLE DEVICE | Site: URETER | Status: FUNCTIONAL
Brand: PERCUFLEX™ PLUS

## 2021-10-26 RX ORDER — FENTANYL CITRATE 50 UG/ML
INJECTION, SOLUTION INTRAMUSCULAR; INTRAVENOUS
Status: COMPLETED
Start: 2021-10-26 | End: 2021-10-26

## 2021-10-26 RX ORDER — KETOROLAC TROMETHAMINE 30 MG/ML
INJECTION, SOLUTION INTRAMUSCULAR; INTRAVENOUS
Status: COMPLETED
Start: 2021-10-26 | End: 2021-10-26

## 2021-10-26 RX ORDER — MIDAZOLAM HYDROCHLORIDE 1 MG/ML
INJECTION INTRAMUSCULAR; INTRAVENOUS PRN
Status: DISCONTINUED | OUTPATIENT
Start: 2021-10-26 | End: 2021-10-26 | Stop reason: SDUPTHER

## 2021-10-26 RX ORDER — HYDRALAZINE HYDROCHLORIDE 20 MG/ML
5 INJECTION INTRAMUSCULAR; INTRAVENOUS EVERY 10 MIN PRN
Status: DISCONTINUED | OUTPATIENT
Start: 2021-10-26 | End: 2021-10-26 | Stop reason: HOSPADM

## 2021-10-26 RX ORDER — LIDOCAINE HYDROCHLORIDE 20 MG/ML
INJECTION, SOLUTION INTRAVENOUS PRN
Status: DISCONTINUED | OUTPATIENT
Start: 2021-10-26 | End: 2021-10-26 | Stop reason: SDUPTHER

## 2021-10-26 RX ORDER — OXYCODONE HYDROCHLORIDE AND ACETAMINOPHEN 5; 325 MG/1; MG/1
1 TABLET ORAL ONCE
Status: COMPLETED | OUTPATIENT
Start: 2021-10-26 | End: 2021-10-26

## 2021-10-26 RX ORDER — KETAMINE HCL IN NACL, ISO-OSM 100MG/10ML
SYRINGE (ML) INJECTION PRN
Status: DISCONTINUED | OUTPATIENT
Start: 2021-10-26 | End: 2021-10-26 | Stop reason: SDUPTHER

## 2021-10-26 RX ORDER — ATROPA BELLADONNA AND OPIUM 16.2; 3 MG/1; MG/1
SUPPOSITORY RECTAL
Status: DISCONTINUED
Start: 2021-10-26 | End: 2021-10-26 | Stop reason: HOSPADM

## 2021-10-26 RX ORDER — OXYCODONE HYDROCHLORIDE AND ACETAMINOPHEN 5; 325 MG/1; MG/1
TABLET ORAL
Status: COMPLETED
Start: 2021-10-26 | End: 2021-10-26

## 2021-10-26 RX ORDER — CEFAZOLIN SODIUM 2 G/100ML
2000 INJECTION, SOLUTION INTRAVENOUS
Status: COMPLETED | OUTPATIENT
Start: 2021-10-26 | End: 2021-10-26

## 2021-10-26 RX ORDER — FENTANYL CITRATE 50 UG/ML
25 INJECTION, SOLUTION INTRAMUSCULAR; INTRAVENOUS EVERY 5 MIN PRN
Status: DISCONTINUED | OUTPATIENT
Start: 2021-10-26 | End: 2021-10-26 | Stop reason: HOSPADM

## 2021-10-26 RX ORDER — PROPOFOL 10 MG/ML
INJECTION, EMULSION INTRAVENOUS PRN
Status: DISCONTINUED | OUTPATIENT
Start: 2021-10-26 | End: 2021-10-26 | Stop reason: SDUPTHER

## 2021-10-26 RX ORDER — PHENAZOPYRIDINE HYDROCHLORIDE 200 MG/1
200 TABLET, FILM COATED ORAL 3 TIMES DAILY PRN
Qty: 9 TABLET | Refills: 0 | Status: SHIPPED | OUTPATIENT
Start: 2021-10-26 | End: 2021-10-28

## 2021-10-26 RX ORDER — ONDANSETRON 2 MG/ML
INJECTION INTRAMUSCULAR; INTRAVENOUS PRN
Status: DISCONTINUED | OUTPATIENT
Start: 2021-10-26 | End: 2021-10-26 | Stop reason: SDUPTHER

## 2021-10-26 RX ORDER — FENTANYL CITRATE 50 UG/ML
INJECTION, SOLUTION INTRAMUSCULAR; INTRAVENOUS PRN
Status: DISCONTINUED | OUTPATIENT
Start: 2021-10-26 | End: 2021-10-26 | Stop reason: SDUPTHER

## 2021-10-26 RX ORDER — PROMETHAZINE HYDROCHLORIDE 25 MG/ML
12.5 INJECTION, SOLUTION INTRAMUSCULAR; INTRAVENOUS ONCE
Status: COMPLETED | OUTPATIENT
Start: 2021-10-26 | End: 2021-10-26

## 2021-10-26 RX ORDER — ONDANSETRON 2 MG/ML
4 INJECTION INTRAMUSCULAR; INTRAVENOUS
Status: DISCONTINUED | OUTPATIENT
Start: 2021-10-26 | End: 2021-10-26 | Stop reason: HOSPADM

## 2021-10-26 RX ORDER — FENTANYL CITRATE 50 UG/ML
50 INJECTION, SOLUTION INTRAMUSCULAR; INTRAVENOUS EVERY 5 MIN PRN
Status: COMPLETED | OUTPATIENT
Start: 2021-10-26 | End: 2021-10-26

## 2021-10-26 RX ORDER — PHENAZOPYRIDINE HYDROCHLORIDE 200 MG/1
200 TABLET, FILM COATED ORAL ONCE
Status: COMPLETED | OUTPATIENT
Start: 2021-10-26 | End: 2021-10-26

## 2021-10-26 RX ORDER — OXYCODONE HYDROCHLORIDE AND ACETAMINOPHEN 5; 325 MG/1; MG/1
1 TABLET ORAL EVERY 4 HOURS PRN
Qty: 12 TABLET | Refills: 0 | Status: SHIPPED | OUTPATIENT
Start: 2021-10-26 | End: 2021-10-29

## 2021-10-26 RX ORDER — MEPERIDINE HYDROCHLORIDE 25 MG/ML
12.5 INJECTION INTRAMUSCULAR; INTRAVENOUS; SUBCUTANEOUS EVERY 5 MIN PRN
Status: DISCONTINUED | OUTPATIENT
Start: 2021-10-26 | End: 2021-10-26 | Stop reason: HOSPADM

## 2021-10-26 RX ORDER — SODIUM CHLORIDE 9 MG/ML
INJECTION, SOLUTION INTRAVENOUS CONTINUOUS
Status: DISCONTINUED | OUTPATIENT
Start: 2021-10-26 | End: 2021-10-26 | Stop reason: HOSPADM

## 2021-10-26 RX ORDER — LABETALOL 20 MG/4 ML (5 MG/ML) INTRAVENOUS SYRINGE
5 EVERY 10 MIN PRN
Status: DISCONTINUED | OUTPATIENT
Start: 2021-10-26 | End: 2021-10-26 | Stop reason: HOSPADM

## 2021-10-26 RX ORDER — KETOROLAC TROMETHAMINE 30 MG/ML
30 INJECTION, SOLUTION INTRAMUSCULAR; INTRAVENOUS ONCE
Status: COMPLETED | OUTPATIENT
Start: 2021-10-26 | End: 2021-10-26

## 2021-10-26 RX ORDER — PROMETHAZINE HYDROCHLORIDE 25 MG/ML
INJECTION, SOLUTION INTRAMUSCULAR; INTRAVENOUS
Status: COMPLETED
Start: 2021-10-26 | End: 2021-10-26

## 2021-10-26 RX ADMIN — FENTANYL CITRATE 100 MCG: 50 INJECTION, SOLUTION INTRAMUSCULAR; INTRAVENOUS at 13:11

## 2021-10-26 RX ADMIN — LIDOCAINE HYDROCHLORIDE 100 MG: 20 INJECTION, SOLUTION INTRAVENOUS at 13:11

## 2021-10-26 RX ADMIN — PROMETHAZINE HYDROCHLORIDE 12.5 MG: 25 INJECTION INTRAMUSCULAR; INTRAVENOUS at 15:16

## 2021-10-26 RX ADMIN — ONDANSETRON HYDROCHLORIDE 4 MG: 4 INJECTION, SOLUTION INTRAMUSCULAR; INTRAVENOUS at 13:25

## 2021-10-26 RX ADMIN — PHENAZOPYRIDINE HYDROCHLORIDE 200 MG: 200 TABLET ORAL at 14:42

## 2021-10-26 RX ADMIN — ONDANSETRON HYDROCHLORIDE 4 MG: 4 INJECTION, SOLUTION INTRAMUSCULAR; INTRAVENOUS at 13:13

## 2021-10-26 RX ADMIN — HYDROMORPHONE HYDROCHLORIDE 0.5 MG: 1 INJECTION, SOLUTION INTRAMUSCULAR; INTRAVENOUS; SUBCUTANEOUS at 14:19

## 2021-10-26 RX ADMIN — FENTANYL CITRATE 50 MCG: 50 INJECTION INTRAMUSCULAR; INTRAVENOUS at 14:14

## 2021-10-26 RX ADMIN — Medication 25 MG: at 13:15

## 2021-10-26 RX ADMIN — SODIUM CHLORIDE: 9 INJECTION, SOLUTION INTRAVENOUS at 10:09

## 2021-10-26 RX ADMIN — KETOROLAC TROMETHAMINE 30 MG: 30 INJECTION, SOLUTION INTRAMUSCULAR; INTRAVENOUS at 15:08

## 2021-10-26 RX ADMIN — FENTANYL CITRATE 50 MCG: 50 INJECTION INTRAMUSCULAR; INTRAVENOUS at 14:55

## 2021-10-26 RX ADMIN — FENTANYL CITRATE 50 MCG: 50 INJECTION INTRAMUSCULAR; INTRAVENOUS at 14:09

## 2021-10-26 RX ADMIN — PROPOFOL 30 MG: 10 INJECTION, EMULSION INTRAVENOUS at 13:35

## 2021-10-26 RX ADMIN — HYDROMORPHONE HYDROCHLORIDE 0.5 MG: 1 INJECTION, SOLUTION INTRAMUSCULAR; INTRAVENOUS; SUBCUTANEOUS at 14:30

## 2021-10-26 RX ADMIN — OXYCODONE AND ACETAMINOPHEN 1 TABLET: 5; 325 TABLET ORAL at 16:10

## 2021-10-26 RX ADMIN — FENTANYL CITRATE 50 MCG: 50 INJECTION INTRAMUSCULAR; INTRAVENOUS at 14:50

## 2021-10-26 RX ADMIN — PROMETHAZINE HYDROCHLORIDE 12.5 MG: 25 INJECTION, SOLUTION INTRAMUSCULAR; INTRAVENOUS at 15:16

## 2021-10-26 RX ADMIN — HYDROMORPHONE HYDROCHLORIDE 0.5 MG: 1 INJECTION, SOLUTION INTRAMUSCULAR; INTRAVENOUS; SUBCUTANEOUS at 14:25

## 2021-10-26 RX ADMIN — MIDAZOLAM 2 MG: 1 INJECTION INTRAMUSCULAR; INTRAVENOUS at 13:08

## 2021-10-26 RX ADMIN — MEPERIDINE HYDROCHLORIDE 12.5 MG: 25 INJECTION INTRAMUSCULAR; INTRAVENOUS; SUBCUTANEOUS at 15:09

## 2021-10-26 RX ADMIN — HYDROMORPHONE HYDROCHLORIDE 0.5 MG: 1 INJECTION, SOLUTION INTRAMUSCULAR; INTRAVENOUS; SUBCUTANEOUS at 14:20

## 2021-10-26 RX ADMIN — PROPOFOL 250 MG: 10 INJECTION, EMULSION INTRAVENOUS at 13:11

## 2021-10-26 RX ADMIN — CEFAZOLIN SODIUM 2000 MG: 2 INJECTION, SOLUTION INTRAVENOUS at 13:14

## 2021-10-26 RX ADMIN — FENTANYL CITRATE 50 MCG: 50 INJECTION, SOLUTION INTRAMUSCULAR; INTRAVENOUS at 14:09

## 2021-10-26 RX ADMIN — OXYCODONE HYDROCHLORIDE AND ACETAMINOPHEN 1 TABLET: 5; 325 TABLET ORAL at 16:10

## 2021-10-26 ASSESSMENT — PULMONARY FUNCTION TESTS
PIF_VALUE: 7
PIF_VALUE: 3
PIF_VALUE: 3
PIF_VALUE: 6
PIF_VALUE: 7
PIF_VALUE: 1
PIF_VALUE: 6
PIF_VALUE: 2
PIF_VALUE: 5
PIF_VALUE: 8
PIF_VALUE: 2
PIF_VALUE: 7
PIF_VALUE: 6
PIF_VALUE: 8
PIF_VALUE: 3
PIF_VALUE: 8
PIF_VALUE: 6
PIF_VALUE: 6
PIF_VALUE: 1
PIF_VALUE: 7
PIF_VALUE: 7
PIF_VALUE: 6
PIF_VALUE: 8
PIF_VALUE: 7
PIF_VALUE: 7
PIF_VALUE: 1
PIF_VALUE: 6
PIF_VALUE: 8
PIF_VALUE: 3
PIF_VALUE: 7
PIF_VALUE: 5
PIF_VALUE: 6
PIF_VALUE: 6
PIF_VALUE: 5
PIF_VALUE: 2
PIF_VALUE: 1
PIF_VALUE: 2
PIF_VALUE: 6
PIF_VALUE: 6
PIF_VALUE: 8

## 2021-10-26 ASSESSMENT — PAIN SCALES - GENERAL
PAINLEVEL_OUTOF10: 8
PAINLEVEL_OUTOF10: 8
PAINLEVEL_OUTOF10: 4
PAINLEVEL_OUTOF10: 8
PAINLEVEL_OUTOF10: 4
PAINLEVEL_OUTOF10: 8
PAINLEVEL_OUTOF10: 5
PAINLEVEL_OUTOF10: 5
PAINLEVEL_OUTOF10: 8

## 2021-10-26 ASSESSMENT — PAIN - FUNCTIONAL ASSESSMENT: PAIN_FUNCTIONAL_ASSESSMENT: 0-10

## 2021-10-26 ASSESSMENT — PAIN SCALES - WONG BAKER: WONGBAKER_NUMERICALRESPONSE: 0

## 2021-10-26 NOTE — ANESTHESIA POSTPROCEDURE EVALUATION
Department of Anesthesiology  Postprocedure Note    Patient: Sabiha Nicolas  MRN: [id number]  YOB: 1975  Date of evaluation: 10/26/2021  Time:  2:11 PM     Procedure Summary     Date: 10/26/21 Room / Location: [facility]  / [facility]    Anesthesia Start: 2640 Anesthesia Stop: 7134    Procedure: CYSTOSCOPY, LEFT URETEROSCOPY, LASER LITHOTRIPSY, LEFT URETERAL STENT (Left Ureter) Diagnosis: (KIDNEY STONE)    Surgeons: Meka Muhammad MD Responsible Provider: Ewa Ryder MD    Anesthesia Type: general ASA Status: 3          Anesthesia Type: general    Morales Phase I: Morales Score: 7    Morales Phase II:      Last vitals: Reviewed and per EMR flowsheets.        Anesthesia Post Evaluation    Patient location during evaluation: PACU  Patient participation: complete - patient participated  Level of consciousness: awake and alert  Airway patency: patent  Nausea & Vomiting: no nausea  Complications: no  Cardiovascular status: blood pressure returned to baseline and hemodynamically stable  Respiratory status: acceptable and spontaneous ventilation  Hydration status: euvolemic

## 2021-10-26 NOTE — PROGRESS NOTES
Alert. Family at bedside. Jello and ice water given. Side rails up bed in low position.  Call light in reach

## 2021-10-26 NOTE — H&P
History and Physical    Patient: Julienne Lewis  MRN: [id number]  YOB: 1975    CHIEF COMPLAINT: Left kidney stone    HISTORY OF PRESENT ILLNESS:   The patient is a 40 y.o. male who presents with left kidney stone    Patient's old records, notes and chart reviewed and summarized above. Past Medical History:    Past Medical History:   Diagnosis Date    Anxiety     Bipolar 1 disorder ([facility]     Chronic diastolic congestive heart failure ([facility] [facility] 3/16/2018    patient denies    Cirrhosis ([facility] [facility] 01/2021    Constipation     Gastroesophageal reflux disease 3/16/2018    Hard of hearing     left ear, no hearing aid    Hep C w/o coma, chronic (HCC)     Hepatic encephalopathy ([facility] [facility] 7/23/2020    Hepatitis B     Kidney stones     hx of    Post traumatic stress disorder (PTSD)     Substance abuse ([facility]     \"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\"    Type 2 diabetes mellitus without complication, with long-term current use of insulin ([facility] [facility] 8/16/2017    Wears glasses     for reading       Past Surgical History:    Past Surgical History:   Procedure Laterality Date    ANKLE SURGERY Left     KIDNEY STONE SURGERY      x 4     Medications Prior to Admission:    Prior to Admission medications    Medication Sig Start Date End Date Taking?  Authorizing Provider   tiZANidine (ZANAFLEX) 4 MG tablet TAKE 1 TABLET BY MOUTH THREE TIMES DAILY AS NEEDED FOR MUSCLE PAIN 10/22/21  Yes Domitila Coleman, DO   tamsulosin (FLOMAX) 0.4 MG capsule Take 1 capsule by mouth daily 10/7/21 11/6/21 Yes Clarence Simmons MD   ketorolac (TORADOL) 10 MG tablet Take 1 tablet by mouth every 6 hours as needed for Pain 10/3/21 10/26/21 Yes Jun Hicks,    metoclopramide (REGLAN) 10 MG tablet Take 1 tablet by mouth 3 times daily 10/1/21  Yes Archie Howell MD   promethazine (PHENERGAN) 25 MG tablet Take 1 tablet by mouth 4 times daily as needed for Nausea 10/1/21 10/31/21 Yes Archie Howell MD   furosemide (LASIX) 40 MG tablet 9/17/21  Yes Historical Provider, MD   EQ ALLERGY RELIEF 10 MG tablet Take 1 tablet by mouth nightly 9/23/21  Yes Zaynab Cardoso DO   lisinopril (PRINIVIL;ZESTRIL) 5 MG tablet Take 1 tablet by mouth once daily 9/23/21  Yes Zaynab Cardoso DO   gabapentin (NEURONTIN) 800 MG tablet TAKE 1 TABLET BY MOUTH THREE TIMES DAILY 9/23/21 3/23/22 Yes Zaynab Cardoso DO   magnesium (MAGNESIUM-OXIDE) 250 MG TABS tablet Take 1 tablet by mouth 2 times daily 9/23/21 11/22/21 Yes Edel Govea DO   fluticasone (FLOVENT HFA) 220 MCG/ACT inhaler Inhale 2 puffs into the lungs 2 times daily 9/23/21 9/23/22 Yes Edel Govea DO   metroNIDAZOLE (METROGEL) 0.75 % gel APPLY EXTERNALLY TWICE DAILY 9/20/21  Yes Efren Bell MD   tiotropium (Marylee Simms) 18 MCG inhalation capsule Inhale 1 puff by mouth once daily 8/27/21  Yes Edel Govea DO   pantoprazole (PROTONIX) 40 MG tablet Take 1 tablet by mouth once daily 8/24/21  Yes Zaynab Cardoso DO   escitalopram (LEXAPRO) 10 MG tablet Take 1 tablet by mouth once daily 8/12/21  Yes Zaynab Cardoso DO   ipratropium-albuterol (DUONEB) 0.5-2.5 (3) MG/3ML SOLN nebulizer solution Take 3 mLs by nebulization every 6 hours as needed for Shortness of Breath 4/12/21  Yes Zaynab Cardoso DO   Multiple Vitamin (MULTIVITAMIN PO) Take by mouth daily   Yes Historical Provider, MD   ondansetron (ZOFRAN ODT) 4 MG disintegrating tablet Take 1 tablet by mouth every 8 hours as needed for Nausea 8/18/20  Yes CELIO Wu CNP   tenofovir disoproxil fumarate (VIREAD) 300 MG tablet Take 300 mg by mouth daily 8/10/20  Yes Historical Provider, MD   lidocaine (LIDODERM) 5 % Place 1 patch onto the skin daily 12 hours on, 12 hours off. 5/11/20  Yes Elaine Kc PA-C   albuterol sulfate HFA (PROVENTIL HFA) 108 (90 Base) MCG/ACT inhaler Inhale 2 puffs into the lungs every 4 hours as needed for Wheezing or Shortness of Breath (Space out to every 6 hours as symptoms improve) Space out to every 6 hours as symptoms improve. 2/16/18  Yes Betina Ricardo MD   METHADONE HCL PO Take 116 mg by mouth daily    Yes Historical Provider, MD   naloxone 4 MG/0.1ML LIQD nasal spray Naloxone Hcl Active 4 MG NA One Time Only 1 1 August 20th, 2020 10:59am  Patient not taking: Reported on 10/13/2021 8/20/20   Historical Provider, MD   Aimsco Ultra Thin Lancets MISC 1 applicator by Does not apply route 2 times daily  Patient not taking: Reported on 10/13/2021 8/12/20   Raymundo Sarmiento MD       Allergies:  Adhesive tape, Clonazepam, Flexeril [cyclobenzaprine], Morphine, and Quetiapine    Social History:    Social History     Socioeconomic History    Marital status: Single     Spouse name: Not on file    Number of children: Not on file    Years of education: Not on file    Highest education level: Not on file   Occupational History    Not on file   Tobacco Use    Smoking status: Current Every Day Smoker     Packs/day: 1.00     Years: 30.00     Pack years: 30.00     Types: Cigarettes    Smokeless tobacco: Never Used   Vaping Use    Vaping Use: Never used   Substance and Sexual Activity    Alcohol use: No    Drug use: Not Currently     Comment: stopped heroin in 2013; medical marijuana card daily    Sexual activity: Yes     Partners: Female   Other Topics Concern    Not on file   Social History Narrative    Not on file     Social Determinants of Health     Financial Resource Strain: Low Risk     Difficulty of Paying Living Expenses: Not hard at all   Food Insecurity: No Food Insecurity    Worried About [address] in the Last Year: Never true    [address] in the Last Year: Never true   Transportation Needs:     Lack of Transportation (Medical):      Lack of Transportation (Non-Medical):    Physical Activity:     Days of Exercise per Week:     Minutes of Exercise per Session:    Stress:     Feeling of Stress :    Social Connections:     Frequency of Communication with Friends and Family:  Frequency of Social Gatherings with Friends and Family:     Attends Yazdan[employer] Services:     Active Member of Clubs or Organizations:     Attends Club or Organization Meetings:     Marital Status:    Intimate Partner Violence:     Fear of Current or Ex-Partner:     Emotionally Abused:     Physically Abused:     Sexually Abused:        Family History:    Family History   Problem Relation Age of Onset    Substance Abuse Mother     Depression Mother     Heart Disease Mother         CHF    Rheum Arthritis Mother     Other Mother         Fibromyalgia    Substance Abuse Father     Heart Disease Father     Depression Sister     Substance Abuse Brother     Substance Abuse Maternal Aunt     Substance Abuse Maternal Uncle     Substance Abuse Paternal Aunt     Substance Abuse Paternal Uncle     Substance Abuse Maternal Grandmother     Depression Maternal Grandmother        REVIEW OF SYSTEMS:  Constitutional: negative  Eyes: negative  Respiratory: negative  Cardiovascular: negative  Gastrointestinal: negative  Genitourinary: see HPI  Musculoskeletal: negative  Skin: negative   Neurological: negative  Hematological/Lymphatic: negative  Psychological: negative    Physical Exam:      Patient Vitals for the past 24 hrs:   BP Temp Temp src Pulse Resp SpO2 Height Weight   10/26/21 0941 (!) 154/85 96.1 °F (35.6 °C) Tympanic 87 20 97 % 6' 1\" (1.854 m) 250 lb 12.8 oz (113.8 kg)     Constitutional: Patient in no acute distress; Neuro: alert and oriented to person place and time. Psych: Mood and affect normal.  Skin: Normal  Lungs: Respiratory effort normal, CTA  Cardiovascular:  Normal peripheral pulses; no murmur  Abdomen: Soft, non-tender, non-distended with no CVA, flank pain, hepatosplenomegaly or hernia. Kidneys normal.  Bladder non-tender and not distended. LABS:   No results for input(s): WBC, HGB, HCT, MCV, PLT in the last 72 hours.   Recent Labs     10/26/21  1013   K 4.0     No results found for: PSA        Urinalysis: No results for input(s): COLORU, PHUR, LABCAST, WBCUA, RBCUA, MUCUS, TRICHOMONAS, YEAST, BACTERIA, CLARITYU, SPECGRAV, LEUKOCYTESUR, UROBILINOGEN, BILIRUBINUR, BLOODU in the last 72 hours. Invalid input(s): NITRATE, GLUCOSEUKETONESUAMORPHOUS     -----------------------------------------------------------------      Assessment and Plan   Impression:    Patient Active Problem List   Diagnosis    Long-term current use of methadone for opiate dependence ([facility] [address]    Tobacco abuse    Hepatitis B    History of hepatitis C    Gastroesophageal reflux disease    Chronic diastolic heart failure ([facility] [address]    Abscess    Establishing care with new doctor, encounter for    Essential hypertension    Plantar fasciitis of right foot    Polyneuropathy associated with underlying disease ([facility] [address]    Class 2 severe obesity due to excess calories with serious comorbidity and body mass index (BMI) of 38.0 to 38.9 in adult ([address]    Mild intermittent asthma without complication    Pulmonary nodules/lesions, multiple    COPD (chronic obstructive pulmonary disease) ([address]    Bipolar affective disorder, current episode depressed ([address]    Neuropathy    Bipolar disorder ([address]    IFG (impaired fasting glucose)       Plan:   Risks: I discussed all the risks, benefits, alternatives and possible complications or surgery as well as expectations and post-op recovery.       Consent obtained; CYSTOSCOPY, LEFT URETEROSCOPY, LASER LITHOTRIPSY, BASKET RETRIEVAL OF STONE FRAGMENTS, POSS LEFT URETERAL STENT in OR today    Jason Denise M.D, MD  12:30 PM 10/26/2021

## 2021-10-26 NOTE — ANESTHESIA PRE PROCEDURE
Department of Anesthesiology  Preprocedure Note       Name:  Michaela Taveras   Age:  40 y.o.  :  1975                                          MRN:  [id number]         Date:  10/26/2021      Surgeon: Ramiro Butler):  Rory Mitchell MD    Procedure: Procedure(s):  CYSTOSCOPY, LEFT URETEROSCOPY, LASER LITHOTRIPSY, BASKET RETRIEVAL OF STONE FRAGMENTS, POSS LEFT URETERAL STENT    Medications prior to admission:   Prior to Admission medications    Medication Sig Start Date End Date Taking?  Authorizing Provider   tiZANidine (ZANAFLEX) 4 MG tablet TAKE 1 TABLET BY MOUTH THREE TIMES DAILY AS NEEDED FOR MUSCLE PAIN 10/22/21  Yes Allison Crum, DO   tamsulosin (FLOMAX) 0.4 MG capsule Take 1 capsule by mouth daily 10/7/21 11/6/21 Yes Rory Mitchell MD   ketorolac (TORADOL) 10 MG tablet Take 1 tablet by mouth every 6 hours as needed for Pain 10/3/21 10/26/21 Yes Jun Hicks,    metoclopramide (REGLAN) 10 MG tablet Take 1 tablet by mouth 3 times daily 10/1/21  Yes Holly Tello MD   promethazine (PHENERGAN) 25 MG tablet Take 1 tablet by mouth 4 times daily as needed for Nausea 10/1/21 10/31/21 Yes Holly Tello MD   furosemide (LASIX) 40 MG tablet  21  Yes Historical Provider, MD   EQ ALLERGY RELIEF 10 MG tablet Take 1 tablet by mouth nightly 21  Yes Allison Crum, DO   lisinopril (PRINIVIL;ZESTRIL) 5 MG tablet Take 1 tablet by mouth once daily 21  Yes Allison Crum, DO   gabapentin (NEURONTIN) 800 MG tablet TAKE 1 TABLET BY MOUTH THREE TIMES DAILY 9/23/21 3/23/22 Yes Allison Number, DO   magnesium (MAGNESIUM-OXIDE) 250 MG TABS tablet Take 1 tablet by mouth 2 times daily 21 Yes Edel Govea, DO   fluticasone (FLOVENT HFA) 220 MCG/ACT inhaler Inhale 2 puffs into the lungs 2 times daily 21 Yes Edel Govea DO   metroNIDAZOLE (METROGEL) 0.75 % gel APPLY EXTERNALLY TWICE DAILY 21  Yes John Arias MD   tiotropium (SPIRIVA HANDIHALER) 18 MCG inhalation capsule Inhale 1 puff by mouth once daily 8/27/21  Yes Edel Govea, DO   pantoprazole (PROTONIX) 40 MG tablet Take 1 tablet by mouth once daily 8/24/21  Yes Anastasia Escobar, DO   escitalopram (LEXAPRO) 10 MG tablet Take 1 tablet by mouth once daily 8/12/21  Yes Anastasia Escobar, DO   ipratropium-albuterol (DUONEB) 0.5-2.5 (3) MG/3ML SOLN nebulizer solution Take 3 mLs by nebulization every 6 hours as needed for Shortness of Breath 4/12/21  Yes Anastasia Escobar, DO   Multiple Vitamin (MULTIVITAMIN PO) Take by mouth daily   Yes Historical Provider, MD   ondansetron (ZOFRAN ODT) 4 MG disintegrating tablet Take 1 tablet by mouth every 8 hours as needed for Nausea 8/18/20  Yes Evi Pederson, APRN - CNP   tenofovir disoproxil fumarate (VIREAD) 300 MG tablet Take 300 mg by mouth daily 8/10/20  Yes Historical Provider, MD   lidocaine (LIDODERM) 5 % Place 1 patch onto the skin daily 12 hours on, 12 hours off. 5/11/20  Yes Jes Beverly PA-C   albuterol sulfate HFA (PROVENTIL HFA) 108 (90 Base) MCG/ACT inhaler Inhale 2 puffs into the lungs every 4 hours as needed for Wheezing or Shortness of Breath (Space out to every 6 hours as symptoms improve) Space out to every 6 hours as symptoms improve.  2/16/18  Yes Quentin Montemayor MD   METHADONE HCL PO Take 116 mg by mouth daily    Yes Historical Provider, MD   naloxone 4 MG/0.1ML LIQD nasal spray Naloxone Hcl Active 4 MG NA One Time Only 1 1 August 20th, 2020 10:59am  Patient not taking: Reported on 10/13/2021 8/20/20   Historical Provider, MD   [employer] Ultra Thin Lancets MISC 1 applicator by Does not apply route 2 times daily  Patient not taking: Reported on 10/13/2021 8/12/20   Igor Cerda MD       Current medications:    Current Facility-Administered Medications   Medication Dose Route Frequency Provider Last Rate Last Admin    0.9 % sodium chloride infusion   IntraVENous Continuous Lela Padilla CMA (AAMA) 100 mL/hr at 10/26/21 1009 New Bag at 10/26/21 1009    ceFAZolin (ANCEF) 2000 mg in dextrose 4 % 100 mL IVPB (premix)  2,000 mg IntraVENous 30 Min Pre-Op [place]ra CMA (AAMA)           Allergies:     Allergies   Allergen Reactions    Adhesive Tape      Other reaction(s): Hives    Clonazepam Other (See Comments)     Restless leg    Flexeril [Cyclobenzaprine] Hives     Pt unsure    Morphine Hives    Quetiapine Other (See Comments)     restless       Problem List:    Patient Active Problem List   Diagnosis Code    Long-term current use of methadone for opiate dependence ([facility] [address] F11.20    Tobacco abuse Z72.0    Hepatitis B B19.10    History of hepatitis C Z86.19    Gastroesophageal reflux disease K21.9    Chronic diastolic heart failure (HCC) I50.32    Abscess L02.91    Establishing care with new doctor, encounter for Z76.89    Essential hypertension I10    Plantar fasciitis of right foot M72.2    Polyneuropathy associated with underlying disease ([facility] [address] G63    Class 2 severe obesity due to excess calories with serious comorbidity and body mass index (BMI) of 38.0 to 38.9 in adult ([facility]) E66.01, Z68.38    Mild intermittent asthma without complication A08.96    Pulmonary nodules/lesions, multiple R91.8    COPD (chronic obstructive pulmonary disease) ([facility]) J44.9    Bipolar affective disorder, current episode depressed ([facility]) F31.30    Neuropathy G62.9    Bipolar disorder (HCC) F31.9    IFG (impaired fasting glucose) R73.01       Past Medical History:        Diagnosis Date    Anxiety     Bipolar 1 disorder ([facility])     Chronic diastolic congestive heart failure ([facility] [address] 3/16/2018    patient denies    Cirrhosis ([facility] [address] 01/2021    Constipation     Gastroesophageal reflux disease 3/16/2018    Hard of hearing     left ear, no hearing aid    Hep C w/o coma, chronic (HCC)     Hepatic encephalopathy (HCC) 7/23/2020    Hepatitis B     Kidney stones     hx of    Post traumatic stress disorder (PTSD)     Substance abuse ([facility] [address] [facility]     \"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\"  Type 2 diabetes mellitus without complication, with long-term current use of insulin ([address] 8/16/2017    Wears glasses     for reading       Past Surgical History:        Procedure Laterality Date    ANKLE SURGERY Left     KIDNEY STONE SURGERY      x 4       Social History:    Social History     Tobacco Use    Smoking status: Current Every Day Smoker     Packs/day: 1.00     Years: 30.00     Pack years: 30.00     Types: Cigarettes    Smokeless tobacco: Never Used   Substance Use Topics    Alcohol use: No                                Ready to quit: Not Answered  Counseling given: Not Answered      Vital Signs (Current):   Vitals:    10/26/21 0941   BP: (!) 154/85   Pulse: 87   Resp: 20   Temp: 96.1 °F (35.6 °C)   TempSrc: Tympanic   SpO2: 97%   Weight: 250 lb 12.8 oz (113.8 kg)   Height: 6' 1\" (1.854 m)                                              BP Readings from Last 3 Encounters:   10/26/21 (!) 154/85   10/13/21 124/80   10/03/21 (!) 137/97       NPO Status: Time of last liquid consumption: 2100                        Time of last solid consumption: 1800                        Date of last liquid consumption: 10/25/21                        Date of last solid food consumption: 10/25/21    BMI:   Wt Readings from Last 3 Encounters:   10/26/21 250 lb 12.8 oz (113.8 kg)   10/13/21 246 lb 9.6 oz (111.9 kg)   10/07/21 255 lb (115.7 kg)     Body mass index is 33.09 kg/m².     CBC:   Lab Results   Component Value Date    WBC 6.7 10/05/2021    RBC 4.71 10/05/2021    HGB 14.5 10/05/2021    HCT 42.6 10/05/2021    MCV 90.4 10/05/2021    RDW 14.2 02/26/2019     10/05/2021       CMP:   Lab Results   Component Value Date     10/05/2021    K 3.8 10/05/2021    K 3.8 09/30/2021     10/05/2021    CO2 27 10/05/2021    BUN 6 10/05/2021    CREATININE 0.8 10/05/2021    GFRAA >60 02/26/2019    LABGLOM >90 10/05/2021    GLUCOSE 207 10/05/2021    PROT 6.2 10/05/2021    CALCIUM 8.7 10/05/2021    BILITOT 0.3

## 2021-10-26 NOTE — PROGRESS NOTES
Pt admitted to [facility] room 8 and oriented to unit. SCD sleeves applied. Nares swabbed. Pt verbalized permission for first name, last initial and physicians name on white board. SDS board and discharge criteria explained, pt and family verbalized understanding. Pt denies thoughts of harming self or others. Call light in reach. Family at the bedside.

## 2021-10-26 NOTE — OP NOTE
Maria Isabel Lopez  1975  [id number]    DATE: 10/26/21  SURGEON:  Dr. Debbie Rose M.D, MD MD  PREOPERATIVE DIAGNOSIS: Left sided kidney stone  POSTOPERATIVE DIAGNOSIS: Left sided kidney stone  PROCEDURES PERFORMED:  1. Cystoscopy. 2. Left sided ureteral dilation and ureteroscopy with holmium laser lithotripsy  3. Left sided stent placement. DRAINS: A Left sided 6x28 double J ureteral stent ( with string)  SPECIMEN: none  ANESTHESIA: General  ESTIMATED BLOOD LOSS: None.   COMPLICATIONS: None.     INDICATIONS FOR PROCEDURE:  Maria Isabel Lopez is a 40 y.o. male presents for Left sided kidney calculus and flank pain. After the risks, benefits, alternatives, of the procedure were discussed with the patient, informed consent was obtained. The patient elected to proceed.     DETAILS OF THE PROCEDURE:  The patient was brought back from the preoperative holding area to the  operating suite, and was transferred to the operating table where the patient lay in  supine position. EPC's were in place, connected to the machine and the machine was turned on before induction. General endotracheal anesthesia was induced, and patient was prepped and draped in standard surgical fashion after being placed in dorsolithotomy position. A proper timeout was performed, preoperative antibiotics were given. We began by inserting a cystoscope with a 22 Georgian sheath and 30 degree lens into the patient's urethral meatus and advancing into the bladder without complication. A pan cystoscopy was preformed and the bladder appeared unremarkable. We then focused our attention on the Left ureteral orifice, which we cannulated with our glidewire, advanced up to renal pelvis. We then used a  dual lumen catheter to place a second wire and dilate the ureter. Once in good position, we advanced our flexible ureteroscope over the working wire to the renal pelvis under direct visualization.   We identified the renal calculus and using a

## 2021-10-26 NOTE — PROGRESS NOTES
1350: Patient arrived to PACU. Report received from Ned Vazquez CRNA and [facility], [address] [facility]. Patient placed on cardiac monitor. Patient with OPA in place on simple mask. Spontaneous ventilation observed. Stent strings exposed and steri-striped to penis. Patient unresponsive at this time. 1359: Pt remains with OPA in place. Spontaneous ventilation. Does not wake up to voice at this time. 1405: Patient waking up more. OPA removed. Able to keep patent airway. Patient asking for something to drink. Denies nausea. 1409: Pt grimacing in pain. States that is 8/10. Given dose of fentanyl. 1414: Pt not improving. Given another dose of fentanyl. 1415: Pt refusing SCD's.   8402: Pt states that he is having a burning sensation that is bad. Has tried to urinate two times with urinal but not having success. Dr. Elizabeth Jauregui called and updated. Orders for pyridium 200mg once and b&o suppository once. 1445: B&O suppository offered. Pt does not want to take. Pt also has allergy to morphine, pt states that when he had morphine his throat swelled up. Pt sat on side of cart to try to urinate. Unable to at this time. 1450: Pt given another dose of fentanyl at this time. 1455: Pt given another dose of fentanyl at this time. No improvement in pain level. 1505: Pt not having relief with IV pain medication. Dr. Elizabeth Jauregui called and updated. Orders for IV toradol 30mg once. 1508: IV toradol given. Pt stood on side of cart with staff help. Was able to urinate 100ml bloody urine. 1509: Pt shivering. Given dose of demerol. 1516: Pt states he is nauseated. Given dose of phenergan. 1523: Pt states that pain level down to 5/10.   1535: Pt stent strings seem that they are longer then when patient came to pacu. Patient has been moving about in bed and using urinal at times. Pt states that he does not remember pulling sensation at strings. No stent is exposed. Dr. Elizabeth Jauregui called and he will come see patient.      1545: Dr. Elizabeth Jauregui in to see patient in pacu. Pt instructed to be careful not to tug on stent strings. Stent strings reinforced with steri-strips to penis per Dr. Priyanka Mustafa request.   7866: Pt meets pacu discharge criteria. Patient transported to [facility] in stable condition on room air. Report and chart given to Lenin Interiano, [address] [place] [address]. Patient given snack and drink.

## 2021-10-27 DIAGNOSIS — N20.0 NEPHROLITHIASIS: Primary | ICD-10-CM

## 2021-10-27 NOTE — TELEPHONE ENCOUNTER
Calling pt again at this time for smoking cessation program.  I left a message asking pt to call us back at [phone], option 2.

## 2021-10-28 ENCOUNTER — HOSPITAL ENCOUNTER (EMERGENCY)
Age: 46
Discharge: HOME | End: 2021-10-28
Payer: COMMERCIAL

## 2021-10-28 VITALS
OXYGEN SATURATION: 99 % | HEIGHT: 73 IN | RESPIRATION RATE: 17 BRPM | BODY MASS INDEX: 33.13 KG/M2 | TEMPERATURE: 98.2 F | SYSTOLIC BLOOD PRESSURE: 151 MMHG | DIASTOLIC BLOOD PRESSURE: 85 MMHG | HEART RATE: 80 BPM | WEIGHT: 250 LBS

## 2021-10-28 DIAGNOSIS — N30.01 ACUTE CYSTITIS WITH HEMATURIA: Primary | ICD-10-CM

## 2021-10-28 LAB
ALBUMIN SERPL-MCNC: 4.2 G/DL (ref 3.5–5.1)
ALP BLD-CCNC: 134 U/L (ref 38–126)
ALT SERPL-CCNC: 18 U/L (ref 11–66)
ANION GAP SERPL CALCULATED.3IONS-SCNC: 17 MEQ/L (ref 8–16)
AST SERPL-CCNC: 20 U/L (ref 5–40)
BACTERIA: ABNORMAL /HPF
BASOPHILS # BLD: 0.8 %
BASOPHILS ABSOLUTE: 0.1 THOU/MM3 (ref 0–0.1)
BILIRUB SERPL-MCNC: 0.4 MG/DL (ref 0.3–1.2)
BILIRUBIN URINE: NEGATIVE
BLOOD, URINE: ABNORMAL
BUN BLDV-MCNC: 9 MG/DL (ref 7–22)
CALCIUM SERPL-MCNC: 9.2 MG/DL (ref 8.5–10.5)
CASTS 2: ABNORMAL /LPF
CASTS UA: ABNORMAL /LPF
CHARACTER, URINE: CLEAR
CHLORIDE BLD-SCNC: 102 MEQ/L (ref 98–111)
CO2: 24 MEQ/L (ref 23–33)
COLOR: ABNORMAL
CREAT SERPL-MCNC: 0.9 MG/DL (ref 0.4–1.2)
CRYSTALS, UA: ABNORMAL
EOSINOPHIL # BLD: 2.2 %
EOSINOPHILS ABSOLUTE: 0.2 THOU/MM3 (ref 0–0.4)
EPITHELIAL CELLS, UA: ABNORMAL /HPF
ERYTHROCYTE [DISTWIDTH] IN BLOOD BY AUTOMATED COUNT: 13.7 % (ref 11.5–14.5)
ERYTHROCYTE [DISTWIDTH] IN BLOOD BY AUTOMATED COUNT: 46.1 FL (ref 35–45)
GFR SERPL CREATININE-BSD FRML MDRD: > 90 ML/MIN/1.73M2
GLUCOSE BLD-MCNC: 107 MG/DL (ref 70–108)
GLUCOSE URINE: NEGATIVE MG/DL
HCT VFR BLD CALC: 43 % (ref 42–52)
HEMOGLOBIN: 14.4 GM/DL (ref 14–18)
IMMATURE GRANS (ABS): 0.12 THOU/MM3 (ref 0–0.07)
IMMATURE GRANULOCYTES: 1.1 %
KETONES, URINE: NEGATIVE
LEUKOCYTE ESTERASE, URINE: ABNORMAL
LYMPHOCYTES # BLD: 17.6 %
LYMPHOCYTES ABSOLUTE: 1.9 THOU/MM3 (ref 1–4.8)
MAGNESIUM: 1.9 MG/DL (ref 1.6–2.4)
MCH RBC QN AUTO: 31 PG (ref 26–33)
MCHC RBC AUTO-ENTMCNC: 33.5 GM/DL (ref 32.2–35.5)
MCV RBC AUTO: 92.5 FL (ref 80–94)
MISCELLANEOUS 2: ABNORMAL
MONOCYTES # BLD: 6.8 %
MONOCYTES ABSOLUTE: 0.7 THOU/MM3 (ref 0.4–1.3)
NITRITE, URINE: POSITIVE
NUCLEATED RED BLOOD CELLS: 0 /100 WBC
OSMOLALITY CALCULATION: 284.1 MOSMOL/KG (ref 275–300)
PH UA: 5.5 (ref 5–9)
PLATELET # BLD: 161 THOU/MM3 (ref 130–400)
PMV BLD AUTO: 10.6 FL (ref 9.4–12.4)
POTASSIUM REFLEX MAGNESIUM: 3.5 MEQ/L (ref 3.5–5.2)
PROTEIN UA: 30
RBC # BLD: 4.65 MILL/MM3 (ref 4.7–6.1)
RBC URINE: > 200 /HPF
RENAL EPITHELIAL, UA: ABNORMAL
SEG NEUTROPHILS: 71.5 %
SEGMENTED NEUTROPHILS ABSOLUTE COUNT: 7.7 THOU/MM3 (ref 1.8–7.7)
SODIUM BLD-SCNC: 143 MEQ/L (ref 135–145)
SPECIFIC GRAVITY, URINE: 1.01 (ref 1–1.03)
TOTAL PROTEIN: 6.5 G/DL (ref 6.1–8)
UROBILINOGEN, URINE: 1 EU/DL (ref 0–1)
WBC # BLD: 10.8 THOU/MM3 (ref 4.8–10.8)
WBC UA: ABNORMAL /HPF
YEAST: ABNORMAL

## 2021-10-28 PROCEDURE — 85025 COMPLETE CBC W/AUTO DIFF WBC: CPT

## 2021-10-28 PROCEDURE — 99282 EMERGENCY DEPT VISIT SF MDM: CPT

## 2021-10-28 PROCEDURE — 81001 URINALYSIS AUTO W/SCOPE: CPT

## 2021-10-28 PROCEDURE — 83735 ASSAY OF MAGNESIUM: CPT

## 2021-10-28 PROCEDURE — 36415 COLL VENOUS BLD VENIPUNCTURE: CPT

## 2021-10-28 PROCEDURE — 80053 COMPREHEN METABOLIC PANEL: CPT

## 2021-10-28 RX ORDER — PHENAZOPYRIDINE HYDROCHLORIDE 100 MG/1
100 TABLET, FILM COATED ORAL 3 TIMES DAILY PRN
Qty: 12 TABLET | Refills: 0 | Status: SHIPPED | OUTPATIENT
Start: 2021-10-28 | End: 2021-11-01

## 2021-10-28 RX ORDER — SULFAMETHOXAZOLE AND TRIMETHOPRIM 800; 160 MG/1; MG/1
1 TABLET ORAL 2 TIMES DAILY
Qty: 20 TABLET | Refills: 0 | Status: SHIPPED | OUTPATIENT
Start: 2021-10-28 | End: 2021-11-07

## 2021-10-28 RX ORDER — OXYBUTYNIN CHLORIDE 5 MG/1
10 TABLET, EXTENDED RELEASE ORAL DAILY
Qty: 30 TABLET | Refills: 3 | Status: SHIPPED | OUTPATIENT
Start: 2021-10-28 | End: 2021-11-08 | Stop reason: ALTCHOICE

## 2021-10-28 RX ORDER — KETOROLAC TROMETHAMINE 10 MG/1
10 TABLET, FILM COATED ORAL EVERY 6 HOURS PRN
Qty: 20 TABLET | Refills: 0 | Status: SHIPPED | OUTPATIENT
Start: 2021-10-28 | End: 2022-03-30 | Stop reason: ALTCHOICE

## 2021-10-28 ASSESSMENT — PAIN DESCRIPTION - LOCATION: LOCATION: GROIN

## 2021-10-28 ASSESSMENT — PAIN DESCRIPTION - PAIN TYPE: TYPE: ACUTE PAIN

## 2021-10-28 ASSESSMENT — PAIN SCALES - GENERAL: PAINLEVEL_OUTOF10: 8

## 2021-10-28 NOTE — ED NOTES
Presents to ER with complaints of painful and frequent urination. Pt states he had a lithrotripsy done on Tuesday and has since been having worsening pain. Pt states he is out of the pain medicine prescribed and has been unable to get in touch with the Dr. Marco Sanotyo he had a 8mm kidney stone removed.      Nadja Campbell, RN  10/28/21 9455

## 2021-10-28 NOTE — ED PROVIDER NOTES
glasses     for reading       SURGICAL HISTORY       Past Surgical History:   Procedure Laterality Date    ANKLE SURGERY Left     KIDNEY STONE SURGERY      x 4    URETER SURGERY Left 10/26/2021    CYSTOSCOPY, LEFT URETEROSCOPY, LASER LITHOTRIPSY, LEFT URETERAL STENT performed by Andrew Arshad MD at [address] [place] [address]       Discharge Medication List as of 10/28/2021  4:07 PM      CONTINUE these medications which have NOT CHANGED    Details   oxyCODONE-acetaminophen (PERCOCET) 5-325 MG per tablet Take 1 tablet by mouth every 4 hours as needed for Pain for up to 3 days. Intended supply: 3 days.  Take lowest dose possible to manage pain, Disp-12 tablet, R-0Normal      tiZANidine (ZANAFLEX) 4 MG tablet TAKE 1 TABLET BY MOUTH THREE TIMES DAILY AS NEEDED FOR MUSCLE PAIN, Disp-60 tablet, R-0Normal      tamsulosin (FLOMAX) 0.4 MG capsule Take 1 capsule by mouth daily, Disp-30 capsule, R-1Normal      metoclopramide (REGLAN) 10 MG tablet Take 1 tablet by mouth 3 times dailyHistorical Med      promethazine (PHENERGAN) 25 MG tablet Take 1 tablet by mouth 4 times daily as needed for Nausea, Disp-120 tablet, R-1Normal      furosemide (LASIX) 40 MG tablet Historical Med      lisinopril (PRINIVIL;ZESTRIL) 5 MG tablet Take 1 tablet by mouth once daily, Disp-90 tablet, R-0Normal      gabapentin (NEURONTIN) 800 MG tablet TAKE 1 TABLET BY MOUTH THREE TIMES DAILY, Disp-90 tablet, R-5Normal      magnesium (MAGNESIUM-OXIDE) 250 MG TABS tablet Take 1 tablet by mouth 2 times daily, Disp-120 tablet, R-0Normal      fluticasone (FLOVENT HFA) 220 MCG/ACT inhaler Inhale 2 puffs into the lungs 2 times daily, Disp-1 each, R-3Normal      EQ ALLERGY RELIEF 10 MG tablet Take 1 tablet by mouth nightly, Disp-30 tablet, R-0Normal      metroNIDAZOLE (METROGEL) 0.75 % gel APPLY EXTERNALLY TWICE DAILY, Disp-45 g, R-0, Normal      tiotropium (SPIRIVA HANDIHALER) 18 MCG inhalation capsule Inhale 1 puff by mouth once daily, Disp-90 capsule, R-3Normal      pantoprazole (PROTONIX) 40 MG tablet Take 1 tablet by mouth once daily, Disp-90 tablet, R-0Normal      escitalopram (LEXAPRO) 10 MG tablet Take 1 tablet by mouth once daily, Disp-90 tablet, R-1Normal      naloxone 4 MG/0.1ML LIQD nasal spray Naloxone Hcl Active 4 MG NA One Time Only 1 1 August 20th, 2020 10:59amHistorical Med      ipratropium-albuterol (DUONEB) 0.5-2.5 (3) MG/3ML SOLN nebulizer solution Take 3 mLs by nebulization every 6 hours as needed for Shortness of Breath, Disp-360 vial, R-3Normal      Multiple Vitamin (MULTIVITAMIN PO) Take by mouth dailyHistorical Med      ondansetron (ZOFRAN ODT) 4 MG disintegrating tablet Take 1 tablet by mouth every 8 hours as needed for Nausea, Disp-20 tablet,R-0Print      tenofovir disoproxil fumarate (VIREAD) 300 MG tablet Take 300 mg by mouth dailyHistorical Med      [employer] Ultra Thin Lancets MISC 2 TIMES DAILY Starting Wed 8/12/2020, Disp-100 each,R-3, Normal      lidocaine (LIDODERM) 5 % Place 1 patch onto the skin daily 12 hours on, 12 hours off., Disp-15 patch, R-0Print      albuterol sulfate HFA (PROVENTIL HFA) 108 (90 Base) MCG/ACT inhaler Inhale 2 puffs into the lungs every 4 hours as needed for Wheezing or Shortness of Breath (Space out to every 6 hours as symptoms improve) Space out to every 6 hours as symptoms improve., Disp-1 Inhaler, R-3Print      METHADONE HCL PO Take 116 mg by mouth daily Historical Med             ALLERGIES       Allergies   Allergen Reactions    Adhesive Tape      Other reaction(s): Hives    Bactrim [Sulfamethoxazole-Trimethoprim]      Nausea and vomiting    Clonazepam Other (See Comments)     Restless leg    Flexeril [Cyclobenzaprine] Hives     Pt unsure    Morphine Hives     \"throat started swelling up\"    Quetiapine Other (See Comments)     restless       FAMILY HISTORY       Family History   Problem Relation Age of Onset    Substance Abuse Mother     Depression Mother     Heart Disease Mother         [facility] Rheum Arthritis Mother     Other Mother         Fibromyalgia    Substance Abuse Father     Heart Disease Father     Depression Sister     Substance Abuse Brother     Substance Abuse Maternal Aunt     Substance Abuse Maternal Uncle     Substance Abuse Paternal Aunt     Substance Abuse Paternal Uncle     Substance Abuse Maternal Grandmother     Depression Maternal Grandmother         SOCIAL HISTORY       Social History     Socioeconomic History    Marital status: Single     Spouse name: Not on file    Number of children: Not on file    Years of education: Not on file    Highest education level: Not on file   Occupational History    Not on file   Tobacco Use    Smoking status: Current Every Day Smoker     Packs/day: 1.00     Years: 30.00     Pack years: 30.00     Types: Cigarettes    Smokeless tobacco: Never Used   Vaping Use    Vaping Use: Never used   Substance and Sexual Activity    Alcohol use: No    Drug use: Not Currently     Comment: stopped heroin in 2013; medical marijuana card daily    Sexual activity: Yes     Partners: Female   Other Topics Concern    Not on file   Social History Narrative    Not on file     Social Determinants of Health     Financial Resource Strain: Low Risk     Difficulty of Paying Living Expenses: Not hard at all   Food Insecurity: No Food Insecurity    Worried About [address] in the Last Year: Never true    [address] [facility] [address] in the Last Year: Never true   Transportation Needs:     Lack of Transportation (Medical): Not on file    Lack of Transportation (Non-Medical):  Not on file   Physical Activity:     Days of Exercise per Week: Not on file    Minutes of Exercise per Session: Not on file   Stress:     Feeling of Stress : Not on file   Social Connections:     Frequency of Communication with Friends and Family: Not on file    Frequency of Social Gatherings with Friends and Family: Not on file    Attends [employer]ism Services: Not on file   [facility] Active Member of Clubs or Organizations: Not on file    Attends Club or Organization Meetings: Not on file    Marital Status: Not on file   Intimate Partner Violence:     Fear of Current or Ex-Partner: Not on file    Emotionally Abused: Not on file    Physically Abused: Not on file    Sexually Abused: Not on file   Housing Stability:     Unable to Pay for Housing in the Last Year: Not on file    Number of [place] in the Last Year: Not on file    Unstable Housing in the Last Year: Not on file       REVIEW OF SYSTEMS     Review of Systems   Constitutional: Negative for appetite change, chills, fatigue, fever and unexpected weight change. HENT: Negative for ear pain, rhinorrhea and sinus pressure. Eyes: Negative for pain and visual disturbance. Respiratory: Negative for cough, shortness of breath and wheezing. Cardiovascular: Negative for chest pain, palpitations and leg swelling. Gastrointestinal: Negative for abdominal pain, blood in stool, constipation, diarrhea, nausea and vomiting. Genitourinary: Positive for difficulty urinating. Negative for flank pain, frequency and hematuria. Musculoskeletal: Negative for arthralgias and joint swelling. Skin: Negative for rash. Neurological: Negative for dizziness, syncope, weakness, light-headedness and headaches. Hematological: Does not bruise/bleed easily. Except as noted above the remainder of the review of systems was reviewed and is negative. SCREENINGS                        PHYSICAL EXAM    (up to 7 for level 4, 8 or more for level 5)     ED Triage Vitals [10/28/21 1455]   BP Temp Temp Source Pulse Resp SpO2 Height Weight   (!) 151/85 98.2 °F (36.8 °C) Oral 80 17 99 % 6' 1\" (1.854 m) 250 lb (113.4 kg)       Physical Exam  Vitals and nursing note reviewed. Constitutional:       Appearance: He is not diaphoretic. HENT:      Head: Normocephalic and atraumatic.       Right Ear: External ear normal.      Left Ear: External ear normal. Eyes:      Conjunctiva/sclera: Conjunctivae normal.   Pulmonary:      Effort: Pulmonary effort is normal. No respiratory distress. Abdominal:      General: There is no distension. Tenderness: There is no abdominal tenderness. There is no guarding. Musculoskeletal:      Cervical back: Normal range of motion. Skin:     General: Skin is warm and dry. Neurological:      Mental Status: He is alert. DIAGNOSTIC RESULTS     EKG:(none if blank)  All EKGs are interpreted by the Emergency Department Physician who either signs or Co-signs this chart in the absence of a cardiologist.      RADIOLOGY: (none if blank)   I directly visualized the following images and reviewed the radiologist interpretations. Interpretation per the Radiologist below, if available at the time of this note:  No orders to display       LABS:  Labs Reviewed   COMPREHENSIVE METABOLIC PANEL W/ REFLEX TO MG FOR LOW K - Abnormal; Notable for the following components:       Result Value    Alkaline Phosphatase 134 (*)     All other components within normal limits   CBC WITH AUTO DIFFERENTIAL - Abnormal; Notable for the following components:    RBC 4.65 (*)     RDW-SD 46.1 (*)     Immature Grans (Abs) 0.12 (*)     All other components within normal limits   URINE WITH REFLEXED MICRO - Abnormal; Notable for the following components:    Blood, Urine LARGE (*)     Protein, UA 30 (*)     Nitrite, Urine POSITIVE (*)     Leukocyte Esterase, Urine SMALL (*)     Color, UA DK YELLOW (*)     All other components within normal limits   ANION GAP - Abnormal; Notable for the following components:    Anion Gap 17.0 (*)     All other components within normal limits   GLOMERULAR FILTRATION RATE, ESTIMATED   MAGNESIUM   OSMOLALITY       All other labs were within normal range or not returned as of this dictation. Please note, any cultures that may have been sent were not resulted at the time of this patient visit.     EMERGENCY DEPARTMENT COURSE and Medical Decision Making:     Vitals:    Vitals:    10/28/21 1455   BP: (!) 151/85   Pulse: 80   Resp: 17   Temp: 98.2 °F (36.8 °C)   TempSrc: Oral   SpO2: 99%   Weight: 250 lb (113.4 kg)   Height: 6' 1\" (1.854 m)       PROCEDURES: (None if blank)  Procedures    ED Course as of 12/15/21 1855   Thu Oct 28, 2021   [address] Dr Elizabeth Jauregui via [place]. He states to discharge pt with antibiotics, Ditropan and Toradol.  [NW]   0108 Discussed case with Dr Emma Caballero. OK to discharge [NW]      ED Course User Index  [NW] CELIO Mondragon CNP     MDM  Number of Diagnoses or Management Options      70-year-old male patient presents with dysuria for 2 days following lithotripsy. Labs were completed and are positive for UTI. Contact was made with patient's surgeon from his lithotripsy he had on Tuesday for guidance on course.  Request that patient be discharged on antibiotics, Pyridium, Ditropan and Toradol for 1 week. He states it is okay to discharge patient at home to follow-up with him as previously scheduled. Strict return precautions and follow up instructions were discussed with the patient with which the patient agrees    ED Medications administered this visit:  Medications - No data to display      FINAL IMPRESSION      1.  Acute cystitis with hematuria          DISPOSITION/PLAN   DISPOSITION Decision To Discharge 10/28/2021 03:59:55 PM      PATIENT REFERRED TO:  Roxanna Rodriguez MD  [phone]            DISCHARGE MEDICATIONS:  Discharge Medication List as of 10/28/2021  4:07 PM      START taking these medications    Details   sulfamethoxazole-trimethoprim (BACTRIM DS) 800-160 MG per tablet Take 1 tablet by mouth 2 times daily for 10 days, Disp-20 tablet, R-0Normal      oxybutynin (DITROPAN XL) 5 MG extended release tablet Take 2 tablets by mouth daily for 7 days, Disp-30 tablet, R-3Normal                    CELIO Mondragon CNP (electronically signed) Nettie Hussein, APRN - CNP  10/28/21 681 Cristina Meyer, APRN - CNP  12/15/21 8692

## 2021-10-29 ENCOUNTER — CARE COORDINATION (OUTPATIENT)
Dept: CARE COORDINATION | Age: 46
End: 2021-10-29

## 2021-10-29 ENCOUNTER — HOSPITAL ENCOUNTER (OUTPATIENT)
Dept: ULTRASOUND IMAGING | Age: 46
Discharge: HOME | End: 2021-10-29
Payer: COMMERCIAL

## 2021-10-29 DIAGNOSIS — N20.0 NEPHROLITHIASIS: ICD-10-CM

## 2021-10-29 PROCEDURE — 76770 US EXAM ABDO BACK WALL COMP: CPT

## 2021-10-29 NOTE — ED PROVIDER NOTES
[address] [facility]  EMERGENCY MEDICINE ATTENDING ATTESTATION      Evaluation of Theodore Valadez. Case discussed and care plan developed with nurse practitioner. I agree with the nurse practitioner documentation and plan as documented by her, except if my documentation differs. Patient seen, interviewed and examined by me. I reviewed the medical, surgical, family and social history, medications and allergies. I have reviewed the nursing documentation. I have reviewed the patient's vital signs and are normal per my interpretation. Body mass index is 32.98 kg/m². Pulsoxymetry is normal per my interpretation. Brief H&P   Patient c/o dysuria, bladder spasms, recent lithotripsy    Physical exam is notable for well appearing, suprapubic tenderness      Medical Decision Making   MDM:   1. Patient is a 81-year-old male with a history of recent lithotripsy who presented with pelvic pain and dysuria. UA positive for infection. Case discussed with urology, Dr. Michele Andersen, who recommended antibiotics, pyridium, ditropan, toradol x1 week. Plan:    Urology follow up   Return precautions   Antibiotics, pyridium, ditropan, toradol     Please see the nurse practitioner completed note for final disposition except as documented on this attestation. I have reviewed and interpreted all available lab, radiology and ekg results available at the moment. Diagnosis, treatment and disposition plans were discussed and agreed upon by patient. This transcription was electronically signed. It was dictated by use of voice recognition software and electronically transcribed. The transcription may contain errors not detected in proofreading.      I performed direct supervision and was present for the critical portion following procedures: None  Critical care time on this case: None    Electronically signed by Shelley Loredo MD on 10/28/21 at 9:54 PM EDT       Shelley Loredo MD  10/28/21 2441

## 2021-10-29 NOTE — CARE COORDINATION
Ambulatory Care Coordination  ED Follow up Call    Reason for ED visit:  Urinary frequency, groin pain, post op problem   Status:     Improved    Increased urinary symptoms. He attempted to reach urologist and no response so he went to ED. Treating for UTI. Taking abx, pyridium, toradol, Bactrim DS, and oxybutynin. Stent was placed 10/26. Was instructed to pull stent strings in 7 days. Reinforced instructions. Planning to pull 11/2 in am. Advised to report and issues or concerns immediately. Dr. Demetrice García did fibroscan. Fatty liver disease and cirrhosis. Discussed diet. GI follow up 11/8/21. Plan-   US kidney today  Report new or worsening symptoms immediately  Pull strings as instructed 11/2 in am report any issues   meds as discussed  Drink more water          Did you call your PCP prior to going to the ED? Yes      Did you receive a discharge instructions from the Emergency Room? Yes  Review of Instructions:     Understands what to report/when to return?:  Yes   Understands discharge instructions?:  Yes   Following discharge instructions?:  Yes   If not why? Are there any new complaints of pain? Yes urinary  New Pain Meds? Yes    Constipation prophylaxis needed? No    If you have a wound is the dressing clean, dry, and intact? N/A  Understands wound care regimen? N/A    Are there any other complaints/concerns that you wish to tell your provider? FU appts/Provider:    Future Appointments   Date Time Provider Didier Burton   10/29/2021  3:00 PM STR ULTRASOUND RM 2 STRZ US STR Radiolog   12/8/2021  9:15 AM CELIO Mejia - SIMON [address]           New Medications?:   Yes      Medication Reconciliation by phone - Yes  Understands Medications? Yes  Taking Medications? Yes  Can you swallow your pills? Yes    Any further needs in the home i.e. Equipment?   No    Link to services in community?:  N/A   Which services:

## 2021-11-01 ENCOUNTER — TELEPHONE (OUTPATIENT)
Dept: FAMILY MEDICINE CLINIC | Age: 46
End: 2021-11-01

## 2021-11-01 NOTE — LETTER
[address] [place] [address],[address] [facility]  Phone: [phone]  Fax: [phone]    Cyndee Wolf DO       November 3, 2021    [address] [facility] [address]  [address] [facility]      Dear Parish Turcios:    We have made several attempts to contact you by phone and have   been unsuccessful. Please call our office at your earliest convenience  At [phone] opt 2. Thank you.       Sincerely,        Cyndee Wolf DO

## 2021-11-01 NOTE — TELEPHONE ENCOUNTER
----- Message from Monika Jakub sent at 10/29/2021 11:47 AM EDT -----  Subject: Message to Provider    QUESTIONS  Information for Provider? pt needs return to work letter prior to monday   please call pt when it is ready  ---------------------------------------------------------------------------  --------------  CALL BACK INFO  What is the best way for the office to contact you? OK to leave message on   voicemail  Preferred Call Back Phone Number? [phone]  ---------------------------------------------------------------------------  --------------  SCRIPT ANSWERS  Relationship to Patient?  Self

## 2021-11-01 NOTE — TELEPHONE ENCOUNTER
Attempted to contact pt to see if he needed a new note or not (there is a letter from 10/4/21 to return back to work). Pt's phone would not go to .

## 2021-11-02 ENCOUNTER — NURSE TRIAGE (OUTPATIENT)
Dept: OTHER | Facility: CLINIC | Age: 46
End: 2021-11-02

## 2021-11-02 NOTE — TELEPHONE ENCOUNTER
Received call from [facility] with Red Flag Complaint. Brief description of triage:  Patient calling for concerns for urinary incontinence since removing stent yesterday. Also stating now that he has having trouble with urinating and feeling like bladder is full. Also complaints of lower back pain and burning with urination. Patient with Lithotripsy procedure on 10/26/2021. Triage indicates for patient to go to ED now. Care advice provided, patient verbalizes understanding; denies any other questions or concerns; instructed to call back for any new or worsening symptoms. Attention Provider: Thank you for allowing me to participate in the care of your patient. The patient was connected to triage in response to information provided to the ECC/PSC. Please do not respond through this encounter as the response is not directed to a shared pool. Reason for Disposition   [1] Unable to urinate (or only a few drops) > 4 hours AND [2] bladder feels very full (e.g., palpable bladder or strong urge to urinate)    Answer Assessment - Initial Assessment Questions  1. ANTIBIOTIC: \"What antibiotic are you taking? \" \"How many times per day? \"      Patient was unable to answer. Per ED record:  Sulfamethoxazole-trimeth 800-160mg one tab bid    2. DURATION: \"When was the antibiotic started? \"      Started on Sunday    3. MAIN SYMPTOM: \"What is the main symptom you are concerned about? \"      Urinary incontinence since yesterday after removing stent    4. FEVER: \"Do you have a fever? \" If so, ask: \"What is it, how was it measured, and when did it start? \"      100.4 F    5. OTHER SYMPTOMS: \"Do you have any other symptoms? \" (e.g., flank pain, penile discharge, scrotal pain, blood in urine)      Lower back on both sides, burning with urination    Protocols used: URINARY TRACT INFECTION ON ANTIBIOTIC FOLLOW-UP CALL - MALE-ADULT-

## 2021-11-03 ENCOUNTER — TELEPHONE (OUTPATIENT)
Dept: UROLOGY | Age: 46
End: 2021-11-03

## 2021-11-03 DIAGNOSIS — R39.15 URGENCY OF URINATION: Primary | ICD-10-CM

## 2021-11-03 DIAGNOSIS — R35.0 URINARY FREQUENCY: ICD-10-CM

## 2021-11-03 NOTE — TELEPHONE ENCOUNTER
Patient underwent Cystoscopy. Left sided ureteral dilation and ureteroscopy with holmium laser lithotripsy Left sided stent placement on 10/26/2021 with Dr Ree Guevara. He pulled the stent on Sunday. He has had trouble controlling the urine since then. If he does anything strenuous he leaks urine. He also has burning, urgency, and frequency and thinks he has a UTI. He denies fever. Order placed for an urine. Patient has bilateral edema up to his knees. He has a lot of muscle pain and where the socks were. He took the socks off. Patient encouraged to elevate. Patient advised to call the PCP regarding the edema. He still has poor appetite and has some nausea. He has zofran at home to use. Please advise.  Thank you

## 2021-11-03 NOTE — TELEPHONE ENCOUNTER
No return calls received from pt regarding messages left for smoking cessation. Pt considered lost to follow up and referral closed.

## 2021-11-04 ENCOUNTER — HOSPITAL ENCOUNTER (EMERGENCY)
Age: 46
Discharge: HOME | End: 2021-11-04
Attending: EMERGENCY MEDICINE
Payer: COMMERCIAL

## 2021-11-04 ENCOUNTER — APPOINTMENT (OUTPATIENT)
Dept: CT IMAGING | Age: 46
End: 2021-11-04
Payer: COMMERCIAL

## 2021-11-04 ENCOUNTER — CARE COORDINATION (OUTPATIENT)
Dept: CARE COORDINATION | Age: 46
End: 2021-11-04

## 2021-11-04 ENCOUNTER — TELEPHONE (OUTPATIENT)
Dept: UROLOGY | Age: 46
End: 2021-11-04

## 2021-11-04 VITALS
DIASTOLIC BLOOD PRESSURE: 96 MMHG | BODY MASS INDEX: 33.13 KG/M2 | RESPIRATION RATE: 18 BRPM | WEIGHT: 250 LBS | HEART RATE: 99 BPM | OXYGEN SATURATION: 98 % | HEIGHT: 73 IN | SYSTOLIC BLOOD PRESSURE: 145 MMHG | TEMPERATURE: 98.4 F

## 2021-11-04 DIAGNOSIS — E86.0 DEHYDRATION: ICD-10-CM

## 2021-11-04 DIAGNOSIS — R11.2 NON-INTRACTABLE VOMITING WITH NAUSEA, UNSPECIFIED VOMITING TYPE: Primary | ICD-10-CM

## 2021-11-04 LAB
ALBUMIN SERPL-MCNC: 4.3 G/DL (ref 3.5–5.1)
ALP BLD-CCNC: 131 U/L (ref 38–126)
ALT SERPL-CCNC: 16 U/L (ref 11–66)
ANION GAP SERPL CALCULATED.3IONS-SCNC: 17 MEQ/L (ref 8–16)
AST SERPL-CCNC: 23 U/L (ref 5–40)
BACTERIA: ABNORMAL
BASOPHILS # BLD: 0.7 %
BASOPHILS ABSOLUTE: 0.1 THOU/MM3 (ref 0–0.1)
BILIRUB SERPL-MCNC: 0.8 MG/DL (ref 0.3–1.2)
BILIRUBIN URINE: ABNORMAL
BLOOD, URINE: NEGATIVE
BUN BLDV-MCNC: 12 MG/DL (ref 7–22)
CALCIUM SERPL-MCNC: 9.5 MG/DL (ref 8.5–10.5)
CASTS: ABNORMAL /LPF
CASTS: ABNORMAL /LPF
CHARACTER, URINE: ABNORMAL
CHLORIDE BLD-SCNC: 104 MEQ/L (ref 98–111)
CO2: 18 MEQ/L (ref 23–33)
COLOR: ABNORMAL
CREAT SERPL-MCNC: 0.8 MG/DL (ref 0.4–1.2)
CRYSTALS: ABNORMAL
EOSINOPHIL # BLD: 0.7 %
EOSINOPHILS ABSOLUTE: 0.1 THOU/MM3 (ref 0–0.4)
EPITHELIAL CELLS, UA: ABNORMAL /HPF
ERYTHROCYTE [DISTWIDTH] IN BLOOD BY AUTOMATED COUNT: 13.7 % (ref 11.5–14.5)
ERYTHROCYTE [DISTWIDTH] IN BLOOD BY AUTOMATED COUNT: 45.5 FL (ref 35–45)
GFR SERPL CREATININE-BSD FRML MDRD: > 90 ML/MIN/1.73M2
GLUCOSE BLD-MCNC: 124 MG/DL (ref 70–108)
GLUCOSE BLD-MCNC: 125 MG/DL (ref 70–108)
GLUCOSE, URINE: NEGATIVE MG/DL
HCT VFR BLD CALC: 49.4 % (ref 42–52)
HEMOGLOBIN: 16.7 GM/DL (ref 14–18)
ICTOTEST: NEGATIVE
IMMATURE GRANS (ABS): 0.15 THOU/MM3 (ref 0–0.07)
IMMATURE GRANULOCYTES: 1 %
KETONES, URINE: 40
LEUKOCYTE EST, POC: ABNORMAL
LIPASE: 19.3 U/L (ref 5.6–51.3)
LYMPHOCYTES # BLD: 10.3 %
LYMPHOCYTES ABSOLUTE: 1.6 THOU/MM3 (ref 1–4.8)
MCH RBC QN AUTO: 31 PG (ref 26–33)
MCHC RBC AUTO-ENTMCNC: 33.8 GM/DL (ref 32.2–35.5)
MCV RBC AUTO: 91.8 FL (ref 80–94)
MISCELLANEOUS LAB TEST RESULT: ABNORMAL
MONOCYTES # BLD: 4.6 %
MONOCYTES ABSOLUTE: 0.7 THOU/MM3 (ref 0.4–1.3)
MUCUS: ABNORMAL
NITRITE, URINE: NEGATIVE
NUCLEATED RED BLOOD CELLS: 0 /100 WBC
OSMOLALITY CALCULATION: 278.8 MOSMOL/KG (ref 275–300)
PH UA: 6.5 (ref 5–9)
PLATELET # BLD: 223 THOU/MM3 (ref 130–400)
PMV BLD AUTO: 9.8 FL (ref 9.4–12.4)
POTASSIUM REFLEX MAGNESIUM: 4.6 MEQ/L (ref 3.5–5.2)
PROCALCITONIN: 0.03 NG/ML (ref 0.01–0.09)
PROTEIN UA: 30 MG/DL
RBC # BLD: 5.38 MILL/MM3 (ref 4.7–6.1)
RBC URINE: ABNORMAL /HPF
REASON FOR REJECTION: NORMAL
REJECTED TEST: NORMAL
RENAL EPITHELIAL, UA: ABNORMAL
SEG NEUTROPHILS: 82.7 %
SEGMENTED NEUTROPHILS ABSOLUTE COUNT: 12.6 THOU/MM3 (ref 1.8–7.7)
SODIUM BLD-SCNC: 139 MEQ/L (ref 135–145)
SPECIFIC GRAVITY UA: 1.02 (ref 1–1.03)
TOTAL PROTEIN: 7.5 G/DL (ref 6.1–8)
UROBILINOGEN, URINE: 4 EU/DL (ref 0–1)
WBC # BLD: 15.2 THOU/MM3 (ref 4.8–10.8)
WBC UA: ABNORMAL /HPF
YEAST: ABNORMAL

## 2021-11-04 PROCEDURE — 96360 HYDRATION IV INFUSION INIT: CPT

## 2021-11-04 PROCEDURE — 2580000003 HC RX 258: Performed by: PHYSICIAN ASSISTANT

## 2021-11-04 PROCEDURE — 80053 COMPREHEN METABOLIC PANEL: CPT

## 2021-11-04 PROCEDURE — 85025 COMPLETE CBC W/AUTO DIFF WBC: CPT

## 2021-11-04 PROCEDURE — 96361 HYDRATE IV INFUSION ADD-ON: CPT

## 2021-11-04 PROCEDURE — 84145 PROCALCITONIN (PCT): CPT

## 2021-11-04 PROCEDURE — 82948 REAGENT STRIP/BLOOD GLUCOSE: CPT

## 2021-11-04 PROCEDURE — 83690 ASSAY OF LIPASE: CPT

## 2021-11-04 PROCEDURE — 99282 EMERGENCY DEPT VISIT SF MDM: CPT

## 2021-11-04 PROCEDURE — 81001 URINALYSIS AUTO W/SCOPE: CPT

## 2021-11-04 PROCEDURE — 36415 COLL VENOUS BLD VENIPUNCTURE: CPT

## 2021-11-04 PROCEDURE — 74176 CT ABD & PELVIS W/O CONTRAST: CPT

## 2021-11-04 RX ORDER — 0.9 % SODIUM CHLORIDE 0.9 %
1000 INTRAVENOUS SOLUTION INTRAVENOUS ONCE
Status: COMPLETED | OUTPATIENT
Start: 2021-11-04 | End: 2021-11-04

## 2021-11-04 RX ADMIN — SODIUM CHLORIDE 1000 ML: 9 INJECTION, SOLUTION INTRAVENOUS at 11:43

## 2021-11-04 ASSESSMENT — PAIN DESCRIPTION - ORIENTATION: ORIENTATION: RIGHT;LEFT

## 2021-11-04 ASSESSMENT — ENCOUNTER SYMPTOMS
SHORTNESS OF BREATH: 0
COUGH: 0
CONSTIPATION: 0
EYES NEGATIVE: 1
SORE THROAT: 0
ABDOMINAL DISTENTION: 0
NAUSEA: 1
ABDOMINAL PAIN: 1
STRIDOR: 0
VOMITING: 1
DIARRHEA: 0
WHEEZING: 0

## 2021-11-04 ASSESSMENT — PAIN SCALES - GENERAL: PAINLEVEL_OUTOF10: 4

## 2021-11-04 ASSESSMENT — PAIN DESCRIPTION - LOCATION: LOCATION: LEG

## 2021-11-04 ASSESSMENT — PAIN DESCRIPTION - DESCRIPTORS: DESCRIPTORS: SORE

## 2021-11-04 NOTE — ED NOTES
Patient presents to the ED with complaints of having bilateral leg pain and vomiting.       Devyn Henning, PATRICIA  35/[phone]

## 2021-11-04 NOTE — ED PROVIDER NOTES
[facility] [address] [address] EMERGENCY DEPT    EMERGENCY MEDICINE     Pt Name: Bud Phan  MRN: [id number]  [place] 1975  Date of evaluation: 11/4/2021  Provider: Kassandra Dawson PA-C    [address] [place] [address]       Chief Complaint   Patient presents with    Leg Pain     bilateral    Emesis       HISTORY OF PRESENT ILLNESS    Bud Phan is a pleasant 40 y.o. male who presents to the emergency department for nausea and vomiting. Patient states that for the past 3 days he has had nausea and vomiting and when he checked his blood sugars this morning they were at 30. He did take a antinausea medication this morning sublingual which helped and was able to drink juice before coming here which did help as well. He had a lithotripsy and stent placement on 10/26/2021 by Dr. Priyanka Mustafa. Was seen in the ED on 10/20/2021 and diagnosed at that time with UTI. Urologist discussed with the ED at the time to start patient on Pyridium, Ditropan, Toradol, and Bactrim. Patient had worsening nausea and vomiting and was not always able to keep his medication down although he has been trying to take it. Patient complains of headache, diaphoresis at night, nausea, vomiting, urinary frequency, burning with urination. No complaints of chest pain, shortness of breath, fever, chills, bowel issues. Complains of bilateral leg cramping type pain but no noticeable swelling. Overall feels dehydrated. Has only been able to eat and drink minimal amounts in the last few days. No history of cirrhosis. Triage notes and Nursing notes were reviewed by myself. Any discrepancies are addressed above.     PAST MEDICAL HISTORY     Past Medical History:   Diagnosis Date    Anxiety     Bipolar 1 disorder ([address]     Chronic diastolic congestive heart failure ([address] 3/16/2018    patient denies    Cirrhosis ([address] 01/2021    Constipation     Gastroesophageal reflux disease 3/16/2018    Hard of hearing     left ear, no hearing aid    [facility]o coma[facility]     Hepatic encephalopathy ([facility] 7/23/2020    Hepatitis B     Kidney stones     hx of    Post traumatic stress disorder (PTSD)     Substance abuse ([facility]     \"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\"    Type 2 diabetes mellitus without complication, with long-term current use of insulin ([facility] 8/16/2017    Wears glasses     for reading       SURGICAL HISTORY       Past Surgical History:   Procedure Laterality Date    ANKLE SURGERY Left     KIDNEY STONE SURGERY      x 4    URETER SURGERY Left 10/26/2021    CYSTOSCOPY, LEFT URETEROSCOPY, LASER LITHOTRIPSY, LEFT URETERAL STENT performed by Jossue Evangelista MD at [address] [place] [address]       Discharge Medication List as of 11/4/2021  2:02 PM      CONTINUE these medications which have NOT CHANGED    Details   oxybutynin (DITROPAN XL) 5 MG extended release tablet Take 2 tablets by mouth daily for 7 days, Disp-30 tablet, R-3Normal      ketorolac (TORADOL) 10 MG tablet Take 1 tablet by mouth every 6 hours as needed for Pain, Disp-20 tablet, R-0Normal      sulfamethoxazole-trimethoprim (BACTRIM DS) 800-160 MG per tablet Take 1 tablet by mouth 2 times daily for 10 days, Disp-20 tablet, R-0Normal      tiZANidine (ZANAFLEX) 4 MG tablet TAKE 1 TABLET BY MOUTH THREE TIMES DAILY AS NEEDED FOR MUSCLE PAIN, Disp-60 tablet, R-0Normal      tamsulosin (FLOMAX) 0.4 MG capsule Take 1 capsule by mouth daily, Disp-30 capsule, R-1Normal      metoclopramide (REGLAN) 10 MG tablet Take 1 tablet by mouth 3 times dailyHistorical Med      furosemide (LASIX) 40 MG tablet Historical Med      EQ ALLERGY RELIEF 10 MG tablet Take 1 tablet by mouth nightly, Disp-30 tablet, R-0Normal      lisinopril (PRINIVIL;ZESTRIL) 5 MG tablet Take 1 tablet by mouth once daily, Disp-90 tablet, R-0Normal      gabapentin (NEURONTIN) 800 MG tablet TAKE 1 TABLET BY MOUTH THREE TIMES DAILY, Disp-90 tablet, R-5Normal      magnesium (MAGNESIUM-OXIDE) 250 MG TABS tablet Take 1 tablet by mouth 2 times daily, Disp-120 tablet, R-0Normal      fluticasone (FLOVENT HFA) 220 MCG/ACT inhaler Inhale 2 puffs into the lungs 2 times daily, Disp-1 each, R-3Normal      metroNIDAZOLE (METROGEL) 0.75 % gel APPLY EXTERNALLY TWICE DAILY, Disp-45 g, R-0, Normal      tiotropium (SPIRIVA HANDIHALER) 18 MCG inhalation capsule Inhale 1 puff by mouth once daily, Disp-90 capsule, R-3Normal      pantoprazole (PROTONIX) 40 MG tablet Take 1 tablet by mouth once daily, Disp-90 tablet, R-0Normal      escitalopram (LEXAPRO) 10 MG tablet Take 1 tablet by mouth once daily, Disp-90 tablet, R-1Normal      naloxone 4 MG/0.1ML LIQD nasal spray Naloxone Hcl Active 4 MG NA One Time Only 1 1 August 20th, 2020 10:59amHistorical Med      ipratropium-albuterol (DUONEB) 0.5-2.5 (3) MG/3ML SOLN nebulizer solution Take 3 mLs by nebulization every 6 hours as needed for Shortness of Breath, Disp-360 vial, R-3Normal      Multiple Vitamin (MULTIVITAMIN PO) Take by mouth dailyHistorical Med      ondansetron (ZOFRAN ODT) 4 MG disintegrating tablet Take 1 tablet by mouth every 8 hours as needed for Nausea, Disp-20 tablet,R-0Print      tenofovir disoproxil fumarate (VIREAD) 300 MG tablet Take 300 mg by mouth dailyHistorical Med      [employer] Ultra Thin Lancets MISC 2 TIMES DAILY Starting Wed 8/12/2020, Disp-100 each,R-3, Normal      lidocaine (LIDODERM) 5 % Place 1 patch onto the skin daily 12 hours on, 12 hours off., Disp-15 patch, R-0Print      albuterol sulfate HFA (PROVENTIL HFA) 108 (90 Base) MCG/ACT inhaler Inhale 2 puffs into the lungs every 4 hours as needed for Wheezing or Shortness of Breath (Space out to every 6 hours as symptoms improve) Space out to every 6 hours as symptoms improve., Disp-1 Inhaler, R-3Print      METHADONE HCL PO Take 116 mg by mouth daily Historical Med             ALLERGIES     Adhesive tape, Clonazepam, Flexeril [cyclobenzaprine], Morphine, and Quetiapine    FAMILY HISTORY       Family History   Problem Relation Age of Onset  Substance Abuse Mother     Depression Mother     Heart Disease Mother         CHF   Gonzalez Rheum Arthritis Mother     Other Mother         Fibromyalgia    Substance Abuse Father     Heart Disease Father     Depression Sister     Substance Abuse Brother     Substance Abuse Maternal Aunt     Substance Abuse Maternal Uncle     Substance Abuse Paternal Aunt     Substance Abuse Paternal Uncle     Substance Abuse Maternal Grandmother     Depression Maternal Grandmother         SOCIAL HISTORY       Social History     Socioeconomic History    Marital status: Single     Spouse name: Not on file    Number of children: Not on file    Years of education: Not on file    Highest education level: Not on file   Occupational History    Not on file   Tobacco Use    Smoking status: Current Every Day Smoker     Packs/day: 1.00     Years: 30.00     Pack years: 30.00     Types: Cigarettes    Smokeless tobacco: Never Used   Vaping Use    Vaping Use: Never used   Substance and Sexual Activity    Alcohol use: No    Drug use: Not Currently     Comment: stopped heroin in 2013; medical marijuana card daily    Sexual activity: Yes     Partners: Female   Other Topics Concern    Not on file   Social History Narrative    Not on file     Social Determinants of Health     Financial Resource Strain: Low Risk     Difficulty of Paying Living Expenses: Not hard at all   Food Insecurity: No Food Insecurity    Worried About [address] [employer] in the Last Year: Never true    [facility] in the Last Year: Never true   Transportation Needs:     Lack of Transportation (Medical):      Lack of Transportation (Non-Medical):    Physical Activity:     Days of Exercise per Week:     Minutes of Exercise per Session:    Stress:     Feeling of Stress :    Social Connections:     Frequency of Communication with Friends and Family:     Frequency of Social Gatherings with Friends and Family:     Attends [employer] Services:     Active Member of Clubs or Organizations:     Attends Club or Organization Meetings:     Marital Status:    Intimate Partner Violence:     Fear of Current or Ex-Partner:     Emotionally Abused:     Physically Abused:     Sexually Abused:        REVIEW OF SYSTEMS     Review of Systems   Constitutional: Positive for appetite change and diaphoresis. Negative for activity change, chills, fatigue, fever and unexpected weight change. HENT: Negative for congestion, drooling, ear pain and sore throat. Eyes: Negative. Respiratory: Negative for cough, shortness of breath, wheezing and stridor. Cardiovascular: Negative for chest pain and palpitations. Gastrointestinal: Positive for abdominal pain, nausea and vomiting. Negative for abdominal distention, constipation and diarrhea. Genitourinary: Positive for dysuria and frequency. Negative for difficulty urinating, flank pain, hematuria and urgency. Musculoskeletal: Negative for myalgias and neck pain. Skin: Negative for rash. Neurological: Positive for headaches. Negative for dizziness, syncope, light-headedness and numbness. Psychiatric/Behavioral: Negative. Except as noted above the remainder of the review of systems was reviewed and is. PHYSICAL EXAM     INITIAL VITALS:  height is 6' 1\" (1.854 m) and weight is 250 lb (113.4 kg). His oral temperature is 98.4 °F (36.9 °C). His blood pressure is 145/96 (abnormal) and his pulse is 99. His respiration is 18 and oxygen saturation is 98%. Physical Exam  Vitals and nursing note reviewed. Constitutional:       General: He is not in acute distress. Appearance: Normal appearance. He is obese. He is not ill-appearing. HENT:      Head: Normocephalic and atraumatic. Right Ear: External ear normal.      Left Ear: External ear normal.      Nose: Nose normal.      Mouth/Throat:      Mouth: Mucous membranes are dry. Eyes:      Pupils: Pupils are equal, round, and reactive to light. Cardiovascular:      Rate and Rhythm: Normal rate. Pulses: Normal pulses. Pulmonary:      Effort: Pulmonary effort is normal. No respiratory distress. Abdominal:      General: Bowel sounds are normal. There is no distension. Palpations: Abdomen is soft. Tenderness: There is abdominal tenderness in the right upper quadrant. There is no right CVA tenderness, left CVA tenderness, guarding or rebound. Positive signs include Guerra's sign. Negative signs include McBurney's sign. Hernia: No hernia is present. Musculoskeletal:         General: Normal range of motion. Cervical back: Normal range of motion and neck supple. Skin:     General: Skin is warm and dry. Neurological:      Mental Status: He is alert and oriented to person, place, and time. Psychiatric:         Behavior: Behavior normal.         DIFFERENTIAL DIAGNOSIS:   Differential diagnoses are discussed    DIAGNOSTIC RESULTS     EKG: All EKG's are interpreted by the Emergency Department Physician who either signs or Co-signsthis chart in the absence of a cardiologist.         RADIOLOGY: non-plain film images(s) such as CT, Ultrasound and MRI are read by the radiologist.    CT ABDOMEN PELVIS WO CONTRAST Additional Contrast? None   Final Result       1. Punctate calcification in the left kidney. No associated hydronephrosis. 2. Slight irregularity of the liver. Moderate splenomegaly. Findings may represent cirrhosis. 3. Atherosclerotic calcification in the abdominal aorta. 4. Atelectasis or scarring in the lingula. .               **This report has been created using voice recognition software. It may contain minor errors which are inherent in voice recognition technology. **      Final report electronically signed by DR Theodore Souza on 11/4/2021 12:57 PM          LABS:   Labs Reviewed   COMPREHENSIVE METABOLIC PANEL W/ REFLEX TO MG FOR LOW K - Abnormal; Notable for the following components:       Result Value    Glucose 125 (*)     CO2 18 (*)     Alkaline Phosphatase 131 (*)     All other components within normal limits   CBC WITH AUTO DIFFERENTIAL - Abnormal; Notable for the following components:    WBC 15.2 (*)     RDW-SD 45.5 (*)     Segs Absolute 12.6 (*)     Immature Grans (Abs) 0.15 (*)     All other components within normal limits   MICROSCOPIC URINALYSIS - Abnormal; Notable for the following components:    Bilirubin Urine SMALL (*)     Ketones, Urine 40 (*)     Protein, UA 30 (*)     Urobilinogen, Urine 4.0 (*)     Leukocytes, UA SMALL (*)     Color, UA DK YELLOW (*)     Character, Urine CLOUDY (*)     All other components within normal limits   ANION GAP - Abnormal; Notable for the following components:    Anion Gap 17.0 (*)     All other components within normal limits   POCT GLUCOSE - Abnormal; Notable for the following components:    POC Glucose 124 (*)     All other components within normal limits   LIPASE   SPECIMEN REJECTION   ICTOTEST, URINE   OSMOLALITY   GLOMERULAR FILTRATION RATE, ESTIMATED   PROCALCITONIN       EMERGENCY DEPARTMENT COURSE:   Vitals:    Vitals:    11/04/21 0857   BP: (!) 145/96   Pulse: 99   Resp: 18   Temp: 98.4 °F (36.9 °C)   TempSrc: Oral   SpO2: 98%   Weight: 250 lb (113.4 kg)   Height: 6' 1\" (1.854 m)     6:21 PM EDT: The patient was seen and evaluated. MDM:  Patient is 71-year-old male who presents with 3 days worsening nausea vomiting along with urinary frequency and burning with urination. Patient states that he was seen a few days ago and was given Pyridium, Ditropan, Toradol, and Bactrim in which she has been taking but feels like he has been vomiting them up at times. He has not been able to eat much or keep fluids down much in the last 3 days. Initial blood work demonstrated leukocytosis along with decreased CO2. Liver and lipase within normal limits. UA did not note bacteria but did show urobilinogen, ketones, and small leukocytes.   Patient had right upper quadrant pain to palpation and based on symptoms and abdominal tenderness recommended CT abdomen without contrast.  Dr. Kimberly Sheikh staffed the case and recommended pro calcitonin, lactic acid, and CT abdomen without contrast.  CT did not demonstrate any acute findings. Patient was given 1 L of fluids and patient states was feeling much improved after that. Discussed with patient no acute findings were noted and recommend patient following up with PCP in the next 2 to 3 days for reevaluation. If symptoms worsen may follow-up with ED in the next 2 days. We will continue antinausea medications the patient has at home for the sublingual seem to work. We will also finish the medications given to him from previous ED visit. Patient understands and agrees the treatment plan. CRITICAL CARE:   None    CONSULTS:  None    PROCEDURES:  None    FINAL IMPRESSION      1. Non-intractable vomiting with nausea, unspecified vomiting type    2.  Dehydration          DISPOSITION/PLAN   Discharged home    PATIENT REFERRED TO:  Kristin Barnett DO  [address] [place] [address]    In 2 days      Lupe Simon MD  [address] [phone]    In 2 days        DISCHARGEMEDICATIONS:  Discharge Medication List as of 11/4/2021  2:02 PM              (Please note that portions of this note were completed with a voice recognition program.  Efforts were made to edit the dictations but occasionallywords are mis-transcribed.)      Sheeba Guerra PA-C(electronically signed)  Physician Associate, Emergency Department       Sheeba Guerra PA-C  11/04/21 0259

## 2021-11-05 ENCOUNTER — CARE COORDINATION (OUTPATIENT)
Dept: CARE COORDINATION | Age: 46
End: 2021-11-05

## 2021-11-05 NOTE — CARE COORDINATION
Ambulatory Care Coordination  ED Follow up Call    Reason for ED visit:  Leg pain bilateral, emesis, dehydration   Status:     Improved    Thinks the leg pain is from dehydration. ABX for UTI were causing N/V. feeling better today. Urinating no difficulties since stent removal.    Plan-   11/8 PCP  11/9 Urology  Report new or worsening symptoms immediately  Increase fluid intake - gatorade and water            Did you call your PCP prior to going to the ED? No      Did you receive a discharge instructions from the Emergency Room? Yes  Review of Instructions:     Understands what to report/when to return?:  Yes   Understands discharge instructions?:  Yes   Following discharge instructions?:  Yes   If not why? Are there any new complaints of pain? Yes leg pain  New Pain Meds? No    Constipation prophylaxis needed? N/A    If you have a wound is the dressing clean, dry, and intact? N/A  Understands wound care regimen? N/A    Are there any other complaints/concerns that you wish to tell your provider? FU appts/Provider:    Future Appointments   Date Time Provider Didier Burton   11/8/2021  9:00 AM [facility]  [place] [facility] [place]   11/9/2021  8:45 AM CELIO Elizondo CNP 4724   12/8/2021  9:15 AM CELIO Elizondo CNP 4724           New Medications?:   No      Medication Reconciliation by phone - No  Understands Medications? Yes  Taking Medications? Yes  Can you swallow your pills? Yes    Any further needs in the home i.e. Equipment?   No    Link to services in community?:  N/A   Which services:

## 2021-11-05 NOTE — ED PROVIDER NOTES
I have seen and examined the patient myself and I have reviewed the advanced practice clinician's chart. Please refer to advanced practice clinician's chart for detailed history of present illness, physical exam and medical decision making. I agree with [facility]'s assessment and plan. 50-year-old male presents for nausea and vomiting. Status post lithotripsy and L stent placement on 10/26/2021. Stent was pulled on 10/31/2021. He was put on Bactrim by urologist on 10/28/2021    Physical exam: Afebrile. No signs stable. Cardiopulmonary exam unremarkable. Mild left CVA tenderness. Pertinent labs: WBC 15.2, UA shows small leukocyte,    CT A/P: Punctate calcification in the left kidney. Slightly irregularity of the liver. Nausea vomiting improved with NS bolus. Discharged with urology follow-up in two days. IMPRESSION  1. Non-intractable vomiting with nausea, unspecified vomiting type    2.  Dehydration        DISPOSITION AND PLAN  Home    Anila Ni M.D.       Anila Ni MD  11/04/21 4836

## 2021-11-07 NOTE — PROGRESS NOTES
[address] [facility] [address] [facility] [address]  Dept: [phone]  Dept Fax: [phone]: [phone]      Assessment/Plan:     1. Muscle cramps    2. Leukocytosis, unspecified type    3. Essential hypertension    4. Type 2 diabetes mellitus with hyperglycemia, with long-term current use of insulin (HCC)    5. Polyneuropathy associated with underlying disease ([address]    6. Colon cancer screening    7. Flu vaccine need           Orders Placed This Encounter   Procedures    VL DUP LOWER EXTREMITY ARTERIES BILATERAL    INFLUENZA, MDCK QUADV, 2 YRS AND OLDER, IM, PF, PREFILL SYR OR SDV, 0.5ML (FLUCELVAX QUADV, PF)    Hemoglobin A1C    Lipid Panel    CBC With Auto Differential    Comprehensive Metabolic Panel       Orders Placed This Encounter   Medications    potassium chloride (KLOR-CON M) 20 MEQ extended release tablet     Sig: Take 1 tablet by mouth daily as needed (with lasix)     Dispense:  30 tablet     Refill:  2    atorvastatin (LIPITOR) 10 MG tablet     Sig: Take 1 tablet by mouth daily     Dispense:  90 tablet     Refill:  1     Continue lisinopril for HTN. Continue gabapentin for polyneuropathy. Given Flu vaccine  Restarting mod intensity atorvastatin 10 mg daily  Start potassium supplement as long as taking lasix. Repeat CBC for leukocytosis  Vascular studies w/ exercise for eval of potential PAD  Find telehealth provider psych  Patient to see GI Dr. John Solorio next month  appt with Devorah Powell of Urology tomorrow. --------------------------------------------------------------------------------------------------------------------  Return for Will determine follow at later point. .     Future Appointments   Date Time Provider Didier Burton   11/9/2021  8:45 AM CELIO Andre - CNP [address]   12/8/2021  9:15 AM [address]       Discussed use, benefit, and side effects of prescribed medications. Reviewed health maintenance. Instructed to continue current medications, diet, and exercise. All patient questions answered. Pt voiced understanding with agreement with treatment plan. Electronically signed by Anneliese Marshall MD on 11/8/2021 at 3:00 PM      HPI:     Sandy Quach is a [address] y.o. male who presents today for:  Chief Complaint   Patient presents with   [facility] ED Follow-up     [facility] ED 11/04/2021 Follow-up Nausea and Vomiting    Discuss Medications     Patient states that he believes that the Bactrim caused the nausea and vomiting       Patient w/ history of gastroparesis went to [address] [place] [address] ED on 11/4 for nausea and vomiting. Had lithotripsy and L stent placed on 10/26, stent pulled on 10/31 and has been on bactrim for UTI since 10/28. CT abdomen unremarkable for acute findings. Had WBC of 15.2. Was given IV fluids and n/v improved and subsequently discharged. Will follow up with urology on 11/9. Per care coordination note, patient feels bactrim caused n/v. Also seeing Dr. Jose Carlos Escoto at [facility]. Had liver biopsy in 2018 that was suggestive of cirrhosis and a chronic hepatitis B on Tenofovir. 12/8/21 will see Dr. Jose Carlos Escoto. When he works - he gets cramping in his lower legs and foot, bilaterally. Works at [employer], does a lot of walking and on his feet. Cramping has been happening for a year now. Has gotten workup for inflammatory myopathy and was unremarkable.        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\"    Type 2 diabetes mellitus without complication, with long-term current use of insulin ([facility] 8/16/2017    Wears glasses     for reading       Past Surgical History:          Procedure Laterality Date    ANKLE SURGERY Left     KIDNEY STONE SURGERY      x 4    URETER SURGERY Left 10/26/2021    CYSTOSCOPY, LEFT URETEROSCOPY, LASER LITHOTRIPSY, LEFT URETERAL STENT performed by Jennifer Martinez MD at [facility]       Medications:      has a current medication list which includes the following prescription(s): metoclopramide, potassium chloride, atorvastatin, ketorolac, tizanidine, furosemide, eq allergy relief, lisinopril, gabapentin, magnesium, fluticasone, metronidazole, spiriva handihaler, pantoprazole, escitalopram, naloxone, ipratropium-albuterol, multiple vitamin, ondansetron, tenofovir disoproxil fumarate, lidocaine, albuterol sulfate hfa, and methadone hcl. Allergies:      Adhesive tape, Bactrim [sulfamethoxazole-trimethoprim], Clonazepam, Flexeril [cyclobenzaprine], Morphine, and Quetiapine    Social History:      TOBACCO:   reports that he has been smoking cigarettes. He has a 30.00 pack-year smoking history. He has never used smokeless tobacco.  ETOH:   reports no history of alcohol use.     Family History:          Problem Relation Age of Onset    Substance Abuse Mother     Depression Mother     Heart Disease Mother         CHF   [employer]ane[employer] Rheum Arthritis Mother     Other Mother         Fibromyalgia    Substance Abuse Father     Heart Disease Father     Depression Sister     Substance Abuse Brother     Substance Abuse Maternal Aunt     Substance Abuse Maternal Uncle     Substance Abuse Paternal Aunt     Substance Abuse Paternal Uncle     Substance Abuse Maternal Grandmother     Depression Maternal Grandmother        Objective:     Vitals:    11/08/21 0856   BP: 126/76   Site: Left Upper Arm   Position: Sitting   Cuff Size: Large Adult   Pulse: 85 Resp: 16   Temp: 96.7 °F (35.9 °C)   TempSrc: Temporal   SpO2: 98%   Weight: 245 lb 6.4 oz (111.3 kg)   Height: 6' 1\" (1.854 m)       Physical Exam:  General appearance: Alert, not in acute distress  HEENT:  Normal cephalic, atraumatic without obvious deformity. Pupils equal, round, and reactive to light. Conjunctivae clear. Clear oral mucosa  Neck: Supple, with full range of motion. No jugular venous distention. Trachea midline. Respiratory:  Normal respiratory effort. Clear to auscultation, bilaterally without Rales/Wheezes/Rhonchi. Cardiovascular: Normal rate, regular rhythm with normal S1/S2 without murmurs, rubs or gallops. Abdomen: Soft, non-tender, non-distended with normal bowel sounds. Musculoskeletal:  No clubbing, cyanosis or edema bilaterally. Full range of motion without deformity. Skin: No rashes or lesions. Neurological exam reveals alert, oriented, normal speech, no focal findings or movement disorder noted.   Exam of extremities: peripheral pulses normal, no pedal or leg edema, no clubbing or cyanosis    Stethoscope ARNOLD  Right ARNOLD = 0.92  Left ARNOLD = difficult to obtain due to 1+/2+ pulses

## 2021-11-07 NOTE — PROGRESS NOTES
[address] [facility] [address] [place] [address] [id number]  Dept: [phone]  Dept Fax: [phone]: [phone]      Assessment/Plan:     1. Leukocytosis, unspecified type    2. Essential hypertension    3. Type 2 diabetes mellitus with hyperglycemia, with long-term current use of insulin (HCC)             No orders of the defined types were placed in this encounter. No orders of the defined types were placed in this encounter. Continue lisinopril for HTN. Continue gabapentin for polyneuropathy. Cologuard for colon cancer screening.    --------------------------------------------------------------------------------------------------------------------  No follow-ups on file. Future Appointments   Date Time Provider Didier Burton   11/8/2021  9:00 AM Marlen Goldstein MD [facility]   11/9/2021  8:45 AM CELIO Crowell CNP [address]   12/8/2021  9:15 AM CELIO Crowell CNP [address]       Discussed use, benefit, and side effects of prescribed medications. Reviewed health maintenance. Instructed to continue current medications, diet, and exercise. All patient questions answered. Pt voiced understanding with agreement with treatment plan. Electronically signed by Marlen Goldstein MD on 11/7/2021 at 4:31 PM      HPI:     Roxana Gr is a 40 y.o. male who presents today for:  No chief complaint on file. Patient w/ history of gastroparesis went to [facility] ED on 11/4 for nausea and vomiting. Had lithotripsy and L stent placed on 10/26, stent pulled on 10/31 and has been on bactrim for UTI since 10/28. CT abdomen unremarkable for acute findings. Had WBC of 15.2. Was given IV fluids and n/v improved and subsequently discharged. Will follow up with urology on 11/9. Per care coordination note, patient feels bactrim caused n/v. Also seeing [facility]. Jagjit [facility]les [facility].   Had liver biopsy in 2018 that was suggestive of cirrhosis and a chronic hepatitis B on Tenofovir. Past Medical History:          Diagnosis Date    Anxiety     Bipolar 1 disorder ([facility]     Chronic diastolic congestive heart failure ([address] 3/16/2018    patient denies    Cirrhosis ([address] 01/2021    Constipation     Gastroesophageal reflux disease 3/16/2018    Hard of hearing     left ear, no hearing aid    Hep C w/o coma, chronic (HCC)     Hepatic encephalopathy ([facility] 7/23/2020    Hepatitis B     Kidney stones     hx of    Post traumatic stress disorder (PTSD)     Substance abuse ([facility]     \"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\"    Type 2 diabetes mellitus without complication, with long-term current use of insulin ([facility] 8/16/2017    Wears glasses     for reading       Past Surgical History:          Procedure Laterality Date    ANKLE SURGERY Left     KIDNEY STONE SURGERY      x 4    URETER SURGERY Left 10/26/2021    CYSTOSCOPY, LEFT URETEROSCOPY, LASER LITHOTRIPSY, LEFT URETERAL STENT performed by Indiana Hudson MD at [facility]       Medications:      CURRENT MEDS W/ ASSOC DIAG         Start Date End Date     albuterol sulfate HFA (PROVENTIL HFA) 108 (90 Base) MCG/ACT inhaler  02/16/18  --     Inhale 2 puffs into the lungs every 4 hours as needed for Wheezing or Shortness of Breath (Space out to every 6 hours as symptoms improve) Space out to every 6 hours as symptoms improve.      Associated Diagnoses:  Mild intermittent asthma without complication     EQ ALLERGY RELIEF 10 MG tablet  09/23/21  --     Take 1 tablet by mouth nightly     Associated Diagnoses:  Seasonal allergic rhinitis due to pollen     escitalopram (LEXAPRO) 10 MG tablet  08/12/21  --     Take 1 tablet by mouth once daily     Associated Diagnoses:  --     fluticasone (FLOVENT HFA) 220 MCG/ACT inhaler  09/23/21 09/23/22     Inhale 2 puffs into the lungs 2 times daily     Associated Diagnoses:  COPD exacerbation (HCC)     furosemide (LASIX) 40 MG tablet  21  --     Associated Diagnoses:  --     gabapentin (NEURONTIN) 800 MG tablet  21     TAKE 1 TABLET BY MOUTH THREE TIMES DAILY     Associated Diagnoses:  Neuropathy, Polyneuropathy associated with underlying disease ([facility]     ipratropium-albuterol (DUONEB) 0.5-2.5 (3) MG/3ML SOLN nebulizer solution  21  --     Take 3 mLs by nebulization every 6 hours as needed for Shortness of Breath     Associated Diagnoses:  --     ketorolac (TORADOL) 10 MG tablet  10/28/21  --     Take 1 tablet by mouth every 6 hours as needed for Pain     Associated Diagnoses:  --     lidocaine (LIDODERM) 5 %  20  --     Place 1 patch onto the skin daily 12 hours on, 12 hours off.      Associated Diagnoses:  --     lisinopril (PRINIVIL;ZESTRIL) 5 MG tablet  21  --     Take 1 tablet by mouth once daily     Associated Diagnoses:  Type 2 diabetes mellitus with hyperglycemia, with long-term current use of insulin ([facility] Essential hypertension     magnesium (MAGNESIUM-OXIDE) 250 MG TABS tablet  21     Take 1 tablet by mouth 2 times daily     Associated Diagnoses:  Muscle cramps     METHADONE HCL PO  --  --     Associated Diagnoses:  --     metoclopramide (REGLAN) 10 MG tablet  10/01/21  --     Associated Diagnoses:  Gastroparesis, Nausea     metroNIDAZOLE (METROGEL) 0.75 % gel  21  --     APPLY EXTERNALLY TWICE DAILY     Associated Diagnoses:  Rosacea     Multiple Vitamin (MULTIVITAMIN PO)  --  --     Associated Diagnoses:  --     naloxone 4 MG/0.1ML LIQD nasal spray  20  --     Associated Diagnoses:  --     ondansetron (ZOFRAN ODT) 4 MG disintegrating tablet  20  --     Take 1 tablet by mouth every 8 hours as needed for Nausea     Associated Diagnoses:  --     oxybutynin (DITROPAN XL) 5 MG extended release tablet ()  10/28/21  11/04/21     Take 2 tablets by mouth daily for 7 days     Associated Diagnoses:  --     pantoprazole (PROTONIX) 40 MG tablet 21  --     Take 1 tablet by mouth once daily     Associated Diagnoses:  Gastroesophageal reflux disease, H. pylori infection     sulfamethoxazole-trimethoprim (BACTRIM DS) 800-160 MG per tablet  10/28/21  11/07/21     Take 1 tablet by mouth 2 times daily for 10 days     Associated Diagnoses:  --     tamsulosin (FLOMAX) 0.4 MG capsule ()  10/07/21  11/06/21     Take 1 capsule by mouth daily     Associated Diagnoses:  --     tenofovir disoproxil fumarate (VIREAD) 300 MG tablet  08/10/20  --     Associated Diagnoses:  --     tiotropium (Lauralyn Lull) 18 MCG inhalation capsule  21  --     Inhale 1 puff by mouth once daily     Associated Diagnoses:  Muscle cramps, Centrilobular emphysema (HCC)     tiZANidine (ZANAFLEX) 4 MG tablet  10/22/21  --     TAKE 1 TABLET BY MOUTH THREE TIMES DAILY AS NEEDED FOR MUSCLE PAIN     Associated Diagnoses:  Myopathy           Allergies:      Adhesive tape, Clonazepam, Flexeril [cyclobenzaprine], Morphine, and Quetiapine    Social History:      TOBACCO:   reports that he has been smoking cigarettes. He has a 30.00 pack-year smoking history. He has never used smokeless tobacco.  ETOH:   reports no history of alcohol use. Family History:          Problem Relation Age of Onset    Substance Abuse Mother     Depression Mother     Heart Disease Mother         CHF   [employer] Rheum Arthritis Mother     Other Mother         Fibromyalgia    Substance Abuse Father     Heart Disease Father     Depression Sister     Substance Abuse Brother     Substance Abuse Maternal Aunt     Substance Abuse Maternal Uncle     Substance Abuse Paternal Aunt     Substance Abuse Paternal Uncle     Substance Abuse Maternal Grandmother     Depression Maternal Grandmother        Objective: There were no vitals filed for this visit. Physical Exam:  General appearance: Alert, not in acute distress  HEENT:  Normal cephalic, atraumatic without obvious deformity.  Pupils equal, round,

## 2021-11-08 ENCOUNTER — TELEPHONE (OUTPATIENT)
Dept: FAMILY MEDICINE CLINIC | Age: 46
End: 2021-11-08

## 2021-11-08 ENCOUNTER — OFFICE VISIT (OUTPATIENT)
Dept: FAMILY MEDICINE CLINIC | Age: 46
End: 2021-11-08
Payer: COMMERCIAL

## 2021-11-08 VITALS
TEMPERATURE: 96.7 F | HEIGHT: 73 IN | SYSTOLIC BLOOD PRESSURE: 126 MMHG | WEIGHT: 245.4 LBS | RESPIRATION RATE: 16 BRPM | BODY MASS INDEX: 32.52 KG/M2 | DIASTOLIC BLOOD PRESSURE: 76 MMHG | OXYGEN SATURATION: 98 % | HEART RATE: 85 BPM

## 2021-11-08 DIAGNOSIS — Z23 FLU VACCINE NEED: ICD-10-CM

## 2021-11-08 DIAGNOSIS — R25.2 MUSCLE CRAMPS: Primary | ICD-10-CM

## 2021-11-08 DIAGNOSIS — G63 POLYNEUROPATHY ASSOCIATED WITH UNDERLYING DISEASE (HCC): ICD-10-CM

## 2021-11-08 DIAGNOSIS — R68.89 ABNORMAL ANKLE BRACHIAL INDEX (ABI): ICD-10-CM

## 2021-11-08 DIAGNOSIS — E11.65 TYPE 2 DIABETES MELLITUS WITH HYPERGLYCEMIA, WITH LONG-TERM CURRENT USE OF INSULIN (HCC): ICD-10-CM

## 2021-11-08 DIAGNOSIS — Z79.4 TYPE 2 DIABETES MELLITUS WITH HYPERGLYCEMIA, WITH LONG-TERM CURRENT USE OF INSULIN (HCC): ICD-10-CM

## 2021-11-08 DIAGNOSIS — Z12.11 COLON CANCER SCREENING: ICD-10-CM

## 2021-11-08 DIAGNOSIS — I10 ESSENTIAL HYPERTENSION: ICD-10-CM

## 2021-11-08 DIAGNOSIS — D72.829 LEUKOCYTOSIS, UNSPECIFIED TYPE: ICD-10-CM

## 2021-11-08 PROCEDURE — G8482 FLU IMMUNIZE ORDER/ADMIN: HCPCS | Performed by: STUDENT IN AN ORGANIZED HEALTH CARE EDUCATION/TRAINING PROGRAM

## 2021-11-08 PROCEDURE — 3044F HG A1C LEVEL LT 7.0%: CPT | Performed by: STUDENT IN AN ORGANIZED HEALTH CARE EDUCATION/TRAINING PROGRAM

## 2021-11-08 PROCEDURE — 4004F PT TOBACCO SCREEN RCVD TLK: CPT | Performed by: STUDENT IN AN ORGANIZED HEALTH CARE EDUCATION/TRAINING PROGRAM

## 2021-11-08 PROCEDURE — 2022F DILAT RTA XM EVC RTNOPTHY: CPT | Performed by: STUDENT IN AN ORGANIZED HEALTH CARE EDUCATION/TRAINING PROGRAM

## 2021-11-08 PROCEDURE — 90471 IMMUNIZATION ADMIN: CPT | Performed by: FAMILY MEDICINE

## 2021-11-08 PROCEDURE — G8417 CALC BMI ABV UP PARAM F/U: HCPCS | Performed by: STUDENT IN AN ORGANIZED HEALTH CARE EDUCATION/TRAINING PROGRAM

## 2021-11-08 PROCEDURE — G8427 DOCREV CUR MEDS BY ELIG CLIN: HCPCS | Performed by: STUDENT IN AN ORGANIZED HEALTH CARE EDUCATION/TRAINING PROGRAM

## 2021-11-08 PROCEDURE — 99214 OFFICE O/P EST MOD 30 MIN: CPT | Performed by: STUDENT IN AN ORGANIZED HEALTH CARE EDUCATION/TRAINING PROGRAM

## 2021-11-08 PROCEDURE — 90674 CCIIV4 VAC NO PRSV 0.5 ML IM: CPT | Performed by: FAMILY MEDICINE

## 2021-11-08 RX ORDER — ATORVASTATIN CALCIUM 10 MG/1
10 TABLET, FILM COATED ORAL DAILY
Qty: 90 TABLET | Refills: 1 | Status: SHIPPED | OUTPATIENT
Start: 2021-11-08 | End: 2022-05-13 | Stop reason: SDUPTHER

## 2021-11-08 RX ORDER — METOCLOPRAMIDE 5 MG/1
TABLET ORAL 3 TIMES DAILY
COMMUNITY
Start: 2021-10-26

## 2021-11-08 RX ORDER — POTASSIUM CHLORIDE 20 MEQ/1
20 TABLET, EXTENDED RELEASE ORAL DAILY PRN
Qty: 30 TABLET | Refills: 2 | Status: SHIPPED | OUTPATIENT
Start: 2021-11-08 | End: 2022-05-13 | Stop reason: SDUPTHER

## 2021-11-08 NOTE — PATIENT INSTRUCTIONS
Thank you   1. Thank you for trusting us with your healthcare needs. You may receive a survey regarding today's visit. It would help us out if you would take a few moments to provide your feedback. We value your input. 2. Please bring in ALL medications BOTTLES, including inhalers, herbal supplements, over the counter, prescribed & non-prescribed medicine. The office would like actual medication bottles and a list.   3. Please note our OFFICE POLICIES:   a. Prior to getting your labs drawn, please check with your insurance company for benefits and eligibility of lab services. Often, insurance companies cover certain tests for preventative visits only. It is patient's responsibility to see what is covered. b. We are unable to change a diagnosis after the test has been performed. c. Lab orders will not be re-printed. Please hold onto your original lab orders and take them to your lab to be completed. d. If you no show your scheduled appointment three times, you will be dismissed from this practice. e. Jeremy Fischer must be completed 24 hours prior to your schedule appointment. 4. If the list below has been completed, PLEASE FAX RECORDS TO OUR OFFICE @ [phone].  Once the records have been received we will update your records at our office:  Health Maintenance Due   Topic Date Due    Hepatitis A vaccine (2 of 3 - Hep A Twinrix risk 3-dose series) 07/10/2013    Diabetic retinal exam  06/04/2019    Colon cancer screen colonoscopy  Never done    Flu vaccine (1) 09/01/2021

## 2021-11-08 NOTE — PROGRESS NOTES
S: 40 y.o. male with   Chief Complaint   Patient presents with   [facility] ED Follow-up     [facility] ED 11/04/2021 Follow-up Nausea and Vomiting    Discuss Medications     Patient states that he believes that the Bactrim caused the nausea and vomiting     N/V likely related to bactrim. Now resolved    Muscle cramping both legs, some day, mostly at work. Works at [employer] and stands all day. Inflammatory myopathy work up negative. Statin stopping didn't help. -- on magnesium. Not clear if on potassium  Has varicose veins. using TEDS which he finds helpful  Uses lasix intermittently for some swelling  Has neuropathy. Reports doing stretches. ARNOLD per Dr. Enrrique Gilliland  . 92 right. And left has weaker pulse    BP Readings from Last 3 Encounters:   11/08/21 126/76   11/04/21 (!) 145/96   10/28/21 (!) 151/85     Wt Readings from Last 3 Encounters:   11/08/21 245 lb 6.4 oz (111.3 kg)   11/04/21 250 lb (113.4 kg)   10/28/21 250 lb (113.4 kg)           O: VS:   Vitals:    11/08/21 0856   BP: 126/76   Site: Left Upper Arm   Position: Sitting   Cuff Size: Large Adult   Pulse: 85   Resp: 16   Temp: 96.7 °F (35.9 °C)   TempSrc: Temporal   SpO2: 98%   Weight: 245 lb 6.4 oz (111.3 kg)   Height: 6' 1\" (1.854 m)     Body mass index is 32.38 kg/m².     AAO/NAD, appropriate affect for mood  Normocephalic, atraumatic, eyes  conjunctiva and sclera normal,   skin no rashes on exposed areas   Insight, judgement normal and in no acute distress    Lab Results   Component Value Date    WBC 15.2 (H) 11/04/2021    HGB 16.7 11/04/2021    HCT 49.4 11/04/2021     11/04/2021    CHOL 157 07/01/2021    TRIG 151 07/01/2021    HDL 29 07/01/2021    LDLCALC 98 07/01/2021    AST 23 11/04/2021     11/04/2021    K 4.6 11/04/2021     11/04/2021    CREATININE 0.8 11/04/2021    BUN 12 11/04/2021    CO2 18 (L) 11/04/2021    TSH 1.78 10/11/2017    INR 0.98 10/05/2021    LABA1C 5.7 08/27/2021    LABMICR < 1.20 07/01/2021    LABGLOM >90 11/04/2021    MG 1.9 10/28/2021    CALCIUM 9.5 11/04/2021     Lab Results   Component Value Date    CKTOTAL 113 08/13/2014    CKMB 2.5 09/23/2021     Lab Results   Component Value Date    MG 1.9 10/28/2021   No results found for: [id number]        CT ABDOMEN PELVIS WO CONTRAST Additional Contrast? None    Result Date: 11/4/2021  PROCEDURE: CT ABDOMEN PELVIS WO CONTRAST CLINICAL INFORMATION: abd pain . COMPARISON: CT scan of the abdomen and pelvis dated 30 September 2021. Renal ultrasound dated 29th of October 2021. TECHNIQUE: Axial 5 mm CT images were obtained through the abdomen and pelvis. No contrast was given. Coronal and sagittal reconstructions were obtained. All CT scans at this facility use dose modulation, iterative reconstruction, and/or weight-based dosing when appropriate to reduce radiation dose to as low as reasonably achievable. FINDINGS: There is some atelectasis or scarring in the lingula. .   The base of the heart is within appropriate limits. There is an irregular outline of the liver which may represent cirrhosis. There is moderate splenomegaly. . The adrenal glands and pancreas are grossly normal. The gallbladder is normal.  There is a punctate calcification in the left kidney. There is no associated hydronephrosis. The right kidney is unremarkable. . No contour deforming renal masses are noted. No abnormalities of the small bowel loops are noted. There is atherosclerotic calcification in the abdominal aorta. There is no adenopathy. The urinary bladder is normal. The prostate gland measures 3 x 2.8 cm in size. Tthere is no pelvic free fluid. The colon is grossly normal. There is no adenopathy. No suspicious osseous lesions are present. 1. Punctate calcification in the left kidney. No associated hydronephrosis. 2. Slight irregularity of the liver. Moderate splenomegaly. Findings may represent cirrhosis. 3. Atherosclerotic calcification in the abdominal aorta. 4. Atelectasis or scarring in the lingula. . **This report has been created using voice recognition software. It may contain minor errors which are inherent in voice recognition technology. ** Final report electronically signed by DR Charlotte Bernard on 11/4/2021 12:57 PM    US RENAL COMPLETE    Result Date: 10/29/2021  BILATERAL RENAL ULTRASOUND: CLINICAL INFORMATION: Nephrolithiasis COMPARISON: No comparison available. TECHNIQUE: Multiple sonographic images of both kidneys were obtained. Images of the urinary bladder were also obtained. FINDINGS: RIGHT KIDNEY - 12.63 x 5.65 x 5.55 cm Resistive Index - 0.80 Cortical Thickness - 1.62 cm LEFT KIDNEY - 13.51 x 4.30 x 5.18 cm Resistive Index -0.71 Cortical Thickness -1.31 cm URINARY BLADDER Pre-Void - 368 mL  KIDNEYS:  Both kidneys are normal in size and contour. Elevated resistive indices bilaterally, suggestive of medical renal disease. MASS/CYST: No solid or cystic lesion of either kidney is seen. HYDRONEPHROSIS:  There is no hydronephrosis. CALCULI:  No calculi are seen. BLADDER:  The urinary bladder is unremarkable. .     Findings suggestive of possible medical renal disease. Study otherwise unremarkable. **This report has been created using voice recognition software. It may contain minor errors which are inherent in voice recognition technology. ** Final report electronically signed by Dr. Sierra Brennan on 10/29/2021 3:49 PM           Diagnosis Orders   1. Muscle cramps  potassium chloride (KLOR-CON M) 20 MEQ extended release tablet    Comprehensive Metabolic Panel    Vascular duplex lower extremity arteries bilateral with exercise ARNOLD   2. Leukocytosis, unspecified type  CBC With Auto Differential   3. Essential hypertension     4. Type 2 diabetes mellitus with hyperglycemia, with long-term current use of insulin ([facility])  Hemoglobin A1C    Lipid Panel    Comprehensive Metabolic Panel    atorvastatin (LIPITOR) 10 MG tablet    Vascular duplex lower extremity arteries bilateral with exercise ARNOLD   5.  Polyneuropathy associated with underlying disease ([facility] [address]     6. Colon cancer screening     7. Flu vaccine need  INFLUENZA, MDCK QUADV, 2 YRS AND OLDER, IM, PF, PREFILL SYR OR SDV, 0.5ML (FLUCELVAX QUADV, PF)   8. Abnormal ankle brachial index (ARNOLD)  Vascular duplex lower extremity arteries bilateral with exercise ARNOLD       Plan    Continue TEDS, stretches. Good hydration. Continue magnesium. Use potassium whent taking lasix. Rule out PAD needing more aggressive treatment  Statin is a great idea. Continue neurontin. Consider vitamin b12 check and vitamin B supplementation to help neuropathy     Return for Will determine follow at later point. .    Orders Placed:  Orders Placed This Encounter   Procedures    INFLUENZA, MDCK QUADV, 2 YRS AND OLDER, IM, PF, PREFILL SYR OR SDV, 0.5ML (FLUCELVAX QUADV, PF)     Medications Prescribed:  No orders of the defined types were placed in this encounter. Future Appointments   Date Time Provider Didier Burton   11/9/2021  8:45 AM CELIO German - SIMON [facility] Zina Landin   12/8/2021  9:15 AM CELIO German - CNP [address] [facility] Maintenance Due   Topic Date Due    Hepatitis A vaccine (2 of 3 - Hep A Twinrix risk 3-dose series) 07/10/2013    Diabetic retinal exam  06/04/2019    Colon cancer screen colonoscopy  Never done    Flu vaccine (1) 09/01/2021         Attending Physician Statement  I have discussed the case, including pertinent history and exam findings with the resident. I also have seen the patient and performed key portions of the examination. I agree with the documented assessment and plan as documented by the resident.   GE modifier added to this encounter      Parish Engel MD 11/8/2021 10:02 AM

## 2021-11-08 NOTE — PROGRESS NOTES
Immunizations Administered       Name Date Dose Route    Influenza, MDCK Quadv, IM, PF (Flucelvax 2 yrs and older) 11/8/2021 0.5 mL Intramuscular    Site: Deltoid- Right    Lot: [id number]    NDC: [address] in place:  No  Recommendation Provided To: Provider: 1 via Note to Provider  Intervention Detail: Vaccine Recommended/Administered  Gap Closed?: No   Intervention Accepted By: Provider: 1  Time Spent (min): 10

## 2021-11-09 ENCOUNTER — TELEPHONE (OUTPATIENT)
Dept: UROLOGY | Age: 46
End: 2021-11-09

## 2021-11-09 ENCOUNTER — TELEPHONE (OUTPATIENT)
Dept: FAMILY MEDICINE CLINIC | Age: 46
End: 2021-11-09

## 2021-11-09 ENCOUNTER — VIRTUAL VISIT (OUTPATIENT)
Dept: UROLOGY | Age: 46
End: 2021-11-09
Payer: COMMERCIAL

## 2021-11-09 DIAGNOSIS — N20.0 KIDNEY STONES: Primary | ICD-10-CM

## 2021-11-09 PROCEDURE — 99441 PR PHYS/QHP TELEPHONE EVALUATION 5-10 MIN: CPT | Performed by: NURSE PRACTITIONER

## 2021-11-09 NOTE — PROGRESS NOTES
Rosa Wisdom is a 40 y.o. male evaluated via telephone on 11/9/2021. Consent:  He and/or health care decision maker is aware that that he may receive a bill for this telephone service, depending on his insurance coverage, and has provided verbal consent to proceed: Yes      Documentation:  I communicated with the patient and/or health care decision maker about kidney stones. Underwent Cystoscopy. Left sided ureteral dilation and ureteroscopy with holmium laser lithotripsy Left sided stent placement on 10/26/2021 with Dr Lisa Andrade. Removed stent on 10/31/2021    Details of this discussion including any medical advice provided: kidney stones    Hx of stones. Last episode 10 years ago. Had UTI post op, started on Bactrim and then with nausea and vomiting  Feeling much better. has some residual pain on left side when voiding. CT done in ER showed no hydronephrosis. Ok to return to work  FU 6 months with [facility] and [employer] prior      I affirm this is a Patient Initiated Episode with a Patient who has not had a related appointment within my department in the past 7 days or scheduled within the next 24 hours. Patient identification was verified at the start of the visit: Yes    Total Time: minutes: 5-10 minutes    The visit was conducted pursuant to the emergency declaration under the [address] [place] [address], [address] [place] [address] authority and the [employer] and [employer] General Act. Patient identification was verified, and a caregiver was present when appropriate. The patient was located in a state where the provider was credentialed to provide care.     Note: not billable if this call serves to triage the patient into an appointment for the relevant concern      CELIO Chambers - CNP

## 2021-11-09 NOTE — TELEPHONE ENCOUNTER
Tried to call pt x3, phone cuts off after several rings. Sylvia's check out note reads: \"Pt would like return to work slip, for tomorrow. He will come to office and  if possible. \" Trying to call to see how he wants the work slip written, and asking if I can fax it to his work instead of him coming in.

## 2021-11-09 NOTE — TELEPHONE ENCOUNTER
We faxed the referral to \[facility]\"" on 10/15 and they were supposed to contact the pt to schedule.

## 2021-11-10 NOTE — TELEPHONE ENCOUNTER
I don't see why Mr. Mayfield Call needs this FMLA. Can you please have him schedule an appointment to discuss this and fill out the paperwork together?

## 2021-11-16 DIAGNOSIS — G72.9 MYOPATHY: ICD-10-CM

## 2021-11-16 DIAGNOSIS — J30.1 SEASONAL ALLERGIC RHINITIS DUE TO POLLEN: ICD-10-CM

## 2021-11-16 NOTE — TELEPHONE ENCOUNTER
Patient's last appointment was : 11/8/2021  Patient's next appointment is : Visit date not found  Last refilled:   9/23/2021  10/22/2021

## 2021-11-17 ENCOUNTER — CARE COORDINATION (OUTPATIENT)
Dept: CARE COORDINATION | Age: 46
End: 2021-11-17

## 2021-11-17 RX ORDER — TIZANIDINE 4 MG/1
TABLET ORAL
Qty: 60 TABLET | Refills: 0 | Status: SHIPPED | OUTPATIENT
Start: 2021-11-17 | End: 2021-12-06

## 2021-11-17 NOTE — CARE COORDINATION
Ambulatory Care Coordination Note  11/17/2021  CM Risk Score: 12  Charlson 10 Year Mortality Risk Score: 98%     ACC: Nicolas Hernandez, RN    Summary Note:     Urinary and kidney problems resolved. Feet and leg issues still major problem. Dr. Pradip Hare wants to do US. Left foot circulation questioned during last appt. Vascular duplex lower extremities arteries bilateral with exercise ARNOLD. Wants to see psychologist. Anxiety. Not sleeping well. Doesn't want to go to [facility]. Bipolar and feels fluctuations. Plan-   Est with [address] or [facility] () patient calling [facility] first  Labs ordered by PCP  11/19 at 8 am arrive at 730, 2K, Bilateral LE vascular duplex with exercise ARNOLD   Eye exam referral to [address]  Early symptom recognition and reporting to prevent ED and admissions  Updated via my chart  Smoking cessation when ready          Care Coordination Interventions    Program Enrollment: Complex Care  Referral from Primary Care Provider: Yes  Suggested Interventions and Community Resources  Diabetes Education: In Process  Smoking Cessation: In Process  Specialty Services Referral: Completed (Comment: urology - kidney stone)  Other Therapy Services: Completed (Comment: DOC pain mgmt for methadone)  Transportation Support: Declined  Zone Management Tools: In Process  Other Services or Interventions: podiatry, GI         Goals Addressed                 This Visit's Progress     Conditions and Symptoms   On track     I will schedule office visits, as directed by my provider. I will keep my appointment or reschedule if I have to cancel. I will notify my provider of any barriers to my plan of care. I will follow my Zone Management tool to seek urgent or emergent care. I will notify my provider of any symptoms that indicate a worsening of my condition.     Barriers: impairment:  physical: gastroparesis and mental health: bipolar, lack of motivation, overwhelmed by complexity of regimen, and stress  Plan for overcoming my barriers: care coordination  Confidence: 9/10  Anticipated Goal Completion Date: 12/28/21              Prior to Admission medications    Medication Sig Start Date End Date Taking?  Authorizing Provider   metoclopramide (REGLAN) 5 MG tablet 3 times daily 10/26/21   Historical Provider, MD   potassium chloride (KLOR-CON M) 20 MEQ extended release tablet Take 1 tablet by mouth daily as needed (with lasix) 11/8/21   Kalyan Gilliland MD   atorvastatin (LIPITOR) 10 MG tablet Take 1 tablet by mouth daily 11/8/21   Kalyan Gilliland MD   ketorolac (TORADOL) 10 MG tablet Take 1 tablet by mouth every 6 hours as needed for Pain 10/28/21   CELIO Nelson CNP   tiZANidine (ZANAFLEX) 4 MG tablet TAKE 1 TABLET BY MOUTH THREE TIMES DAILY AS NEEDED FOR MUSCLE PAIN 10/22/21   Kristin Barnett DO   furosemide (LASIX) 40 MG tablet  9/17/21   Historical Provider, MD   EQ ALLERGY RELIEF 10 MG tablet Take 1 tablet by mouth nightly 9/23/21   Kristin Barnett DO   lisinopril (PRINIVIL;ZESTRIL) 5 MG tablet Take 1 tablet by mouth once daily 9/23/21   Kristin Barnett DO   gabapentin (NEURONTIN) 800 MG tablet TAKE 1 TABLET BY MOUTH THREE TIMES DAILY 9/23/21 3/23/22  Kristin Barnett DO   magnesium (MAGNESIUM-OXIDE) 250 MG TABS tablet Take 1 tablet by mouth 2 times daily  Patient taking differently: Take 250 mg by mouth daily  9/23/21 11/22/21  Tiana Govea DO   fluticasone (FLOVENT HFA) 220 MCG/ACT inhaler Inhale 2 puffs into the lungs 2 times daily 9/23/21 9/23/22  Edel Govea DO   metroNIDAZOLE (METROGEL) 0.75 % gel APPLY EXTERNALLY TWICE DAILY 9/20/21   Jim Sanchez MD   tiotropium (SPIRIVA HANDIHALER) 18 MCG inhalation capsule Inhale 1 puff by mouth once daily 8/27/21   Tiana Govea DO   pantoprazole (PROTONIX) 40 MG tablet Take 1 tablet by mouth once daily 8/24/21   Kristin Barnett DO   escitalopram (LEXAPRO) 10 MG tablet Take 1 tablet by mouth once daily 8/12/21   Kristin Barnett DO   naloxone 4 MG/0.1ML LIQD nasal spray Naloxone Hcl Active 4 MG NA One Time Only 1 1 August 20th, 2020 10:59am 8/20/20   Historical Provider, MD   ipratropium-albuterol (DUONEB) 0.5-2.5 (3) MG/3ML SOLN nebulizer solution Take 3 mLs by nebulization every 6 hours as needed for Shortness of Breath 4/12/21   Austyn Conn DO   Multiple Vitamin (MULTIVITAMIN PO) Take by mouth daily    Historical Provider, MD   ondansetron (ZOFRAN ODT) 4 MG disintegrating tablet Take 1 tablet by mouth every 8 hours as needed for Nausea 8/18/20   CELIO Stratton - CNP   tenofovir disoproxil fumarate (VIREAD) 300 MG tablet Take 300 mg by mouth daily 8/10/20   Historical Provider, MD   lidocaine (LIDODERM) 5 % Place 1 patch onto the skin daily 12 hours on, 12 hours off. 5/11/20   Dunia Boateng PA-C   albuterol sulfate HFA (PROVENTIL HFA) 108 (90 Base) MCG/ACT inhaler Inhale 2 puffs into the lungs every 4 hours as needed for Wheezing or Shortness of Breath (Space out to every 6 hours as symptoms improve) Space out to every 6 hours as symptoms improve. 2/16/18   Karthikeyan Evangelista MD   METHADONE HCL PO Take 116 mg by mouth daily     Historical Provider, MD       Future Appointments   Date Time Provider Didier Burton   5/10/2022  9:45 AM CELIO Addison CNP     ,   Diabetes Assessment    Meal Planning: Avoidance of concentrated sweets   How often do you test your blood sugar?: Daily   Do you have barriers with adherence to non-pharmacologic self-management interventions?  (Nutrition/Exercise/Self-Monitoring): No   Have you ever had to go to the ED for symptoms of low blood sugar?: No       No patient-reported symptoms   Do you have hyperglycemia symptoms?: No   Do you have hypoglycemia symptoms?: Yes   Frequency of Episodes: 1 Per: Month   Blood Sugar Monitoring Regimen: Once a Day   Blood Sugar Trends: Fluctuating      ,   Congestive Heart Failure Assessment    Are you currently restricting fluids?: No Restriction  Do you understand a low sodium diet?: Yes  Do you understand how to read food labels?: Yes  How many restaurant meals do you eat per week?: 1-2  Do you salt your food before tasting it?: No     No patient-reported symptoms      Symptoms:  None: Yes      Symptom course: stable  Patient-reported weight (lb): 235  Weight trend: stable  Salt intake watch compared to last visit: stable      and   COPD Assessment    Does the patient understand envrionmental exposure?: Yes  Is the patient able to verbalize Rescue vs. Long Acting medications?: Yes  Does the patient have a nebulizer?: No     No patient-reported symptoms         Symptoms:  None: Yes      Symptom course: stable  Breathlessness: none  Increase use of rapid acting/rescue inhaled medications?: No  Change in chronic cough?: No/At Baseline  Change in sputum?: No/At Baseline  Have you had a recent diagnosis of pneumonia either by PCP or at a hospital?: No

## 2021-11-23 ENCOUNTER — CARE COORDINATION (OUTPATIENT)
Dept: CARE COORDINATION | Age: 46
End: 2021-11-23

## 2021-11-23 NOTE — CARE COORDINATION
Attempted care management CHF initiative outreach. Left message to return phone call to ambulatory care manager at [phone].   Plan -   Lasix 40 mg daily  CHF zones - report weight gain 3 lbs in 1 day or 5 in 1 week, leg swelling, bloating, SOB  Low salt diet  Watch sodium in [place] foods

## 2021-11-26 DIAGNOSIS — R11.0 NAUSEA: ICD-10-CM

## 2021-11-26 DIAGNOSIS — K31.84 GASTROPARESIS: ICD-10-CM

## 2021-11-30 ENCOUNTER — OFFICE VISIT (OUTPATIENT)
Dept: FAMILY MEDICINE CLINIC | Age: 46
End: 2021-11-30
Payer: COMMERCIAL

## 2021-11-30 VITALS
SYSTOLIC BLOOD PRESSURE: 126 MMHG | HEART RATE: 72 BPM | DIASTOLIC BLOOD PRESSURE: 76 MMHG | BODY MASS INDEX: 31.27 KG/M2 | WEIGHT: 237 LBS | RESPIRATION RATE: 16 BRPM | OXYGEN SATURATION: 98 % | TEMPERATURE: 96.5 F

## 2021-11-30 DIAGNOSIS — R25.2 LEG CRAMPS: Primary | ICD-10-CM

## 2021-11-30 DIAGNOSIS — Z12.11 COLON CANCER SCREENING: ICD-10-CM

## 2021-11-30 DIAGNOSIS — Z02.89 ENCOUNTER TO OBTAIN EXCUSE FROM WORK: ICD-10-CM

## 2021-11-30 DIAGNOSIS — I10 ESSENTIAL HYPERTENSION: ICD-10-CM

## 2021-11-30 PROCEDURE — G8417 CALC BMI ABV UP PARAM F/U: HCPCS | Performed by: STUDENT IN AN ORGANIZED HEALTH CARE EDUCATION/TRAINING PROGRAM

## 2021-11-30 PROCEDURE — 99214 OFFICE O/P EST MOD 30 MIN: CPT | Performed by: STUDENT IN AN ORGANIZED HEALTH CARE EDUCATION/TRAINING PROGRAM

## 2021-11-30 PROCEDURE — G8482 FLU IMMUNIZE ORDER/ADMIN: HCPCS | Performed by: STUDENT IN AN ORGANIZED HEALTH CARE EDUCATION/TRAINING PROGRAM

## 2021-11-30 PROCEDURE — 4004F PT TOBACCO SCREEN RCVD TLK: CPT | Performed by: STUDENT IN AN ORGANIZED HEALTH CARE EDUCATION/TRAINING PROGRAM

## 2021-11-30 PROCEDURE — G8427 DOCREV CUR MEDS BY ELIG CLIN: HCPCS | Performed by: STUDENT IN AN ORGANIZED HEALTH CARE EDUCATION/TRAINING PROGRAM

## 2021-11-30 NOTE — PROGRESS NOTES
SUBJECTIVE     Theodore Mccormick is a 40 y.o.male    Chief Complaint   Patient presents with    Letter for School/Work       Chief complaint, CLIFF, and all pertinent details of the case reviewed with the resident. Please see resident's note for specific details discussed at today's visit.         Patient Active Problem List   Diagnosis    Long-term current use of methadone for opiate dependence ([facility]    Tobacco abuse    Hepatitis B    History of hepatitis C    Gastroesophageal reflux disease    Chronic diastolic heart failure ([facility]    Abscess    Establishing care with new doctor, encounter for    Essential hypertension    Plantar fasciitis of right foot    Polyneuropathy associated with underlying disease ([facility]    Class 2 severe obesity due to excess calories with serious comorbidity and body mass index (BMI) of 38.0 to 38.9 in adult ([facility]    Mild intermittent asthma without complication    Pulmonary nodules/lesions, multiple    COPD (chronic obstructive pulmonary disease) ([facility]    B[facility], [facility] episode depressed [facility]    Neuropathy    Bipolar disorder ([facility]    IFG (impaired fasting glucose)       Current Outpatient Medications   Medication Sig Dispense Refill    tiZANidine (ZANAFLEX) 4 MG tablet TAKE 1 TABLET BY MOUTH THREE TIMES DAILY AS NEEDED FOR MUSCLE SPASM AND FOR PAIN 60 tablet 0    EQ LORATADINE 10 MG tablet Take 1 tablet by mouth nightly 30 tablet 0    metoclopramide (REGLAN) 5 MG tablet 3 times daily      potassium chloride (KLOR-CON M) 20 MEQ extended release tablet Take 1 tablet by mouth daily as needed (with lasix) 30 tablet 2    atorvastatin (LIPITOR) 10 MG tablet Take 1 tablet by mouth daily 90 tablet 1    ketorolac (TORADOL) 10 MG tablet Take 1 tablet by mouth every 6 hours as needed for Pain 20 tablet 0    furosemide (LASIX) 40 MG tablet       lisinopril (PRINIVIL;ZESTRIL) 5 MG tablet Take 1 tablet by mouth once daily 90 tablet 0    gabapentin (NEURONTIN) 800 MG tablet TAKE 1 TABLET BY MOUTH THREE TIMES DAILY 90 tablet 5    magnesium (MAGNESIUM-OXIDE) 250 MG TABS tablet Take 1 tablet by mouth 2 times daily (Patient taking differently: Take 250 mg by mouth daily ) 120 tablet 0    fluticasone (FLOVENT HFA) 220 MCG/ACT inhaler Inhale 2 puffs into the lungs 2 times daily 1 each 3    metroNIDAZOLE (METROGEL) 0.75 % gel APPLY EXTERNALLY TWICE DAILY 45 g 0    tiotropium (SPIRIVA HANDIHALER) 18 MCG inhalation capsule Inhale 1 puff by mouth once daily 90 capsule 3    pantoprazole (PROTONIX) 40 MG tablet Take 1 tablet by mouth once daily 90 tablet 0    escitalopram (LEXAPRO) 10 MG tablet Take 1 tablet by mouth once daily 90 tablet 1    naloxone 4 MG/0.1ML LIQD nasal spray Naloxone Hcl Active 4 MG NA One Time Only 1 1 August 20th, 2020 10:59am      ipratropium-albuterol (DUONEB) 0.5-2.5 (3) MG/3ML SOLN nebulizer solution Take 3 mLs by nebulization every 6 hours as needed for Shortness of Breath 360 vial 3    Multiple Vitamin (MULTIVITAMIN PO) Take by mouth daily      ondansetron (ZOFRAN ODT) 4 MG disintegrating tablet Take 1 tablet by mouth every 8 hours as needed for Nausea 20 tablet 0    tenofovir disoproxil fumarate (VIREAD) 300 MG tablet Take 300 mg by mouth daily      lidocaine (LIDODERM) 5 % Place 1 patch onto the skin daily 12 hours on, 12 hours off. 15 patch 0    albuterol sulfate HFA (PROVENTIL HFA) 108 (90 Base) MCG/ACT inhaler Inhale 2 puffs into the lungs every 4 hours as needed for Wheezing or Shortness of Breath (Space out to every 6 hours as symptoms improve) Space out to every 6 hours as symptoms improve. 1 Inhaler 3    METHADONE HCL PO Take 116 mg by mouth daily        No current facility-administered medications for this visit.        Review of Systems per Dr. Jhon Angelo    OBJECTIVE     /76   Pulse 72   Temp 96.5 °F (35.8 °C)   Resp 16   Wt 237 lb (107.5 kg)   SpO2 98%   BMI 31.27 kg/m²   BP Readings from Last 3 Encounters: 11/30/21 126/76   11/08/21 126/76   11/04/21 (!) 145/96       Wt Readings from Last 3 Encounters:   11/30/21 237 lb (107.5 kg)   11/08/21 245 lb 6.4 oz (111.3 kg)   11/04/21 250 lb (113.4 kg)     Body mass index is 31.27 kg/m². Physical Exam  Vitals and nursing note reviewed. Constitutional:       Appearance: Normal appearance. He is well-developed and normal weight. HENT:      Head: Normocephalic and atraumatic. Cardiovascular:      Rate and Rhythm: Normal rate and regular rhythm. Heart sounds: Normal heart sounds. Pulmonary:      Effort: Pulmonary effort is normal.      Breath sounds: Normal breath sounds. Abdominal:      General: Bowel sounds are normal.      Palpations: Abdomen is soft. Skin:     General: Skin is warm and dry. Neurological:      Mental Status: He is alert. Deep Tendon Reflexes: Reflexes are normal and symmetric. Psychiatric:         Behavior: Behavior normal.         Thought Content: Thought content normal.         Judgment: Judgment normal.        No results found for this visit on 11/30/21.     Lab Results   Component Value Date    LABA1C 5.7 08/27/2021       Lab Results   Component Value Date    CHOL 157 07/01/2021    TRIG 151 07/01/2021    HDL 29 07/01/2021    LDLCALC 98 07/01/2021       The 10-year ASCVD risk score (Olman Page, et al., 2013) is: 14.9%    Values used to calculate the score:      Age: 40 years      Sex: Male      Is Non- : No      Diabetic: Yes      Tobacco smoker: Yes      Systolic Blood Pressure: 636 mmHg      Is BP treated: Yes      HDL Cholesterol: 29 mg/dL      Total Cholesterol: 157 mg/dL    Lab Results   Component Value Date     11/04/2021    K 4.6 11/04/2021     11/04/2021    CO2 18 (L) 11/04/2021    BUN 12 11/04/2021    CREATININE 0.8 11/04/2021    GLUCOSE 125 (H) 11/04/2021    CALCIUM 9.5 11/04/2021    PROT 7.5 11/04/2021    LABALBU 4.3 11/04/2021    BILITOT 0.8 11/04/2021    ALKPHOS 131 (H) 11/04/2021    AST 23 11/04/2021    ALT 16 11/04/2021    LABGLOM >90 11/04/2021    GFRAA >60 02/26/2019    GLOB NOT REPORTED 04/05/2018       Lab Results   Component Value Date    LABMICR < 1.20 07/01/2021       Lab Results   Component Value Date    TSH 1.78 10/11/2017    FT3 3.37 10/11/2017    T4FREE 1.14 05/07/2013       Lab Results   Component Value Date    WBC 15.2 (H) 11/04/2021    HGB 16.7 11/04/2021    HCT 49.4 11/04/2021    MCV 91.8 11/04/2021     11/04/2021       Immunization History   Administered Date(s) Administered    COVID-19, Pfizer, PF, 30mcg/0.3mL 05/30/2021, 07/01/2021    Hepatitis A/Hepatitis B (Twinrix) 06/12/2013    Influenza Vaccine, unspecified formulation 11/13/2015, 09/21/2017    Influenza Virus Vaccine 11/13/2015, 01/31/2020    Influenza, MDCK Quadv, IM, PF (Flucelvax 2 yrs and older) 11/08/2021    Influenza, Melene Levi, IM, (6 mo and older Fluzone, Flulaval, Fluarix and 3 yrs and older Afluria) 09/21/2017, 01/31/2020    Influenza, Quadv, IM, PF (6 mo and older Fluzone, Flulaval, Fluarix, and 3 yrs and older Afluria) 09/28/2018, 10/08/2020    Pneumococcal Conjugate 13-valent ([id number]) 07/17/2015    Pneumococcal Polysaccharide ([id number]) 09/21/2017    Tdap (Boostrix, Adacel) 10/08/2012, 06/12/2013, 11/13/2015       Health Maintenance   Topic Date Due    Hepatitis A vaccine (2 of 3 - Hep A Twinrix risk 3-dose series) 07/10/2013    Diabetic retinal exam  06/04/2019    Colon cancer screen colonoscopy  Never done    Hepatitis B vaccine (2 of 3 - Hep B Twinrix risk 3-dose series) 07/01/2022 (Originally 7/10/2013)    Diabetic foot exam  01/21/2022    Diabetic microalbuminuria test  07/01/2022    Lipid screen  07/01/2022    A1C test (Diabetic or Prediabetic)  08/27/2022    Potassium monitoring  11/04/2022    Creatinine monitoring  11/04/2022    DTaP/Tdap/Td vaccine (4 - Td or Tdap) 11/13/2025    Pneumococcal 0-64 years Vaccine (2 of 2 - PPSV23) 02/18/2041    Flu vaccine Completed    COVID-19 Vaccine  Completed    HIV screen  Completed    Hib vaccine  Aged Out    Meningococcal (ACWY) vaccine  Aged Out           Future Appointments   Date Time Provider Didier Burton   5/10/2022  9:45 AM CELIO Umanzor - CNP [facility] [address]       ASSESSMENT       Diagnosis Orders   1. Leg cramps  Magnesium   2. Colon cancer screening     3. Essential hypertension     4. Encounter to obtain excuse from work         PLAN      After discussion with pt and Dr. Pili Faulkner, we agreed on plan as follows: Follow up with GI as planned in 12/2021. Consider updated Twinrix  Await previously ordered hemoglobin A1c, FLP, CBC, and CMP, with Magnesium due to leg cramps in 3 months  Okay for FMLA  Continue current medicines otherwise  Follow-up in 3 months        Preventive Health Topics:  COLONOSCOPY management to be determined per GI         Attending Physician Statement  I have discussed the case, including pertinent history and exam findings with the resident. I also have seen the patient and performed key portions of the examination. I agree with the documented assessment and plan as documented by the resident.   GC modifier added to this encounter     Electronically signed by Martin Coombs MD on 11/30/2021 at 9:03 PM

## 2021-11-30 NOTE — PROGRESS NOTES
[id number] [facility] [address] [id number]  Dept: [phone]  Dept Fax: [phone]: [phone]      Assessment/Plan:     1. Leg cramps    2. Colon cancer screening    3. Essential hypertension    4. Encounter to obtain excuse from work          Orders Placed This Encounter   Procedures    Magnesium       No orders of the defined types were placed in this encounter. Continue lisinopril for HTN. Continue gabapentin for polyneuropathy. Cont mod intensity atorvastatin 10 mg daily  On potassium supplement as long as taking lasix. Vascular studies for eval of potential PAD pending  Patient to follow up on telehealth psych provider   Patient to see GI Dr. Kristina Watson Dec 8th and inquire about colonoscopy  Also to message me regarding details of his [facility] paperwork.    --------------------------------------------------------------------------------------------------------------------  Return in about 3 months (around 2/28/2022) for Review of lab results. Future Appointments   Date Time Provider Didier Burton   5/10/2022  9:45 AM CELIO Alarcon CNP       Reviewed health maintenance. All patient questions answered. Pt voiced understanding with agreement with treatment plan. Electronically signed by Koko Brown MD on 11/30/2021 at 8:28 PM      HPI:     Siomara Petit is a 40 y.o. male who presents today for:  Chief Complaint   Patient presents with    Letter for School/Work       Patient w/ history of gastroparesis went [facility] ED on 11/4 for nausea and vomiting. Had lithotripsy and L stent placed on 10/26, stent pulled on 10/31 and has been on bactrim for UTI since 10/28. CT abdomen unremarkable for acute findings. Had WBC of 15.2. Was given IV fluids and n/v improved and subsequently discharged. Will follow up with urology on 11/9.  Per care coordination note, patient feels bactrim caused n/v. Work wants him to get [employer] paper work filled out. Also seeing Dr. Jagjit iMreles at [facility] [facility]. Had liver biopsy in 2018 that was suggestive of cirrhosis and a chronic hepatitis B on Tenofovir. 12/8/21 will see Dr. Jagjit Mireles. When he works - he gets cramping in his lower legs and foot, bilaterally. Works at [email or web address], does a lot of walking and on his feet. Cramping has been happening for a year now. Has gotten workup for inflammatory myopathy and was unremarkable. 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       Medications:      has a current medication list which includes the following prescription(s): tizanidine, eq loratadine, metoclopramide, potassium chloride, atorvastatin, ketorolac, furosemide, lisinopril, gabapentin, magnesium, fluticasone, metronidazole, spiriva handihaler, pantoprazole, escitalopram, naloxone, ipratropium-albuterol, multiple vitamin, ondansetron, tenofovir disoproxil fumarate, lidocaine, albuterol sulfate hfa, and methadone hcl.      Allergies:      Adhesive tape, Bactrim [sulfamethoxazole-trimethoprim], Clonazepam, Flexeril [cyclobenzaprine], Morphine, and Quetiapine    Social History:      TOBACCO:   reports that he has been smoking cigarettes. He has a 30.00 pack-year smoking history. He has never used smokeless tobacco.  ETOH:   reports no history of alcohol use. Family History:          Problem Relation Age of Onset    Substance Abuse Mother     Depression Mother     Heart Disease Mother         CHF   Verl Raw Rheum Arthritis Mother     Other Mother         Fibromyalgia    Substance Abuse Father     Heart Disease Father     Depression Sister     Substance Abuse Brother     Substance Abuse Maternal Aunt     Substance Abuse Maternal Uncle     Substance Abuse Paternal Aunt     Substance Abuse Paternal Uncle     Substance Abuse Maternal Grandmother     Depression Maternal Grandmother        Objective:     Vitals:    11/30/21 0811   BP: 126/76   Pulse: 72   Resp: 16   Temp: 96.5 °F (35.8 °C)   SpO2: 98%   Weight: 237 lb (107.5 kg)       Physical Exam:  General appearance: Alert, not in acute distress  HEENT:  Normal cephalic, atraumatic without obvious deformity. Conjunctivae clear. Neck: Supple, with full range of motion. Trachea midline. Respiratory:  Normal respiratory effort. Clear to auscultation, bilaterally without Rales/Wheezes/Rhonchi. Cardiovascular: Normal rate, regular rhythm with normal S1/S2 without murmurs, rubs or gallops. Abdomen: Soft, non-tender, non-distended with normal bowel sounds. Musculoskeletal:  No clubbing, cyanosis or edema bilaterally. Full range of motion without deformity. Skin: No rashes or lesions. Neurological exam reveals alert, oriented, normal speech, no focal findings or movement disorder noted.   Exam of extremities: loss of hair in lower extremities noted

## 2021-12-01 ENCOUNTER — CARE COORDINATION (OUTPATIENT)
Dept: CARE COORDINATION | Age: 46
End: 2021-12-01

## 2021-12-01 NOTE — CARE COORDINATION
Attempted care management follow up. Left message to return phone call to ambulatory care manager at [phone].   Plan -   Lasix 40 mg daily  CHF zones - report weight gain 3 lbs in 1 day or 5 in 1 week, leg swelling, bloating, SOB  Low salt diet  Watch sodium in Holidays foods  Est with [facility] or [employer] () patient calling [employer] first  Labs ordered by PCP  11/19 at 8 am arrive at 730, 2K, Bilateral LE vascular duplex with exercise ARNOLD - No show  Eye exam referral to [address]  Early symptom recognition and reporting to prevent ED and admissions  Updated via my chart  Smoking cessation when ready

## 2021-12-05 RX ORDER — PROMETHAZINE HYDROCHLORIDE 25 MG/1
25 TABLET ORAL 4 TIMES DAILY PRN
Qty: 120 TABLET | Refills: 0 | Status: SHIPPED | OUTPATIENT
Start: 2021-12-05 | End: 2022-01-04

## 2021-12-06 DIAGNOSIS — G72.9 MYOPATHY: ICD-10-CM

## 2021-12-06 DIAGNOSIS — A04.8 H. PYLORI INFECTION: ICD-10-CM

## 2021-12-06 DIAGNOSIS — J30.1 SEASONAL ALLERGIC RHINITIS DUE TO POLLEN: ICD-10-CM

## 2021-12-06 DIAGNOSIS — K21.9 GASTROESOPHAGEAL REFLUX DISEASE: ICD-10-CM

## 2021-12-06 RX ORDER — TIZANIDINE 4 MG/1
TABLET ORAL
Qty: 60 TABLET | Refills: 0 | Status: SHIPPED | OUTPATIENT
Start: 2021-12-06 | End: 2022-01-25 | Stop reason: SDUPTHER

## 2021-12-06 RX ORDER — PANTOPRAZOLE SODIUM 40 MG/1
TABLET, DELAYED RELEASE ORAL
Qty: 90 TABLET | Refills: 1 | Status: SHIPPED | OUTPATIENT
Start: 2021-12-06 | End: 2022-10-19 | Stop reason: SDUPTHER

## 2021-12-10 ENCOUNTER — CARE COORDINATION (OUTPATIENT)
Dept: CARE COORDINATION | Age: 46
End: 2021-12-10

## 2021-12-10 NOTE — CARE COORDINATION
Ambulatory Care Coordination Note  12/10/2021  CM Risk Score: 12  Charlson 10 Year Mortality Risk Score: 98%     ACC: Ren Rubinstein, RN    Summary Note:     Antoni Will has [facility] counseling which he establised    Off work 12/24-1/2. Would like to reschedule Bilateral LE vascular duplex with exercise ARNOLD    Still having leg cramps and pain    Eye exam - can use Antoni Son coverage now    CHF initiative - reviewed zones, low salt diet, Lasix 40 mg daily    Plan -   Lasix 40 mg daily  CHF zones - report weight gain 3 lbs in 1 day or 5 in 1 week, leg swelling, bloating, SOB  Low salt diet  Watch sodium in Holidays foods   with [facility] ordered by PCP  12/27 arrive at 930, 2K, Bilateral LE vascular duplex with exercise ARNOLD, if on BP meds take them  Eye exam using Antoni Son coverage  Early symptom recognition and reporting to prevent ED and admissions  Smoking cessation when ready        Care Coordination Interventions    Program Enrollment: Complex Care  Referral from Primary Care Provider: Yes  Suggested Interventions and Community Resources  Diabetes Education: In Process  Smoking Cessation: In Process  Specialty Services Referral: Completed (Comment: urology - kidney stone)  Other Therapy Services: Completed (Comment: DOC pain mgmt for methadone)  Transportation Support: Declined  Zone Management Tools: In Process  Other Services or Interventions: podiatry, GI         Goals Addressed    None         Prior to Admission medications    Medication Sig Start Date End Date Taking?  Authorizing Provider   tiZANidine (ZANAFLEX) 4 MG tablet TAKE 1 TABLET BY MOUTH THREE TIMES DAILY AS NEEDED FOR MUSCLE SPASM AND FOR PAIN 12/6/21   Luisa Kinney MD   EQ LORATADINE 10 MG tablet Take 1 tablet by mouth nightly 12/6/21   Luisa Kinney MD   pantoprazole (PROTONIX) 40 MG tablet Take 1 tablet by mouth once daily 12/6/21   Luisa Kinney MD   promethazine (PHENERGAN) 25 MG tablet TAKE 1 TABLET BY MOUTH 4 TIMES DAILY AS NEEDED FOR NAUSEA 12/5/21 1/4/22  Cyndee Wolf DO   metoclopramide (REGLAN) 5 MG tablet 3 times daily 10/26/21   Historical Provider, MD   potassium chloride (KLOR-CON M) 20 MEQ extended release tablet Take 1 tablet by mouth daily as needed (with lasix) 11/8/21   Dalila Morley MD   atorvastatin (LIPITOR) 10 MG tablet Take 1 tablet by mouth daily 11/8/21   Dalila Morley MD   ketorolac (TORADOL) 10 MG tablet Take 1 tablet by mouth every 6 hours as needed for Pain 10/28/21   Pink Manges, APRN - CNP   furosemide (LASIX) 40 MG tablet  9/17/21   Historical Provider, MD   lisinopril (PRINIVIL;ZESTRIL) 5 MG tablet Take 1 tablet by mouth once daily 9/23/21   Cyndee Wolf DO   gabapentin (NEURONTIN) 800 MG tablet TAKE 1 TABLET BY MOUTH THREE TIMES DAILY 9/23/21 3/23/22  Cyndee Wolf DO   magnesium (MAGNESIUM-OXIDE) 250 MG TABS tablet Take 1 tablet by mouth 2 times daily  Patient taking differently: Take 250 mg by mouth daily  9/23/21 11/22/21  Anastasiia Govea DO   fluticasone (FLOVENT HFA) 220 MCG/ACT inhaler Inhale 2 puffs into the lungs 2 times daily 9/23/21 9/23/22  Edel Govea DO   metroNIDAZOLE (METROGEL) 0.75 % gel APPLY EXTERNALLY TWICE DAILY 9/20/21   Ruta Meigs, MD   tiotropium (Michael Bidding) 18 MCG inhalation capsule Inhale 1 puff by mouth once daily 8/27/21   Viry Lr DO   escitalopram (LEXAPRO) 10 MG tablet Take 1 tablet by mouth once daily 8/12/21   Cyndee Wolf DO   naloxone 4 MG/0.1ML LIQD nasal spray Naloxone Hcl Active 4 MG NA One Time Only 1 1 August 20th, 2020 10:59am 8/20/20   Historical Provider, MD   ipratropium-albuterol (DUONEB) 0.5-2.5 (3) MG/3ML SOLN nebulizer solution Take 3 mLs by nebulization every 6 hours as needed for Shortness of Breath 4/12/21   Cyndee Wolf DO   Multiple Vitamin (MULTIVITAMIN PO) Take by mouth daily    Historical Provider, MD   ondansetron (ZOFRAN ODT) 4 MG disintegrating tablet Take 1 tablet by mouth every 8 hours as needed for Nausea 8/18/20   Jacky Manley, APRN - CNP tenofovir disoproxil fumarate (VIREAD) 300 MG tablet Take 300 mg by mouth daily 8/10/20   Historical Provider, MD   lidocaine (LIDODERM) 5 % Place 1 patch onto the skin daily 12 hours on, 12 hours off. 5/11/20   Ramila Mcdowell PA-C   albuterol sulfate HFA (PROVENTIL HFA) 108 (90 Base) MCG/ACT inhaler Inhale 2 puffs into the lungs every 4 hours as needed for Wheezing or Shortness of Breath (Space out to every 6 hours as symptoms improve) Space out to every 6 hours as symptoms improve. 2/16/18   Karthikeyan Canela MD   METHADONE HCL PO Take 116 mg by mouth daily     Historical Provider, MD       Future Appointments   Date Time Provider Didier Falki   5/10/2022  9:45 AM Shyrl Spurling, APRN - SIMON Rodriguez     ,   Diabetes Assessment    Meal Planning: Avoidance of concentrated sweets   How often do you test your blood sugar?: Daily   Do you have barriers with adherence to non-pharmacologic self-management interventions?  (Nutrition/Exercise/Self-Monitoring): No   Have you ever had to go to the ED for symptoms of low blood sugar?: No       No patient-reported symptoms   Do you have hyperglycemia symptoms?: No   Do you have hypoglycemia symptoms?: No   Blood Sugar Monitoring Regimen: Morning Fasting   Blood Sugar Trends: No Change      ,   Congestive Heart Failure Assessment    Are you currently restricting fluids?: No Restriction  Do you understand a low sodium diet?: Yes  Do you understand how to read food labels?: Yes  How many restaurant meals do you eat per week?: 1-2  Do you salt your food before tasting it?: No     No patient-reported symptoms      Symptoms:  None: Yes      Symptom course: stable  Weight trend: stable  Salt intake watch compared to last visit: stable      and   COPD Assessment    Does the patient understand envrionmental exposure?: Yes  Is the patient able to verbalize Rescue vs. Long Acting medications?: Yes  Does the patient have a nebulizer?: No     No patient-reported symptoms         Symptoms:  None: Yes      Symptom course: stable  Breathlessness: none  Increase use of rapid acting/rescue inhaled medications?: No  Change in chronic cough?: No/At Baseline  Change in sputum?: No/At Baseline  Have you had a recent diagnosis of pneumonia either by PCP or at a hospital?: No

## 2021-12-15 ASSESSMENT — ENCOUNTER SYMPTOMS
NAUSEA: 0
VOMITING: 0
CONSTIPATION: 0
ABDOMINAL PAIN: 0
WHEEZING: 0
COUGH: 0
RHINORRHEA: 0
SINUS PRESSURE: 0
EYE PAIN: 0
DIARRHEA: 0
BLOOD IN STOOL: 0
SHORTNESS OF BREATH: 0

## 2021-12-21 ENCOUNTER — CARE COORDINATION (OUTPATIENT)
Dept: CARE COORDINATION | Age: 46
End: 2021-12-21

## 2021-12-21 NOTE — CARE COORDINATION
Congestive Heart Failure Assessment    Are you currently restricting fluids?: No Restriction  Do you understand a low sodium diet?: Yes  Do you understand how to read food labels?: Yes  How many restaurant meals do you eat per week?: 1-2  Do you salt your food before tasting it?: No         Symptoms:          Attempted ACM follow up and CHF initiative call. Left message to return phone call to ambulatory care manager at [phone].     My chart message A recipe for avoiding the hospital this holiday season  CHF zones - report weight gain 3 lbs in 1 day or 5 in 1 week, leg swelling, bloating, SOB  Low salt diet  Medication adherence lasix and K+ daily  BH with [facility] ordered by PCP  12/27 arrive at 930, 2K, Bilateral LE vascular duplex with exercise ARNOLD, if on BP meds take them  Eye exam using [facility] coverage  Early symptom recognition and reporting to prevent ED and admissions  Smoking cessation when ready

## 2021-12-28 ENCOUNTER — CARE COORDINATION (OUTPATIENT)
Dept: CARE COORDINATION | Age: 46
End: 2021-12-28

## 2021-12-28 NOTE — CARE COORDINATION
Attempted care management follow up. Left message to return phone call to ambulatory care manager at [phone].   Plan -   CHF zones - report weight gain 3 lbs in 1 day or 5 in 1 week, leg swelling, bloating, SOB  Low salt diet  Medication adherence lasix and K+ daily  BH with [facility] ordered by PCP  12/27 arrive at 930, 2K, Bilateral LE vascular duplex with exercise ARNOLD, if on BP meds take them  [address] coverage  Early symptom recognition and reporting to prevent ED and admissions  Smoking cessation when ready

## 2022-01-04 ENCOUNTER — CARE COORDINATION (OUTPATIENT)
Dept: CARE COORDINATION | Age: 47
End: 2022-01-04

## 2022-01-04 NOTE — CARE COORDINATION
Ambulatory Care Coordination Note  1/4/2022  CM Risk Score: 12  Charlson 10 Year Mortality Risk Score: 98%     ACC: Jamey Burleson RN    Summary Note:     Wearing tennis shoes and compression stockings. Legs feeling better. Missed US of BLE. Patient states he will reschedule. Has cold like symptoms. Breathing is baseline. Denies any exposure to covid. Declines PCP appt. Denies weight gain, leg swelling, or SOB. Reviewed zones. Missed GI appt and he called office to reschedule. Needs refill of Viread. Will notify ACM if cannot get refill from Dr. Marycruz Lewis. Plan -   GI appt  CHF zones - report weight gain 3 lbs in 1 day or 5 in 1 week, leg swelling, bloating, SOB  Low salt diet  Medication adherence lasix and K+ daily  BH with [facility] ordered by PCP  Patient rescheduling, 2K, Bilateral LE vascular duplex with exercise ARNOLD, if on BP meds take them  Eye exam using [employer] coverage  Early symptom recognition and reporting to prevent ED and admissions  Smoking cessation when ready        Care Coordination Interventions    Program Enrollment: Complex Care  Referral from Primary Care Provider: Yes  Suggested Interventions and Community Resources  Diabetes Education: In Process  Smoking Cessation: In Process  Specialty Services Referral: Completed (Comment: urology - kidney stone)  Other Therapy Services: Completed (Comment: DOC pain mgmt for methadone)  Transportation Support: Declined  Zone Management Tools: In Process  Other Services or Interventions: podiatry, GI         Goals Addressed    None         Prior to Admission medications    Medication Sig Start Date End Date Taking?  Authorizing Provider   tiZANidine (ZANAFLEX) 4 MG tablet TAKE 1 TABLET BY MOUTH THREE TIMES DAILY AS NEEDED FOR MUSCLE SPASM AND FOR PAIN 12/6/21   Eliseo Zazueta MD   EQ LORATADINE 10 MG tablet Take 1 tablet by mouth nightly 12/6/21   Eliseo Zazueta MD   pantoprazole (PROTONIX) 40 MG tablet Take 1 tablet by mouth once daily 12/6/21   Yoshi Mei MD   promethazine (PHENERGAN) 25 MG tablet TAKE 1 TABLET BY MOUTH 4 TIMES DAILY AS NEEDED FOR NAUSEA 12/5/21 1/4/22  Zara Mancia DO   metoclopramide (REGLAN) 5 MG tablet 3 times daily 10/26/21   Historical Provider, MD   potassium chloride (KLOR-CON M) 20 MEQ extended release tablet Take 1 tablet by mouth daily as needed (with lasix) 11/8/21   Yoshi Mei MD   atorvastatin (LIPITOR) 10 MG tablet Take 1 tablet by mouth daily 11/8/21   Yoshi Mei MD   ketorolac (TORADOL) 10 MG tablet Take 1 tablet by mouth every 6 hours as needed for Pain 10/28/21   CELIO Moya - CNP   furosemide (LASIX) 40 MG tablet  9/17/21   Historical Provider, MD   lisinopril (PRINIVIL;ZESTRIL) 5 MG tablet Take 1 tablet by mouth once daily 9/23/21   Zara Mancia DO   gabapentin (NEURONTIN) 800 MG tablet TAKE 1 TABLET BY MOUTH THREE TIMES DAILY 9/23/21 3/23/22  Zara Mancia DO   magnesium (MAGNESIUM-OXIDE) 250 MG TABS tablet Take 1 tablet by mouth 2 times daily  Patient taking differently: Take 250 mg by mouth daily  9/23/21 11/22/21  Erich Govea DO   fluticasone (FLOVENT HFA) 220 MCG/ACT inhaler Inhale 2 puffs into the lungs 2 times daily 9/23/21 9/23/22  Edel Govea DO   metroNIDAZOLE (METROGEL) 0.75 % gel APPLY EXTERNALLY TWICE DAILY 9/20/21   Janette Dotson MD   tiotropium (Nahomy Yu) 18 MCG inhalation capsule Inhale 1 puff by mouth once daily 8/27/21   Rosetta Farnsworth DO   escitalopram (LEXAPRO) 10 MG tablet Take 1 tablet by mouth once daily 8/12/21   Zara Mancia DO   naloxone 4 MG/0.1ML LIQD nasal spray Naloxone Hcl Active 4 MG NA One Time Only 1 1 August 20th, 2020 10:59am 8/20/20   Historical Provider, MD   ipratropium-albuterol (DUONEB) 0.5-2.5 (3) MG/3ML SOLN nebulizer solution Take 3 mLs by nebulization every 6 hours as needed for Shortness of Breath 4/12/21   Zara Mancia, DO   Multiple Vitamin (MULTIVITAMIN PO) Take by mouth daily    Historical Provider, MD   ondansetron [facility] ODT) 4 MG disintegrating tablet Take 1 tablet by mouth every 8 hours as needed for Nausea 8/18/20   Nelly Jean-Baptiste, APRN - CNP   tenofovir disoproxil fumarate (VIREAD) 300 MG tablet Take 300 mg by mouth daily 8/10/20   Historical Provider, MD   lidocaine (LIDODERM) 5 % Place 1 patch onto the skin daily 12 hours on, 12 hours off. 5/11/20   Pawel Alexandre PA-C   albuterol sulfate HFA (PROVENTIL HFA) 108 (90 Base) MCG/ACT inhaler Inhale 2 puffs into the lungs every 4 hours as needed for Wheezing or Shortness of Breath (Space out to every 6 hours as symptoms improve) Space out to every 6 hours as symptoms improve. 2/16/18   Karthikeyan Martinez MD   METHADONE HCL PO Take 116 mg by mouth daily     Historical Provider, MD       Future Appointments   Date Time Provider Didier Josephine   5/10/2022  9:45 AM Jenny Reyes APRN - CNP [facility]     ,   Diabetes Assessment    Meal Planning: Avoidance of concentrated sweets   How often do you test your blood sugar?: Daily   Do you have barriers with adherence to non-pharmacologic self-management interventions?  (Nutrition/Exercise/Self-Monitoring): No   Have you ever had to go to the ED for symptoms of low blood sugar?: No          ,   Congestive Heart Failure Assessment    Are you currently restricting fluids?: No Restriction  Do you understand a low sodium diet?: Yes  Do you understand how to read food labels?: Yes  How many restaurant meals do you eat per week?: 1-2  Do you salt your food before tasting it?: No         Symptoms:         and   COPD Assessment    Does the patient understand envrionmental exposure?: Yes  Is the patient able to verbalize Rescue vs. Long Acting medications?: Yes  Does the patient have a nebulizer?: No            Symptoms:

## 2022-01-14 DIAGNOSIS — Z79.4 TYPE 2 DIABETES MELLITUS WITH HYPERGLYCEMIA, WITH LONG-TERM CURRENT USE OF INSULIN (HCC): Chronic | ICD-10-CM

## 2022-01-14 DIAGNOSIS — E11.65 TYPE 2 DIABETES MELLITUS WITH HYPERGLYCEMIA, WITH LONG-TERM CURRENT USE OF INSULIN (HCC): Chronic | ICD-10-CM

## 2022-01-14 DIAGNOSIS — I10 ESSENTIAL HYPERTENSION: ICD-10-CM

## 2022-01-17 RX ORDER — LISINOPRIL 5 MG/1
TABLET ORAL
Qty: 90 TABLET | Refills: 0 | Status: SHIPPED | OUTPATIENT
Start: 2022-01-17 | End: 2022-02-25

## 2022-01-25 ENCOUNTER — CARE COORDINATION (OUTPATIENT)
Dept: CARE COORDINATION | Age: 47
End: 2022-01-25

## 2022-01-25 DIAGNOSIS — G72.9 MYOPATHY: ICD-10-CM

## 2022-01-25 NOTE — CARE COORDINATION
Ambulatory Care Coordination Note  1/25/2022  CM Risk Score: 12  Charlson 10 Year Mortality Risk Score: 98%     ACC: Trent Tavarez RN    Summary Note:     Dry throat, coughing, fatigue, mucous is yellowish white, congestion in chest and head going on 3 weeks. Worsening today. Taking ibuprofen. Took some dayquil and mucinex. Didn't give much relief. He called PCP office to request an appt. He can't come in today and next appt is Thusday. Legs are better since taking potassium and magnesium    Tizanidine refill requested. Sent to PCP    Plan-   Go to  if PCP office can't see you today  GI appt  CHF zones - report weight gain 3 lbs in 1 day or 5 in 1 week, leg swelling, bloating, SOB  Low salt diet  Medication adherence lasix and K+ daily  BH with [facility] ordered by PCP  Patient rescheduling, 2K, Bilateral LE vascular duplex with exercise ARNOLD, if on BP meds take them  Eye exam using [employer] coverage  Early symptom recognition and reporting to prevent ED and admissions  Smoking cessation when ready         Care Coordination Interventions    Program Enrollment: Complex Care  Referral from Primary Care Provider: Yes  Suggested Interventions and Community Resources  Diabetes Education: In Process  Smoking Cessation: In Process  Specialty Services Referral: Completed (Comment: urology - kidney stone)  Other Therapy Services: Completed (Comment: DOC pain mgmt for methadone)  Transportation Support: Declined  Zone Management Tools: In Process  Other Services or Interventions: podiatry, GI         Goals Addressed                 This Visit's Progress     Conditions and Symptoms   On track     I will schedule office visits, as directed by my provider. I will keep my appointment or reschedule if I have to cancel. I will notify my provider of any barriers to my plan of care. I will follow my Zone Management tool to seek urgent or emergent care.   I will notify my provider of any symptoms that indicate a worsening of my condition. Barriers: impairment:  physical: gastroparesis and mental health: bipolar, lack of motivation, overwhelmed by complexity of regimen, and stress  Plan for overcoming my barriers: care coordination  Confidence: 9/10  Anticipated Goal Completion Date: 12/28/21              Prior to Admission medications    Medication Sig Start Date End Date Taking?  Authorizing Provider   lisinopril (PRINIVIL;ZESTRIL) 5 MG tablet Take 1 tablet by mouth once daily 1/17/22   Jose David Clark DO   tiZANidine (ZANAFLEX) 4 MG tablet TAKE 1 TABLET BY MOUTH THREE TIMES DAILY AS NEEDED FOR MUSCLE SPASM AND FOR PAIN 12/6/21   Izzy Guevara MD   EQ LORATADINE 10 MG tablet Take 1 tablet by mouth nightly 12/6/21   Izzy Guevara MD   pantoprazole (PROTONIX) 40 MG tablet Take 1 tablet by mouth once daily 12/6/21   Izzy Guevara MD   metoclopramide (REGLAN) 5 MG tablet 3 times daily 10/26/21   Historical Provider, MD   potassium chloride (KLOR-CON M) 20 MEQ extended release tablet Take 1 tablet by mouth daily as needed (with lasix) 11/8/21   Izzy Guevara MD   atorvastatin (LIPITOR) 10 MG tablet Take 1 tablet by mouth daily 11/8/21   Izzy Guevara MD   ketorolac (TORADOL) 10 MG tablet Take 1 tablet by mouth every 6 hours as needed for Pain 10/28/21   Marcy Campos APRN - CNP   furosemide (LASIX) 40 MG tablet  9/17/21   Historical Provider, MD   gabapentin (NEURONTIN) 800 MG tablet TAKE 1 TABLET BY MOUTH THREE TIMES DAILY 9/23/21 3/23/22  Jose David Clark DO   magnesium (MAGNESIUM-OXIDE) 250 MG TABS tablet Take 1 tablet by mouth 2 times daily  Patient taking differently: Take 250 mg by mouth daily  9/23/21 11/22/21  Kacy Govea DO   fluticasone (FLOVENT HFA) 220 MCG/ACT inhaler Inhale 2 puffs into the lungs 2 times daily 9/23/21 9/23/22  Edel Govea DO   metroNIDAZOLE (METROGEL) 0.75 % gel APPLY EXTERNALLY TWICE DAILY 9/20/21   Charmaine Castillo MD   tiotropium (SPIRIVA HANDIHALER) 18 MCG inhalation capsule Inhale 1 puff by mouth once daily 8/27/21   Andreas Govea,    escitalopram (LEXAPRO) 10 MG tablet Take 1 tablet by mouth once daily 8/12/21   Carla Canales DO   naloxone 4 MG/0.1ML LIQD nasal spray Naloxone Hcl Active 4 MG NA One Time Only 1 1 August 20th, 2020 10:59am 8/20/20   Historical Provider, MD   ipratropium-albuterol (DUONEB) 0.5-2.5 (3) MG/3ML SOLN nebulizer solution Take 3 mLs by nebulization every 6 hours as needed for Shortness of Breath 4/12/21   Carla Canales, DO   Multiple Vitamin (MULTIVITAMIN PO) Take by mouth daily    Historical Provider, MD   ondansetron (ZOFRAN ODT) 4 MG disintegrating tablet Take 1 tablet by mouth every 8 hours as needed for Nausea 8/18/20   Nahomy Vallecillo APRN - CNP   tenofovir disoproxil fumarate (VIREAD) 300 MG tablet Take 300 mg by mouth daily 8/10/20   Historical Provider, MD   lidocaine (LIDODERM) 5 % Place 1 patch onto the skin daily 12 hours on, 12 hours off. 5/11/20   Oksana Rader PA-C   albuterol sulfate HFA (PROVENTIL HFA) 108 (90 Base) MCG/ACT inhaler Inhale 2 puffs into the lungs every 4 hours as needed for Wheezing or Shortness of Breath (Space out to every 6 hours as symptoms improve) Space out to every 6 hours as symptoms improve. 2/16/18   Karthikeyan Kurtz MD   METHADONE HCL PO Take 116 mg by mouth daily     Historical Provider, MD       Future Appointments   Date Time Provider Didier Josephine   5/10/2022  9:45 AM Sridevi Sutton APRN - CNP [facility]     ,   Diabetes Assessment    Meal Planning: Avoidance of concentrated sweets   How often do you test your blood sugar?: Daily   Do you have barriers with adherence to non-pharmacologic self-management interventions?  (Nutrition/Exercise/Self-Monitoring): No   Have you ever had to go to the ED for symptoms of low blood sugar?: No       No patient-reported symptoms   Do you have hyperglycemia symptoms?: No   Do you have hypoglycemia symptoms?: No      ,   Congestive Heart Failure Assessment    Are you currently restricting fluids?: No Restriction  Do you understand a low sodium diet?: Yes  Do you understand how to read food labels?: Yes  How many restaurant meals do you eat per week?: 1-2  Do you salt your food before tasting it?: No     No patient-reported symptoms      Symptoms:  None: Yes      Symptom course: stable  Weight trend: stable  Salt intake watch compared to last visit: stable      and   COPD Assessment    Does the patient understand envrionmental exposure?: Yes  Is the patient able to verbalize Rescue vs. Long Acting medications?: Yes  Does the patient have a nebulizer?: No     No patient-reported symptoms         Symptoms:  None: Yes      Symptom course: stable  Breathlessness: none  Increase use of rapid acting/rescue inhaled medications?: No  Change in chronic cough?: Increased  Change in sputum?: Increased  Sputum characteristics: Yellow, White  Have you had a recent diagnosis of pneumonia either by PCP or at a hospital?: No

## 2022-01-26 ENCOUNTER — HOSPITAL ENCOUNTER (EMERGENCY)
Age: 47
Discharge: HOME | End: 2022-01-26
Payer: COMMERCIAL

## 2022-01-26 VITALS
HEART RATE: 98 BPM | BODY MASS INDEX: 31 KG/M2 | RESPIRATION RATE: 16 BRPM | OXYGEN SATURATION: 95 % | SYSTOLIC BLOOD PRESSURE: 120 MMHG | WEIGHT: 235 LBS | TEMPERATURE: 96.9 F | DIASTOLIC BLOOD PRESSURE: 77 MMHG

## 2022-01-26 DIAGNOSIS — U07.1 UPPER RESPIRATORY TRACT INFECTION DUE TO COVID-19 VIRUS: Primary | ICD-10-CM

## 2022-01-26 DIAGNOSIS — J06.9 UPPER RESPIRATORY TRACT INFECTION DUE TO COVID-19 VIRUS: Primary | ICD-10-CM

## 2022-01-26 LAB — SARS-COV-2, NAA: DETECTED

## 2022-01-26 PROCEDURE — 87635 SARS-COV-2 COVID-19 AMP PRB: CPT

## 2022-01-26 PROCEDURE — 99213 OFFICE O/P EST LOW 20 MIN: CPT

## 2022-01-26 PROCEDURE — 99213 OFFICE O/P EST LOW 20 MIN: CPT | Performed by: NURSE PRACTITIONER

## 2022-01-26 RX ORDER — ZINC SULFATE 50(220)MG
50 CAPSULE ORAL DAILY
Qty: 7 CAPSULE | Refills: 0 | Status: SHIPPED | OUTPATIENT
Start: 2022-01-26 | End: 2022-03-30 | Stop reason: ALTCHOICE

## 2022-01-26 RX ORDER — DEXAMETHASONE 6 MG/1
6 TABLET ORAL 2 TIMES DAILY WITH MEALS
Qty: 20 TABLET | Refills: 0 | Status: SHIPPED | OUTPATIENT
Start: 2022-01-26 | End: 2022-02-05

## 2022-01-26 RX ORDER — ASCORBIC ACID 500 MG
500 TABLET ORAL 2 TIMES DAILY
Qty: 14 TABLET | Refills: 0 | Status: SHIPPED | OUTPATIENT
Start: 2022-01-26 | End: 2022-03-30 | Stop reason: ALTCHOICE

## 2022-01-26 RX ORDER — DEXTROMETHORPHAN HYDROBROMIDE AND PROMETHAZINE HYDROCHLORIDE 15; 6.25 MG/5ML; MG/5ML
5 SYRUP ORAL 4 TIMES DAILY PRN
Qty: 100 ML | Refills: 0 | Status: SHIPPED | OUTPATIENT
Start: 2022-01-26 | End: 2022-01-31

## 2022-01-26 RX ORDER — ACETAMINOPHEN 500 MG
500 TABLET ORAL EVERY 4 HOURS PRN
Qty: 20 TABLET | Refills: 0 | Status: SHIPPED | OUTPATIENT
Start: 2022-01-26 | End: 2022-08-15

## 2022-01-26 ASSESSMENT — PAIN SCALES - GENERAL: PAINLEVEL_OUTOF10: 7

## 2022-01-26 ASSESSMENT — PAIN DESCRIPTION - LOCATION: LOCATION: CHEST

## 2022-01-26 ASSESSMENT — ENCOUNTER SYMPTOMS
SINUS PRESSURE: 1
SHORTNESS OF BREATH: 0
SINUS CONGESTION: 1
RHINORRHEA: 1
SORE THROAT: 1
ABDOMINAL PAIN: 0
STRIDOR: 0
EYE DISCHARGE: 0
CHEST TIGHTNESS: 0
COUGH: 1
APNEA: 0
NAUSEA: 0
WHEEZING: 0
DIARRHEA: 0
CHOKING: 0
VOMITING: 0

## 2022-01-26 ASSESSMENT — PAIN - FUNCTIONAL ASSESSMENT: PAIN_FUNCTIONAL_ASSESSMENT: ACTIVITIES ARE NOT PREVENTED

## 2022-01-26 ASSESSMENT — PAIN DESCRIPTION - DESCRIPTORS: DESCRIPTORS: ACHING;BURNING

## 2022-01-26 ASSESSMENT — PAIN DESCRIPTION - PAIN TYPE: TYPE: ACUTE PAIN

## 2022-01-26 ASSESSMENT — PAIN DESCRIPTION - PROGRESSION: CLINICAL_PROGRESSION: GRADUALLY WORSENING

## 2022-01-26 ASSESSMENT — PAIN DESCRIPTION - FREQUENCY: FREQUENCY: INTERMITTENT

## 2022-01-26 ASSESSMENT — PAIN DESCRIPTION - ONSET: ONSET: PROGRESSIVE

## 2022-01-26 NOTE — Clinical Note
Catherine Turner was seen and treated in our emergency department on 1/26/2022. He may return to work on 01/31/2022. Must be fever free for 24 hours without medication follow CDC guidelines discussed return with your employer     If you have any questions or concerns, please don't hesitate to call.       Karma Altamirano, APRN - CNP

## 2022-01-26 NOTE — ED PROVIDER NOTES
[facility]  Urgent Care Encounter      CHIEF COMPLAINT       Chief Complaint   Patient presents with    Cough    Shortness of Breath    Sweats    Pharyngitis     \"feeling really dry\"    Fatigue       Nurses Notes reviewed and I agree except as noted in the HPI. HISTORY OFPRESENT ILLNESS   Trudy Ronquillo is a 40 y.o. The history is provided by the patient. No  was used. Cough  Cough characteristics:  Productive and non-productive  Sputum characteristics:  Unable to specify  Severity:  Moderate  Onset quality:  Sudden  Timing:  Constant  Progression:  Unchanged  Chronicity:  New  Smoker: yes    Context: sick contacts, smoke exposure and upper respiratory infection    Context: not animal exposure, not exposure to allergens, not fumes, not occupational exposure, not weather changes and not with activity    Relieved by:  Nothing  Worsened by:  Nothing  Ineffective treatments:  None tried  Associated symptoms: headaches, myalgias, rhinorrhea, sinus congestion and sore throat    Associated symptoms: no chest pain, no chills, no diaphoresis, no ear fullness, no ear pain, no eye discharge, no fever, no rash, no shortness of breath, no weight loss and no wheezing    Risk factors: no chemical exposure, no recent infection and no recent travel        REVIEW OF SYSTEMS     Review of Systems   Constitutional: Positive for activity change, appetite change and fatigue. Negative for chills, diaphoresis, fever and weight loss. HENT: Positive for congestion, postnasal drip, rhinorrhea, sinus pressure and sore throat. Negative for ear pain. Eyes: Negative for discharge. Respiratory: Positive for cough. Negative for apnea, choking, chest tightness, shortness of breath, wheezing and stridor. Cardiovascular: Negative for chest pain, palpitations and leg swelling. Gastrointestinal: Negative for abdominal pain, diarrhea, nausea and vomiting.    Musculoskeletal: Positive for myalgias. Skin: Negative for rash. Neurological: Positive for headaches. Negative for dizziness and light-headedness. PAST MEDICAL HISTORY         Diagnosis Date    Anxiety     Bipolar 1 disorder ([facility]     Chronic diastolic congestive heart failure ([facility] 3/16/2018    patient denies    Cirrhosis ([facility] 01/2021    Constipation     Gastroesophageal reflux disease 3/16/2018    Hard of hearing     left ear, no hearing aid    Hep C w/o coma, chronic (HCC)     Hepatic encephalopathy ([facility] 7/23/2020    Hepatitis B     Kidney stones     hx of    Post traumatic stress disorder (PTSD)     Substance abuse ([facility]     \"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     CURRENT MEDICATIONS       Discharge Medication List as of 1/26/2022 11:53 AM      CONTINUE these medications which have NOT CHANGED    Details   lisinopril (PRINIVIL;ZESTRIL) 5 MG tablet Take 1 tablet by mouth once daily, Disp-90 tablet, R-0Normal      tiZANidine (ZANAFLEX) 4 MG tablet TAKE 1 TABLET BY MOUTH THREE TIMES DAILY AS NEEDED FOR MUSCLE SPASM AND FOR PAIN, Disp-60 tablet, R-0Normal      EQ LORATADINE 10 MG tablet Take 1 tablet by mouth nightly, Disp-90 tablet, R-1Normal      pantoprazole (PROTONIX) 40 MG tablet Take 1 tablet by mouth once daily, Disp-90 tablet, R-1, DAWNormal      metoclopramide (REGLAN) 5 MG tablet 3 times dailyHistorical Med      potassium chloride (KLOR-CON M) 20 MEQ extended release tablet Take 1 tablet by mouth daily as needed (with lasix), Disp-30 tablet, R-2Normal      atorvastatin (LIPITOR) 10 MG tablet Take 1 tablet by mouth daily, Disp-90 tablet, R-1Normal      furosemide (LASIX) 40 MG tablet Historical Med      gabapentin (NEURONTIN) 800 MG tablet TAKE 1 TABLET BY MOUTH THREE TIMES DAILY, Disp-90 tablet, R-5Normal      magnesium (MAGNESIUM-OXIDE) 250 MG TABS tablet Take 1 tablet by mouth 2 times daily, Disp-120 tablet, R-0Normal      fluticasone (FLOVENT HFA) 220 MCG/ACT inhaler Inhale 2 puffs into the lungs 2 times daily, Disp-1 each, R-3Normal      tiotropium (SPIRIVA HANDIHALER) 18 MCG inhalation capsule Inhale 1 puff by mouth once daily, Disp-90 capsule, R-3Normal      escitalopram (LEXAPRO) 10 MG tablet Take 1 tablet by mouth once daily, Disp-90 tablet, R-1Normal      Multiple Vitamin (MULTIVITAMIN PO) Take by mouth dailyHistorical Med      ondansetron (ZOFRAN ODT) 4 MG disintegrating tablet Take 1 tablet by mouth every 8 hours as needed for Nausea, Disp-20 tablet,R-0Print      albuterol sulfate HFA (PROVENTIL HFA) 108 (90 Base) MCG/ACT inhaler Inhale 2 puffs into the lungs every 4 hours as needed for Wheezing or Shortness of Breath (Space out to every 6 hours as symptoms improve) Space out to every 6 hours as symptoms improve., Disp-1 Inhaler, R-3Print      METHADONE HCL PO Take 116 mg by mouth daily Historical Med      ketorolac (TORADOL) 10 MG tablet Take 1 tablet by mouth every 6 hours as needed for Pain, Disp-20 tablet, R-0Normal      naloxone 4 MG/0.1ML LIQD nasal spray Naloxone Hcl Active 4 MG NA One Time Only 1 1 August 20th, 2020 10:59amHistorical Med      ipratropium-albuterol (DUONEB) 0.5-2.5 (3) MG/3ML SOLN nebulizer solution Take 3 mLs by nebulization every 6 hours as needed for Shortness of Breath, Disp-360 vial, R-3Normal      tenofovir disoproxil fumarate (VIREAD) 300 MG tablet Take 300 mg by mouth dailyHistorical Med      lidocaine (LIDODERM) 5 % Place 1 patch onto the skin daily 12 hours on, 12 hours off., Disp-15 patch, R-0Print             ALLERGIES     Patient is is allergic to adhesive tape, bactrim [sulfamethoxazole-trimethoprim], clonazepam, flexeril [cyclobenzaprine], morphine, and quetiapine.     FAMILY HISTORY     Patient's family history includes Depression in his maternal grandmother, mother, and sister; Heart Disease in his father and mother; Other in his mother; Rheum Arthritis in his mother; Substance Abuse in his brother, father, maternal aunt, maternal grandmother, maternal uncle, mother, paternal aunt, and paternal uncle. SOCIAL HISTORY     Patient  reports that he has been smoking cigarettes. He has a 30.00 pack-year smoking history. He has never used smokeless tobacco. He reports previous drug use. He reports that he does not drink alcohol. PHYSICAL EXAM     ED TRIAGE VITALS  BP: 120/77, Temp: 96.9 °F (36.1 °C), Pulse: 98, Resp: 16, SpO2: 95 %  Physical Exam  Vitals and nursing note reviewed. Constitutional:       General: He is not in acute distress. Appearance: He is well-developed. He is not ill-appearing, toxic-appearing or diaphoretic. Interventions: He is not intubated. HENT:      Head: Normocephalic and atraumatic. Mouth/Throat:      Mouth: Mucous membranes are moist.   Eyes:      Extraocular Movements: Extraocular movements intact. Pulmonary:      Effort: Pulmonary effort is normal. No tachypnea, bradypnea, accessory muscle usage or respiratory distress. He is not intubated. Breath sounds: Normal breath sounds. No stridor. No decreased breath sounds, wheezing, rhonchi or rales. Musculoskeletal:         General: Normal range of motion. Cervical back: Normal range of motion and neck supple. Skin:     General: Skin is warm. Neurological:      General: No focal deficit present. Mental Status: He is alert and oriented to person, place, and time.    Psychiatric:         Mood and Affect: Mood normal.         Behavior: Behavior normal.         DIAGNOSTIC RESULTS   Labs:  Results for orders placed or performed during the hospital encounter of 01/26/22   COVID-19, Rapid   Result Value Ref Range    SARS-CoV-2, MELANIA DETECTED (AA) NOT DETECTED       IMAGING:  No orders to display     URGENT CARE COURSE:     Vitals:    01/26/22 1127 01/26/22 1129   BP:  120/77   Pulse:  98   Resp:  16   Temp:  96.9 °F (36.1 °C)   TempSrc:  Temporal   SpO2:  95%   Weight: 235 lb (106.6 kg)        Medications - No data to display  PROCEDURES:  None  FINAL IMPRESSION      1. Upper respiratory tract infection due to COVID-19 virus        DISPOSITION/PLAN   Decision To Discharge    You are COVID-19 positive. You may be contacted by the [address] [place] [address] and/or the [facility] regarding length of quarantine when you may return to work and/or school. When to seek immediate emergency medical attention:    Uncontrollable fever  Trouble breathing  Persistent pain or pressure in the chest  New confusion  Inability to wake or stay awake  Pale, gray, or blue-colored skin, lips, or nail beds, depending on skin tone  *This list is not all possible symptoms. Please call your medical provider for any other symptoms that are severe or concerning to you. May take over-the-counter supplements daily if no contraindications:  Multi-vitamin/multi-mineral daily   Vitamin D3 4000 IU daily  Zinc 75 mg daily  Vitamin C 1000 mg twice daily  B-100 complex as daily as directed on bottle  Gargle with mouthwash 3 times daily, may use Scope, Crest, or Listerine.           PATIENT REFERRED TO:  DO Ivis Cooper [address]  [phone]    Schedule an appointment as soon as possible for a visit in 3 days  Follow up within 3 days, Return to ED sooner if symptoms worsen    DISCHARGE MEDICATIONS:  Discharge Medication List as of 1/26/2022 11:53 AM      START taking these medications    Details   acetaminophen (TYLENOL) 500 MG tablet Take 1 tablet by mouth every 4 hours as needed for Pain or Fever, Disp-20 tablet, R-0Normal      ascorbic acid (VITAMIN C) 500 MG tablet Take 1 tablet by mouth 2 times daily for 7 days, Disp-14 tablet, R-0Normal      zinc sulfate (ZINCATE) 220 (50 Zn) MG capsule Take 1 capsule by mouth daily for 7 days, Disp-7 capsule, R-0Normal      promethazine-dextromethorphan (PROMETHAZINE-DM) 6.25-15 MG/5ML syrup Take 5 mLs by mouth 4 times daily as needed for Cough, Disp-100 mL, R-0Normal      dexamethasone (DECADRON) 6 MG tablet Take 1 tablet by mouth 2 times daily (with meals) for 10 days, Disp-20 tablet, R-0Normal           Discharge Medication List as of 1/26/2022 11:53 AM          CELIO Shay - SIMON Her, CELIO - SIMON  01/26/22 1816

## 2022-01-27 ENCOUNTER — CARE COORDINATION (OUTPATIENT)
Dept: CARE COORDINATION | Age: 47
End: 2022-01-27

## 2022-01-27 DIAGNOSIS — U07.1 COVID-19: Primary | ICD-10-CM

## 2022-01-27 RX ORDER — TIZANIDINE 4 MG/1
TABLET ORAL
Qty: 60 TABLET | Refills: 0 | Status: SHIPPED | OUTPATIENT
Start: 2022-01-27 | End: 2022-04-08 | Stop reason: SDUPTHER

## 2022-01-27 NOTE — CARE COORDINATION
Attempted outreach for COVID19 monitoring  UC yesterday for cough, SOB, sore throat, fatigue  COVID positive  Spoke with patient yesterday for ACM follow up. Advised to go to UC as he couldn't get an appt with PCP office    Ambulatory Care Coordination ED COVID Follow up Call    Challenges to be reviewed by the provider   Additional needs identified to be addressed with provider: Yes  medications-patient requesting molnupiravir symptom onset 3 days ago. Encounter was routed to provider for escalation. Method of communication with provider: chart routing    Discussed COVID-19 related testing which was: available at this time. Test results were: positive. Patient informed of results, if available? Yes. Current Symptoms: fatigue, cough, shortness of breath and sore throat    Reviewed New or Changed Meds: yes    Do you have what you need at home?  Durable Medical Equipment ordered at discharge: None   Home Health/Outpatient orders at discharge: none   Was patient discharged with a pulse oximeter? No Discussed and confirmed pulse oximeter discharge instructions and when to notify provider or seek emergency care. Patient education provided: Reviewed appropriate site of care based on symptoms and resources available to patient including: PCP, Urgent care clinics and When to call 911. Follow up appointment recommended: yes. If no appointment scheduled, scheduling offered: yes  Future Appointments   Date Time Provider Didier Burton   5/10/2022  9:45 AM CELIO Brown CNP       Interventions: Obtained and reviewed discharge summary and/or continuity of care documents  Reviewed discharge instructions, medical action plan and red flags with patient who verbalized understanding. acm protocol based on severity of symptoms and risk factors.   Plan for next call: symptom management-  Advised on zone sheet, symptom management, reporting worsening symptoms, deep breathing exercises, staying active, and hydration. Provided contact information for future needs.     Jonathan Olsen RN

## 2022-01-27 NOTE — PROGRESS NOTES
Please see care coordination note form 1/27 - patient COVID positive. Requesting molnupiravir. Sent to [address] [place] [address] pharmacy.

## 2022-01-31 ENCOUNTER — CARE COORDINATION (OUTPATIENT)
Dept: CARE COORDINATION | Age: 47
End: 2022-01-31

## 2022-01-31 NOTE — CARE COORDINATION
Follow Up Call    Challenges to be reviewed by the provider   Additional needs identified to be addressed with provider: No  none                 Encounter was not routed to provider for escalation. Method of communication with provider:  none. Contacted the patient by telephone to follow up after ED. Status: improved  Interventions to address identified needs: Scheduled appointment with PCP-2022  Obtained and reviewed discharge summary and/or continuity of care documents    [facility] follow up appointment(s):   Future Appointments   Date Time Provider Didier Burton   2022  1:00 PM Neha Kamara MD [facility] [email or web address]   2022 12:15 PM Sami Leavitt, [address]     [facility] Joyce Almonte follow up appointment(s): NA   Follow up appointment completed? Scheduled for . Provided contact information for future needs. ACM protocol based on severity of symptoms and risk factors. Plan for next call: symptom management-  Advised on zone sheet, symptom management, reporting worsening symptoms, deep breathing exercises, staying active, and hydration. Still having cough and congestion. No fever. He is supposed to go back to work today. Requesting follow up with PCP office. Scheduled tomorrow. Also, Dr. Mc Garner dismissed from GI office. New referral needed.      Symptom onset   COVID positive     Trent Tavarez RN

## 2022-02-01 ENCOUNTER — OFFICE VISIT (OUTPATIENT)
Dept: FAMILY MEDICINE CLINIC | Age: 47
End: 2022-02-01
Payer: COMMERCIAL

## 2022-02-01 VITALS
BODY MASS INDEX: 29.9 KG/M2 | RESPIRATION RATE: 18 BRPM | DIASTOLIC BLOOD PRESSURE: 84 MMHG | WEIGHT: 225.6 LBS | TEMPERATURE: 96.6 F | HEART RATE: 95 BPM | HEIGHT: 73 IN | SYSTOLIC BLOOD PRESSURE: 122 MMHG | OXYGEN SATURATION: 96 %

## 2022-02-01 DIAGNOSIS — I50.32 CHRONIC DIASTOLIC HEART FAILURE (HCC): ICD-10-CM

## 2022-02-01 DIAGNOSIS — J44.1 COPD EXACERBATION (HCC): ICD-10-CM

## 2022-02-01 DIAGNOSIS — U07.1 COVID-19: Primary | ICD-10-CM

## 2022-02-01 DIAGNOSIS — Z86.19 HISTORY OF HELICOBACTER PYLORI INFECTION: ICD-10-CM

## 2022-02-01 PROCEDURE — 4004F PT TOBACCO SCREEN RCVD TLK: CPT | Performed by: STUDENT IN AN ORGANIZED HEALTH CARE EDUCATION/TRAINING PROGRAM

## 2022-02-01 PROCEDURE — 3023F SPIROM DOC REV: CPT | Performed by: STUDENT IN AN ORGANIZED HEALTH CARE EDUCATION/TRAINING PROGRAM

## 2022-02-01 PROCEDURE — G8427 DOCREV CUR MEDS BY ELIG CLIN: HCPCS | Performed by: STUDENT IN AN ORGANIZED HEALTH CARE EDUCATION/TRAINING PROGRAM

## 2022-02-01 PROCEDURE — G8482 FLU IMMUNIZE ORDER/ADMIN: HCPCS | Performed by: STUDENT IN AN ORGANIZED HEALTH CARE EDUCATION/TRAINING PROGRAM

## 2022-02-01 PROCEDURE — G8417 CALC BMI ABV UP PARAM F/U: HCPCS | Performed by: STUDENT IN AN ORGANIZED HEALTH CARE EDUCATION/TRAINING PROGRAM

## 2022-02-01 PROCEDURE — 99214 OFFICE O/P EST MOD 30 MIN: CPT | Performed by: STUDENT IN AN ORGANIZED HEALTH CARE EDUCATION/TRAINING PROGRAM

## 2022-02-01 RX ORDER — FAMOTIDINE 20 MG/1
20 TABLET, FILM COATED ORAL 2 TIMES DAILY
Qty: 60 TABLET | Refills: 3 | Status: SHIPPED | OUTPATIENT
Start: 2022-02-01 | End: 2022-02-01

## 2022-02-01 RX ORDER — FAMOTIDINE 20 MG/1
20 TABLET, FILM COATED ORAL 2 TIMES DAILY
Qty: 60 TABLET | Refills: 0 | Status: SHIPPED | OUTPATIENT
Start: 2022-02-01

## 2022-02-01 RX ORDER — AZITHROMYCIN 250 MG/1
250 TABLET, FILM COATED ORAL SEE ADMIN INSTRUCTIONS
Qty: 6 TABLET | Refills: 0 | Status: SHIPPED | OUTPATIENT
Start: 2022-02-01 | End: 2022-02-06

## 2022-02-01 ASSESSMENT — ENCOUNTER SYMPTOMS
VOMITING: 0
NAUSEA: 0
SORE THROAT: 1
RHINORRHEA: 1
COUGH: 1
ABDOMINAL PAIN: 0
DIARRHEA: 0
SHORTNESS OF BREATH: 1

## 2022-02-01 ASSESSMENT — PATIENT HEALTH QUESTIONNAIRE - PHQ9
10. IF YOU CHECKED OFF ANY PROBLEMS, HOW DIFFICULT HAVE THESE PROBLEMS MADE IT FOR YOU TO DO YOUR WORK, TAKE CARE OF THINGS AT HOME, OR GET ALONG WITH OTHER PEOPLE: 0
SUM OF ALL RESPONSES TO PHQ QUESTIONS 1-9: 0
8. MOVING OR SPEAKING SO SLOWLY THAT OTHER PEOPLE COULD HAVE NOTICED. OR THE OPPOSITE, BEING SO FIGETY OR RESTLESS THAT YOU HAVE BEEN MOVING AROUND A LOT MORE THAN USUAL: 0
5. POOR APPETITE OR OVEREATING: 0
1. LITTLE INTEREST OR PLEASURE IN DOING THINGS: 0
3. TROUBLE FALLING OR STAYING ASLEEP: 0
9. THOUGHTS THAT YOU WOULD BE BETTER OFF DEAD, OR OF HURTING YOURSELF: 0
SUM OF ALL RESPONSES TO PHQ QUESTIONS 1-9: 0
2. FEELING DOWN, DEPRESSED OR HOPELESS: 0
4. FEELING TIRED OR HAVING LITTLE ENERGY: 0
7. TROUBLE CONCENTRATING ON THINGS, SUCH AS READING THE NEWSPAPER OR WATCHING TELEVISION: 0
SUM OF ALL RESPONSES TO PHQ QUESTIONS 1-9: 0
SUM OF ALL RESPONSES TO PHQ9 QUESTIONS 1 & 2: 0
6. FEELING BAD ABOUT YOURSELF - OR THAT YOU ARE A FAILURE OR HAVE LET YOURSELF OR YOUR FAMILY DOWN: 0
SUM OF ALL RESPONSES TO PHQ QUESTIONS 1-9: 0

## 2022-02-01 NOTE — PROGRESS NOTES
Hamilton Guerrero (:  1975) is a 40 y.o. male,Established patient, here for evaluation of the following chief complaint(s):  Positive For Covid-19 (Pt presents c/o continuing COVID sxs that are worsening.  )         ASSESSMENT/PLAN:  1. COVID-19  -     famotidine (PEPCID) 20 MG tablet; Take 1 tablet by mouth 2 times daily, Disp-60 tablet, R-0Normal  2. COPD exacerbation (HCC)  -     azithromycin (ZITHROMAX) 250 MG tablet; Take 1 tablet by mouth See Admin Instructions for 5 days 500mg on day 1 followed by 250mg on days 2 - 5, Disp-6 tablet, R-0Normal  -     famotidine (PEPCID) 20 MG tablet; Take 1 tablet by mouth 2 times daily, Disp-60 tablet, R-0Normal  3. Chronic diastolic heart failure ([facility] [address]  4. History of Helicobacter pylori infection  -     H. Pylori Antigen, Stool; Future    Plan  Start a zpack to cover for possible COPD exacerbation  Start pepcid 20mg twice daily for a month will taper once feeling better  Collect a stool sample for H. Pylori testing  Get plenty of rest, small frequent meals. Drink plenty of fluids   Continue vitamin D, C, and zinc over the counter   Continue rest or meds as prescribed   Work note given. Can return to work in 1 week or sooner if feeling better. Return in about 2 weeks (around 2/15/2022) for COVID 19. Subjective   SUBJECTIVE/OBJECTIVE:  HPI    Follow up from  for COVID. Symptoms started 8 days ago. Was diagnosed 7 days ago at [facility]. Symptoms include chest pain worse with breathing and coughing, headache, generalized weakness, cough with yellow and white mucus productions. Has loss of taste. Dry throat/sore throat. Drinking plenty plenty of fluids. Fatigue causing sleepiness. Decreased appetite. Has had fevers at home, with tmax of 102.7 yesterday. Tylenol and ibuprofen helping. Does feel SOB especially with exertion. Does not have a pulse ox at home. Has been doing breathing treatments, which helps him expectorate. Completed monoclonal antibodies. Completed 7 days of steroid, vit C, vit D, and zinc. Still not feeling well enough to go to work. Did get the [address] x2. Was going to get his booster, but got sick. COPD: Albuterol as needed, taking spiriva daily, and doing nebulizer treatments 3 times a day. CHF: Takes lasix daily. Seems controlled. No increased edema. History of H. Pylori: not sure if he was fully treated in the past, but has not had any issue with it until he got COVID. Now having a lot of burping with \"rotten egg smell. \"    Also has hx of gastroparesis on reglan. Suppose to take TID, but often forgets to take it prior to dinner. Does take it twice a day. Review of Systems   Constitutional: Positive for activity change, appetite change, fatigue and fever. HENT: Positive for congestion, rhinorrhea and sore throat. Respiratory: Positive for cough and shortness of breath. Cardiovascular: Positive for chest pain. Negative for palpitations and leg swelling. Gastrointestinal: Negative for abdominal pain, diarrhea, nausea and vomiting. Genitourinary: Negative for difficulty urinating and dysuria. Musculoskeletal: Positive for myalgias. Negative for neck stiffness. Skin: Negative for rash and wound. Neurological: Negative for numbness and headaches. Objective +  Physical Exam  Vitals and nursing note reviewed. Constitutional:       General: He is not in acute distress. HENT:      Head: Normocephalic and atraumatic. Right Ear: External ear normal.      Left Ear: External ear normal.      Nose: No congestion or rhinorrhea. Eyes:      General:         Right eye: No discharge. Left eye: No discharge. Cardiovascular:      Rate and Rhythm: Normal rate and regular rhythm. Pulmonary:      Effort: Pulmonary effort is normal.      Breath sounds: No wheezing. Abdominal:      General: Abdomen is flat. Palpations: Abdomen is soft. Tenderness: There is no abdominal tenderness. Musculoskeletal:         General: No swelling. Normal range of motion. Cervical back: Normal range of motion. Skin:     General: Skin is warm. Findings: No rash. Neurological:      Mental Status: He is alert and oriented to person, place, and time. An electronic signature was used to authenticate this note.     --Lynnette Arias MD

## 2022-02-01 NOTE — PATIENT INSTRUCTIONS
Start a zpack to cover for possible COPD exacerbation  Start pepcid 20mg twice daily for a month will taper once feeling better  Collect a stool sample for H. Pylori testing  Get plenty of rest, small frequent meals.  Drink plenty of fluids   Continue vitamin D, C, and zinc over the counter

## 2022-02-01 NOTE — LETTER
[address] [place] [address],[address] [facility] [address] [place] [address]  Phone: [phone]  Fax: [phone]    Justin Heard MD        February 1, 2022     Patient: Gil Thomas   YOB: 1975   Date of Visit: 2/1/2022       To Whom It May Concern: It is my medical opinion that Roosvelt Goodpasture should remain out of work until 02/08/22, or until synptoms improve . If you have any questions or concerns, please don't hesitate to call.     Sincerely,        Justin Heard MD

## 2022-02-01 NOTE — PROGRESS NOTES
FIONA Hansen Setting is a 40 y.o.male    Chief Complaint   Patient presents with    Positive For Covid-19     Pt presents c/o continuing COVID sxs that are worsening. Chief complaint, [place], and all pertinent details of the case reviewed with the resident. Please see resident's note for specific details discussed at today's visit.         Patient Active Problem List   Diagnosis    Long-term current use of methadone for opiate dependence ([facility]    Tobacco abuse    Hepatitis B    History of hepatitis C    Gastroesophageal reflux disease    Chronic diastolic heart failure ([facility] [facility]    Abscess    Establishing care with new doctor, encounter for    Essential hypertension    Plantar fasciitis of right foot    Polyneuropathy associated with underlying disease ([facility] [address]    Class 2 severe obesity due to excess calories with serious comorbidity and body mass index (BMI) of 38.0 to 38.9 in adult ([facility] [address] [facility]    Mild intermittent asthma without complication    Pulmonary nodules/lesions, multiple    COPD (chronic obstructive pulmonary disease) ([facility] [facility]    Bipolar affective disorder, current episode depressed ([facility] [facility]    Neuropathy    [facility]    IFG (impaired fasting glucose)       Current Outpatient Medications   Medication Sig Dispense Refill    azithromycin (ZITHROMAX) 250 MG tablet Take 1 tablet by mouth See Admin Instructions for 5 days 500mg on day 1 followed by 250mg on days 2 - 5 6 tablet 0    famotidine (PEPCID) 20 MG tablet Take 1 tablet by mouth 2 times daily 60 tablet 0    tiZANidine (ZANAFLEX) 4 MG tablet TAKE 1 TABLET BY MOUTH THREE TIMES DAILY AS NEEDED FOR MUSCLE SPASM AND FOR PAIN 60 tablet 0    molnupiravir 200 MG capsule Take 4 capsules by mouth every 12 hours for 5 days 40 capsule 0    acetaminophen (TYLENOL) 500 MG tablet Take 1 tablet by mouth every 4 hours as needed for Pain or Fever 20 tablet 0    ascorbic acid (VITAMIN C) 500 MG tablet Take 1 tablet by mouth 2 times daily for 7 days 14 tablet 0    zinc sulfate (ZINCATE) 220 (50 Zn) MG capsule Take 1 capsule by mouth daily for 7 days 7 capsule 0    dexamethasone (DECADRON) 6 MG tablet Take 1 tablet by mouth 2 times daily (with meals) for 10 days 20 tablet 0    lisinopril (PRINIVIL;ZESTRIL) 5 MG tablet Take 1 tablet by mouth once daily 90 tablet 0    EQ LORATADINE 10 MG tablet Take 1 tablet by mouth nightly 90 tablet 1    pantoprazole (PROTONIX) 40 MG tablet Take 1 tablet by mouth once daily 90 tablet 1    metoclopramide (REGLAN) 5 MG tablet 3 times daily      potassium chloride (KLOR-CON M) 20 MEQ extended release tablet Take 1 tablet by mouth daily as needed (with lasix) 30 tablet 2    atorvastatin (LIPITOR) 10 MG tablet Take 1 tablet by mouth daily 90 tablet 1    ketorolac (TORADOL) 10 MG tablet Take 1 tablet by mouth every 6 hours as needed for Pain 20 tablet 0    furosemide (LASIX) 40 MG tablet       gabapentin (NEURONTIN) 800 MG tablet TAKE 1 TABLET BY MOUTH THREE TIMES DAILY 90 tablet 5    fluticasone (FLOVENT HFA) 220 MCG/ACT inhaler Inhale 2 puffs into the lungs 2 times daily 1 each 3    tiotropium (SPIRIVA HANDIHALER) 18 MCG inhalation capsule Inhale 1 puff by mouth once daily 90 capsule 3    escitalopram (LEXAPRO) 10 MG tablet Take 1 tablet by mouth once daily 90 tablet 1    naloxone 4 MG/0.1ML LIQD nasal spray Naloxone Hcl Active 4 MG NA One Time Only 1 1 August 20th, 2020 10:59am      ipratropium-albuterol (DUONEB) 0.5-2.5 (3) MG/3ML SOLN nebulizer solution Take 3 mLs by nebulization every 6 hours as needed for Shortness of Breath 360 vial 3    Multiple Vitamin (MULTIVITAMIN PO) Take by mouth daily      ondansetron (ZOFRAN ODT) 4 MG disintegrating tablet Take 1 tablet by mouth every 8 hours as needed for Nausea 20 tablet 0    tenofovir disoproxil fumarate (VIREAD) 300 MG tablet Take 300 mg by mouth daily      lidocaine (LIDODERM) 5 % Place 1 patch onto the skin daily 12 hours on, 12 hours Value Date    CHOL 157 07/01/2021    TRIG 151 07/01/2021    HDL 29 07/01/2021    LDLCALC 98 07/01/2021       The 10-year ASCVD risk score (Samantha Drummond et al., 2013) is: 14.1%    Values used to calculate the score:      Age: 40 years      Sex: Male      Is Non- : No      Diabetic: Yes      Tobacco smoker: Yes      Systolic Blood Pressure: 723 mmHg      Is BP treated: Yes      HDL Cholesterol: 29 mg/dL      Total Cholesterol: 157 mg/dL    Lab Results   Component Value Date     11/04/2021    K 4.6 11/04/2021     11/04/2021    CO2 18 (L) 11/04/2021    BUN 12 11/04/2021    CREATININE 0.8 11/04/2021    GLUCOSE 125 (H) 11/04/2021    CALCIUM 9.5 11/04/2021    PROT 7.5 11/04/2021    LABALBU 4.3 11/04/2021    BILITOT 0.8 11/04/2021    ALKPHOS 131 (H) 11/04/2021    AST 23 11/04/2021    ALT 16 11/04/2021    LABGLOM >90 11/04/2021    GFRAA >60 02/26/2019    GLOB NOT REPORTED 04/05/2018       Lab Results   Component Value Date    LABMICR < 1.20 07/01/2021       Lab Results   Component Value Date    TSH 1.78 10/11/2017    FT3 3.37 10/11/2017    T4FREE 1.14 05/07/2013       Lab Results   Component Value Date    WBC 15.2 (H) 11/04/2021    HGB 16.7 11/04/2021    HCT 49.4 11/04/2021    MCV 91.8 11/04/2021     11/04/2021         Immunization History   Administered Date(s) Administered    COVID-19, Pfizer Purple top, DILUTE for use, 12+ yrs, 30mcg/0.3mL dose 05/30/2021, 07/01/2021    Hepatitis A/Hepatitis B (Twinrix) 06/12/2013    Influenza Vaccine, unspecified formulation 11/13/2015, 09/21/2017    Influenza Virus Vaccine 11/13/2015, 01/31/2020    Influenza, MDCK Quadv, IM, PF (Flucelvax 2 yrs and older) 11/08/2021    Influenza, Loletta Flatten, IM, (6 mo and older Fluzone, Flulaval, Fluarix and 3 yrs and older Afluria) 09/21/2017, 01/31/2020    Influenza, Quadv, IM, PF (6 mo and older Fluzone, Flulaval, Fluarix, and 3 yrs and older Afluria) 09/28/2018, 10/08/2020    Pneumococcal Conjugate 13-valent ([id number]) 07/17/2015    Pneumococcal Polysaccharide ([id number]) 09/21/2017    Tdap (Boostrix, Adacel) 10/08/2012, 06/12/2013, 11/13/2015       Health Maintenance   Topic Date Due    Depression Monitoring  Never done    Hepatitis A vaccine (2 of 3 - Hep A Twinrix risk 3-dose series) 07/10/2013    Diabetic retinal exam  06/04/2019    Colon cancer screen colonoscopy  Never done    COVID-19 Vaccine (3 - Booster for Pfizer series) 12/01/2021    Diabetic foot exam  01/21/2022    Hepatitis B vaccine (2 of 3 - Hep B Twinrix risk 3-dose series) 07/01/2022 (Originally 7/10/2013)    Diabetic microalbuminuria test  07/01/2022    Lipid screen  07/01/2022    A1C test (Diabetic or Prediabetic)  08/27/2022    Potassium monitoring  11/04/2022    Creatinine monitoring  11/04/2022    DTaP/Tdap/Td vaccine (4 - Td or Tdap) 11/13/2025    Pneumococcal 0-64 years Vaccine (2 of 2 - PPSV23) 02/18/2041    Flu vaccine  Completed    HIV screen  Completed    Hib vaccine  Aged Out    Meningococcal (ACWY) vaccine  Aged Out       Future Appointments   Date Time Provider Didier Kingisti   2/24/2022  9:40 AM Emir Wolff MD [facility]   5/13/2022 12:15 PM Ayah Al, [address]       ASSESSMENT       Diagnosis Orders   1. COVID-19  famotidine (PEPCID) 20 MG tablet    DISCONTINUED: famotidine (PEPCID) 20 MG tablet   2. COPD exacerbation (HCC)  azithromycin (ZITHROMAX) 250 MG tablet    famotidine (PEPCID) 20 MG tablet    DISCONTINUED: famotidine (PEPCID) 20 MG tablet   3. Chronic diastolic heart failure ([address]     4.  History of Helicobacter pylori infection  H. Pylori Antigen, Stool       PLAN      After discussion with pt and Dr. Geraldo Mann, we agreed on plan as follows:    Continue vitamin C, vitamin D, and zinc at this time  Start Zithromax Z-LATONYA as directed  Consider PA and lateral chest x-ray in future if symptoms do not resolve with above treatment  Small, frequent meals with plenty of fluids  Get plenty of rest  Continue current medicines otherwise  Check H. pylori stool antigen at this time  Add Pepcid 20 mg twice daily x1 month  Follow-up in 2 weeks for reevaluation-we will try to taper Pepcid as quickly as possible if tolerated. Attending Physician Statement  I have discussed the case, including pertinent history and exam findings with the resident. I also have seen the patient and performed key portions of the examination. I agree with the documented assessment and plan as documented by the resident.   GC modifier added to this encounter     Electronically signed by Shira Hernandez MD on 2/1/2022 at 5:32 PM

## 2022-02-07 ENCOUNTER — TELEMEDICINE (OUTPATIENT)
Dept: FAMILY MEDICINE CLINIC | Age: 47
End: 2022-02-07
Payer: COMMERCIAL

## 2022-02-07 ENCOUNTER — CARE COORDINATION (OUTPATIENT)
Dept: CARE COORDINATION | Age: 47
End: 2022-02-07

## 2022-02-07 DIAGNOSIS — U07.1 COVID-19: Primary | ICD-10-CM

## 2022-02-07 DIAGNOSIS — J44.1 COPD EXACERBATION (HCC): ICD-10-CM

## 2022-02-07 DIAGNOSIS — R06.02 SOB (SHORTNESS OF BREATH): ICD-10-CM

## 2022-02-07 PROCEDURE — G8427 DOCREV CUR MEDS BY ELIG CLIN: HCPCS | Performed by: STUDENT IN AN ORGANIZED HEALTH CARE EDUCATION/TRAINING PROGRAM

## 2022-02-07 PROCEDURE — 99213 OFFICE O/P EST LOW 20 MIN: CPT | Performed by: STUDENT IN AN ORGANIZED HEALTH CARE EDUCATION/TRAINING PROGRAM

## 2022-02-07 RX ORDER — PREDNISONE 20 MG/1
TABLET ORAL
Qty: 15 TABLET | Refills: 0 | Status: SHIPPED | OUTPATIENT
Start: 2022-02-07 | End: 2022-03-30 | Stop reason: ALTCHOICE

## 2022-02-07 ASSESSMENT — ENCOUNTER SYMPTOMS
RHINORRHEA: 1
ABDOMINAL PAIN: 0
COUGH: 1
SHORTNESS OF BREATH: 1
DIARRHEA: 0
CONSTIPATION: 0
WHEEZING: 0

## 2022-02-07 NOTE — LETTER
[address] [place] [address],[address] [facility] [address] [place] [address]  Phone: [phone]  Fax: [phone]    Ricky Aly MD        February 7, 2022     Patient: Neil Xie   YOB: 1975   Date of Visit: 2/7/2022       To Whom It May Concern: It is my medical opinion that Jose Trujilloer should remain out of work until 02/15/22. May return sooner if symptoms improved and patient afebrile for 24hours. .    If you have any questions or concerns, please don't hesitate to call.     Sincerely,        Ricky Aly MD

## 2022-02-07 NOTE — PROGRESS NOTES
continue tylenol, start prednisone for reduction in pulmonary inflammation and obtain chest xray. Will plan to follow up in 1 week to determine need for more intensive work up for post covid complications. Return in about 1 week (around 2/14/2022) for COVID and SOB. Orders Placed:  Orders Placed This Encounter   Procedures    XR CHEST STANDARD (2 VW)     Medications Prescribed:  Orders Placed This Encounter   Medications    predniSONE (DELTASONE) 20 MG tablet     Sig: Take two tablets once a day for 5 days, then decrease to 1 tablet once a day for 5 days     Dispense:  15 tablet     Refill:  0       Future Appointments   Date Time Provider Didier Burton   2/24/2022  9:40 AM Nakul Phillips MD [facility] [place]   5/13/2022 12:15 PM [facility] [address] [facility] Due   Topic Date Due    Hepatitis A vaccine (2 of 3 - Hep A Twinrix risk 3-dose series) 07/10/2013    Diabetic retinal exam  06/04/2019    Colon cancer screen colonoscopy  Never done    COVID-19 Vaccine (3 - Booster for Celestin Peter series) 12/01/2021    Diabetic foot exam  01/21/2022         Attending Physician Statement  I have discussed the case, including pertinent history and exam findings with the resident. I agree with the documented assessment and plan as documented by the resident. GE modifier added to this encounter    2/7/2022    TELEHEALTH EVALUATION -- Audio/Visual (During [facility]- public health emergency)      An electronic signature was used to authenticate this note.      Connie Mack DO 2/8/2022 3:02 PM

## 2022-02-07 NOTE — PATIENT INSTRUCTIONS
cups, towels, and bedding. ? Wash your hands often and well. Use soap and water, and scrub for at least 20 seconds. This is especially important after you've been around the sick person or touched things they've touched. If soap and water aren't handy, use an alcohol-based hand . ? Wear a mask when caring for someone who is sick. And wear a mask when you're around other people after you've cared for someone who's sick. ? Avoid touching your mouth, nose, and eyes. ? Take care with the person's laundry. It's okay to wash the sick person's laundry with yours. If you have them, wear disposable gloves when handling their dirty laundry, and wash your hands well after you touch it. Wash items in the warmest water allowed for the fabric type, and dry them completely. ? Clean high-touch items every day and anytime the sick person touches them. These include doorknobs, light switches, toilets, counters, and remote controls. Use a household disinfectant or a homemade bleach solution. (Follow the directions on the label.) If the sick person has their own room, have them disinfect it every day. ? Avoid having visitors. If you have to have visitors, everyone needs to wear a mask and stay at least 6 feet (2 meters) away from you. And keep the visit as short as possible. To help protect family and friends, stay in touch with them only by phone or computer. ? If you haven't had COVID-19 in the past 3 months or aren't fully vaccinated, you may need to quarantine. Where can you learn more? Go to [email or web address] and sign in to your [employer] account. Enter G497 in the [employer] box to learn more about \"Learning How To Care for Someone Who Has COVID-19. \"     If you do not have an account, please click on the \"Sign Up Now\" link. Current as of: March 26, 2021               Content Version: 13.1  © 7287-2977 [employer]wise, [employer].    Care instructions adapted under license by [facility]. If you have questions about a medical condition or this instruction, always ask your healthcare professional. [place] [address] any warranty or liability for your use of this information.

## 2022-02-07 NOTE — PROGRESS NOTES
Devorah Perkins (:  1975) is a Established patient, here for evaluation of the following:    Assessment & Plan   Below is the assessment and plan developed based on review of pertinent history, physical exam, labs, studies, and medications. 1. COVID-19  -     predniSONE (DELTASONE) 20 MG tablet; Take two tablets once a day for 5 days, then decrease to 1 tablet once a day for 5 days, Disp-15 tablet, R-0Normal  2. SOB (shortness of breath)  -     XR CHEST STANDARD (2 VW); Future  -     predniSONE (DELTASONE) 20 MG tablet; Take two tablets once a day for 5 days, then decrease to 1 tablet once a day for 5 days, Disp-15 tablet, R-0Normal  3. COPD exacerbation (HCC)  -     XR CHEST STANDARD (2 VW); Future  -     predniSONE (DELTASONE) 20 MG tablet; Take two tablets once a day for 5 days, then decrease to 1 tablet once a day for 5 days, Disp-15 tablet, R-0Normal    Plan  Get CXR. Start Steroid taper 40mg x5 days then 20mg x5. Discussed with patient that steroids can increase blood sugars and cause some anxiety. He understands and agrees to call if he develops any issues with them. Excused from work. Follow up in 1 week. Can return sooner if symptoms improve. Consider PFTs if symptoms and pulm workup if symptoms fail to improve  Continue tylenol and motrin for fevers and pain  Small frequent meals. Drink plenty of fluids  Can use nebulizer Q4-6 hours as needed      Return in about 1 week (around 2022) for COVID and SOB. Subjective   HPI   Follow up for COVID 19  Diagnosed 22. 12 days out. States that he was starting to feel better, but then got worse 3 days ago. 3 days ago he woke up with chills and a fever of 103.5. The next day it was 101.8. Today he had a temp of 100.3. Still having a cough, which is non productive. Also having runny nose. SOB- Not improving. Still using his nebulizer 3 times daily and his flovent twice daily. Did recently complete a zpack for possible copd exacerbation. Overall symptoms did not get worse. However, he does not feel like he can work, and is requesting a work note. Review of Systems   Constitutional: Positive for chills, fatigue and fever. HENT: Positive for congestion and rhinorrhea. Respiratory: Positive for cough and shortness of breath. Negative for wheezing. Cardiovascular: Negative for chest pain and palpitations. Gastrointestinal: Negative for abdominal pain, constipation and diarrhea. Musculoskeletal: Positive for myalgias.        Objective   Patient-Reported Vitals  No data recorded     Physical Exam  [INSTRUCTIONS:  \"[x]\" Indicates a positive item  \"[]\" Indicates a negative item  -- DELETE ALL ITEMS NOT EXAMINED]    Constitutional: [x] Appears well-developed and well-nourished [x] No apparent distress      [] Abnormal -     Mental status: [x] Alert and awake  [x] Oriented to person/place/time [x] Able to follow commands    [] Abnormal -     Eyes:   EOM    [x]  Normal    [] Abnormal -   Sclera  [x]  Normal    [] Abnormal -          Discharge [x]  None visible   [] Abnormal -     HENT: [x] Normocephalic, atraumatic  [] Abnormal -   [x] Mouth/Throat: Mucous membranes are moist    External Ears [x] Normal  [] Abnormal -    Neck: [x] No visualized mass [] Abnormal -     Pulmonary/Chest: [x] Respiratory effort normal   [x] No visualized signs of difficulty breathing or respiratory distress        [] Abnormal -      Musculoskeletal:   [x] Normal gait with no signs of ataxia         [x] Normal range of motion of neck        [] Abnormal -     Neurological:        [x] No Facial Asymmetry (Cranial nerve 7 motor function) (limited exam due to video visit)          [x] No gaze palsy        [] Abnormal -          Skin:        [x] No significant exanthematous lesions or discoloration noted on facial skin         [] Abnormal -            Psychiatric:       [x] Normal Affect [] Abnormal -        [x] No Hallucinations    Other pertinent observable physical exam findings:-                 Theodore Valadez, was evaluated through a synchronous (real-time) audio-video encounter. The patient (or guardian if applicable) is aware that this is a billable service, which includes applicable co-pays. This Virtual Visit was conducted with patient's (and/or legal guardian's) consent. The visit was conducted pursuant to the emergency declaration under the [address] [place] [address] authority and the [employer] Act. Patient identification was verified, and a caregiver was present when appropriate. The patient was located at home in a state where the provider was licensed to provide care.        --Yahaira Felder MD

## 2022-02-07 NOTE — CARE COORDINATION
Attempted care management follow up and covid19 monitoring. Left message to return phone call to ambulatory care manager at [phone].   Plan -   PCP today  z-pack  pepcid  Report new or worsening symptoms asap  CHF zones - report weight gain 3 lbs in 1 day or 5 in 1 week, leg swelling, bloating, SOB  Low salt diet  Medication adherence lasix and K+ daily  BH with [facility] ordered by PCP  Patient rescheduling, 2K, Bilateral LE vascular duplex with exercise ARNOLD, if on BP meds take them  [facility] coverage  Early symptom recognition and reporting to prevent ED and admissions  Smoking cessation when ready

## 2022-02-11 ENCOUNTER — CARE COORDINATION (OUTPATIENT)
Dept: CARE COORDINATION | Age: 47
End: 2022-02-11

## 2022-02-11 NOTE — LETTER
[facility]  No information on file. Trent Tavarez RN        2022     Patient: Parmjit Santiago   YOB: 1975   Date of Visit: 2022       To Whom It May Concern: It is my medical opinion that Thania Carranza may return to work on 22 without restrictions . If you have any questions or concerns, please don't hesitate to call.     Sincerely,        Electronically signed by Neha Kamara MD on 2022 at 12:27 PM

## 2022-02-11 NOTE — CARE COORDINATION
Ambulatory Care Coordination Note  2/11/2022  CM Risk Score: 12  Charlson 10 Year Mortality Risk Score: 98%     ACC: Jamey Burleson RN    Summary Note:     Call from patient    Feeling better. COVID positive 1/26. PCP follow up 2/7. Follow up CXR and prednisone taper ordered. States he will get CXR today or Monday. Requesting return to work letter to return Monday with no restrictions. Can pull letter from my chart. Plan-  CXR  Return to work   CHF zones - report weight gain 3 lbs in 1 day or 5 in 1 week, leg swelling, bloating, SOB  Low salt diet  Medication adherence lasix and K+ daily  BH with [facility] ordered by PCP  Patient rescheduling, 2K, Bilateral LE vascular duplex with exercise ARNOLD, if on BP meds take them  [facility]  Early symptom recognition and reporting to prevent ED and admissions  Smoking cessation when ready        Care Coordination Interventions    Program Enrollment: Complex Care  Referral from Primary Care Provider: Yes  Suggested Interventions and Community Resources  Diabetes Education: In Process  Smoking Cessation: In Process  Specialty Services Referral: Completed (Comment: urology - kidney stone)  Other Therapy Services: Completed (Comment: DOC pain mgmt for methadone)  Transportation Support: Declined  Zone Management Tools: In Process  Other Services or Interventions: podiatry, GI         Goals Addressed    None         Prior to Admission medications    Medication Sig Start Date End Date Taking?  Authorizing Provider   predniSONE (DELTASONE) 20 MG tablet Take two tablets once a day for 5 days, then decrease to 1 tablet once a day for 5 days 2/7/22   Damaso Wilson MD   famotidine (PEPCID) 20 MG tablet Take 1 tablet by mouth 2 times daily 2/1/22   Damaso Wilson MD   tiZANidine (ZANAFLEX) 4 MG tablet TAKE 1 TABLET BY MOUTH THREE TIMES DAILY AS NEEDED FOR MUSCLE SPASM AND FOR PAIN 1/27/22   Rahat Jorge DO   acetaminophen (TYLENOL) 500 MG tablet Take 1 tablet by mouth every 4 hours as needed for Pain or Fever 1/26/22 2/5/22  CELIO Reed CNP   ascorbic acid (VITAMIN C) 500 MG tablet Take 1 tablet by mouth 2 times daily for 7 days 1/26/22 2/2/22  CELIO Reed - CNP   zinc sulfate (ZINCATE) 220 (50 Zn) MG capsule Take 1 capsule by mouth daily for 7 days 1/26/22 2/2/22  CELIO Reed CNP   lisinopril (PRINIVIL;ZESTRIL) 5 MG tablet Take 1 tablet by mouth once daily 1/17/22   Rajendra Flanagan DO   EQ LORATADINE 10 MG tablet Take 1 tablet by mouth nightly 12/6/21   Bill Hebert MD   pantoprazole (PROTONIX) 40 MG tablet Take 1 tablet by mouth once daily 12/6/21   Bill Hebert MD   metoclopramide (REGLAN) 5 MG tablet 3 times daily 10/26/21   Historical Provider, MD   potassium chloride (KLOR-CON M) 20 MEQ extended release tablet Take 1 tablet by mouth daily as needed (with lasix) 11/8/21   Bill Hebert MD   atorvastatin (LIPITOR) 10 MG tablet Take 1 tablet by mouth daily 11/8/21   Bill Hebert MD   ketorolac (TORADOL) 10 MG tablet Take 1 tablet by mouth every 6 hours as needed for Pain 10/28/21   [facility] Citizen, APRN - CNP   furosemide (LASIX) 40 MG tablet  9/17/21   Historical Provider, MD   gabapentin (NEURONTIN) 800 MG tablet TAKE 1 TABLET BY MOUTH THREE TIMES DAILY 9/23/21 3/23/22  Rajendra Flanagan DO   magnesium (MAGNESIUM-OXIDE) 250 MG TABS tablet Take 1 tablet by mouth 2 times daily  Patient taking differently: Take 250 mg by mouth daily  9/23/21 1/26/22  Theo Govea DO   fluticasone (FLOVENT HFA) 220 MCG/ACT inhaler Inhale 2 puffs into the lungs 2 times daily 9/23/21 9/23/22  Theo Govea DO   tiotropium (SPIRIVA HANDIHALER) 18 MCG inhalation capsule Inhale 1 puff by mouth once daily 8/27/21   Rea Ocasio DO   escitalopram (LEXAPRO) 10 MG tablet Take 1 tablet by mouth once daily 8/12/21   Rajendra Flanagan DO   naloxone 4 MG/0.1ML LIQD nasal spray Naloxone Hcl Active 4 MG NA One Time Only 1 1 August 20th, 2020 10:59am 8/20/20 Historical Provider, MD   ipratropium-albuterol (DUONEB) 0.5-2.5 (3) MG/3ML SOLN nebulizer solution Take 3 mLs by nebulization every 6 hours as needed for Shortness of Breath 4/12/21   Christy Whaley, DO   Multiple Vitamin (MULTIVITAMIN PO) Take by mouth daily    Historical Provider, MD   ondansetron (ZOFRAN ODT) 4 MG disintegrating tablet Take 1 tablet by mouth every 8 hours as needed for Nausea 8/18/20   CELIO Cheney - CNP   tenofovir disoproxil fumarate (VIREAD) 300 MG tablet Take 300 mg by mouth daily 8/10/20   Historical Provider, MD   lidocaine (LIDODERM) 5 % Place 1 patch onto the skin daily 12 hours on, 12 hours off. 5/11/20   Maritza Walsh PA-C   albuterol sulfate HFA (PROVENTIL HFA) 108 (90 Base) MCG/ACT inhaler Inhale 2 puffs into the lungs every 4 hours as needed for Wheezing or Shortness of Breath (Space out to every 6 hours as symptoms improve) Space out to every 6 hours as symptoms improve. 2/16/18   Karthikeyan Bazan MD   METHADONE HCL PO Take 116 mg by mouth daily     Historical Provider, MD       Future Appointments   Date Time Provider Didier Burton   2/24/2022  9:40 AM Bing Quinones MD [facility]   5/13/2022 12:15 PM Soha Montes, [facility] [facility]     ,   Diabetes Assessment    Meal Planning: Avoidance of concentrated sweets   How often do you test your blood sugar?: Daily   Do you have barriers with adherence to non-pharmacologic self-management interventions?  (Nutrition/Exercise/Self-Monitoring): No   Have you ever had to go to the ED for symptoms of low blood sugar?: No          ,   Congestive Heart Failure Assessment    Are you currently restricting fluids?: No Restriction  Do you understand a low sodium diet?: Yes  Do you understand how to read food labels?: Yes  How many restaurant meals do you eat per week?: 1-2  Do you salt your food before tasting it?: No         Symptoms:         and   COPD Assessment    Does the patient understand envrionmental exposure?: Yes  Is the patient able to verbalize Rescue vs. Long Acting medications?: Yes  Does the patient have a nebulizer?: No            Symptoms:

## 2022-02-11 NOTE — TELEPHONE ENCOUNTER
Work release letter uploaded to [employer]. [employer] can print it out from home. If he cannot get to [employer], he can pick it up from the office or we can mail it to him, whichever he prefers. Please advise. Thank you.

## 2022-02-16 DIAGNOSIS — E11.65 TYPE 2 DIABETES MELLITUS WITH HYPERGLYCEMIA, WITH LONG-TERM CURRENT USE OF INSULIN (HCC): Chronic | ICD-10-CM

## 2022-02-16 DIAGNOSIS — Z79.4 TYPE 2 DIABETES MELLITUS WITH HYPERGLYCEMIA, WITH LONG-TERM CURRENT USE OF INSULIN (HCC): Chronic | ICD-10-CM

## 2022-02-16 DIAGNOSIS — I10 ESSENTIAL HYPERTENSION: ICD-10-CM

## 2022-02-17 RX ORDER — LISINOPRIL 5 MG/1
TABLET ORAL
Qty: 90 TABLET | Refills: 0 | OUTPATIENT
Start: 2022-02-17

## 2022-02-21 DIAGNOSIS — Z86.19 HISTORY OF HEPATITIS C: ICD-10-CM

## 2022-02-21 DIAGNOSIS — B19.10 HEPATITIS B INFECTION WITHOUT DELTA AGENT WITHOUT HEPATIC COMA, UNSPECIFIED CHRONICITY: Primary | ICD-10-CM

## 2022-02-21 NOTE — TELEPHONE ENCOUNTER
----- Message from Faye Saha sent at 2/18/2022 12:00 PM EST -----  Subject: Message to Provider    QUESTIONS  Information for Provider? pt is calling in wanting to know what to do for   a vomiting, fever of 102.8 headache, sore throat, fatigue. pt has been   sick for a month but most of these symptoms started today 2/18/2022. pt   would like a call on what he should do. pt has lost is liver doctor, pt is   on Vemlidy 300 mg 1 tab po qd pt needs this medication refilled. ---------------------------------------------------------------------------  --------------  Lear How INFO  What is the best way for the office to contact you? OK to leave message on   voicemail  Preferred Call Back Phone Number? [phone]  ---------------------------------------------------------------------------  --------------  SCRIPT ANSWERS  Relationship to Patient?  Self

## 2022-02-23 RX ORDER — TENOFOVIR ALAFENAMIDE 25 MG/1
300 TABLET ORAL DAILY
COMMUNITY
End: 2022-02-23 | Stop reason: SDUPTHER

## 2022-02-24 ENCOUNTER — TELEPHONE (OUTPATIENT)
Dept: FAMILY MEDICINE CLINIC | Age: 47
End: 2022-02-24

## 2022-02-24 DIAGNOSIS — B19.10 HEPATITIS B INFECTION WITHOUT DELTA AGENT WITHOUT HEPATIC COMA, UNSPECIFIED CHRONICITY: Primary | ICD-10-CM

## 2022-02-24 RX ORDER — TENOFOVIR ALAFENAMIDE 25 MG/1
300 TABLET ORAL DAILY
Qty: 30 TABLET | Refills: 0 | Status: SHIPPED | OUTPATIENT
Start: 2022-02-24 | End: 2022-03-30 | Stop reason: ALTCHOICE

## 2022-02-24 RX ORDER — TENOFOVIR DISOPROXIL FUMARATE 300 MG/1
300 TABLET, FILM COATED ORAL DAILY
Qty: 30 TABLET | Refills: 3 | Status: SHIPPED | OUTPATIENT
Start: 2022-02-24 | End: 2022-02-24

## 2022-02-24 RX ORDER — TENOFOVIR DISOPROXIL FUMARATE 300 MG/1
300 TABLET, FILM COATED ORAL DAILY
Qty: 30 TABLET | Refills: 0 | Status: SHIPPED | OUTPATIENT
Start: 2022-02-24 | End: 2022-04-26 | Stop reason: SDUPTHER

## 2022-02-24 NOTE — TELEPHONE ENCOUNTER
Dr. Jes Arita,   Need new Referral to Infectious Disease due to note    Type Date User Summary Attachment   Referral Entry Note 02/24/2022  2:45 PM Efrain Davis - -   Note    This office does not see Hep B. Patient will need to go to Infectious Disease Provider.

## 2022-02-24 NOTE — TELEPHONE ENCOUNTER
Dr. Crow Rao,   I spoke with Sunday Juarez and he states that he was taking 300 mg daily. Please write new script.

## 2022-02-24 NOTE — TELEPHONE ENCOUNTER
Please cancel previous order. Please call patient and verify the dosage, and amount of pills he was taking a day. Thank you.

## 2022-02-24 NOTE — TELEPHONE ENCOUNTER
Medication sent to pharmacy. Thank you. Again, I sent for a 1 month supply. The patient needs to follow with GI. Referral was placed. Please advise. Thank you.

## 2022-02-24 NOTE — TELEPHONE ENCOUNTER
The patient needs to be followed by GI. I will send a referral for different GI specialist. Please advise. Thank you.

## 2022-02-24 NOTE — TELEPHONE ENCOUNTER
[facility]. Tenofovir Alafenamide Fumarate (VEMLIDY) 25 MG TABS [[id number]]     Order Details  Dose: 300 mg Route: Oral Frequency: DAILY   Dispense Quantity: 30 tablet Refills: 0          Sig: Take 300 mg by mouth daily           You ordered 25 mg and then put dose 300 mg which means patient will have to take 12 pills per day. Please Advise.

## 2022-02-25 ENCOUNTER — TELEPHONE (OUTPATIENT)
Dept: FAMILY MEDICINE CLINIC | Age: 47
End: 2022-02-25

## 2022-02-25 RX ORDER — LISINOPRIL 5 MG/1
5 TABLET ORAL DAILY
Qty: 90 TABLET | Refills: 1 | Status: SHIPPED | OUTPATIENT
Start: 2022-02-25

## 2022-03-03 NOTE — TELEPHONE ENCOUNTER
Dr. Miguel Angel Flores,   Melissa Vel[place]quezland called back in requesting forms to be filled out in your mailbox. Melissa Blake states that without these forms being filled out and faxed he is losing pay. Please sign and Fax back.

## 2022-03-08 ENCOUNTER — CARE COORDINATION (OUTPATIENT)
Dept: CARE COORDINATION | Age: 47
End: 2022-03-08

## 2022-03-08 NOTE — CARE COORDINATION
Ambulatory Care Coordination Note  3/8/2022  CM Risk Score: 12  Charlson 10 Year Mortality Risk Score: 98%     ACC: Bart Chadwick, RN    Summary Note:     Referral placed for ID Dr. Beronica Galvezor after Dr. Samira Lopez doesn't treat Hep B. He hasn't received a call yet. Appts before 2 pm. Needs to be in [facility] by 4 pm for work. COPD and CHF stable. No recent exacerbations. Good understanding of zone management and symptom monitoring    Last a1c 5.7. Reminded to complete blood work. Established with [employer] [facility] through employer. Declined tobacco cessation program. Not ready to quit. Good medication adherence. Education complete, goals met. No new barriers. Ready for graduation. Care Coordination Interventions    Program Enrollment: Complex Care  Referral from Primary Care Provider: Yes  Suggested Interventions and Community Resources  [facility] Completed (Comment: through employer)  Diabetes Education: Completed  Smoking Cessation: In Process  Specialty Services Referral: Completed (Comment: urology - kidney stone)  Other Therapy Services: Completed (Comment: DOC pain mgmt for methadone)  Transportation Support: Declined  Zone Management Tools: Completed  Other Services or Interventions: podiatry, GI, pain mgmt (methadone)         Goals Addressed                 This Visit's Progress     COMPLETED: Conditions and Symptoms        I will schedule office visits, as directed by my provider. I will keep my appointment or reschedule if I have to cancel. I will notify my provider of any barriers to my plan of care. I will follow my Zone Management tool to seek urgent or emergent care. I will notify my provider of any symptoms that indicate a worsening of my condition.     Barriers: impairment:  physical: gastroparesis and mental health: bipolar, lack of motivation, overwhelmed by complexity of regimen, and stress  Plan for overcoming my barriers: care coordination  Confidence: 9/10  Anticipated Goal Completion Date: 12/28/21              Prior to Admission medications    Medication Sig Start Date End Date Taking?  Authorizing Provider   lisinopril (PRINIVIL;ZESTRIL) 5 MG tablet Take 1 tablet by mouth daily 2/25/22   Red Coil, DO   Tenofovir Alafenamide Fumarate (VEMLIDY) 25 MG TABS Take 300 mg by mouth daily 2/24/22   Lisseth Mayfield MD   tenofovir disoproxil fumarate (VIREAD) 300 MG tablet Take 1 tablet by mouth daily 2/24/22   Lisseth Mayfield MD   predniSONE (DELTASONE) 20 MG tablet Take two tablets once a day for 5 days, then decrease to 1 tablet once a day for 5 days 2/7/22   Lisseth Mayfield MD   famotidine (PEPCID) 20 MG tablet Take 1 tablet by mouth 2 times daily 2/1/22   Lisseth Mayfield MD   tiZANidine (ZANAFLEX) 4 MG tablet TAKE 1 TABLET BY MOUTH THREE TIMES DAILY AS NEEDED FOR MUSCLE SPASM AND FOR PAIN 1/27/22   Red Coil, DO   acetaminophen (TYLENOL) 500 MG tablet Take 1 tablet by mouth every 4 hours as needed for Pain or Fever 1/26/22 2/5/22  Lala Fabry, APRN - CNP   ascorbic acid (VITAMIN C) 500 MG tablet Take 1 tablet by mouth 2 times daily for 7 days 1/26/22 2/2/22  Lala Fabry, APRN - CNP   zinc sulfate (ZINCATE) 220 (50 Zn) MG capsule Take 1 capsule by mouth daily for 7 days 1/26/22 2/2/22  Lala Fabry, APRN - CNP   EQ LORATADINE 10 MG tablet Take 1 tablet by mouth nightly 12/6/21   Obdulia Handy MD   pantoprazole (PROTONIX) 40 MG tablet Take 1 tablet by mouth once daily 12/6/21   Obdulia Handy MD   metoclopramide (REGLAN) 5 MG tablet 3 times daily 10/26/21   Historical Provider, MD   potassium chloride (KLOR-CON M) 20 MEQ extended release tablet Take 1 tablet by mouth daily as needed (with lasix) 11/8/21   Obdulia Handy MD   atorvastatin (LIPITOR) 10 MG tablet Take 1 tablet by mouth daily 11/8/21   Obdulia Handy MD   ketorolac (TORADOL) 10 MG tablet Take 1 tablet by mouth every 6 hours as needed for Pain 10/28/21   Lindsey Sports, APRN - CNP   furosemide (LASIX) 40 MG tablet  9/17/21 Historical Provider, MD   gabapentin (NEURONTIN) 800 MG tablet TAKE 1 TABLET BY MOUTH THREE TIMES DAILY 9/23/21 3/23/22  Selene Lenz DO   magnesium (MAGNESIUM-OXIDE) 250 MG TABS tablet Take 1 tablet by mouth 2 times daily  Patient taking differently: Take 250 mg by mouth daily  9/23/21 1/26/22  Merline Dyke Ruck, DO   fluticasone (FLOVENT HFA) 220 MCG/ACT inhaler Inhale 2 puffs into the lungs 2 times daily 9/23/21 9/23/22  Merline Dyke Ruck, DO   tiotropium (SPIRIVA HANDIHALER) 18 MCG inhalation capsule Inhale 1 puff by mouth once daily 8/27/21   John Reyes,    escitalopram (LEXAPRO) 10 MG tablet Take 1 tablet by mouth once daily 8/12/21   Selene Lenz DO   naloxone 4 MG/0.1ML LIQD nasal spray Naloxone Hcl Active 4 MG NA One Time Only 1 1 August 20th, 2020 10:59am 8/20/20   Historical Provider, MD   ipratropium-albuterol (DUONEB) 0.5-2.5 (3) MG/3ML SOLN nebulizer solution Take 3 mLs by nebulization every 6 hours as needed for Shortness of Breath 4/12/21   Selene Lenz DO   Multiple Vitamin (MULTIVITAMIN PO) Take by mouth daily    Historical Provider, MD   ondansetron (ZOFRAN ODT) 4 MG disintegrating tablet Take 1 tablet by mouth every 8 hours as needed for Nausea 8/18/20   CELIO Salazar - CNP   tenofovir disoproxil fumarate (VIREAD) 300 MG tablet Take 300 mg by mouth daily 8/10/20   Historical Provider, MD   lidocaine (LIDODERM) 5 % Place 1 patch onto the skin daily 12 hours on, 12 hours off. 5/11/20   Lisa Woodruff PA-C   albuterol sulfate HFA (PROVENTIL HFA) 108 (90 Base) MCG/ACT inhaler Inhale 2 puffs into the lungs every 4 hours as needed for Wheezing or Shortness of Breath (Space out to every 6 hours as symptoms improve) Space out to every 6 hours as symptoms improve.  2/16/18   Karthikeyan Butterfield MD   METHADONE HCL PO Take 116 mg by mouth daily     Historical Provider, MD       Future Appointments   Date Time Provider Didier Burton   5/13/2022 12:15 PM Ursula Pierre MD [address] [place]     ,   Diabetes Assessment    Meal Planning: Avoidance of concentrated sweets   How often do you test your blood sugar?: Daily   Do you have barriers with adherence to non-pharmacologic self-management interventions?  (Nutrition/Exercise/Self-Monitoring): No   Have you ever had to go to the ED for symptoms of low blood sugar?: No       No patient-reported symptoms   Do you have hyperglycemia symptoms?: No   Do you have hypoglycemia symptoms?: No   Blood Sugar Monitoring Regimen: Morning Fasting   Blood Sugar Trends: No Change      ,   Congestive Heart Failure Assessment    Are you currently restricting fluids?: No Restriction  Do you understand a low sodium diet?: Yes  Do you understand how to read food labels?: Yes  How many restaurant meals do you eat per week?: 1-2  Do you salt your food before tasting it?: No     No patient-reported symptoms      Symptoms:  None: Yes      Symptom course: stable  Weight trend: stable  Salt intake watch compared to last visit: stable      and   COPD Assessment    Does the patient understand envrionmental exposure?: Yes  Is the patient able to verbalize Rescue vs. Long Acting medications?: Yes  Does the patient have a nebulizer?: No     No patient-reported symptoms         Symptoms:  None: Yes      Symptom course: stable  Breathlessness: none  Increase use of rapid acting/rescue inhaled medications?: No  Change in chronic cough?: No/At Baseline  Change in sputum?: No/At Baseline  Have you had a recent diagnosis of pneumonia either by PCP or at a hospital?: No

## 2022-03-30 ENCOUNTER — NURSE TRIAGE (OUTPATIENT)
Dept: OTHER | Facility: CLINIC | Age: 47
End: 2022-03-30

## 2022-03-30 ENCOUNTER — OFFICE VISIT (OUTPATIENT)
Dept: FAMILY MEDICINE CLINIC | Age: 47
End: 2022-03-30
Payer: COMMERCIAL

## 2022-03-30 VITALS
BODY MASS INDEX: 30.88 KG/M2 | DIASTOLIC BLOOD PRESSURE: 70 MMHG | TEMPERATURE: 97 F | OXYGEN SATURATION: 98 % | SYSTOLIC BLOOD PRESSURE: 110 MMHG | HEIGHT: 73 IN | HEART RATE: 96 BPM | RESPIRATION RATE: 16 BRPM | WEIGHT: 233 LBS

## 2022-03-30 DIAGNOSIS — A46 ERYSIPELAS: Primary | ICD-10-CM

## 2022-03-30 PROCEDURE — 99213 OFFICE O/P EST LOW 20 MIN: CPT | Performed by: STUDENT IN AN ORGANIZED HEALTH CARE EDUCATION/TRAINING PROGRAM

## 2022-03-30 PROCEDURE — G8417 CALC BMI ABV UP PARAM F/U: HCPCS | Performed by: STUDENT IN AN ORGANIZED HEALTH CARE EDUCATION/TRAINING PROGRAM

## 2022-03-30 PROCEDURE — G8482 FLU IMMUNIZE ORDER/ADMIN: HCPCS | Performed by: STUDENT IN AN ORGANIZED HEALTH CARE EDUCATION/TRAINING PROGRAM

## 2022-03-30 PROCEDURE — 4004F PT TOBACCO SCREEN RCVD TLK: CPT | Performed by: STUDENT IN AN ORGANIZED HEALTH CARE EDUCATION/TRAINING PROGRAM

## 2022-03-30 PROCEDURE — G8427 DOCREV CUR MEDS BY ELIG CLIN: HCPCS | Performed by: STUDENT IN AN ORGANIZED HEALTH CARE EDUCATION/TRAINING PROGRAM

## 2022-03-30 RX ORDER — CEPHALEXIN 500 MG/1
500 CAPSULE ORAL 4 TIMES DAILY
Qty: 28 CAPSULE | Refills: 0 | Status: SHIPPED | OUTPATIENT
Start: 2022-03-30 | End: 2022-04-06

## 2022-03-30 ASSESSMENT — ENCOUNTER SYMPTOMS
CONSTIPATION: 0
COUGH: 0
EYE PAIN: 0
SHORTNESS OF BREATH: 0
DIARRHEA: 0
ABDOMINAL PAIN: 0

## 2022-03-30 NOTE — PROGRESS NOTES
Health Maintenance Due   Topic Date Due    Diabetic retinal exam  06/04/2019    Colorectal Cancer Screen  Never done    COVID-19 Vaccine (3 - Booster for [employer] series) 12/01/2021    Diabetic foot exam  01/21/2022
S: 56 y.o. male with   Chief Complaint   Patient presents with    Swelling     Right hand swelling, pain, redness, and warm to the touch first noticed yesterday       -R hand pain, redness and swelling. No fever. Able to move hand fine w/o issues. BP Readings from Last 3 Encounters:   03/30/22 110/70   02/01/22 122/84   01/26/22 120/77     Wt Readings from Last 3 Encounters:   03/30/22 233 lb (105.7 kg)   02/01/22 225 lb 9.6 oz (102.3 kg)   01/26/22 235 lb (106.6 kg)     O: VS:  height is 6' 1\" (1.854 m) and weight is 233 lb (105.7 kg). His temporal temperature is 97 °F (36.1 °C). His blood pressure is 110/70 and his pulse is 96. His respiration is 16 and oxygen saturation is 98%. Diagnosis Orders   1. Erysipelas         Plan  Cellulitis: keflex bid, f/u next wk to reassess, sooner any changes. Reviewed ER precautions, pt understands. Health Maintenance Due   Topic Date Due    Diabetic retinal exam  06/04/2019    Colorectal Cancer Screen  Never done    COVID-19 Vaccine (3 - Booster for Celestin Peter series) 12/01/2021    Diabetic foot exam  01/21/2022         Attending Physician Statement  I have discussed the case, including pertinent history and exam findings with the resident. I agree with the documented assessment and plan as documented by the resident.   GE modifier added to this encounter      Mavis Peters DO 3/30/2022 4:01 PM
Cardiovascular:      Rate and Rhythm: Normal rate and regular rhythm. Pulses: Normal pulses. Heart sounds: Normal heart sounds. Pulmonary:      Effort: Pulmonary effort is normal.      Breath sounds: Normal breath sounds. Abdominal:      General: Abdomen is flat. Bowel sounds are normal.   Musculoskeletal:         General: Swelling (Neurovascularly intact, but has 4x6cm erythematous, nonfluctuant mass on dorsal aspect of right wrist.  ), tenderness and signs of injury present. Normal range of motion. Cervical back: Normal range of motion and neck supple. Skin:     General: Skin is warm and dry. Capillary Refill: Capillary refill takes less than 2 seconds. Neurological:      General: No focal deficit present. Mental Status: He is alert and oriented to person, place, and time. Mental status is at baseline. Psychiatric:         Mood and Affect: Mood normal.         Behavior: Behavior normal.                  An electronic signature was used to authenticate this note.     --Edgardo Hurtado MD

## 2022-03-30 NOTE — TELEPHONE ENCOUNTER
Received call from Nisha Wong at [facility] with Red Flag Complaint. Subjective: Caller states \"right hand and wrist pain\"     Current Symptoms: right hand and wrist pain with swelling, warm to touch, quarter size area or redness to wrist, denies open sores, denies drainage, denies injury/trauma, painful use of hand, pt marked swollen area yesterday and reports no change to the area     Onset: 1 day ago; worsening    Associated Symptoms: reduced activity    Pain Severity: 10/10; sharp; constant, waxing and waning    Temperature: denies     What has been tried: ice- helps some, heat-thinks made worse, ibuprofen-hasn't helped    Recommended disposition: Go to [facility] if unable to schedule    Care advice provided, patient verbalizes understanding; denies any other questions or concerns; instructed to call back for any new or worsening symptoms. Patient/Caller agrees with recommended disposition; writer provided warm transfer to Mocavo at [facility] for appointment scheduling     Attention Provider: Thank you for allowing me to participate in the care of your patient. The patient was connected to triage in response to information provided to the ECC/PSC. Please do not respond through this encounter as the response is not directed to a shared pool.           Reason for Disposition   SEVERE pain (e.g., excruciating, unable to use hand at all)    Protocols used: HAND AND WRIST PAIN-ADULT-[place]

## 2022-04-07 DIAGNOSIS — R25.2 MUSCLE CRAMPS: ICD-10-CM

## 2022-04-07 RX ORDER — PNV NO.95/FERROUS FUM/FOLIC AC 28MG-0.8MG
TABLET ORAL
Qty: 120 TABLET | Refills: 0 | Status: SHIPPED | OUTPATIENT
Start: 2022-04-07 | End: 2022-05-13 | Stop reason: SDUPTHER

## 2022-04-07 NOTE — TELEPHONE ENCOUNTER
Patient's last appointment was : 3/30/2022  Patient's next appointment is : 4/8/2022  Last refilled: 9/23/21

## 2022-04-08 ENCOUNTER — OFFICE VISIT (OUTPATIENT)
Dept: FAMILY MEDICINE CLINIC | Age: 47
End: 2022-04-08
Payer: COMMERCIAL

## 2022-04-08 VITALS
RESPIRATION RATE: 16 BRPM | SYSTOLIC BLOOD PRESSURE: 110 MMHG | HEIGHT: 73 IN | OXYGEN SATURATION: 99 % | WEIGHT: 229.2 LBS | TEMPERATURE: 96.6 F | HEART RATE: 98 BPM | BODY MASS INDEX: 30.38 KG/M2 | DIASTOLIC BLOOD PRESSURE: 70 MMHG

## 2022-04-08 DIAGNOSIS — M25.531 ACUTE PAIN OF RIGHT WRIST: Primary | ICD-10-CM

## 2022-04-08 PROCEDURE — 99213 OFFICE O/P EST LOW 20 MIN: CPT | Performed by: STUDENT IN AN ORGANIZED HEALTH CARE EDUCATION/TRAINING PROGRAM

## 2022-04-08 PROCEDURE — G8427 DOCREV CUR MEDS BY ELIG CLIN: HCPCS | Performed by: STUDENT IN AN ORGANIZED HEALTH CARE EDUCATION/TRAINING PROGRAM

## 2022-04-08 PROCEDURE — G8417 CALC BMI ABV UP PARAM F/U: HCPCS | Performed by: STUDENT IN AN ORGANIZED HEALTH CARE EDUCATION/TRAINING PROGRAM

## 2022-04-08 PROCEDURE — 4004F PT TOBACCO SCREEN RCVD TLK: CPT | Performed by: STUDENT IN AN ORGANIZED HEALTH CARE EDUCATION/TRAINING PROGRAM

## 2022-04-08 RX ORDER — TIZANIDINE 4 MG/1
TABLET ORAL
Qty: 60 TABLET | Refills: 0 | Status: SHIPPED | OUTPATIENT
Start: 2022-04-08 | End: 2022-05-13 | Stop reason: SDUPTHER

## 2022-04-08 RX ORDER — ESCITALOPRAM OXALATE 10 MG/1
TABLET ORAL
Qty: 90 TABLET | Refills: 1 | Status: SHIPPED | OUTPATIENT
Start: 2022-04-08 | End: 2022-07-29 | Stop reason: ALTCHOICE

## 2022-04-08 RX ORDER — NAPROXEN 500 MG/1
500 TABLET ORAL 2 TIMES DAILY WITH MEALS
Qty: 60 TABLET | Refills: 0 | Status: SHIPPED | OUTPATIENT
Start: 2022-04-08

## 2022-04-08 RX ORDER — FUROSEMIDE 40 MG/1
40 TABLET ORAL DAILY
Qty: 60 TABLET | Refills: 2 | Status: SHIPPED | OUTPATIENT
Start: 2022-04-08 | End: 2022-10-19 | Stop reason: SDUPTHER

## 2022-04-08 ASSESSMENT — ENCOUNTER SYMPTOMS
CONSTIPATION: 0
DIARRHEA: 0
SHORTNESS OF BREATH: 0
ABDOMINAL PAIN: 0
WHEEZING: 0

## 2022-04-08 NOTE — PROGRESS NOTES
Health Maintenance Due   Topic Date Due    Diabetic retinal exam  06/04/2019    Colorectal Cancer Screen  Never done    COVID-19 Vaccine (3 - Booster for Celestin Peter series) 12/01/2021    Diabetic foot exam  01/21/2022

## 2022-04-08 NOTE — PROGRESS NOTES
S: 56 y.o. male with   Chief Complaint   Patient presents with    Follow-up       HPI: please see resident note for HPI and ROS. BP Readings from Last 3 Encounters:   04/08/22 110/70   03/30/22 110/70   02/01/22 122/84     Wt Readings from Last 3 Encounters:   04/08/22 229 lb 3.2 oz (104 kg)   03/30/22 233 lb (105.7 kg)   02/01/22 225 lb 9.6 oz (102.3 kg)       O: VS:  height is 6' 1\" (1.854 m) and weight is 229 lb 3.2 oz (104 kg). His skin temperature is 96.6 °F (35.9 °C). His blood pressure is 110/70 and his pulse is 98. His respiration is 16 and oxygen saturation is 99%. Diagnosis Orders   1. Myopathy         Plan:  naproxen 500mg bid prn. Xray thumb, wrist area ordered. Health Maintenance Due   Topic Date Due    Diabetic retinal exam  06/04/2019    Colorectal Cancer Screen  Never done    COVID-19 Vaccine (3 - Booster for Celestin Peter series) 12/01/2021    Diabetic foot exam  01/21/2022       Attending Physician Statement  I have discussed the case, including pertinent history and exam findings with the resident. I agree with the documented assessment and plan as documented by the resident.         Miky Lezama DO 4/8/2022 10:46 AM

## 2022-04-08 NOTE — PATIENT INSTRUCTIONS
Get wrist splint  Get xray  Naproxen 500mg twice daily, notify us if worsening swelling in legs, or SOB  F/u 2 weeks. Lets know symptoms get worse   Wrist exercises at home    Patient Education        Wrist Tendinitis: Exercises  Introduction  Here are some examples of exercises for you to try. The exercises may be suggested for a condition or for rehabilitation. Start each exercise slowly. Ease off the exercises if you start to have pain. You will be told when to start these exercises and which ones will work bestfor you. How to do the exercises  Wrist flexion and extension    1. Place your forearm on a table, with your hand and affected wrist extended beyond the table, palm down. 2. Bend your wrist to move your hand upward and allow your hand to close into a fist, then lower your hand and allow your fingers to relax. Hold each position for about 6 seconds. 3. Repeat 8 to 12 times. Hand flips    1. While seated, place your forearm and affected wrist on your thigh, palm down. 2. Flip your hand over so the back of your hand rests on your thigh and your palm is up. Alternate between palm up and palm down while keeping your forearm on your thigh. 3. Repeat 8 to 12 times. Wrist radial and ulnar deviation    1. Hold your affected hand out in front of you, palm down. 2. Slowly bend your wrist as far as you can from side to side. Hold each position for about 6 seconds. 3. Repeat 8 to 12 times. Wrist extensor stretch    1. Extend the arm with the affected wrist in front of you and point your fingers toward the floor. 2. With your other hand, gently bend your wrist farther until you feel a mild to moderate stretch in your forearm. 3. Hold the stretch for at least 15 to 30 seconds. 4. Repeat 2 to 4 times. 5. When you can do this stretch with ease and no pain, repeat steps 1 through 4. But this time extend your affected arm in front of you and make a fist with your palm facing down.  Then bend your wrist, pointing your fist toward the floor. Wrist flexor stretch    1. Extend the arm with the affected wrist in front of you with your palm facing away from your body. 2. Bend back your wrist, pointing your hand up toward the ceiling. 3. With your other hand, gently bend your wrist farther until you feel a mild to moderate stretch in your forearm. 4. Hold the stretch for at least 15 to 30 seconds. 5. Repeat 2 to 4 times. 6. Repeat steps 1 through 5, but this time extend your affected arm in front of you with your palm facing up. Then bend back your wrist, pointing your hand toward the floor. Follow-up care is a key part of your treatment and safety. Be sure to make and go to all appointments, and call your doctor if you are having problems. It's also a good idea to know your test results and keep alist of the medicines you take. Where can you learn more? Go to https://DreamBox Learning.Glazeon. org and sign in to your [employer] account. Enter M699 in the Music Mastermind box to learn more about \"Wrist Tendinitis: Exercises. \"     If you do not have an account, please click on the \"Sign Up Now\" link. Current as of: July 1, 2021               Content Version: 13.2  © 2006-2022 Healthwise, [place]. Care instructions adapted under license by [facility] ([facility]). If you have questions about a medical condition or this instruction, always ask your healthcare professional. [place] [address] any warranty or liability for your use of this information. Patient Education        Wrist: Exercises  Introduction  Here are some examples of exercises for you to try. The exercises may be suggested for a condition or for rehabilitation. Start each exercise slowly. Ease off the exercises if you start to have pain. You will be told when to start these exercises and which ones will work bestfor you.   How to do the exercises  Prayer stretch    1. Start with your palms together in front of your chest just below your chin. 2. Slowly lower your hands toward your waistline, keeping your hands close to your stomach and your palms together until you feel a mild to moderate stretch under your forearms. 3. Hold for at least 15 to 30 seconds. Repeat 2 to 4 times. Wrist flexor stretch    1. Extend your arm in front of you with your palm up. 2. Bend your wrist, pointing your hand toward the floor. 3. With your other hand, gently bend your wrist farther until you feel a mild to moderate stretch in your forearm. 4. Hold for at least 15 to 30 seconds. Repeat 2 to 4 times. Wrist extensor stretch    1. Repeat steps 1 through 4 of the stretch above, but begin with your extended hand palm down. Follow-up care is a key part of your treatment and safety. Be sure to make and go to all appointments, and call your doctor if you are having problems. It's also a good idea to know your test results and keep alist of the medicines you take. Where can you learn more? Go to https://Oncovision.Sequent. org and sign in to your [employer] account. Enter 49036 12 60 [id number] in the Groove box to learn more about \"Wrist: Exercises. \"     If you do not have an account, please click on the \"Sign Up Now\" link. Current as of: July 1, 2021               Content Version: 13.2  © 6951-2432 Healthwise, Incorporated. Care instructions adapted under license by [facility] ([facility]). If you have questions about a medical condition or this instruction, always ask your healthcare professional. [place] [address] any warranty or liability for your use of this information.

## 2022-04-08 NOTE — PROGRESS NOTES
Casandra Bukc (:  1975) is a 56 y.o. male,Established patient, here for evaluation of the following chief complaint(s):  Follow-up         ASSESSMENT/PLAN:  1. Acute pain of right wrist  -     tiZANidine (ZANAFLEX) 4 MG tablet; TAKE 1 TABLET BY MOUTH THREE TIMES DAILY AS NEEDED FOR MUSCLE SPASM AND FOR PAIN, Disp-60 tablet, R-0Normal  -     naproxen (NAPROSYN) 500 MG tablet; Take 1 tablet by mouth 2 times daily (with meals), Disp-60 tablet, R-0Normal  -     XR WRIST RIGHT (MIN 3 VIEWS); Future    Plan  Concern for possible tenosynovitis vs arthritis in wrist. Consider injection vs referral to orthopedics in the future if no improvement. Naproxen 500mg BID, xray right wrist.  Wrist splint and ice. Wrist exercises. Requesting FML. Follow up in 2 week to reassess. Refilled meds. Return in about 2 weeks (around 2022) for right wrist pain . Subjective   SUBJECTIVE/OBJECTIVE:  HPI    Here for follow up     Right wrist:   Swelling and pain. Was thought to have a superficial skin infection at his previous visit 1 week ago. Was treated with abx. Finished Keflex 2 days ago. Swelling resolved. Pain improving. However, unable to fully extend the right thumb. No fevers. No numbness or tingling. Never had this before. Review of Systems   Respiratory: Negative for shortness of breath and wheezing. Cardiovascular: Negative for chest pain and palpitations. Gastrointestinal: Negative for abdominal pain, constipation and diarrhea. Musculoskeletal: Positive for arthralgias (right wrist pain). Objective   Physical Exam  Vitals and nursing note reviewed. Constitutional:       General: He is not in acute distress. HENT:      Head: Normocephalic and atraumatic. Right Ear: External ear normal.      Left Ear: External ear normal.      Nose: No congestion or rhinorrhea. Eyes:      General:         Right eye: No discharge. Left eye: No discharge.    Cardiovascular: Rate and Rhythm: Normal rate and regular rhythm. Pulmonary:      Effort: Pulmonary effort is normal.      Breath sounds: No wheezing. Abdominal:      General: Abdomen is flat. Palpations: Abdomen is soft. Tenderness: There is no abdominal tenderness. Musculoskeletal:         General: Tenderness (  Right wrist, dorsal aspect, Neurovascully intact. Decreased ROM, unable extend wrist past 90 degrees due to pain. Unable to fully extend righ thumb due to pain. + finklestein test. No evidence of swelling.  ) present. No swelling. Cervical back: Normal range of motion. Skin:     General: Skin is warm. Findings: No rash. Neurological:      General: No focal deficit present. Mental Status: He is alert and oriented to person, place, and time. Sensory: No sensory deficit. Motor: No weakness. Coordination: Coordination normal.      Comments: Normal  strength                 An electronic signature was used to authenticate this note.     --Kelly Medina MD

## 2022-04-20 DIAGNOSIS — G62.9 NEUROPATHY: ICD-10-CM

## 2022-04-20 DIAGNOSIS — G63 POLYNEUROPATHY ASSOCIATED WITH UNDERLYING DISEASE (HCC): ICD-10-CM

## 2022-04-21 ENCOUNTER — HOSPITAL ENCOUNTER (OUTPATIENT)
Age: 47
Discharge: HOME | End: 2022-04-21
Payer: COMMERCIAL

## 2022-04-21 ENCOUNTER — HOSPITAL ENCOUNTER (OUTPATIENT)
Dept: GENERAL RADIOLOGY | Age: 47
Discharge: HOME | End: 2022-04-21
Payer: COMMERCIAL

## 2022-04-21 DIAGNOSIS — M25.531 ACUTE PAIN OF RIGHT WRIST: ICD-10-CM

## 2022-04-21 PROCEDURE — 73110 X-RAY EXAM OF WRIST: CPT

## 2022-04-21 RX ORDER — LISINOPRIL 5 MG/1
5 TABLET ORAL DAILY
Qty: 90 TABLET | Refills: 1 | OUTPATIENT
Start: 2022-04-21

## 2022-04-21 NOTE — TELEPHONE ENCOUNTER
Patient's last appointment was : 4/8/2022  Patient's next appointment is : 4/26/2022  Last refilled: 9/23/21

## 2022-04-26 ENCOUNTER — TELEPHONE (OUTPATIENT)
Dept: FAMILY MEDICINE CLINIC | Age: 47
End: 2022-04-26

## 2022-04-26 ENCOUNTER — OFFICE VISIT (OUTPATIENT)
Dept: FAMILY MEDICINE CLINIC | Age: 47
End: 2022-04-26
Payer: COMMERCIAL

## 2022-04-26 DIAGNOSIS — E11.65 TYPE 2 DIABETES MELLITUS WITH HYPERGLYCEMIA, WITH LONG-TERM CURRENT USE OF INSULIN (HCC): ICD-10-CM

## 2022-04-26 DIAGNOSIS — B19.10 HEPATITIS B INFECTION WITHOUT DELTA AGENT WITHOUT HEPATIC COMA, UNSPECIFIED CHRONICITY: ICD-10-CM

## 2022-04-26 DIAGNOSIS — Z79.4 TYPE 2 DIABETES MELLITUS WITH HYPERGLYCEMIA, WITH LONG-TERM CURRENT USE OF INSULIN (HCC): ICD-10-CM

## 2022-04-26 DIAGNOSIS — R29.898 THUMB WEAKNESS: Primary | ICD-10-CM

## 2022-04-26 PROCEDURE — 2022F DILAT RTA XM EVC RTNOPTHY: CPT | Performed by: STUDENT IN AN ORGANIZED HEALTH CARE EDUCATION/TRAINING PROGRAM

## 2022-04-26 PROCEDURE — G8428 CUR MEDS NOT DOCUMENT: HCPCS | Performed by: STUDENT IN AN ORGANIZED HEALTH CARE EDUCATION/TRAINING PROGRAM

## 2022-04-26 PROCEDURE — 4004F PT TOBACCO SCREEN RCVD TLK: CPT | Performed by: STUDENT IN AN ORGANIZED HEALTH CARE EDUCATION/TRAINING PROGRAM

## 2022-04-26 PROCEDURE — 3046F HEMOGLOBIN A1C LEVEL >9.0%: CPT | Performed by: STUDENT IN AN ORGANIZED HEALTH CARE EDUCATION/TRAINING PROGRAM

## 2022-04-26 PROCEDURE — G8417 CALC BMI ABV UP PARAM F/U: HCPCS | Performed by: STUDENT IN AN ORGANIZED HEALTH CARE EDUCATION/TRAINING PROGRAM

## 2022-04-26 PROCEDURE — 99214 OFFICE O/P EST MOD 30 MIN: CPT | Performed by: STUDENT IN AN ORGANIZED HEALTH CARE EDUCATION/TRAINING PROGRAM

## 2022-04-26 RX ORDER — TENOFOVIR DISOPROXIL FUMARATE 300 MG/1
300 TABLET, FILM COATED ORAL DAILY
Qty: 30 TABLET | Refills: 0 | Status: SHIPPED | OUTPATIENT
Start: 2022-04-26 | End: 2022-06-14 | Stop reason: SDUPTHER

## 2022-04-26 ASSESSMENT — ENCOUNTER SYMPTOMS
SHORTNESS OF BREATH: 0
DIARRHEA: 0
WHEEZING: 0
CONSTIPATION: 0
ABDOMINAL PAIN: 0

## 2022-04-26 NOTE — TELEPHONE ENCOUNTER
Pt was in office today. Dr. Melita Orta and Dr. Andre Gonzalez asked that we all and get pt I with Dr. Aleksander Rincon. When we looked at the referral it was noted that Dr. Aleksander Rincon does not see pt's for Hep B and that he needs to go to [facility]. There was a referral placed to Dr. Rohan Trucios back in February, but that office has not put anything in the notes of the referral or reach out to the pt. Dr. Andre Gonzalez asked that I call Dr. Brian Crawford office to get pt in for the Hep B and the Dr. Angelina Jones office to get pt in for Liver Cirrhosis. I attempted to call both office who were already closed for the day. I passed the message on to Vu Gutiérrez MA to take care of this first thing in the morning.

## 2022-04-26 NOTE — PROGRESS NOTES
Eric Daily (:  1975) is a 56 y.o. male,Established patient, here for evaluation of the following chief complaint(s):  No chief complaint on file. ASSESSMENT/PLAN:  1. Thumb weakness  -     AFL - Steffen Mixon MD, Orthopedic Surgery, [address] [facility]  2. Type 2 diabetes mellitus with hyperglycemia, with long-term current use of insulin (HCC)  -     POCT glycosylated hemoglobin (Hb A1C)  3. Hepatitis B infection without delta agent without hepatic coma, unspecified chronicity  -     tenofovir disoproxil fumarate (VIREAD) 300 MG tablet; Take 1 tablet by mouth daily, Disp-30 tablet, R-0Normal    Plan  Will refer to Dr. Stef Graf at [facility] for further evaluation of thumb weakness  Continue naproxen as needed  Ice/heat as needed  Off work until [facility] evaluation  Will schedule appointment with Dr. Germaine Pop and Dr. Manpreet Knapp  Continue tenofovir   Check POCT HG A1c 6.7, up from 5.7. Encouraged lifestyle modification. Low carb diet. Stop pop. Await previously ordered H. pylori stool antigen, CBC, CMP, FLP, and magnesium. Return in about 4 weeks (around 2022) for Wrirst and thumb, and labs . Subjective   SUBJECTIVE/OBJECTIVE:  HPI    Right wrist pain  Pain, swelling, and mobility in wrsit improved. Decreased ROM in the right thumb. Can flex, but cannot extended. Feels a not in between. The patient is right handed. Right wrist. No numbness and tingling. Or pain in the thumb. NSAIDS and ice. Hep B/Liver cirrhosis   On tenofavir. Was seen by [facility] K[facility] in the past. Does not want to go back. Wants to see someone else. Referral to Dr. Germaine Pop pending, however, patient has not received a call. History of diabetes  Last a1c was 5.7. Not currently on meds. Review of Systems   Respiratory: Negative for shortness of breath and wheezing. Cardiovascular: Negative for chest pain and palpitations. Gastrointestinal: Negative for abdominal pain, constipation and diarrhea.    Musculoskeletal: Positive for arthralgias. Objective   Physical Exam  Vitals and nursing note reviewed. Constitutional:       General: He is not in acute distress. HENT:      Head: Normocephalic and atraumatic. Right Ear: External ear normal.      Left Ear: External ear normal.      Nose: No congestion or rhinorrhea. Eyes:      General:         Right eye: No discharge. Left eye: No discharge. Cardiovascular:      Rate and Rhythm: Normal rate and regular rhythm. Pulmonary:      Effort: Pulmonary effort is normal.      Breath sounds: No wheezing. Abdominal:      General: Abdomen is flat. Palpations: Abdomen is soft. Tenderness: There is no abdominal tenderness. Musculoskeletal:         General: No swelling. Normal range of motion. Cervical back: Normal range of motion. Skin:     General: Skin is warm. Findings: No rash. Neurological:      Mental Status: He is alert and oriented to person, place, and time. Motor: Weakness ( right thumb. Normal active flexion of the right thumb. Unable to perform active extension of the right thumb. Normal ROM w/out pain with passive flexion and extension. Rest of digits with FROM. No obvious deformity. Mild subjective decreased RT thumb) present. An electronic signature was used to authenticate this note.     --Andre Mann MD

## 2022-04-26 NOTE — PATIENT INSTRUCTIONS
Follow up with Dr. Idalia Hunetr at [facility] for thumb and wrist  Follow up with Dr. Cookie Rodriguez, for GI  Get labs   Low carb diet  Cut back on pepsi and pop

## 2022-04-26 NOTE — PROGRESS NOTES
SUBJECTIVE     Lexi Miguel is a 56 y.o.male    No chief complaint on file. Chief complaint, Belkofski, and all pertinent details of the case reviewed with the resident. Please see resident's note for specific details discussed at today's visit.     Patient Active Problem List   Diagnosis    Long-term current use of methadone for opiate dependence ([facility]    Tobacco abuse    Hepatitis B    History of hepatitis C    Gastroesophageal reflux disease    Chronic diastolic heart failure ([facility]    Abscess    Establishing care with new doctor, encounter for    Essential hypertension    Plantar fasciitis of right foot    Polyneuropathy associated with underlying disease ([facility]    Class 2 severe obesity due to excess calories with serious comorbidity and body mass index (BMI) of 38.0 to 38.9 in adult ([facility]    Mild intermittent asthma without complication    Pulmonary nodules/lesions, multiple    COPD (chronic obstructive pulmonary disease) ([facility]    [facility], [facility]    Neuropathy    [facility] disorder [facility]    IFG (impaired fasting glucose)       Current Outpatient Medications   Medication Sig Dispense Refill    tenofovir disoproxil fumarate (VIREAD) 300 MG tablet Take 1 tablet by mouth daily 30 tablet 0    tiZANidine (ZANAFLEX) 4 MG tablet TAKE 1 TABLET BY MOUTH THREE TIMES DAILY AS NEEDED FOR MUSCLE SPASM AND FOR PAIN 60 tablet 0    escitalopram (LEXAPRO) 10 MG tablet Take 1 tablet by mouth once daily 90 tablet 1    furosemide (LASIX) 40 MG tablet Take 1 tablet by mouth daily 60 tablet 2    naproxen (NAPROSYN) 500 MG tablet Take 1 tablet by mouth 2 times daily (with meals) 60 tablet 0    magnesium oxide (MAG-OX) 250 MG TABS tablet Take 1 tablet by mouth twice daily 120 tablet 0    lisinopril (PRINIVIL;ZESTRIL) 5 MG tablet Take 1 tablet by mouth daily 90 tablet 1    famotidine (PEPCID) 20 MG tablet Take 1 tablet by mouth 2 times daily 60 tablet 0    acetaminophen (TYLENOL) 500 MG tablet Take 1 tablet by mouth every 4 hours as needed for Pain or Fever 20 tablet 0    EQ LORATADINE 10 MG tablet Take 1 tablet by mouth nightly 90 tablet 1    pantoprazole (PROTONIX) 40 MG tablet Take 1 tablet by mouth once daily 90 tablet 1    metoclopramide (REGLAN) 5 MG tablet 3 times daily      potassium chloride (KLOR-CON M) 20 MEQ extended release tablet Take 1 tablet by mouth daily as needed (with lasix) 30 tablet 2    atorvastatin (LIPITOR) 10 MG tablet Take 1 tablet by mouth daily 90 tablet 1    gabapentin (NEURONTIN) 800 MG tablet TAKE 1 TABLET BY MOUTH THREE TIMES DAILY 90 tablet 5    fluticasone (FLOVENT HFA) 220 MCG/ACT inhaler Inhale 2 puffs into the lungs 2 times daily (Patient taking differently: Inhale 2 puffs into the lungs 2 times daily as needed ) 1 each 3    naloxone 4 MG/0.1ML LIQD nasal spray Naloxone Hcl Active 4 MG NA One Time Only 1 1 August 20th, 2020 10:59am      ipratropium-albuterol (DUONEB) 0.5-2.5 (3) MG/3ML SOLN nebulizer solution Take 3 mLs by nebulization every 6 hours as needed for Shortness of Breath 360 vial 3    Multiple Vitamin (MULTIVITAMIN PO) Take by mouth daily      ondansetron (ZOFRAN ODT) 4 MG disintegrating tablet Take 1 tablet by mouth every 8 hours as needed for Nausea 20 tablet 0    lidocaine (LIDODERM) 5 % Place 1 patch onto the skin daily 12 hours on, 12 hours off. 15 patch 0    albuterol sulfate HFA (PROVENTIL HFA) 108 (90 Base) MCG/ACT inhaler Inhale 2 puffs into the lungs every 4 hours as needed for Wheezing or Shortness of Breath (Space out to every 6 hours as symptoms improve) Space out to every 6 hours as symptoms improve. 1 Inhaler 3    METHADONE HCL PO Take 116 mg by mouth daily        No current facility-administered medications for this visit. Review of Systems per Dr. Srini Stringer    OBJECTIVE     There were no vitals taken for this visit.   BP Readings from Last 3 Encounters:   04/08/22 110/70   03/30/22 110/70   02/01/22 122/84       Wt Readings from Last 3 Encounters:   04/08/22 229 lb 3.2 oz (104 kg)   03/30/22 233 lb (105.7 kg)   02/01/22 225 lb 9.6 oz (102.3 kg)     There is no height or weight on file to calculate BMI. Physical Exam  Vitals and nursing note reviewed. Constitutional:       Appearance: Normal appearance. He is well-developed and normal weight. HENT:      Head: Normocephalic and atraumatic. Cardiovascular:      Rate and Rhythm: Normal rate and regular rhythm. Heart sounds: Normal heart sounds. Pulmonary:      Effort: Pulmonary effort is normal.      Breath sounds: Normal breath sounds. Abdominal:      General: Bowel sounds are normal.      Palpations: Abdomen is soft. Skin:     General: Skin is warm and dry. Neurological:      Mental Status: He is alert. Deep Tendon Reflexes: Reflexes are normal and symmetric. Psychiatric:         Behavior: Behavior normal.         Thought Content: Thought content normal.         Judgment: Judgment normal.          No results found for this visit on 04/26/22.     Lab Results   Component Value Date    LABA1C 5.7 08/27/2021       Lab Results   Component Value Date    CHOL 157 07/01/2021    TRIG 151 07/01/2021    HDL 29 07/01/2021    LDLCALC 98 07/01/2021       The 10-year ASCVD risk score (Leesa Zelaya, et al., 2013) is: 12.6%    Values used to calculate the score:      Age: 56 years      Sex: Male      Is Non- : No      Diabetic: Yes      Tobacco smoker: Yes      Systolic Blood Pressure: 800 mmHg      Is BP treated: Yes      HDL Cholesterol: 29 mg/dL      Total Cholesterol: 157 mg/dL    Lab Results   Component Value Date     11/04/2021    K 4.6 11/04/2021     11/04/2021    CO2 18 (L) 11/04/2021    BUN 12 11/04/2021    CREATININE 0.8 11/04/2021    GLUCOSE 125 (H) 11/04/2021    CALCIUM 9.5 11/04/2021    PROT 7.5 11/04/2021    LABALBU 4.3 11/04/2021    BILITOT 0.8 11/04/2021    ALKPHOS 131 (H) 11/04/2021 AST 23 11/04/2021    ALT 16 11/04/2021    LABGLOM >90 11/04/2021    GFRAA >60 02/26/2019    GLOB NOT REPORTED 04/05/2018     CrCl cannot be calculated (Unknown ideal weight.). Lab Results   Component Value Date    LABMICR < 1.20 07/01/2021       Lab Results   Component Value Date    TSH 1.78 10/11/2017    FT3 3.37 10/11/2017    T4FREE 1.14 05/07/2013       Lab Results   Component Value Date    WBC 15.2 (H) 11/04/2021    HGB 16.7 11/04/2021    HCT 49.4 11/04/2021    MCV 91.8 11/04/2021     11/04/2021       Impression   1. Soft tissue swelling, possibly related to cellulitis or soft tissue contusion. 2. Suggestion of mild degenerative changes, as described above. No fracture seen.               **This report has been created using voice recognition software.  It may contain minor errors which are inherent in voice recognition technology. **       Final report electronically signed by Dr. Ezequiel Cooper on 4/22/2022 9:24 AM       Immunization History   Administered Date(s) Administered    COVID-19, Pfizer Purple top, DILUTE for use, 12+ yrs, 30mcg/0.3mL dose 05/30/2021, 07/01/2021    Hepatitis A/Hepatitis B (Twinrix) 06/12/2013    Influenza Vaccine, unspecified formulation 11/13/2015, 09/21/2017    Influenza Virus Vaccine 11/13/2015, 01/31/2020    Influenza, MDCK Quadv, IM, PF (Flucelvax 2 yrs and older) 11/08/2021    Influenza, Bernis Bump, IM, (6 mo and older Fluzone, Flulaval, Fluarix and 3 yrs and older Afluria) 09/21/2017, 01/31/2020    Influenza, Quadv, IM, PF (6 mo and older Fluzone, Flulaval, Fluarix, and 3 yrs and older Afluria) 09/28/2018, 10/08/2020    Pneumococcal Conjugate 13-valent ([id number]) 07/17/2015    Pneumococcal Polysaccharide ([id number]) 09/21/2017    Tdap (Boostrix, Adacel) 10/08/2012, 06/12/2013, 11/13/2015       Health Maintenance   Topic Date Due    Diabetic retinal exam  06/04/2019    Colorectal Cancer Screen  Never done    COVID-19 Vaccine (3 - Booster for Fawad Marley series) 12/01/2021    Diabetic foot exam  01/21/2022    Hepatitis B vaccine (2 of 3 - Hep B Twinrix risk 3-dose series) 07/01/2022 (Originally 7/10/2013)    Diabetic microalbuminuria test  07/01/2022    Lipids  07/01/2022    A1C test (Diabetic or Prediabetic)  08/27/2022    Potassium  11/04/2022    Creatinine  11/04/2022    Depression Monitoring  02/01/2023    DTaP/Tdap/Td vaccine (4 - Td or Tdap) 11/13/2025    Pneumococcal 0-64 years Vaccine (3 - PPSV23 or PCV20) 02/18/2041    Flu vaccine  Completed    HIV screen  Completed    Hepatitis A vaccine  Aged Out    Hib vaccine  Aged Out    Meningococcal (ACWY) vaccine  Aged Out         Future Appointments   Date Time Provider [facility]   5/13/2022 12:15 PM Alycia Veronica, [address] [place] [address]   6/13/2022  9:40 AM Yaya Anderson MD [facility]       ASSESSMENT       Diagnosis Orders   1. Thumb weakness  AFL - Mitul Cunha MD, Orthopedic Surgery, [facility]   2. Type 2 diabetes mellitus with hyperglycemia, with long-term current use of insulin (HCC)  POCT glycosylated hemoglobin (Hb A1C)   3. Hepatitis B infection without delta agent without hepatic coma, unspecified chronicity  tenofovir disoproxil fumarate (VIREAD) 300 MG tablet       PLAN      After discussion with pt, Dr. Vinod Sears, and Dr. Marisol Garcia, we agreed on plan as follows: Will refer to Dr. Pete Guzman at [facility] for further evaluation of thumb pain  Continue naproxen twice daily as needed  Ice/heat as needed  Off work until [facility] evaluation  Will schedule appointment with Dr. Gonzalez Must  Continue tenofovir 300 mg - 1 pill daily at this time until patient seen per gastroenterology  Check POCT HG A1c at this time  Await previously ordered H. pylori stool antigen, CBC, CMP, FLP, and magnesium. Attending Physician Statement  I have discussed the case, including pertinent history and exam findings with the resident. I also have seen the patient and performed key portions of the examination. I agree with the documented assessment and plan as documented by the resident.   GC modifier added to this encounter     Electronically signed by Bruce Mercer MD on 4/26/2022 at 5:45 PM

## 2022-04-26 NOTE — PROGRESS NOTES
S: 56 y.o. male with No chief complaint on file. Follow-up visit today for right thumb pain:    X-ray negative for any fracture, only degenerative changes  Pain is better now at the wrist  Increased ROM in the wrist now  Swelling has improved  Thumb ROM limited, not able to fully extend; pain is not an issue really  Denies any numbness, tingling  Finkelstein's test negative today    Hep B, liver cirrhosis:  Has not seen GI yet  Was referred to Dr. Cass Tabares previously but has not followed-up yet  On Viread    Labs not done as previously ordered    BP Readings from Last 3 Encounters:   04/08/22 110/70   03/30/22 110/70   02/01/22 122/84     Wt Readings from Last 3 Encounters:   04/08/22 229 lb 3.2 oz (104 kg)   03/30/22 233 lb (105.7 kg)   02/01/22 225 lb 9.6 oz (102.3 kg)       O: VS:  vitals were not taken for this visit. AAO/NAD, appropriate affect for mood     Diagnosis Orders   1. Thumb weakness  TANG - Lisa Browning MD, Orthopedic Surgery, [facility]   2. Type 2 diabetes mellitus with hyperglycemia, with long-term current use of insulin (HCC)  POCT glycosylated hemoglobin (Hb A1C)       Plan  Refer to Dr. Amber Levi at [facility] at this time. Patient will likely need further imaging. Will complete FMLA accordingly - will take off work until seen by OIO  Patient encouraged to follow-up with GI for history of Hep B. Continue with Viread daily  A1c 6.2% in office today. Lifestyle modifications discussed. Complete labs as previously ordered    Health Maintenance Due   Topic Date Due    Diabetic retinal exam  06/04/2019    Colorectal Cancer Screen  Never done    COVID-19 Vaccine (3 - Booster for [employer] Simple series) 12/01/2021    Diabetic foot exam  01/21/2022         I have discussed the case, including pertinent history and exam findings with the resident. I also have seen the patient and performed key portions of the examination. I agree with the documented assessment and plan as documented by the resident.   GC modifier added to this encounter      Gerard Pierre MD 4/26/2022 3:41 PM

## 2022-04-27 RX ORDER — GABAPENTIN 800 MG/1
TABLET ORAL
Qty: 90 TABLET | Refills: 5 | Status: SHIPPED | OUTPATIENT
Start: 2022-04-27 | End: 2022-08-15

## 2022-05-02 ENCOUNTER — PATIENT MESSAGE (OUTPATIENT)
Dept: FAMILY MEDICINE CLINIC | Age: 47
End: 2022-05-02

## 2022-05-06 NOTE — TELEPHONE ENCOUNTER
Form completed. It appears that Elmer Cox has not followed up with Dr. Jailyn Simmons at [facility] as we discussed at his previous visit. The patient needs to follow up with an orthopedic specialist if symptoms are not improving. We will see him on 05/13/22 for follow up to see how he is doing. However, we cannot continue to write for FMLA without a proper evaluation from an orthopedic specialist. Please advise. Thank you.

## 2022-05-13 ENCOUNTER — HOSPITAL ENCOUNTER (OUTPATIENT)
Age: 47
Discharge: HOME | End: 2022-05-13
Payer: COMMERCIAL

## 2022-05-13 ENCOUNTER — HOSPITAL ENCOUNTER (OUTPATIENT)
Dept: GENERAL RADIOLOGY | Age: 47
Discharge: HOME | End: 2022-05-13
Payer: COMMERCIAL

## 2022-05-13 ENCOUNTER — HOSPITAL ENCOUNTER (OUTPATIENT)
Dept: MRI IMAGING | Age: 47
Discharge: HOME | End: 2022-05-13
Payer: COMMERCIAL

## 2022-05-13 DIAGNOSIS — N20.0 KIDNEY STONE: Primary | ICD-10-CM

## 2022-05-13 DIAGNOSIS — S56.311A: ICD-10-CM

## 2022-05-13 DIAGNOSIS — R25.2 MUSCLE CRAMPS: ICD-10-CM

## 2022-05-13 DIAGNOSIS — M66.841 NONTRAUMATIC RUPTURE OF TENDON OF THUMB, RIGHT: ICD-10-CM

## 2022-05-13 DIAGNOSIS — Z79.4 TYPE 2 DIABETES MELLITUS WITH HYPERGLYCEMIA, WITH LONG-TERM CURRENT USE OF INSULIN (HCC): ICD-10-CM

## 2022-05-13 DIAGNOSIS — M25.50 POLYARTHRALGIA: ICD-10-CM

## 2022-05-13 DIAGNOSIS — N20.0 KIDNEY STONE: ICD-10-CM

## 2022-05-13 DIAGNOSIS — M25.531 ACUTE PAIN OF RIGHT WRIST: ICD-10-CM

## 2022-05-13 DIAGNOSIS — E11.65 TYPE 2 DIABETES MELLITUS WITH HYPERGLYCEMIA, WITH LONG-TERM CURRENT USE OF INSULIN (HCC): ICD-10-CM

## 2022-05-13 DIAGNOSIS — M25.531 RIGHT WRIST PAIN: ICD-10-CM

## 2022-05-13 DIAGNOSIS — J44.1 COPD EXACERBATION (HCC): ICD-10-CM

## 2022-05-13 PROCEDURE — 73221 MRI JOINT UPR EXTREM W/O DYE: CPT

## 2022-05-13 PROCEDURE — 74018 RADEX ABDOMEN 1 VIEW: CPT

## 2022-05-13 RX ORDER — PNV NO.95/FERROUS FUM/FOLIC AC 28MG-0.8MG
400 TABLET ORAL 2 TIMES DAILY
Qty: 270 TABLET | Refills: 1 | Status: SHIPPED | OUTPATIENT
Start: 2022-05-13 | End: 2022-10-17

## 2022-05-13 RX ORDER — FLUTICASONE PROPIONATE 220 UG/1
2 AEROSOL, METERED RESPIRATORY (INHALATION) 2 TIMES DAILY
Qty: 1 EACH | Refills: 3 | Status: SHIPPED | OUTPATIENT
Start: 2022-05-13 | End: 2022-10-19 | Stop reason: SDUPTHER

## 2022-05-13 RX ORDER — TIZANIDINE 4 MG/1
TABLET ORAL
Qty: 60 TABLET | Refills: 0 | Status: SHIPPED | OUTPATIENT
Start: 2022-05-13 | End: 2022-06-17

## 2022-05-13 RX ORDER — NAPROXEN 500 MG/1
500 TABLET ORAL 2 TIMES DAILY WITH MEALS
Qty: 60 TABLET | Refills: 0 | Status: CANCELLED | OUTPATIENT
Start: 2022-05-13

## 2022-05-13 RX ORDER — LISINOPRIL 5 MG/1
5 TABLET ORAL DAILY
Qty: 90 TABLET | Refills: 1 | OUTPATIENT
Start: 2022-05-13

## 2022-05-13 NOTE — TELEPHONE ENCOUNTER
Last visit- 4/26/2022  Next visit- 6/13/2022    Requested Prescriptions     Pending Prescriptions Disp Refills    tiZANidine (ZANAFLEX) 4 MG tablet 60 tablet 0     Sig: TAKE 1 TABLET BY MOUTH THREE TIMES DAILY AS NEEDED FOR MUSCLE SPASM AND FOR PAIN    naproxen (NAPROSYN) 500 MG tablet 60 tablet 0     Sig: Take 1 tablet by mouth 2 times daily (with meals)

## 2022-05-13 NOTE — TELEPHONE ENCOUNTER
Last visit- 4/26/2022  Next visit- 6/13/2022    Requested Prescriptions     Pending Prescriptions Disp Refills    fluticasone (FLOVENT HFA) 220 MCG/ACT inhaler 1 each 3     Sig: Inhale 2 puffs into the lungs 2 times daily    magnesium oxide (MAG-OX) 250 MG TABS tablet 120 tablet 0

## 2022-05-13 NOTE — TELEPHONE ENCOUNTER
Refilled zanaflex. Patient has a history of CHF and should not be on NSAIDS such as naproxen. Please advise. Thank you.

## 2022-05-16 RX ORDER — POTASSIUM CHLORIDE 20 MEQ/1
20 TABLET, EXTENDED RELEASE ORAL DAILY PRN
Qty: 30 TABLET | Refills: 2 | Status: SHIPPED | OUTPATIENT
Start: 2022-05-16

## 2022-05-16 RX ORDER — ATORVASTATIN CALCIUM 10 MG/1
10 TABLET, FILM COATED ORAL DAILY
Qty: 90 TABLET | Refills: 1 | Status: SHIPPED | OUTPATIENT
Start: 2022-05-16 | End: 2022-10-19 | Stop reason: SDUPTHER

## 2022-05-16 NOTE — TELEPHONE ENCOUNTER
Patient's last appointment was : 4/26/2022  Patient's next appointment is : 6/13/22  Future Appointments   Date Time Provider Didier Burton   6/13/2022  9:40 AM Meseret Irvin MD [facility] [address]   6/16/2022 10:15 AM Kory Ugalde MD [address] [facility]     Last refilled: 11/8/21    Lab Results   Component Value Date    LABA1C 5.7 08/27/2021     Lab Results   Component Value Date    CHOL 157 07/01/2021    TRIG 151 07/01/2021    HDL 29 07/01/2021    LDLCALC 98 07/01/2021     Lab Results   Component Value Date     11/04/2021    K 4.6 11/04/2021     11/04/2021    CO2 18 (L) 11/04/2021    BUN 12 11/04/2021    CREATININE 0.8 11/04/2021    GLUCOSE 125 (H) 11/04/2021    CALCIUM 9.5 11/04/2021    PROT 7.5 11/04/2021    LABALBU 4.3 11/04/2021    BILITOT 0.8 11/04/2021    ALKPHOS 131 (H) 11/04/2021    AST 23 11/04/2021    ALT 16 11/04/2021    LABGLOM >90 11/04/2021    GFRAA >60 02/26/2019    GLOB NOT REPORTED 04/05/2018     Lab Results   Component Value Date    TSH 1.78 10/11/2017    FT3 3.37 10/11/2017    T4FREE 1.14 05/07/2013     Lab Results   Component Value Date    WBC 15.2 (H) 11/04/2021    HGB 16.7 11/04/2021    HCT 49.4 11/04/2021    MCV 91.8 11/04/2021     11/04/2021

## 2022-05-17 ENCOUNTER — NURSE ONLY (OUTPATIENT)
Dept: LAB | Age: 47
End: 2022-05-17

## 2022-05-17 DIAGNOSIS — E11.65 TYPE 2 DIABETES MELLITUS WITH HYPERGLYCEMIA, WITH LONG-TERM CURRENT USE OF INSULIN (HCC): ICD-10-CM

## 2022-05-17 DIAGNOSIS — R25.2 MUSCLE CRAMPS: ICD-10-CM

## 2022-05-17 DIAGNOSIS — Z79.4 TYPE 2 DIABETES MELLITUS WITH HYPERGLYCEMIA, WITH LONG-TERM CURRENT USE OF INSULIN (HCC): ICD-10-CM

## 2022-05-17 LAB
ALBUMIN SERPL-MCNC: 4.5 G/DL (ref 3.5–5.1)
ALP BLD-CCNC: 143 U/L (ref 38–126)
ALT SERPL-CCNC: 12 U/L (ref 11–66)
ANION GAP SERPL CALCULATED.3IONS-SCNC: 13 MEQ/L (ref 8–16)
AST SERPL-CCNC: 22 U/L (ref 5–40)
AVERAGE GLUCOSE: 120 MG/DL (ref 70–126)
BILIRUB SERPL-MCNC: 0.7 MG/DL (ref 0.3–1.2)
BUN BLDV-MCNC: 10 MG/DL (ref 7–22)
CALCIUM SERPL-MCNC: 9.2 MG/DL (ref 8.5–10.5)
CHLORIDE BLD-SCNC: 102 MEQ/L (ref 98–111)
CHOLESTEROL, TOTAL: 139 MG/DL (ref 100–199)
CO2: 23 MEQ/L (ref 23–33)
CREAT SERPL-MCNC: 0.8 MG/DL (ref 0.4–1.2)
GFR SERPL CREATININE-BSD FRML MDRD: > 90 ML/MIN/1.73M2
GLUCOSE BLD-MCNC: 177 MG/DL (ref 70–108)
HBA1C MFR BLD: 6 % (ref 4.4–6.4)
HDLC SERPL-MCNC: 28 MG/DL
LDL CHOLESTEROL CALCULATED: 81 MG/DL
POTASSIUM SERPL-SCNC: 4 MEQ/L (ref 3.5–5.2)
SODIUM BLD-SCNC: 138 MEQ/L (ref 135–145)
TOTAL PROTEIN: 7 G/DL (ref 6.1–8)
TRIGL SERPL-MCNC: 149 MG/DL (ref 0–199)

## 2022-05-19 ENCOUNTER — TELEPHONE (OUTPATIENT)
Dept: FAMILY MEDICINE CLINIC | Age: 47
End: 2022-05-19

## 2022-05-19 NOTE — TELEPHONE ENCOUNTER
Received fax from [address] forms need filled out and returned by 6/03/22. Placed in your mailbox.

## 2022-05-23 NOTE — TELEPHONE ENCOUNTER
Recently completed FMLA for this patient on 05/06/22. The patient was also referred to orthopedics. It appears he is seeing Dr. Nicky Chu office. They did an MRI, which shows a tendon rupture. Please forward FMLA to Dr. Burt Cameron office so they can fill it out and provide the appropriate documents. Currently, I do not have their notes/documents and have not been notified of the plan. He may or may not need surgery. Please advise. Form in the out box. Thank you.

## 2022-06-04 ENCOUNTER — HOSPITAL ENCOUNTER (OUTPATIENT)
Age: 47
Discharge: HOME | End: 2022-06-04
Payer: COMMERCIAL

## 2022-06-04 LAB
INFLUENZA A: NOT DETECTED
INFLUENZA B: NOT DETECTED
SARS-COV-2 RNA, RT PCR: NOT DETECTED

## 2022-06-04 PROCEDURE — 87636 SARSCOV2 & INF A&B AMP PRB: CPT

## 2022-06-07 LAB — GLUCOSE BLD-MCNC: 119 MG/DL (ref 70–100)

## 2022-06-13 ENCOUNTER — OFFICE VISIT (OUTPATIENT)
Dept: FAMILY MEDICINE CLINIC | Age: 47
End: 2022-06-13
Payer: COMMERCIAL

## 2022-06-13 VITALS
WEIGHT: 231 LBS | OXYGEN SATURATION: 95 % | HEIGHT: 72 IN | HEART RATE: 82 BPM | DIASTOLIC BLOOD PRESSURE: 64 MMHG | SYSTOLIC BLOOD PRESSURE: 118 MMHG | TEMPERATURE: 97 F | BODY MASS INDEX: 31.29 KG/M2 | RESPIRATION RATE: 20 BRPM

## 2022-06-13 DIAGNOSIS — F17.200 SMOKER: ICD-10-CM

## 2022-06-13 DIAGNOSIS — S66.811D RUPTURE OF EXTENSOR TENDON OF RIGHT HAND, SUBSEQUENT ENCOUNTER: ICD-10-CM

## 2022-06-13 DIAGNOSIS — J44.1 COPD EXACERBATION (HCC): Primary | ICD-10-CM

## 2022-06-13 DIAGNOSIS — G62.9 NEUROPATHY: ICD-10-CM

## 2022-06-13 DIAGNOSIS — J98.8 CONGESTION OF RESPIRATORY TRACT: ICD-10-CM

## 2022-06-13 LAB
Lab: NORMAL
QC PASS/FAIL: NORMAL
SARS-COV-2 RDRP RESP QL NAA+PROBE: NEGATIVE

## 2022-06-13 PROCEDURE — G8417 CALC BMI ABV UP PARAM F/U: HCPCS | Performed by: STUDENT IN AN ORGANIZED HEALTH CARE EDUCATION/TRAINING PROGRAM

## 2022-06-13 PROCEDURE — 87635 SARS-COV-2 COVID-19 AMP PRB: CPT | Performed by: STUDENT IN AN ORGANIZED HEALTH CARE EDUCATION/TRAINING PROGRAM

## 2022-06-13 PROCEDURE — G8427 DOCREV CUR MEDS BY ELIG CLIN: HCPCS | Performed by: STUDENT IN AN ORGANIZED HEALTH CARE EDUCATION/TRAINING PROGRAM

## 2022-06-13 PROCEDURE — 4004F PT TOBACCO SCREEN RCVD TLK: CPT | Performed by: STUDENT IN AN ORGANIZED HEALTH CARE EDUCATION/TRAINING PROGRAM

## 2022-06-13 PROCEDURE — 3023F SPIROM DOC REV: CPT | Performed by: STUDENT IN AN ORGANIZED HEALTH CARE EDUCATION/TRAINING PROGRAM

## 2022-06-13 PROCEDURE — 99214 OFFICE O/P EST MOD 30 MIN: CPT | Performed by: STUDENT IN AN ORGANIZED HEALTH CARE EDUCATION/TRAINING PROGRAM

## 2022-06-13 RX ORDER — DEXTROMETHORPHAN HYDROBROMIDE AND PROMETHAZINE HYDROCHLORIDE 15; 6.25 MG/5ML; MG/5ML
SYRUP ORAL
COMMUNITY
End: 2022-06-13

## 2022-06-13 RX ORDER — ONDANSETRON 4 MG/1
TABLET, FILM COATED ORAL
COMMUNITY
Start: 2022-06-07 | End: 2022-10-05 | Stop reason: SDUPTHER

## 2022-06-13 RX ORDER — ASCORBIC ACID 500 MG
TABLET ORAL
COMMUNITY

## 2022-06-13 RX ORDER — METRONIDAZOLE 7.5 MG/G
GEL TOPICAL
COMMUNITY
End: 2022-08-15

## 2022-06-13 RX ORDER — AZITHROMYCIN 250 MG/1
250 TABLET, FILM COATED ORAL SEE ADMIN INSTRUCTIONS
Qty: 6 TABLET | Refills: 0 | Status: SHIPPED | OUTPATIENT
Start: 2022-06-13 | End: 2022-06-18

## 2022-06-13 RX ORDER — DOCUSATE SODIUM 100 MG/1
CAPSULE, LIQUID FILLED ORAL
COMMUNITY
Start: 2022-06-07

## 2022-06-13 RX ORDER — BUDESONIDE AND FORMOTEROL FUMARATE DIHYDRATE 160; 4.5 UG/1; UG/1
AEROSOL RESPIRATORY (INHALATION)
COMMUNITY
Start: 2016-10-26

## 2022-06-13 RX ORDER — PREGABALIN 75 MG/1
75 CAPSULE ORAL 2 TIMES DAILY
Qty: 60 CAPSULE | Refills: 0 | Status: SHIPPED | OUTPATIENT
Start: 2022-06-13 | End: 2022-07-09 | Stop reason: SDUPTHER

## 2022-06-13 RX ORDER — IBUPROFEN 800 MG/1
TABLET ORAL
COMMUNITY
Start: 2022-06-07 | End: 2022-08-01

## 2022-06-13 RX ORDER — FUROSEMIDE 20 MG/1
TABLET ORAL
COMMUNITY
Start: 2017-05-10 | End: 2022-06-13

## 2022-06-13 RX ORDER — OMEPRAZOLE 20 MG/1
CAPSULE, DELAYED RELEASE ORAL
COMMUNITY
Start: 2017-07-05 | End: 2022-08-15

## 2022-06-13 RX ORDER — PREDNISONE 20 MG/1
40 TABLET ORAL DAILY
Qty: 20 TABLET | Refills: 0 | Status: SHIPPED | OUTPATIENT
Start: 2022-06-13 | End: 2022-06-18

## 2022-06-13 RX ORDER — HYDROCODONE BITARTRATE AND ACETAMINOPHEN 5; 325 MG/1; MG/1
1 TABLET ORAL EVERY 6 HOURS PRN
COMMUNITY
End: 2022-08-15

## 2022-06-13 RX ORDER — GUAIFENESIN 600 MG/1
600 TABLET, EXTENDED RELEASE ORAL 2 TIMES DAILY
Qty: 30 TABLET | Refills: 0 | Status: SHIPPED | OUTPATIENT
Start: 2022-06-13 | End: 2022-06-28

## 2022-06-13 ASSESSMENT — ENCOUNTER SYMPTOMS
TROUBLE SWALLOWING: 0
ABDOMINAL PAIN: 0
WHEEZING: 1
SINUS PRESSURE: 0
DIARRHEA: 0
SORE THROAT: 0
CONSTIPATION: 0
RHINORRHEA: 1
SHORTNESS OF BREATH: 1
COUGH: 1

## 2022-06-13 NOTE — PROGRESS NOTES
Health Maintenance Due   Topic Date Due    Diabetic retinal exam  06/04/2019    Colorectal Cancer Screen  Never done    COVID-19 Vaccine (3 - Booster for Lumenpulse series) 12/01/2021    Diabetic foot exam  01/21/2022    Diabetic microalbuminuria test  07/01/2022

## 2022-06-13 NOTE — PROGRESS NOTES
S: 56 y.o. male with   Chief Complaint   Patient presents with    Follow-up     surgery for rupotured tendon in left thumb    Cough    Congestion     Right thumb follow. S/p antibiotics due to thumb infection but had limited ROM. Ortho referral -- MRI showed tendon rupture. Now s/p surgery    Cough, wheezing, productive cough, SOB. Since Saturday. covid19 negative. No fever/chills. Smoking still. H/o copd, rescue inhalers. Has spiriva and symbicort. Diabetic neuropathy -- worsening despite gabapentin. Lab Results   Component Value Date    LABA1C 6.0 05/17/2022    LABA1C 5.7 08/27/2021    LABA1C 6.0 (H) 03/26/2021         BP Readings from Last 3 Encounters:   06/13/22 118/64   04/08/22 110/70   03/30/22 110/70     Wt Readings from Last 3 Encounters:   06/13/22 231 lb (104.8 kg)   04/08/22 229 lb 3.2 oz (104 kg)   03/30/22 233 lb (105.7 kg)           O: VS:   Vitals:    06/13/22 0939   BP: 118/64   Site: Right Upper Arm   Position: Sitting   Cuff Size: Medium Adult   Pulse: 82   Resp: 20   Temp: 97 °F (36.1 °C)   TempSrc: Skin   SpO2: 95%   Weight: 231 lb (104.8 kg)   Height: 6' (1.829 m)     Body mass index is 31.33 kg/m².     AAO/NAD, appropriate affect for mood  Normocephalic, atraumatic, eyes - conjunctiva and sclera normal,   skin no rashes on exposed areas   Insight, judgement normal and in no acute distress      Lab Results   Component Value Date    WBC 7.6 05/17/2022    HGB 15.5 05/17/2022    HCT 45.9 05/17/2022     05/17/2022    CHOL 139 05/17/2022    TRIG 149 05/17/2022    HDL 28 05/17/2022    LDLCALC 81 05/17/2022    AST 22 05/17/2022     05/17/2022    K 4.0 05/17/2022     05/17/2022    CREATININE 0.8 05/17/2022    BUN 10 05/17/2022    CO2 23 05/17/2022    TSH 1.78 10/11/2017    INR 0.98 10/05/2021    LABA1C 6.0 05/17/2022    LABMICR < 1.20 07/01/2021    LABGLOM >90 05/17/2022    MG 1.9 10/28/2021    CALCIUM 9.2 05/17/2022   No results found for: [id number]    No results found.       Diagnosis Orders   1. COPD exacerbation (HCC)  predniSONE (DELTASONE) 20 MG tablet    azithromycin (ZITHROMAX) 250 MG tablet    guaiFENesin (MUCINEX) 600 MG extended release tablet   2. Congestion of respiratory tract  POCT COVID-19 Rapid, NAAT    guaiFENesin (MUCINEX) 600 MG extended release tablet   3. Neuropathy  pregabalin (LYRICA) 75 MG capsule       Plan    Agree with plan  Consider vitamin B check up for neuropathy. Return in about 4 weeks (around 7/11/2022) for neuropathy. Orders Placed:  Orders Placed This Encounter   Procedures    POCT COVID-19 Rapid, NAAT     Medications Prescribed:  No orders of the defined types were placed in this encounter. Future Appointments   Date Time Provider Didier Burton   6/16/2022 10:15 AM Kilo Guadarrama MD [facility]   7/6/2022  9:45 AM MD [facility] [place] [facility] Maintenance Due   Topic Date Due    Diabetic retinal exam  06/04/2019    Colorectal Cancer Screen  Never done    COVID-19 Vaccine (3 - Booster for [employer] series) 12/01/2021    Diabetic foot exam  01/21/2022    Diabetic microalbuminuria test  07/01/2022         Attending Physician Statement  I have discussed the case, including pertinent history and exam findings with the resident. I also have seen the patient and performed key portions of the examination. I agree with the documented assessment and plan as documented by the resident.   GE modifier added to this encounter      Steve Baron MD 6/13/2022 10:34 AM

## 2022-06-13 NOTE — PROGRESS NOTES
S: 56 y.o. male with   Chief Complaint   Patient presents with    Follow-up     surgery for rupotured tendon in left thumb    Cough    Congestion       HPI: please see resident note for HPI and ROS. Follow up of R thumb tendon rupture  Doing well  COVID negative, cough and congestion    BP Readings from Last 3 Encounters:   06/13/22 118/64   04/08/22 110/70   03/30/22 110/70     Wt Readings from Last 3 Encounters:   06/13/22 231 lb (104.8 kg)   04/08/22 229 lb 3.2 oz (104 kg)   03/30/22 233 lb (105.7 kg)       O: VS:  height is 6' (1.829 m) and weight is 231 lb (104.8 kg). His skin temperature is 97 °F (36.1 °C). His blood pressure is 118/64 and his pulse is 82. His respiration is 20 and oxygen saturation is 95%. Diagnosis Orders   1. COPD exacerbation (HCC)  predniSONE (DELTASONE) 20 MG tablet    azithromycin (ZITHROMAX) 250 MG tablet    guaiFENesin (MUCINEX) 600 MG extended release tablet   2. Rupture of extensor tendon of right hand, subsequent encounter     3. Congestion of respiratory tract  POCT COVID-19 Rapid, NAAT    guaiFENesin (MUCINEX) 600 MG extended release tablet   4. Neuropathy  pregabalin (LYRICA) 75 MG capsule   5.  Smoker         Plan:  Follow up with ortho  Continue nebs every 4-6 hrs  Continue current inhalers  Smoking cessation  Start prednisone 40 mg for 5 days, Z-nitesh      Health Maintenance Due   Topic Date Due    Diabetic retinal exam  06/04/2019    Colorectal Cancer Screen  Never done    COVID-19 Vaccine (3 - Booster for Celestin Peter series) 12/01/2021    Diabetic foot exam  01/21/2022    Diabetic microalbuminuria test  07/01/2022         Anshul Bautista MD 6/13/2022 1:58 PM

## 2022-06-13 NOTE — PROGRESS NOTES
Darby Gongora (:  1975) is a 56 y.o. male,Established patient, here for evaluation of the following chief complaint(s):  Follow-up (surgery for rupotured tendon in left thumb), Cough, and Congestion         ASSESSMENT/PLAN:  1. COPD exacerbation (HCC)  -     predniSONE (DELTASONE) 20 MG tablet; Take 2 tablets by mouth daily for 5 days, Disp-20 tablet, R-0Normal  -     azithromycin (ZITHROMAX) 250 MG tablet; Take 1 tablet by mouth See Admin Instructions for 5 days 500mg on day 1 followed by 250mg on days 2 - 5, Disp-6 tablet, R-0Normal  -     guaiFENesin (MUCINEX) 600 MG extended release tablet; Take 1 tablet by mouth 2 times daily for 15 days, Disp-30 tablet, R-0Normal  2. Rupture of extensor tendon of right hand, subsequent encounter  3. Congestion of respiratory tract  -     POCT COVID-19 Rapid, NAAT  -     guaiFENesin (MUCINEX) 600 MG extended release tablet; Take 1 tablet by mouth 2 times daily for 15 days, Disp-30 tablet, R-0Normal  4. Neuropathy  -     pregabalin (LYRICA) 75 MG capsule; Take 1 capsule by mouth 2 times daily for 30 days. , Disp-60 capsule, R-0Normal  5. Smoker    Plan   Patient w/ COPD exacerbation w/ increased sputum production and subjective SOB. Will start 5 day course of prednisone and zpack. Albuterol inhaler as needed. Nebulizer every 4-6 hours for wheezing, cough, and SOB. Patient to follow up sooner or go to ED if new or worsening symptoms. COVID test was negative. Encouraged patient to stop quit smoking. Neuropathy not controlled. Stop gabapentin. Start lyrica 75mg BID. Will give 30 days supply with no refills. Follow up in 4 weeks      Return in about 4 weeks (around 2022) for neuropathy. Subjective   SUBJECTIVE/OBJECTIVE:  HPI    Follow up right thumb  Rupture of the extensor pollicis longus tendon. Had surgery 4 days ago with Dr. Matteo Islas, orthopedics at [facility]. Has follow up this week. Has a cast in place. COPD Exacerbation. Chest cold w/ wheezing. Cough w/ yellowish green. Some SoB. Started a couple days ago. No known COVID or sick contacts. No fevers, chills. Does have COPD. Not much help with inhalers. Does allergies. Does not think related. Still currently daily smoker. Not interested in quitting at this time. Neuropathy  Both hands and feet. Chronic. Seems to be getting worse over the past year. On Gabapentin 800mg TID, which does not seem to be working as well as it use to. Would like to try something else. Review of Systems   Constitutional: Negative for chills and fever. HENT: Positive for congestion and rhinorrhea. Negative for sinus pressure, sore throat and trouble swallowing. Respiratory: Positive for cough, shortness of breath and wheezing. Cardiovascular: Negative for chest pain and palpitations. Gastrointestinal: Negative for abdominal pain, constipation and diarrhea. Objective   Physical Exam  Vitals and nursing note reviewed. Constitutional:       General: He is not in acute distress. HENT:      Head: Normocephalic and atraumatic. Right Ear: External ear normal.      Left Ear: External ear normal.      Nose: Rhinorrhea present. No congestion. Mouth/Throat:      Pharynx: No oropharyngeal exudate or posterior oropharyngeal erythema. Eyes:      General:         Right eye: No discharge. Left eye: No discharge. Cardiovascular:      Rate and Rhythm: Normal rate and regular rhythm. Pulmonary:      Effort: Pulmonary effort is normal. No respiratory distress. Breath sounds: Wheezing present. Abdominal:      General: Abdomen is flat. Palpations: Abdomen is soft. Tenderness: There is no abdominal tenderness. Musculoskeletal:         General: No swelling. Normal range of motion. Cervical back: Normal range of motion. Skin:     General: Skin is warm. Findings: No rash. Neurological:      Mental Status: He is alert and oriented to person, place, and time. An electronic signature was used to authenticate this note.     --Juan Pablo Sanchez MD

## 2022-06-13 NOTE — PATIENT INSTRUCTIONS
Complete 5 days for prednisone and zpack   Use nebulizer every 4-6 hours for wheezing, cough, and SOB  Cough syrup at night as needed  Can try mucinex for congestion  Stop gabapentin. Start lyrica 75mg daily for 1 week then increased to twice daily. Patient Education        Chronic Obstructive Pulmonary Disease (COPD) Flare-Ups: Care Instructions  Overview     Chronic obstructive pulmonary disease (COPD) is a lung disease that makes it hard to breathe. It is caused by damage to the lungs over many years, usuallyfrom smoking. Chronic bronchitis and emphysema are two lung problems that are types of COPD:   Chronic bronchitis: The airways that carry air to the lungs (bronchial tubes) get inflamed and make a lot of mucus. This can narrow or block the airways. It can also make you cough.  Emphysema: The tiny air sacs (alveoli) at the end of the airways in the lungs are damaged. When the air sacs are damaged or destroyed, the inner walls break down, and the sacs become larger. These larger air sacs move less oxygen into the blood. This causes difficulty breathing or shortness of breath that gets worse over time. After air sacs are destroyed, they cannot be replaced. Many people with COPD have attacks called flare-ups or exacerbations. This iswhen your usual symptoms quickly get worse and stay worse. If you notice any problems or new symptoms, get medical treatment right away. Follow-up care is a key part of your treatment and safety. Be sure to make and go to all appointments, and call your doctor if you are having problems. It's also a good idea to know your test results and keep alist of the medicines you take. How can you care for yourself at home?  Be safe with medicines. Take your medicines exactly as prescribed. Call your doctor if you think you are having a problem with your medicine. You may be taking medicines such as:  ? Bronchodilators.  These help open your airways and make breathing easier. ? Corticosteroids. These reduce airway inflammation. They may be given as pills, in a vein, or in an inhaled form. You may go home with pills in addition to an inhaler that you already use.  A spacer may help you get more inhaled medicine to your lungs. Ask your doctor or pharmacist if a spacer is right for you. If it is, ask how to use it properly.  If your doctor prescribed antibiotics, take them as directed. Do not stop taking them just because you feel better. You need to take the full course of antibiotics.  If your doctor prescribed oxygen, use the flow rate your doctor has recommended. Do not change it without talking to your doctor first.  Gloria[employer] Seeds Do not smoke. Smoking makes COPD worse. If you need help quitting, talk to your doctor about stop-smoking programs and medicines. These can increase your chances of quitting for good. When should you call for help? Call 911 anytime you think you may need emergency care. For example, call if:     You have severe trouble breathing. Call your doctor now or seek immediate medical care if:     You have new or worse trouble breathing.      Your coughing or wheezing gets worse.      You cough up dark brown or bloody mucus (sputum).      You have a new or higher fever.      You have severe chest pain, or chest pain is quickly getting worse. Watch closely for changes in your health, and be sure to contact your doctor if:     You notice more mucus or a change in the color of your mucus.      You need to use your antibiotic or steroid pills.      You do not get better as expected. Where can you learn more? Go to [email or web address]://[email or web address].iOmando. org and sign in to your [employer] account. Enter K030 in the Dokogeo box to learn more about \"Chronic Obstructive Pulmonary Disease (COPD) Flare-Ups: Care Instructions. \"     If you do not have an account, please click on the \"Sign Up Now\" link.   Current as of: July 6, 2021               Content Version: 13.2  © 9915-0567 Healthwise, Incorporated. Care instructions adapted under license by [facility] ([place]). If you have questions about a medical condition or this instruction, always ask your healthcare professional. [address] any warranty or liability for your use of this information.

## 2022-06-14 DIAGNOSIS — B19.10 HEPATITIS B INFECTION WITHOUT DELTA AGENT WITHOUT HEPATIC COMA, UNSPECIFIED CHRONICITY: ICD-10-CM

## 2022-06-14 RX ORDER — TENOFOVIR DISOPROXIL FUMARATE 300 MG/1
300 TABLET, FILM COATED ORAL DAILY
Qty: 30 TABLET | Refills: 0 | Status: SHIPPED | OUTPATIENT
Start: 2022-06-14 | End: 2022-07-29 | Stop reason: SDUPTHER

## 2022-06-14 NOTE — TELEPHONE ENCOUNTER
Last visit- 6/13/2022  Next visit- 7/11/2022    Requested Prescriptions     Pending Prescriptions Disp Refills    tenofovir disoproxil fumarate (VIREAD) 300 MG tablet 30 tablet 0     Sig: Take 1 tablet by mouth daily

## 2022-06-16 DIAGNOSIS — M25.531 ACUTE PAIN OF RIGHT WRIST: ICD-10-CM

## 2022-06-16 NOTE — TELEPHONE ENCOUNTER
Patient's last appointment was : 6/13/2022  Patient's next appointment is :   Future Appointments   Date Time Provider Didier Burton   7/6/2022  9:45 AM MD Alok Cardenas [facility] [address]   7/11/2022  9:40 AM Lee Shipman MD [facility] [address]   7/21/2022  8:30 AM Kilo Guadarrama MD [facility] [address]     Last refilled:5/13/22    Lab Results   Component Value Date    LABA1C 6.0 05/17/2022     Lab Results   Component Value Date    CHOL 139 05/17/2022    TRIG 149 05/17/2022    HDL 28 05/17/2022    LDLCALC 81 05/17/2022     Lab Results   Component Value Date     05/17/2022    K 4.0 05/17/2022     05/17/2022    CO2 23 05/17/2022    BUN 10 05/17/2022    CREATININE 0.8 05/17/2022    GLUCOSE 177 (H) 05/17/2022    CALCIUM 9.2 05/17/2022    PROT 7.0 05/17/2022    LABALBU 4.5 05/17/2022    BILITOT 0.7 05/17/2022    ALKPHOS 143 (H) 05/17/2022    AST 22 05/17/2022    ALT 12 05/17/2022    LABGLOM >90 05/17/2022    GFRAA >60 02/26/2019    GLOB NOT REPORTED 04/05/2018     Lab Results   Component Value Date    TSH 1.78 10/11/2017    FT3 3.37 10/11/2017    T4FREE 1.14 05/07/2013     Lab Results   Component Value Date    WBC 7.6 05/17/2022    HGB 15.5 05/17/2022    HCT 45.9 05/17/2022    MCV 86.9 05/17/2022     05/17/2022

## 2022-06-16 NOTE — TELEPHONE ENCOUNTER
Patient's last appointment was : 6/13/2022  Patient's next appointment is :   Future Appointments   Date Time Provider Didier Burton   7/6/2022  9:45 AM MD Emy Zunigaiver Rheum [facility] [place] [address]   7/11/2022  9:40 AM Haylee Barnes MD [facility] [place] [address]   7/21/2022  8:30 AM MD Alexa Delgadoa [facility] [place] [address]     Last refilled:4/8/22    Lab Results   Component Value Date    LABA1C 6.0 05/17/2022     Lab Results   Component Value Date    CHOL 139 05/17/2022    TRIG 149 05/17/2022    HDL 28 05/17/2022    LDLCALC 81 05/17/2022     Lab Results   Component Value Date     05/17/2022    K 4.0 05/17/2022     05/17/2022    CO2 23 05/17/2022    BUN 10 05/17/2022    CREATININE 0.8 05/17/2022    GLUCOSE 177 (H) 05/17/2022    CALCIUM 9.2 05/17/2022    PROT 7.0 05/17/2022    LABALBU 4.5 05/17/2022    BILITOT 0.7 05/17/2022    ALKPHOS 143 (H) 05/17/2022    AST 22 05/17/2022    ALT 12 05/17/2022    LABGLOM >90 05/17/2022    GFRAA >60 02/26/2019    GLOB NOT REPORTED 04/05/2018     Lab Results   Component Value Date    TSH 1.78 10/11/2017    FT3 3.37 10/11/2017    T4FREE 1.14 05/07/2013     Lab Results   Component Value Date    WBC 7.6 05/17/2022    HGB 15.5 05/17/2022    HCT 45.9 05/17/2022    MCV 86.9 05/17/2022     05/17/2022

## 2022-06-17 RX ORDER — TIZANIDINE 4 MG/1
TABLET ORAL
Qty: 60 TABLET | Refills: 0 | Status: SHIPPED | OUTPATIENT
Start: 2022-06-17 | End: 2022-07-09 | Stop reason: SDUPTHER

## 2022-06-21 RX ORDER — TIZANIDINE 4 MG/1
TABLET ORAL
Qty: 60 TABLET | Refills: 0 | OUTPATIENT
Start: 2022-06-21

## 2022-06-21 RX ORDER — NAPROXEN 500 MG/1
500 TABLET ORAL 2 TIMES DAILY WITH MEALS
Qty: 60 TABLET | Refills: 0 | OUTPATIENT
Start: 2022-06-21

## 2022-07-07 ENCOUNTER — OFFICE VISIT (OUTPATIENT)
Dept: RHEUMATOLOGY | Age: 47
End: 2022-07-07
Payer: COMMERCIAL

## 2022-07-07 VITALS
WEIGHT: 227 LBS | HEIGHT: 72 IN | SYSTOLIC BLOOD PRESSURE: 118 MMHG | HEART RATE: 69 BPM | OXYGEN SATURATION: 98 % | DIASTOLIC BLOOD PRESSURE: 70 MMHG | BODY MASS INDEX: 30.75 KG/M2

## 2022-07-07 DIAGNOSIS — M79.645 PAIN OF LEFT MIDDLE FINGER: Primary | ICD-10-CM

## 2022-07-07 DIAGNOSIS — M66.841 NONTRAUMATIC RUPTURE OF TENDON OF RIGHT THUMB: ICD-10-CM

## 2022-07-07 DIAGNOSIS — M25.50 MULTIPLE JOINT PAIN: ICD-10-CM

## 2022-07-07 PROCEDURE — G8417 CALC BMI ABV UP PARAM F/U: HCPCS | Performed by: INTERNAL MEDICINE

## 2022-07-07 PROCEDURE — 4004F PT TOBACCO SCREEN RCVD TLK: CPT | Performed by: INTERNAL MEDICINE

## 2022-07-07 PROCEDURE — 99204 OFFICE O/P NEW MOD 45 MIN: CPT | Performed by: INTERNAL MEDICINE

## 2022-07-07 PROCEDURE — G8427 DOCREV CUR MEDS BY ELIG CLIN: HCPCS | Performed by: INTERNAL MEDICINE

## 2022-07-07 ASSESSMENT — ENCOUNTER SYMPTOMS
COUGH: 0
ABDOMINAL PAIN: 0
NAUSEA: 0
BLOOD IN STOOL: 0
SHORTNESS OF BREATH: 0
WHEEZING: 0
VOMITING: 0

## 2022-07-07 NOTE — PROGRESS NOTES
[facility] [address] [facility] Rheumatology  Rheumatology Consult Note      7/7/2022       Reason for Consult:  Rupture tendon right thumb  Requesting Physician:  Jeovanny Nugent*     CHIEF COMPLAINT:    Chief Complaint   Patient presents with    New Patient     Spontaneous rupture of other tendons, right hand         History Obtained From:  patient      HISTORY OF PRESENT ILLNESS:    56 y.o. male presents today for evaluation of rupture tendon right thumb. H/o rupture tendon of the right thumb 3 months ago. Had surgical repair/reconstruction with Dr. Ivy Arita (ortho at [place] [address]). Referral wsa then placed to rheumatology for further evaluation. Woke up one day with painful swelling in his right hand. Assciated with hotness and pain. Initially treated for cellulitis. Had imaging that showed rupture tendon. No preceding traumas or injuries. 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 Pain feet and legs for hears. Swelling in his ankles and toes. Last Friday, develped pain and swelling left middle finger. Pain constant achy nonradiating. Bending his finger aggravates the pain. Unaware of any relieving factors. 5-6/10 in pain scale. Has prolonged morning stiffness that lasts for over an hour. This has been ongoing for at least 5 years. No skin rash. No psoriasis. No IBD-like symptoms. No h/o iritis or uveitis.   Reports having decreased vision in his eyes, has been seeing optometrist.        Past Medical History:     has a past medical history of Anxiety, Bipolar 1 disorder ([address] Chronic diastolic congestive heart failure ([address] Cirrhosis ([address] Constipation, Gastroesophageal reflux disease, Hard of hearing, Hep C w/o coma, chronic ([address] Hepatic encephalopathy ([address] Hepatitis B, Kidney stones, Post traumatic stress disorder (PTSD), Substance abuse ([address] Type 2 diabetes mellitus without complication, with long-term current use of insulin ([facility] [address] and Wears glasses. Past Surgical History:     has a past surgical history that includes Kidney stone surgery; Ankle surgery (Left); and Ureter surgery (Left, 10/26/2021). Current Medications:      Current Outpatient Medications:     tiZANidine (ZANAFLEX) 4 MG tablet, TAKE 1 TABLET BY MOUTH THREE TIMES DAILY AS NEEDED FOR MUSCLE SPASM AND FOR PAIN, Disp: 60 tablet, Rfl: 0    tenofovir disoproxil fumarate (VIREAD) 300 MG tablet, Take 1 tablet by mouth daily, Disp: 30 tablet, Rfl: 0    HYDROcodone-acetaminophen (NORCO) 5-325 MG per tablet, Take 1 tablet by mouth every 6 hours as needed for Pain., Disp: , Rfl:     budesonide-formoterol (SYMBICORT) 160-4.5 MCG/ACT AERO, 2 puffs, Disp: , Rfl:     ascorbic acid (VITAMIN C) 500 MG tablet, Vitamin C With Bita Hips 500 mg tablet  TAKE 1 TABLET BY MOUTH TWICE DAILY FOR 7 DAYS, Disp: , Rfl:     ondansetron (ZOFRAN) 4 MG tablet, TAKE ONE TABLET EVERY 8 HOURS AS NEEDED, Disp: , Rfl:     metroNIDAZOLE (METROGEL) 0.75 % gel, metronidazole 0.75 % topical gel  APPLY EXTERNALLY TWICE DAILY, Disp: , Rfl:     ibuprofen (ADVIL;MOTRIN) 800 MG tablet, TAKE ONE TABLET BY MOUTH THREE TIMES DAILY AS NEEDED WITH FOOD, Disp: , Rfl:     docusate sodium (COLACE) 100 mg capsule, TAKE ONE Capsule BY MOUTH TWICE DAILY, Disp: , Rfl:     omeprazole (PRILOSEC) 20 MG delayed release capsule, 1 capsule, Disp: , Rfl:     pregabalin (LYRICA) 75 MG capsule, Take 1 capsule by mouth 2 times daily for 30 days. , Disp: 60 capsule, Rfl: 0    potassium chloride (KLOR-CON M) 20 MEQ extended release tablet, Take 1 tablet by mouth daily as needed (with lasix), Disp: 30 tablet, Rfl: 2    atorvastatin (LIPITOR) 10 MG tablet, Take 1 tablet by mouth daily, Disp: 90 tablet, Rfl: 1    fluticasone (FLOVENT HFA) 220 MCG/ACT inhaler, Inhale 2 puffs into the lungs 2 times daily, Disp: 1 each, Rfl: 3    magnesium oxide (MAG-OX) 250 MG TABS tablet, Take 1.5 tablets by mouth 2 times daily, Disp: 270 tablet, Rfl: 1    gabapentin (NEURONTIN) 800 MG tablet, 1 po tid, Disp: 90 tablet, Rfl: 5    escitalopram (LEXAPRO) 10 MG tablet, Take 1 tablet by mouth once daily, Disp: 90 tablet, Rfl: 1    furosemide (LASIX) 40 MG tablet, Take 1 tablet by mouth daily, Disp: 60 tablet, Rfl: 2    naproxen (NAPROSYN) 500 MG tablet, Take 1 tablet by mouth 2 times daily (with meals), Disp: 60 tablet, Rfl: 0    lisinopril (PRINIVIL;ZESTRIL) 5 MG tablet, Take 1 tablet by mouth daily, Disp: 90 tablet, Rfl: 1    famotidine (PEPCID) 20 MG tablet, Take 1 tablet by mouth 2 times daily, Disp: 60 tablet, Rfl: 0    EQ LORATADINE 10 MG tablet, Take 1 tablet by mouth nightly, Disp: 90 tablet, Rfl: 1    pantoprazole (PROTONIX) 40 MG tablet, Take 1 tablet by mouth once daily, Disp: 90 tablet, Rfl: 1    metoclopramide (REGLAN) 5 MG tablet, 3 times daily, Disp: , Rfl:     naloxone 4 MG/0.1ML LIQD nasal spray, Naloxone Hcl Active 4 MG NA One Time Only 1 1 August 20th, 2020 10:59am, Disp: , Rfl:     ipratropium-albuterol (DUONEB) 0.5-2.5 (3) MG/3ML SOLN nebulizer solution, Take 3 mLs by nebulization every 6 hours as needed for Shortness of Breath, Disp: 360 vial, Rfl: 3    Multiple Vitamin (MULTIVITAMIN PO), Take by mouth daily, Disp: , Rfl:     ondansetron (ZOFRAN ODT) 4 MG disintegrating tablet, Take 1 tablet by mouth every 8 hours as needed for Nausea, Disp: 20 tablet, Rfl: 0    lidocaine (LIDODERM) 5 %, Place 1 patch onto the skin daily 12 hours on, 12 hours off., Disp: 15 patch, Rfl: 0    albuterol sulfate HFA (PROVENTIL HFA) 108 (90 Base) MCG/ACT inhaler, Inhale 2 puffs into the lungs every 4 hours as needed for Wheezing or Shortness of Breath (Space out to every 6 hours as symptoms improve) Space out to every 6 hours as symptoms improve., Disp: 1 Inhaler, Rfl: 3    METHADONE HCL PO, Take 116 mg by mouth daily , Disp: , Rfl:     acetaminophen (TYLENOL) 500 MG tablet, Take 1 tablet by mouth every 4 hours as needed for Pain or Fever, Disp: 20 tablet, Rfl: 0    Allergies:    Adhesive tape, Bactrim [sulfamethoxazole-trimethoprim], Clonazepam, Cyclobenzaprine, Morphine, Other, and Quetiapine    Social History:     reports that he has been smoking cigarettes. He has a 30.00 pack-year smoking history. He has never used smokeless tobacco. He reports previous drug use. He reports that he does not drink alcohol. Family History:   family history includes Depression in his maternal grandmother, mother, and sister; Heart Disease in his father and mother; Other in his mother; Rheum Arthritis in his mother; Substance Abuse in his brother, father, maternal aunt, maternal grandmother, maternal uncle, mother, paternal aunt, and paternal uncle. REVIEW OF SYSTEMS:    Review of Systems   Constitutional: Negative for chills, fatigue, fever and unexpected weight change. HENT: Negative for mouth sores. Respiratory: Negative for cough, shortness of breath and wheezing. Cardiovascular: Negative for chest pain and leg swelling. Gastrointestinal: Negative for abdominal pain, blood in stool, nausea and vomiting. Genitourinary: Negative for dysuria and hematuria. Skin: Positive for rash (erythematous pinpoint rash in back of arms). Neurological: Negative for headaches. PHYSICAL EXAM:    Vitals:    /70 (Site: Left Upper Arm, Position: Sitting, Cuff Size: Large Adult)   Pulse 69   Ht 6' 0.01\" (1.829 m)   Wt 227 lb (103 kg)   SpO2 98%   BMI 30.78 kg/m²     Physical Exam  Constitutional:       General: He is not in acute distress. Appearance: Normal appearance. HENT:      Mouth/Throat:      Mouth: Mucous membranes are moist.      Pharynx: Oropharynx is clear. Eyes:      Extraocular Movements: Extraocular movements intact. Conjunctiva/sclera: Conjunctivae normal.   Cardiovascular:      Rate and Rhythm: Normal rate and regular rhythm. Heart sounds: Normal heart sounds. No murmur heard. No friction rub. Pulmonary:      Effort: Pulmonary effort is normal. No respiratory distress. Breath sounds: Normal breath sounds. No wheezing or rhonchi. Abdominal:      Palpations: Abdomen is soft. Musculoskeletal:         General: Swelling and tenderness present. No deformity. Cervical back: Normal range of motion and neck supple. Right lower leg: No edema. Left lower leg: No edema. Comments: Right hand with hand/wrist splint. Had recent surgery on his right thumb. There is swelling and tenderness in left 3rd MCP and PIP joints and proximal 3rd phalanx. No synovitis or tenderness in other small joints of hands, wrists, elbows, shoulders. No swelling or tenderness in knees, ankles, MTPs. Decreased hand  strength and flexion range of the left 3rd digit. ROM shoulders is intact. Lymphadenopathy:      Cervical: No cervical adenopathy. Skin:     General: Skin is warm and dry. Findings: No lesion or rash. Neurological:      General: No focal deficit present. Mental Status: He is alert and oriented to person, place, and time. Mental status is at baseline. Cranial Nerves: No cranial nerve deficit. Gait: Gait normal.   Psychiatric:         Mood and Affect: Mood normal.            DATA:    Labs were reviewed. Results for Chau Ho (MRN [id number]) as of 7/7/2022 11:31   Ref. Range 5/17/2022 10:11   JONATHAN SCREEN Latest Ref Range: None Detected  None Detected   Rheumatoid Factor Latest Ref Range: 0 - 13 IU/mL < 10   Results for Chau Ho (MRN [id number]) as of 7/7/2022 11:31   Ref. Range 5/17/2022 10:11   HLA-B27 Latest Ref Range: Negative  Negative   Results for Chau Ho (MRN [id number]) as of 7/7/2022 11:31   Ref. Range 5/17/2022 10:11   Sed Rate Latest Ref Range: 0 - 10 mm/hr 5   Results for Chau Ho (MRN [id number]) as of 7/7/2022 11:31   Ref.  Range 5/17/2022 10:11   CRP Latest Ref Range: 0.00 - 1.00 mg/dl 5.58 (H)     Imaging was reviewed. MRI right wrist 5/13/2022  Impression:   Rupture of the extensor pollicis longus tendon. Enlargement of the extensor carpi radialis brevis tendon with adjacent    fluid suspicious for an acute injury without disruption of the tendon. Possible ill-defined contusion of the distal radius. Possible ill-defined partial tear of the scapholunate ligament. IMPRESSION/RECOMMENDATIONS:      1. Spontaneous rupture of right hallucis longus tendon s/p reconstruction surgery. No preceding injuries. Possible ciprofloxacin antibiotic therapy prior to the diagnosis. Etiology is not clear at this time. Interestingly, has ?tenosynovitis in left 3rd digit. He has h/o Hep B and Hep C. ?inflammatory arthritis. However, we do not typically see rupture tendon in such cases unless it is related to presence of erosions in joints rubbing on the tendon causing rupture.     -- obtain labs today  -- xray of left hand  -- rtc in one week    Orders Placed This Encounter   Procedures    XR HAND LEFT (MIN 3 VIEWS)     Standing Status:   Future     Standing Expiration Date:   7/7/2023     Order Specific Question:   Reason for exam:     Answer:   evaluate for inflammatory arthritis    Sedimentation Rate     Standing Status:   Future     Standing Expiration Date:   8/8/2758    Cyclic Citrul Peptide Antibody, IgG     Standing Status:   Future     Standing Expiration Date:   7/7/2023    Rheumatoid Factor     Standing Status:   Future     Standing Expiration Date:   7/7/2023    C-Reactive Protein     Standing Status:   Future     Standing Expiration Date:   7/7/2023        Merry Leon MD    [address] [facility] [address] [place] [address]. [address] [facility]  [address] [place], [place] [id number] [phone]

## 2022-07-09 DIAGNOSIS — G62.9 NEUROPATHY: ICD-10-CM

## 2022-07-09 DIAGNOSIS — M25.531 ACUTE PAIN OF RIGHT WRIST: ICD-10-CM

## 2022-07-11 RX ORDER — PREGABALIN 75 MG/1
CAPSULE ORAL
Qty: 60 CAPSULE | Refills: 0 | OUTPATIENT
Start: 2022-07-11

## 2022-07-11 NOTE — TELEPHONE ENCOUNTER
Patient's last appointment was : 6/13/2022  Patient's next appointment is :   Future Appointments   Date Time Provider Didier Burton   7/14/2022 10:30 AM Cayetano Man MD N [facility] [place]   7/21/2022  8:30 AM MD Bette Sommers [facility] [place] [address]     Last refilled:6/17/22    Lab Results   Component Value Date    LABA1C 6.0 05/17/2022     Lab Results   Component Value Date    CHOL 139 05/17/2022    TRIG 149 05/17/2022    HDL 28 05/17/2022    LDLCALC 81 05/17/2022     Lab Results   Component Value Date     05/17/2022    K 4.0 05/17/2022     05/17/2022    CO2 23 05/17/2022    BUN 10 05/17/2022    CREATININE 0.8 05/17/2022    GLUCOSE 177 (H) 05/17/2022    CALCIUM 9.2 05/17/2022    PROT 7.0 05/17/2022    LABALBU 4.5 05/17/2022    BILITOT 0.7 05/17/2022    ALKPHOS 143 (H) 05/17/2022    AST 22 05/17/2022    ALT 12 05/17/2022    LABGLOM >90 05/17/2022    GFRAA >60 02/26/2019    GLOB NOT REPORTED 04/05/2018     Lab Results   Component Value Date    TSH 1.78 10/11/2017    FT3 3.37 10/11/2017    T4FREE 1.14 05/07/2013     Lab Results   Component Value Date    WBC 7.6 05/17/2022    HGB 15.5 05/17/2022    HCT 45.9 05/17/2022    MCV 86.9 05/17/2022     05/17/2022

## 2022-07-11 NOTE — TELEPHONE ENCOUNTER
Patient's last appointment was : 6/13/2022  Patient's next appointment is :   Future Appointments   Date Time Provider Didier Burton   7/14/2022 10:30 AM Paula Villegas MD N [facility] - [place]   7/21/2022  8:30 AM MD Gely Perry [facility] - [place]     Last refilled:6/13/22,4/2022    Lab Results   Component Value Date    LABA1C 6.0 05/17/2022     Lab Results   Component Value Date    CHOL 139 05/17/2022    TRIG 149 05/17/2022    HDL 28 05/17/2022    LDLCALC 81 05/17/2022     Lab Results   Component Value Date     05/17/2022    K 4.0 05/17/2022     05/17/2022    CO2 23 05/17/2022    BUN 10 05/17/2022    CREATININE 0.8 05/17/2022    GLUCOSE 177 (H) 05/17/2022    CALCIUM 9.2 05/17/2022    PROT 7.0 05/17/2022    LABALBU 4.5 05/17/2022    BILITOT 0.7 05/17/2022    ALKPHOS 143 (H) 05/17/2022    AST 22 05/17/2022    ALT 12 05/17/2022    LABGLOM >90 05/17/2022    GFRAA >60 02/26/2019    GLOB NOT REPORTED 04/05/2018     Lab Results   Component Value Date    TSH 1.78 10/11/2017    FT3 3.37 10/11/2017    T4FREE 1.14 05/07/2013     Lab Results   Component Value Date    WBC 7.6 05/17/2022    HGB 15.5 05/17/2022    HCT 45.9 05/17/2022    MCV 86.9 05/17/2022     05/17/2022

## 2022-07-12 RX ORDER — PREGABALIN 75 MG/1
75 CAPSULE ORAL 2 TIMES DAILY
Qty: 60 CAPSULE | Refills: 0 | Status: SHIPPED | OUTPATIENT
Start: 2022-07-12 | End: 2022-08-15

## 2022-07-12 RX ORDER — NAPROXEN 500 MG/1
500 TABLET ORAL 2 TIMES DAILY WITH MEALS
Qty: 60 TABLET | Refills: 0 | OUTPATIENT
Start: 2022-07-12

## 2022-07-12 RX ORDER — TIZANIDINE 4 MG/1
TABLET ORAL
Qty: 60 TABLET | Refills: 0 | Status: SHIPPED | OUTPATIENT
Start: 2022-07-12 | End: 2022-08-15

## 2022-07-12 NOTE — TELEPHONE ENCOUNTER
The patient missed his appointment that was scheduled 07/11/22. Please call and make a follow up appointment. Advise the patient that I cannot refill the lyrica and zanaflex without regular appointments. Please also advise that the patient should not be taking NSAIDs including naproxen given his history of heart failure. Tylenol is a better option, but no more than 2grams daily, given his history of liver cirrhosis. Thank you.

## 2022-07-27 ENCOUNTER — HOSPITAL ENCOUNTER (OUTPATIENT)
Dept: GENERAL RADIOLOGY | Age: 47
Discharge: HOME | End: 2022-07-27
Payer: COMMERCIAL

## 2022-07-27 ENCOUNTER — HOSPITAL ENCOUNTER (OUTPATIENT)
Age: 47
Discharge: HOME | End: 2022-07-27
Payer: COMMERCIAL

## 2022-07-27 DIAGNOSIS — M79.645 PAIN OF LEFT MIDDLE FINGER: ICD-10-CM

## 2022-07-27 DIAGNOSIS — M25.50 MULTIPLE JOINT PAIN: ICD-10-CM

## 2022-07-27 LAB
C-REACTIVE PROTEIN: 2.21 MG/DL (ref 0–1)
RHEUMATOID FACTOR: < 10 IU/ML (ref 0–13)
SEDIMENTATION RATE, ERYTHROCYTE: 21 MM/HR (ref 0–10)

## 2022-07-27 PROCEDURE — 86430 RHEUMATOID FACTOR TEST QUAL: CPT

## 2022-07-27 PROCEDURE — 86200 CCP ANTIBODY: CPT

## 2022-07-27 PROCEDURE — 36415 COLL VENOUS BLD VENIPUNCTURE: CPT

## 2022-07-27 PROCEDURE — 73130 X-RAY EXAM OF HAND: CPT

## 2022-07-27 PROCEDURE — 85651 RBC SED RATE NONAUTOMATED: CPT

## 2022-07-27 PROCEDURE — 86140 C-REACTIVE PROTEIN: CPT

## 2022-07-28 LAB — CYCLIC CITRULLINATED PEPTIDE ANTIBODY IGG: 1.3 U/ML (ref 0–7)

## 2022-07-29 ENCOUNTER — OFFICE VISIT (OUTPATIENT)
Dept: FAMILY MEDICINE CLINIC | Age: 47
End: 2022-07-29
Payer: COMMERCIAL

## 2022-07-29 VITALS
HEART RATE: 81 BPM | DIASTOLIC BLOOD PRESSURE: 84 MMHG | TEMPERATURE: 97.6 F | BODY MASS INDEX: 30.22 KG/M2 | WEIGHT: 228 LBS | SYSTOLIC BLOOD PRESSURE: 128 MMHG | OXYGEN SATURATION: 98 % | RESPIRATION RATE: 18 BRPM | HEIGHT: 73 IN

## 2022-07-29 DIAGNOSIS — B19.10 HEPATITIS B INFECTION WITHOUT DELTA AGENT WITHOUT HEPATIC COMA, UNSPECIFIED CHRONICITY: ICD-10-CM

## 2022-07-29 DIAGNOSIS — L82.1 SEBORRHEIC KERATOSES: ICD-10-CM

## 2022-07-29 DIAGNOSIS — K74.69 OTHER CIRRHOSIS OF LIVER (HCC): ICD-10-CM

## 2022-07-29 DIAGNOSIS — F31.32 BIPOLAR AFFECTIVE DISORDER, CURRENTLY DEPRESSED, MODERATE (HCC): ICD-10-CM

## 2022-07-29 DIAGNOSIS — N39.0 URINARY TRACT INFECTION WITHOUT HEMATURIA, SITE UNSPECIFIED: ICD-10-CM

## 2022-07-29 DIAGNOSIS — R30.0 DYSURIA: ICD-10-CM

## 2022-07-29 DIAGNOSIS — B07.8 OTHER VIRAL WARTS: ICD-10-CM

## 2022-07-29 DIAGNOSIS — B18.1 CHRONIC HEPATITIS B (HCC): ICD-10-CM

## 2022-07-29 DIAGNOSIS — S66.811D RUPTURE OF EXTENSOR TENDON OF RIGHT HAND, SUBSEQUENT ENCOUNTER: Primary | ICD-10-CM

## 2022-07-29 DIAGNOSIS — G63 POLYNEUROPATHY ASSOCIATED WITH UNDERLYING DISEASE (HCC): ICD-10-CM

## 2022-07-29 LAB
BILIRUBIN URINE: ABNORMAL
BLOOD URINE, POC: ABNORMAL
CHARACTER, URINE: ABNORMAL
COLOR, URINE: ABNORMAL
GLUCOSE URINE: NEGATIVE MG/DL
KETONES, URINE: NEGATIVE
LEUKOCYTE CLUMPS, URINE: ABNORMAL
NITRITE, URINE: POSITIVE
PH, URINE: 6 (ref 5–9)
PROTEIN, URINE: ABNORMAL MG/DL
SPECIFIC GRAVITY, URINE: 1.02 (ref 1–1.03)
UROBILINOGEN, URINE: >= 8 EU/DL (ref 0–1)

## 2022-07-29 PROCEDURE — 81003 URINALYSIS AUTO W/O SCOPE: CPT | Performed by: STUDENT IN AN ORGANIZED HEALTH CARE EDUCATION/TRAINING PROGRAM

## 2022-07-29 PROCEDURE — 99214 OFFICE O/P EST MOD 30 MIN: CPT | Performed by: STUDENT IN AN ORGANIZED HEALTH CARE EDUCATION/TRAINING PROGRAM

## 2022-07-29 RX ORDER — TENOFOVIR DISOPROXIL FUMARATE 300 MG/1
300 TABLET, FILM COATED ORAL DAILY
Qty: 30 TABLET | Refills: 0 | Status: SHIPPED | OUTPATIENT
Start: 2022-07-29 | End: 2022-08-09 | Stop reason: SDUPTHER

## 2022-07-29 RX ORDER — GRANULES FOR ORAL 3 G/1
3 POWDER ORAL ONCE
Qty: 1 EACH | Refills: 0 | Status: SHIPPED | OUTPATIENT
Start: 2022-07-29 | End: 2022-07-29

## 2022-07-29 RX ORDER — LAMOTRIGINE 25 MG/1
TABLET ORAL
Qty: 42 TABLET | Refills: 0 | Status: SHIPPED | OUTPATIENT
Start: 2022-07-29 | End: 2022-08-15

## 2022-07-29 ASSESSMENT — ENCOUNTER SYMPTOMS
CONSTIPATION: 0
WHEEZING: 0
SHORTNESS OF BREATH: 0
ABDOMINAL PAIN: 0
DIARRHEA: 0

## 2022-07-29 NOTE — PROGRESS NOTES
Faiza Ibarra (:  1975) is a 56 y.o. male,Established patient, here for evaluation of the following chief complaint(s):  Follow-up (Follow up and refills with a switch of meds from previous months )         ASSESSMENT/PLAN:  1. Right thumb pain with history of ruptured tendon status post repair  2 Chronic hepatitis B ([address]  3. Other cirrhosis of liver ([address]  4. Polyneuropathy associated with underlying disease ([address]  5. Bipolar affective disorder, currently depressed, moderate ([address]  6. Dysuria/UTI  7. Skin lesions consistent with seborrheic keratoses and warts    -Right thumb seems to be improving. Patient with increasing mobility. No longer in a splint. Agree with starting physical therapy per orthopedic recommendations.  -Patient with chronic hepatitis B. Requesting refill on his tenofovir. Previously saw a [address] for this. But had left for a while due to moving to [place]. States that he will be going back next month.   -We will refill tenofovir for now. GI doctor in [facility] to take over hepatitis B care and monitoring. Would recommend repeat hepatitis B DNA quant. Last was in 2018. Does have history of hepatitis C recent viral load 10/05/2021 showed evidence of remission.  -Patient with polyneuropathy currently on Lyrica. States that it is not helping as well as the gabapentin was. May need an increase. Due for refills on Lyrica in 2 weeks. Patient has appointment scheduled in 2 weeks, will discuss further at next that appointment.    -History of bipolar disorder. Unclear type. He is not currently seeing psych. Referral to psych placed. Patient also started on Lamictal 25 mg daily for 2 weeks then increase to 25 mg twice a day for another 2 weeks. We will reevaluate at next visit.  -Patient with 2 warts, 1 on the dorsal aspect of the right and left hand. Also has an AK versus wart on the right lateral upper extremity.   Patient also has a SK on the anterior aspect of the left upper extremity. Patient would like these removed. Appointment made in 2 weeks for removal of these lesions via cryotherapy.  -Point-of-care urinalysis here positive for nitrates. No blood. Given patient's symptom, will give a 1 time dose of fosfomycin 3 g. Patient allergic to Bactrim. Recently had tendon rupture and cannot to fluoroquinolones. Culture ordered and pending. Return in about 2 weeks (around 8/12/2022) for Discuss lyrica and have skin lesions treated . Subjective   SUBJECTIVE/OBJECTIVE:  HPI    Right thumb  Improving. Saw ortho yesterday. Out of splint. Start PT 3 days a week for 4 weeks. Skin lesion  Left upper arm. Getting bigger. Feeling rough. Also has other mole on hands, arms, and chest x 6 months. Chronic hepatitis B  Currently viread. Does need refill. Planning on going back to his hepatologist in [address] [place] [address]. 08/12/22. Due for liver biopsy. Polyneuropathy- Hx of diabetes. Last A1c was 6. States not controlled. Burning feet. Bottom of feet, more on the inside. Currently on methadone- Sees an addiction specialist in [place]. Clear for over 2 years. Hx bipolar- Diagnosed years ago. Has had manic episodes in the past. Has been hospitalized in the past. Currently on lexapro 10mg. States not helping. Feels like Noemi Henson is all over the place. \" Denies depression. Has not been able to get into see psych. UTI symptoms: x 2 weeks. Dysuria. Darker urine. Increased frequency. NO fevers or chills. Some abdominal/back pain after urinating. No penile discharge. Review of Systems   Respiratory:  Negative for shortness of breath and wheezing. Cardiovascular:  Negative for chest pain and palpitations. Gastrointestinal:  Negative for abdominal pain, constipation and diarrhea. Objective   Physical Exam  Vitals and nursing note reviewed. Constitutional:       General: He is not in acute distress. HENT:      Head: Normocephalic and atraumatic. Right Ear: External ear normal.      Left Ear: External ear normal.      Nose: No congestion or rhinorrhea. Eyes:      General:         Right eye: No discharge. Left eye: No discharge. Cardiovascular:      Rate and Rhythm: Normal rate and regular rhythm. Pulmonary:      Effort: Pulmonary effort is normal.      Breath sounds: No wheezing. Abdominal:      General: Abdomen is flat. Palpations: Abdomen is soft. Tenderness: There is no abdominal tenderness. Musculoskeletal:         General: No swelling. Cervical back: Normal range of motion. Comments: Creased range of motion of the right thumb   Skin:     General: Skin is warm. Findings: Lesion (Small wartlike projections over the dorsal aspect of both hands. Small AK versus wart in the right upper extremity. SK in the posterior left upper extremity.) present. No rash. Neurological:      Mental Status: He is alert and oriented to person, place, and time. An electronic signature was used to authenticate this note.     --Sarah Dunn MD

## 2022-07-29 NOTE — PROGRESS NOTES
Attending attestation:  I personally performed and participated key or critical portions of the evaluation and management including personally performing the exam and medical decision making. I verify the accuracy of the documentation by the resident with the following addition or changes: Here for follow-up today. UTI symptoms. No red flags. Will treat with forfomycin or nitrofurantoin and check urinalysis and culture. Avoid fluoroquinolone given history of recent tendon rupture. Diabetic but stable. Elevated alkaline phosphatase. Likely related to chronic Hep B but will check GGT with next labs. Elevated CRP is non-specific. Seeing rheumatology. Following with addiction specialist; doing well on methadone  Right thumb tendon that was repaired by ortho surgery. Off splint and starting physical therapy. Chronic hep B on Viread. Wants refill today. Had seen hepatologist and Dr. Teo Henning (does not like so wants to go back to hepatologist). Will get records to coordinate care; short term refill. Has appointment August 12th. Reports on Lexapro monotherapy for Bipolar disorder not effective. Mood swings. Diagnosis complicated by addiction. Did not do well on Seroquel or antipsychotics with restless legs. On lithium in the past.  Reports klonopin helped. Will trial Lamictal. Start 25 mg then bump to 50 mg after 2 weeks. Discussed risk of rash. Indications for prompt return and/or emergent presentation if worsening reviewed in detail. Multiple warts and seborrheic and actinic lesions. Will return to have these frozen. Will need to address health maintenance in follow-up.     Electronically signed by Dennis Bryan MD on 7/29/2022 at 9:46 AM

## 2022-07-31 LAB
ORGANISM: ABNORMAL
URINE CULTURE, ROUTINE: ABNORMAL

## 2022-08-01 ENCOUNTER — OFFICE VISIT (OUTPATIENT)
Dept: RHEUMATOLOGY | Age: 47
End: 2022-08-01
Payer: COMMERCIAL

## 2022-08-01 ENCOUNTER — TELEPHONE (OUTPATIENT)
Dept: FAMILY MEDICINE CLINIC | Age: 47
End: 2022-08-01

## 2022-08-01 VITALS
HEART RATE: 92 BPM | DIASTOLIC BLOOD PRESSURE: 78 MMHG | WEIGHT: 230 LBS | OXYGEN SATURATION: 97 % | SYSTOLIC BLOOD PRESSURE: 112 MMHG | HEIGHT: 73 IN | BODY MASS INDEX: 30.48 KG/M2

## 2022-08-01 DIAGNOSIS — M65.9 TENOSYNOVITIS OF BOTH HANDS: Primary | ICD-10-CM

## 2022-08-01 PROCEDURE — G8417 CALC BMI ABV UP PARAM F/U: HCPCS | Performed by: INTERNAL MEDICINE

## 2022-08-01 PROCEDURE — 99214 OFFICE O/P EST MOD 30 MIN: CPT | Performed by: INTERNAL MEDICINE

## 2022-08-01 PROCEDURE — 4004F PT TOBACCO SCREEN RCVD TLK: CPT | Performed by: INTERNAL MEDICINE

## 2022-08-01 PROCEDURE — G8427 DOCREV CUR MEDS BY ELIG CLIN: HCPCS | Performed by: INTERNAL MEDICINE

## 2022-08-01 RX ORDER — NAPROXEN 500 MG/1
500 TABLET ORAL 2 TIMES DAILY WITH MEALS
Qty: 60 TABLET | Refills: 1 | Status: SHIPPED | OUTPATIENT
Start: 2022-08-01 | End: 2022-10-17

## 2022-08-01 ASSESSMENT — ENCOUNTER SYMPTOMS
ABDOMINAL PAIN: 0
WHEEZING: 0
COUGH: 0
SHORTNESS OF BREATH: 0
NAUSEA: 0
BLOOD IN STOOL: 0
VOMITING: 0

## 2022-08-01 NOTE — PROGRESS NOTES
[facility]  Rheumatology Consult Note      8/1/2022       CHIEF COMPLAINT:    Chief Complaint   Patient presents with    Follow-up     1 week           HISTORY OF PRESENT ILLNESS:    56 y.o. male presents for follow up. In last office visit, labs were ordered to rule out inflammatory arthritis.      today for evaluation of rupture tendon right thumb. H/o rupture tendon of the right thumb 3 months ago. Had surgical repair/reconstruction with Dr. Domenica Jauregui (ortho at [place] [address]). Referral wsa then placed to rheumatology for further evaluation. Woke up one day with painful swelling in his right hand. Assciated with hotness and pain. Initially treated for cellulitis. Had imaging that showed rupture tendon. No preceding traumas or injuries. 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 Pain feet and legs for hears. Swelling in his ankles and toes. Last Friday, develped pain and swelling left middle finger. Pain constant achy nonradiating. Bending his finger aggravates the pain. Unaware of any relieving factors. 5-6/10 in pain scale. Has prolonged morning stiffness that lasts for over an hour. This has been ongoing for at least 5 years. No skin rash. No psoriasis. No IBD-like symptoms. No h/o iritis or uveitis. Reports having decreased vision in his eyes, has been seeing optometrist.        Past Medical History:     has a past medical history of Anxiety, Bipolar 1 disorder ([address] Chronic diastolic congestive heart failure ([address] Cirrhosis ([address] Constipation, Gastroesophageal reflux disease, Hard of hearing, Hep C w/o coma, chronic ([address] Hepatic encephalopathy ([address] Hepatitis B, Kidney stones, Post traumatic stress disorder (PTSD), Substance abuse ([address] Type 2 diabetes mellitus without complication, with long-term current use of insulin ([address] and Wears glasses.     Past Surgical History:     has a past surgical history that includes Kidney stone surgery; Ankle surgery (Left); and Ureter surgery (Left, 10/26/2021). Current Medications:      Current Outpatient Medications:     naproxen (NAPROSYN) 500 MG tablet, Take 1 tablet by mouth in the morning and 1 tablet in the evening. Take with meals. , Disp: 60 tablet, Rfl: 1    lamoTRIgine (LAMICTAL) 25 MG tablet, Take 1 tablet by mouth daily for 14 days, THEN 1 tablet 2 times daily for 14 days. , Disp: 42 tablet, Rfl: 0    tenofovir disoproxil fumarate (VIREAD) 300 MG tablet, Take 1 tablet by mouth in the morning., Disp: 30 tablet, Rfl: 0    tiZANidine (ZANAFLEX) 4 MG tablet, Take 1 tablet by mouth up to 3 times daily as needed for muscle spasms, Disp: 60 tablet, Rfl: 0    pregabalin (LYRICA) 75 MG capsule, Take 1 capsule by mouth 2 times daily for 30 days. , Disp: 60 capsule, Rfl: 0    HYDROcodone-acetaminophen (NORCO) 5-325 MG per tablet, Take 1 tablet by mouth every 6 hours as needed for Pain., Disp: , Rfl:     budesonide-formoterol (SYMBICORT) 160-4.5 MCG/ACT AERO, 2 puffs, Disp: , Rfl:     ascorbic acid (VITAMIN C) 500 MG tablet, Vitamin C With Bita Hips 500 mg tablet  TAKE 1 TABLET BY MOUTH TWICE DAILY FOR 7 DAYS, Disp: , Rfl:     ondansetron (ZOFRAN) 4 MG tablet, , Disp: , Rfl:     metroNIDAZOLE (METROGEL) 0.75 % gel, metronidazole 0.75 % topical gel  APPLY EXTERNALLY TWICE DAILY, Disp: , Rfl:     docusate sodium (COLACE) 100 mg capsule, TAKE ONE Capsule BY MOUTH TWICE DAILY, Disp: , Rfl:     omeprazole (PRILOSEC) 20 MG delayed release capsule, 1 capsule, Disp: , Rfl:     potassium chloride (KLOR-CON M) 20 MEQ extended release tablet, Take 1 tablet by mouth daily as needed (with lasix), Disp: 30 tablet, Rfl: 2    atorvastatin (LIPITOR) 10 MG tablet, Take 1 tablet by mouth daily, Disp: 90 tablet, Rfl: 1    fluticasone (FLOVENT HFA) 220 MCG/ACT inhaler, Inhale 2 puffs into the lungs 2 times daily, Disp: 1 each, Rfl: 3    magnesium oxide (MAG-OX) 250 MG TABS tablet, Take 1.5 tablets by mouth 2 times daily, Disp: 270 tablet, Rfl: 1    gabapentin (NEURONTIN) 800 MG tablet, 1 po tid, Disp: 90 tablet, Rfl: 5    furosemide (LASIX) 40 MG tablet, Take 1 tablet by mouth daily, Disp: 60 tablet, Rfl: 2    naproxen (NAPROSYN) 500 MG tablet, Take 1 tablet by mouth 2 times daily (with meals), Disp: 60 tablet, Rfl: 0    lisinopril (PRINIVIL;ZESTRIL) 5 MG tablet, Take 1 tablet by mouth daily, Disp: 90 tablet, Rfl: 1    famotidine (PEPCID) 20 MG tablet, Take 1 tablet by mouth 2 times daily, Disp: 60 tablet, Rfl: 0    EQ LORATADINE 10 MG tablet, Take 1 tablet by mouth nightly, Disp: 90 tablet, Rfl: 1    pantoprazole (PROTONIX) 40 MG tablet, Take 1 tablet by mouth once daily, Disp: 90 tablet, Rfl: 1    metoclopramide (REGLAN) 5 MG tablet, 3 times daily, Disp: , Rfl:     naloxone 4 MG/0.1ML LIQD nasal spray, Naloxone Hcl Active 4 MG NA One Time Only 1 1 August 20th, 2020 10:59am, Disp: , Rfl:     ipratropium-albuterol (DUONEB) 0.5-2.5 (3) MG/3ML SOLN nebulizer solution, Take 3 mLs by nebulization every 6 hours as needed for Shortness of Breath, Disp: 360 vial, Rfl: 3    Multiple Vitamin (MULTIVITAMIN PO), Take by mouth daily, Disp: , Rfl:     ondansetron (ZOFRAN ODT) 4 MG disintegrating tablet, Take 1 tablet by mouth every 8 hours as needed for Nausea, Disp: 20 tablet, Rfl: 0    lidocaine (LIDODERM) 5 %, Place 1 patch onto the skin daily 12 hours on, 12 hours off., Disp: 15 patch, Rfl: 0    albuterol sulfate HFA (PROVENTIL HFA) 108 (90 Base) MCG/ACT inhaler, Inhale 2 puffs into the lungs every 4 hours as needed for Wheezing or Shortness of Breath (Space out to every 6 hours as symptoms improve) Space out to every 6 hours as symptoms improve., Disp: 1 Inhaler, Rfl: 3    METHADONE HCL PO, Take 116 mg by mouth daily , Disp: , Rfl:     acetaminophen (TYLENOL) 500 MG tablet, Take 1 tablet by mouth every 4 hours as needed for Pain or Fever, Disp: 20 tablet, Rfl: 0    Allergies:    Adhesive tape, Bactrim [sulfamethoxazole-trimethoprim], Ciprofloxacin, Clonazepam, Cyclobenzaprine, Morphine, Other, and Quetiapine    Social History:     reports that he has been smoking cigarettes. He has a 30.00 pack-year smoking history. He has never used smokeless tobacco. He reports that he does not currently use drugs. He reports that he does not drink alcohol. Family History:   family history includes Depression in his maternal grandmother, mother, and sister; Heart Disease in his father and mother; Other in his mother; Rheum Arthritis in his mother; Substance Abuse in his brother, father, maternal aunt, maternal grandmother, maternal uncle, mother, paternal aunt, and paternal uncle. REVIEW OF SYSTEMS:    Review of Systems   Constitutional:  Negative for chills, fatigue, fever and unexpected weight change. HENT:  Negative for mouth sores. Respiratory:  Negative for cough, shortness of breath and wheezing. Cardiovascular:  Negative for chest pain and leg swelling. Gastrointestinal:  Negative for abdominal pain, blood in stool, nausea and vomiting. Genitourinary:  Negative for dysuria and hematuria. Skin:  Positive for rash (erythematous pinpoint rash in back of arms). Neurological:  Negative for headaches. PHYSICAL EXAM:    Vitals:    /78 (Site: Left Upper Arm, Position: Sitting, Cuff Size: Medium Adult)   Pulse 92   Ht 6' 0.99\" (1.854 m)   Wt 230 lb (104.3 kg)   SpO2 97%   BMI 30.35 kg/m²     Physical Exam  Constitutional:       General: He is not in acute distress. Appearance: Normal appearance. HENT:      Mouth/Throat:      Mouth: Mucous membranes are moist.      Pharynx: Oropharynx is clear. Eyes:      Extraocular Movements: Extraocular movements intact. Conjunctiva/sclera: Conjunctivae normal.   Cardiovascular:      Rate and Rhythm: Normal rate and regular rhythm. Heart sounds: Normal heart sounds. No murmur heard. No friction rub.    Pulmonary:      Effort: Pulmonary effort is normal. No respiratory distress. Breath sounds: Normal breath sounds. No wheezing or rhonchi. Abdominal:      Palpations: Abdomen is soft. Musculoskeletal:         General: Swelling and tenderness present. No deformity. Cervical back: Normal range of motion and neck supple. Right lower leg: No edema. Left lower leg: No edema. Comments: Right hand with hand/wrist splint. Had recent surgery on his right thumb. There is swelling and tenderness in left 3rd MCP and PIP joints and proximal 3rd phalanx. No synovitis or tenderness in other small joints of hands, wrists, elbows, shoulders. No swelling or tenderness in knees, ankles, MTPs. Decreased hand  strength and flexion range of the left 3rd digit. ROM shoulders is intact. Lymphadenopathy:      Cervical: No cervical adenopathy. Skin:     General: Skin is warm and dry. Findings: No lesion or rash. Neurological:      General: No focal deficit present. Mental Status: He is alert and oriented to person, place, and time. Mental status is at baseline. Cranial Nerves: No cranial nerve deficit. Gait: Gait normal.   Psychiatric:         Mood and Affect: Mood normal.          DATA:    Labs were reviewed. Latest Reference Range & Units Most Recent   Rheumatoid Factor 0 - 13 IU/mL < 10  7/27/22 12:27      Latest Reference Range & Units 7/27/22 12:27   Sed Rate 0 - 10 mm/hr 21 (H)   (H): Data is abnormally high  Results for Anirudh Madrigal (MRN [id number]) as of 7/7/2022 11:31   Ref. Range 5/17/2022 10:11   JONATHAN SCREEN Latest Ref Range: None Detected  None Detected   Rheumatoid Factor Latest Ref Range: 0 - 13 IU/mL < 10   Results for Anirudh Kaitlin (MRN [id number]) as of 7/7/2022 11:31   Ref. Range 5/17/2022 10:11   HLA-B27 Latest Ref Range: Negative  Negative   Results for Anirudh Kaitlin (MRN [id number]) as of 7/7/2022 11:31   Ref.  Range 5/17/2022 10:11   Sed Rate Latest Ref Range: 0 - 10 mm/hr 5   Results for George Turner (MRN [id number]) as of 7/7/2022 11:31   Ref. Range 5/17/2022 10:11   CRP Latest Ref Range: 0.00 - 1.00 mg/dl 5.58 (H)     Imaging was reviewed. MRI right wrist 5/13/2022  Impression:   Rupture of the extensor pollicis longus tendon. Enlargement of the extensor carpi radialis brevis tendon with adjacent    fluid suspicious for an acute injury without disruption of the tendon. Possible ill-defined contusion of the distal radius. Possible ill-defined partial tear of the scapholunate ligament. IMPRESSION/RECOMMENDATIONS:      1. Spontaneous rupture of right hallucis longus tendon s/p reconstruction surgery. No tenosynovitis on today's evaluation. He has h/o Hep B and Hep C. Reviewed with pt his lab results. Acute phase reactants wnl. RA serologies are negative. -- possible seronegative inflammatory arthritis  -- trial of naproxen 500 mg bid. Discussed with pt the benefits, risks and adverse effects of this medication. Patient expressed understanding. RTC in 6 weeks      No orders of the defined types were placed in this encounter.        Darrin Ayon MD    [address]   [address] [facility]. [address] [facility]  [address]  [phone]

## 2022-08-01 NOTE — TELEPHONE ENCOUNTER
----- Message from Ivette Soto MD sent at 8/1/2022  1:54 PM EDT -----  Hi Theodore, your urine grew a bacteria called serratia marcescens. The single dose antibiotic (fosfomycin) should have taken care of it. Let us know how you are doing. Thank you.

## 2022-08-09 DIAGNOSIS — B19.10 HEPATITIS B INFECTION WITHOUT DELTA AGENT WITHOUT HEPATIC COMA, UNSPECIFIED CHRONICITY: ICD-10-CM

## 2022-08-09 RX ORDER — TENOFOVIR DISOPROXIL FUMARATE 300 MG/1
300 TABLET, FILM COATED ORAL DAILY
Qty: 30 TABLET | Refills: 0 | Status: SHIPPED | OUTPATIENT
Start: 2022-08-09

## 2022-08-09 NOTE — TELEPHONE ENCOUNTER
[address] called about the Tenofovir. They said that they had been requesting a refill and not heard back from us. They also said that they had been filling this medication for the pt. I checked and it looks like the med was sent to the [facility]. I called them and they told me that the pt has not picked this up and that it makes sense that this needs to go to a specialty pharmacy. Med pended last refill 7/29/22.

## 2022-08-10 DIAGNOSIS — M25.531 ACUTE PAIN OF RIGHT WRIST: ICD-10-CM

## 2022-08-10 NOTE — TELEPHONE ENCOUNTER
Patient's last appointment was : 7/29/2022  Patient's next appointment is :   Future Appointments   Date Time Provider Didier Burton   8/15/2022 10:00 AM Ines Cm DO SRPX FM RES [facility] [place]   9/12/2022  9:00 AM Clementeen Mortimer, MD Lindalee Hal Rheum [facility] - [place]     Last refilled:7/12/22    Lab Results   Component Value Date    LABA1C 6.0 05/17/2022     Lab Results   Component Value Date    CHOL 139 05/17/2022    TRIG 149 05/17/2022    HDL 28 05/17/2022    LDLCALC 81 05/17/2022     Lab Results   Component Value Date     05/17/2022    K 4.0 05/17/2022     05/17/2022    CO2 23 05/17/2022    BUN 10 05/17/2022    CREATININE 0.8 05/17/2022    GLUCOSE 177 (H) 05/17/2022    CALCIUM 9.2 05/17/2022    PROT 7.0 05/17/2022    LABALBU 4.5 05/17/2022    BILITOT 0.7 05/17/2022    ALKPHOS 143 (H) 05/17/2022    AST 22 05/17/2022    ALT 12 05/17/2022    LABGLOM >90 05/17/2022    GFRAA >60 02/26/2019    GLOB NOT REPORTED 04/05/2018     Lab Results   Component Value Date    TSH 1.78 10/11/2017    FT3 3.37 10/11/2017    T4FREE 1.14 05/07/2013     Lab Results   Component Value Date    WBC 7.6 05/17/2022    HGB 15.5 05/17/2022    HCT 45.9 05/17/2022    MCV 86.9 05/17/2022     05/17/2022

## 2022-08-15 ENCOUNTER — OFFICE VISIT (OUTPATIENT)
Dept: FAMILY MEDICINE CLINIC | Age: 47
End: 2022-08-15
Payer: COMMERCIAL

## 2022-08-15 VITALS
BODY MASS INDEX: 32.07 KG/M2 | OXYGEN SATURATION: 98 % | HEIGHT: 72 IN | TEMPERATURE: 97 F | DIASTOLIC BLOOD PRESSURE: 84 MMHG | WEIGHT: 236.8 LBS | RESPIRATION RATE: 14 BRPM | SYSTOLIC BLOOD PRESSURE: 130 MMHG | HEART RATE: 79 BPM

## 2022-08-15 DIAGNOSIS — G62.9 NEUROPATHY: ICD-10-CM

## 2022-08-15 DIAGNOSIS — F31.32 BIPOLAR AFFECTIVE DISORDER, CURRENTLY DEPRESSED, MODERATE (HCC): Primary | ICD-10-CM

## 2022-08-15 DIAGNOSIS — R07.9 CHEST PAIN, UNSPECIFIED TYPE: ICD-10-CM

## 2022-08-15 PROCEDURE — 4004F PT TOBACCO SCREEN RCVD TLK: CPT

## 2022-08-15 PROCEDURE — G8417 CALC BMI ABV UP PARAM F/U: HCPCS

## 2022-08-15 PROCEDURE — 99214 OFFICE O/P EST MOD 30 MIN: CPT

## 2022-08-15 PROCEDURE — G8427 DOCREV CUR MEDS BY ELIG CLIN: HCPCS

## 2022-08-15 RX ORDER — LAMOTRIGINE 25 MG/1
50 TABLET ORAL 2 TIMES DAILY
Qty: 120 TABLET | Refills: 0 | Status: SHIPPED | OUTPATIENT
Start: 2022-08-15 | End: 2022-09-12

## 2022-08-15 RX ORDER — PREGABALIN 75 MG/1
75 CAPSULE ORAL 2 TIMES DAILY
Qty: 60 CAPSULE | Refills: 0 | Status: SHIPPED | OUTPATIENT
Start: 2022-08-15 | End: 2022-09-12 | Stop reason: SDUPTHER

## 2022-08-15 RX ORDER — TIZANIDINE 4 MG/1
TABLET ORAL
Qty: 60 TABLET | Refills: 0 | Status: SHIPPED | OUTPATIENT
Start: 2022-08-15 | End: 2022-10-19 | Stop reason: SDUPTHER

## 2022-08-15 ASSESSMENT — ENCOUNTER SYMPTOMS
ABDOMINAL PAIN: 0
NAUSEA: 0
SHORTNESS OF BREATH: 0
DIARRHEA: 0
COUGH: 0
VOMITING: 0

## 2022-08-15 NOTE — PROGRESS NOTES
S: 56 y.o. male with   Chief Complaint   Patient presents with    Follow-up     Pt presents for a 2 week f/u. Pt states he has been doing good. HPI: please see resident note for HPI and ROS. BP Readings from Last 3 Encounters:   08/15/22 130/84   08/01/22 112/78   07/29/22 128/84     Wt Readings from Last 3 Encounters:   08/15/22 236 lb 12.8 oz (107.4 kg)   08/01/22 230 lb (104.3 kg)   07/29/22 228 lb (103.4 kg)       O: VS:  height is 6' (1.829 m) and weight is 236 lb 12.8 oz (107.4 kg). His temperature is 97 °F (36.1 °C). His blood pressure is 130/84 and his pulse is 79. His respiration is 14 and oxygen saturation is 98%. AAO/NAD, appropriate affect for mood  CV:  RRR, no murmur  Resp: CTAB     Diagnosis Orders   1. Bipolar affective disorder, currently depressed, moderate (HCC)  lamoTRIgine (LAMICTAL) 25 MG tablet      2. Chest pain, unspecified type        3. Neuropathy  pregabalin (LYRICA) 75 MG capsule          Plan:  Please refer to resident note for full plan. 56-year-old male presents the office to follow-up on mood. Bipolar disorder: Chronic, uncontrolled. Patient recently started on Lamictal 25 mg once a day and titrate up to 25 mg twice a day. At current dose of 50 mg total in the day patient states his depression is significantly improved but is still having issues with anxiety and panic attacks. Patient was on SSRI therapy for the past which worsened his anxiety consistent with bipolar disorder. We will plan to increase Lamictal up to 50 mg twice a day and follow-up in 1 month for medication reevaluation. Patient was educated again on side effects. Chest pain: Patient does have intermittent chest pain consistent with anxiety attacks. No concern about cardiac etiology at this time. No need for cardiac evaluation. Is currently asymptomatic today in the office. We will continue to treat anxiety as above and follow-up.     Diabetic neuropathy: Patient is a long history of diabetic neuropathy which is currently poorly controlled. Patient has a history of being on gabapentin 800 mg 3 times daily which started to not work for him. He was then switched to Lyrica 75 mg daily. Patient states that although it does help some and is not helping nearly as much of the gabapentin. We will plan to increase Lyrica up to 75 mg twice daily and follow-up in the next month for possible additional titration. Patient verbalized understanding plan is agreeable to above. Health Maintenance Due   Topic Date Due    Hepatitis A vaccine (2 of 3 - Hep A Twinrix risk 3-dose series) 07/10/2013    Hepatitis B vaccine (2 of 3 - Hep B Twinrix risk 3-dose series) 07/10/2013    Diabetic retinal exam  06/04/2019    Colorectal Cancer Screen  Never done    COVID-19 Vaccine (3 - Booster for Pfizer series) 12/01/2021    Diabetic foot exam  01/21/2022    Diabetic microalbuminuria test  07/01/2022       Attending Physician Statement  I have discussed the case, including pertinent history and exam findings with the resident. I also have seen the patient and performed key portions of the examination. I agree with the documented assessment and plan as documented by the resident.   DARREL Kaur DO 8/16/2022 7:51 AM

## 2022-08-15 NOTE — PROGRESS NOTES
[address] [facility] [address] [place] [id number]  Dept: [phone]  Dept Fax: [phone]  Loc: [phone]    HPI:     Pj Sommers is a 56 y.o. male who presents today for:  Chief Complaint   Patient presents with    Follow-up     Pt presents for a 2 week f/u. Pt states he has been doing good. Patient states he is overall feeling good. States the Lamictal is helping a lot with Bipolar depression, getting energy back, working. He reports still having problems with anxiety. States he has had chest pain for 3 years, but attributes it to anxiety attacks. He has had extensive cardiac workup 2 years ago for similar issues, including an EKG, echo, and stress test, all of which were normal. He has been on multiple therapies for anxiety, states Xanax has helped in the past, but he is a recovering addict. Was just on Lexapro, states it wasn't helping. He has been trying to get in with [facility] psychiatry for a year. He states he is having numbness, tingling of his feet and toes. Lyrica is somewhat helping. Was confused and hasn't been taking it twice daily as written, only once daily. States he would like to increase it if possible. Needs to do another sleep study per insurance, states he was told he needs a BiPAP. Health Maintenance Topics with due status: Overdue       Topic Date Due    Hepatitis A vaccine 07/10/2013    Hepatitis B vaccine 07/10/2013    Diabetic retinal exam 06/04/2019    Colorectal Cancer Screen Never done    COVID-19 Vaccine 12/01/2021    Diabetic foot exam 01/21/2022    Diabetic microalbuminuria test 07/01/2022     Review of Systems   Constitutional:  Negative for chills and fever. Respiratory:  Negative for cough and shortness of breath. Cardiovascular:  Positive for chest pain. Negative for palpitations. Gastrointestinal:  Negative for abdominal pain, diarrhea, nausea and vomiting. Genitourinary:  Negative for dysuria and hematuria. Skin:  Negative for rash. Neurological:  Negative for dizziness, light-headedness and headaches. Numbness, tingling, pain of BLE   Psychiatric/Behavioral:  The patient is nervous/anxious. All other systems reviewed and are negative. Past Medical History:          Diagnosis Date    Anxiety     Bipolar 1 disorder ([facility]     Chronic diastolic congestive heart failure ([facility] 3/16/2018    patient denies    Cirrhosis ([address] 01/2021    Constipation     Gastroesophageal reflux disease 3/16/2018    Hard of hearing     left ear, no hearing aid    Hep C w/o coma, chronic (HCC)     Hepatic encephalopathy ([facility] 7/23/2020    Hepatitis B     Kidney stones     hx of    Post traumatic stress disorder (PTSD)     Substance abuse ([facility]     \"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\"    Type 2 diabetes mellitus without complication, with long-term current use of insulin ([facility] 8/16/2017    Wears glasses     for reading       Past Surgical History:          Procedure Laterality Date    ANKLE SURGERY Left     KIDNEY STONE SURGERY      x 4    URETER SURGERY Left 10/26/2021    CYSTOSCOPY, LEFT URETEROSCOPY, LASER LITHOTRIPSY, LEFT URETERAL STENT performed by Nyasia Daley MD at [place] [facility]       Medications:      has a current medication list which includes the following prescription(s): lamotrigine, pregabalin, tenofovir disoproxil fumarate, naproxen, tizanidine, budesonide-formoterol, ascorbic acid, ondansetron, docusate sodium, potassium chloride, atorvastatin, fluticasone, magnesium oxide, furosemide, naproxen, lisinopril, famotidine, eq loratadine, pantoprazole, metoclopramide, naloxone, ipratropium-albuterol, multiple vitamin, ondansetron, lidocaine, albuterol sulfate hfa, and methadone hcl.      Allergies:      Adhesive tape, Bactrim [sulfamethoxazole-trimethoprim], Ciprofloxacin, Clonazepam, Cyclobenzaprine, Morphine, Other, and Quetiapine    Social History     Socioeconomic History    Marital status: Single     Spouse name: None    Number of children: None    Years of education: None    Highest education level: None   Tobacco Use    Smoking status: Every Day     Packs/day: 1.00     Years: 30.00     Pack years: 30.00     Types: Cigarettes    Smokeless tobacco: Never   Vaping Use    Vaping Use: Never used   Substance and Sexual Activity    Alcohol use: No    Drug use: Not Currently     Comment: stopped heroin in 2013; medical marijuana card daily    Sexual activity: Yes     Partners: Female     Social Determinants of Health     Financial Resource Strain: Low Risk     Difficulty of Paying Living Expenses: Not hard at all   Food Insecurity: No Food Insecurity    Worried About Running Out of Food in the Last Year: Never true    Ran Out of Food in the Last Year: Never true        Family History:          Problem Relation Age of Onset    Substance Abuse Mother     Depression Mother     Heart Disease Mother         CHF    Rheum Arthritis Mother     Other Mother         Fibromyalgia    Substance Abuse Father     Heart Disease Father     Depression Sister     Substance Abuse Brother     Substance Abuse Maternal Aunt     Substance Abuse Maternal Uncle     Substance Abuse Paternal Aunt     Substance Abuse Paternal Uncle     Substance Abuse Maternal Grandmother     Depression Maternal Grandmother        Objective:     Vitals:    08/15/22 1005   BP: 130/84   Pulse: 79   Resp: 14   Temp: 97 °F (36.1 °C)   SpO2: 98%   Weight: 236 lb 12.8 oz (107.4 kg)   Height: 6' (1.829 m)       Physical Exam  Physical Exam:  GENERAL: Alert and oriented. No distress  EYES: No erythema or icterus  ENT: No thyromegaly or cervical lymphadenopathy. No JVD  HEART: Normal S1/S2. No murmur, rub or gallop  LUNGS: Clear to auscultation  ABDOMEN: Soft and non-tender  EXTREMITIES: no edema  NEUROLOGICAL: Grossly normal  SKIN: no rashes    Assessment/Plan:   1.  Bipolar affective disorder, currently depressed, moderate ([address]    -

## 2022-09-07 ENCOUNTER — HOSPITAL ENCOUNTER (EMERGENCY)
Age: 47
Discharge: HOME | End: 2022-09-07
Attending: STUDENT IN AN ORGANIZED HEALTH CARE EDUCATION/TRAINING PROGRAM
Payer: COMMERCIAL

## 2022-09-07 VITALS
BODY MASS INDEX: 31.81 KG/M2 | DIASTOLIC BLOOD PRESSURE: 82 MMHG | TEMPERATURE: 97.6 F | HEART RATE: 82 BPM | HEIGHT: 73 IN | OXYGEN SATURATION: 97 % | WEIGHT: 240 LBS | SYSTOLIC BLOOD PRESSURE: 125 MMHG | RESPIRATION RATE: 16 BRPM

## 2022-09-07 DIAGNOSIS — R73.9 HYPERGLYCEMIA: Primary | ICD-10-CM

## 2022-09-07 DIAGNOSIS — E11.69 TYPE 2 DIABETES MELLITUS WITH OTHER SPECIFIED COMPLICATION, UNSPECIFIED WHETHER LONG TERM INSULIN USE (HCC): ICD-10-CM

## 2022-09-07 LAB
ALBUMIN SERPL-MCNC: 4 G/DL (ref 3.5–5.1)
ALP BLD-CCNC: 159 U/L (ref 38–126)
ALT SERPL-CCNC: 22 U/L (ref 11–66)
ANION GAP SERPL CALCULATED.3IONS-SCNC: 12 MEQ/L (ref 8–16)
AST SERPL-CCNC: 28 U/L (ref 5–40)
AVERAGE GLUCOSE: 186 MG/DL (ref 70–126)
BACTERIA: ABNORMAL
BASE EXCESS MIXED: 2.8 MMOL/L (ref -2–3)
BASOPHILS # BLD: 0.6 %
BASOPHILS ABSOLUTE: 0 THOU/MM3 (ref 0–0.1)
BILIRUB SERPL-MCNC: 0.5 MG/DL (ref 0.3–1.2)
BILIRUBIN DIRECT: < 0.2 MG/DL (ref 0–0.3)
BILIRUBIN URINE: NEGATIVE
BLOOD, URINE: NEGATIVE
BUN BLDV-MCNC: 12 MG/DL (ref 7–22)
CALCIUM SERPL-MCNC: 9 MG/DL (ref 8.5–10.5)
CASTS: ABNORMAL /LPF
CASTS: ABNORMAL /LPF
CHARACTER, URINE: CLEAR
CHLORIDE BLD-SCNC: 99 MEQ/L (ref 98–111)
CO2: 26 MEQ/L (ref 23–33)
COLLECTED BY:: ABNORMAL
COLOR: YELLOW
CREAT SERPL-MCNC: 0.6 MG/DL (ref 0.4–1.2)
CRYSTALS: ABNORMAL
DEVICE: ABNORMAL
EOSINOPHIL # BLD: 3.1 %
EOSINOPHILS ABSOLUTE: 0.2 THOU/MM3 (ref 0–0.4)
EPITHELIAL CELLS, UA: ABNORMAL /HPF
ERYTHROCYTE [DISTWIDTH] IN BLOOD BY AUTOMATED COUNT: 13.3 % (ref 11.5–14.5)
ERYTHROCYTE [DISTWIDTH] IN BLOOD BY AUTOMATED COUNT: 41.4 FL (ref 35–45)
FLU A ANTIGEN: NEGATIVE
FLU B ANTIGEN: NEGATIVE
GFR SERPL CREATININE-BSD FRML MDRD: > 90 ML/MIN/1.73M2
GLUCOSE BLD-MCNC: 259 MG/DL (ref 70–108)
GLUCOSE BLD-MCNC: 275 MG/DL (ref 70–108)
GLUCOSE, URINE: >= 1000 MG/DL
HBA1C MFR BLD: 8.2 % (ref 4.4–6.4)
HCO3, MIXED: 26 MMOL/L (ref 23–28)
HCT VFR BLD CALC: 42.8 % (ref 42–52)
HEMOGLOBIN: 15 GM/DL (ref 14–18)
IMMATURE GRANS (ABS): 0.05 THOU/MM3 (ref 0–0.07)
IMMATURE GRANULOCYTES: 0.8 %
KETONES, URINE: ABNORMAL
LEUKOCYTE ESTERASE, URINE: NEGATIVE
LYMPHOCYTES # BLD: 21.4 %
LYMPHOCYTES ABSOLUTE: 1.4 THOU/MM3 (ref 1–4.8)
MCH RBC QN AUTO: 29.9 PG (ref 26–33)
MCHC RBC AUTO-ENTMCNC: 35 GM/DL (ref 32.2–35.5)
MCV RBC AUTO: 85.3 FL (ref 80–94)
MISCELLANEOUS LAB TEST RESULT: ABNORMAL
MONOCYTES # BLD: 7.2 %
MONOCYTES ABSOLUTE: 0.5 THOU/MM3 (ref 0.4–1.3)
NITRITE, URINE: NEGATIVE
NUCLEATED RED BLOOD CELLS: 0 /100 WBC
O2 SAT, MIXED: 88 %
PCO2, MIXED VENOUS: 35 MMHG (ref 41–51)
PH UA: 7 (ref 5–9)
PH, MIXED: 7.48 (ref 7.31–7.41)
PLATELET # BLD: 116 THOU/MM3 (ref 130–400)
PMV BLD AUTO: 11.5 FL (ref 9.4–12.4)
PO2 MIXED: 51 MMHG (ref 25–40)
POTASSIUM REFLEX MAGNESIUM: 3.6 MEQ/L (ref 3.5–5.2)
PROTEIN UA: 30 MG/DL
RBC # BLD: 5.02 MILL/MM3 (ref 4.7–6.1)
RBC URINE: ABNORMAL /HPF
RENAL EPITHELIAL, UA: ABNORMAL
SARS-COV-2, NAAT: NOT  DETECTED
SEG NEUTROPHILS: 66.9 %
SEGMENTED NEUTROPHILS ABSOLUTE COUNT: 4.3 THOU/MM3 (ref 1.8–7.7)
SODIUM BLD-SCNC: 137 MEQ/L (ref 135–145)
SPECIFIC GRAVITY UA: > 1.03 (ref 1–1.03)
TOTAL PROTEIN: 6.7 G/DL (ref 6.1–8)
UROBILINOGEN, URINE: 2 EU/DL (ref 0–1)
WBC # BLD: 6.4 THOU/MM3 (ref 4.8–10.8)
WBC UA: ABNORMAL /HPF
YEAST: ABNORMAL

## 2022-09-07 PROCEDURE — 81001 URINALYSIS AUTO W/SCOPE: CPT

## 2022-09-07 PROCEDURE — 87804 INFLUENZA ASSAY W/OPTIC: CPT

## 2022-09-07 PROCEDURE — 85025 COMPLETE CBC W/AUTO DIFF WBC: CPT

## 2022-09-07 PROCEDURE — 99283 EMERGENCY DEPT VISIT LOW MDM: CPT

## 2022-09-07 PROCEDURE — 82948 REAGENT STRIP/BLOOD GLUCOSE: CPT

## 2022-09-07 PROCEDURE — 83036 HEMOGLOBIN GLYCOSYLATED A1C: CPT

## 2022-09-07 PROCEDURE — 80048 BASIC METABOLIC PNL TOTAL CA: CPT

## 2022-09-07 PROCEDURE — 87635 SARS-COV-2 COVID-19 AMP PRB: CPT

## 2022-09-07 PROCEDURE — 36415 COLL VENOUS BLD VENIPUNCTURE: CPT

## 2022-09-07 PROCEDURE — 80076 HEPATIC FUNCTION PANEL: CPT

## 2022-09-07 PROCEDURE — 82803 BLOOD GASES ANY COMBINATION: CPT

## 2022-09-07 RX ORDER — 0.9 % SODIUM CHLORIDE 0.9 %
1000 INTRAVENOUS SOLUTION INTRAVENOUS ONCE
Status: DISCONTINUED | OUTPATIENT
Start: 2022-09-07 | End: 2022-09-07 | Stop reason: HOSPADM

## 2022-09-07 ASSESSMENT — ENCOUNTER SYMPTOMS
RHINORRHEA: 0
SHORTNESS OF BREATH: 0
ABDOMINAL PAIN: 0
CONSTIPATION: 0
SORE THROAT: 0
ABDOMINAL DISTENTION: 0
NAUSEA: 0
COLOR CHANGE: 0
BACK PAIN: 0
CHEST TIGHTNESS: 0
DIARRHEA: 0
WHEEZING: 0
VOMITING: 0
COUGH: 0

## 2022-09-07 ASSESSMENT — PAIN - FUNCTIONAL ASSESSMENT: PAIN_FUNCTIONAL_ASSESSMENT: NONE - DENIES PAIN

## 2022-09-07 NOTE — ED NOTES
Patient resting in bed. Respirations easy and unlabored. No distress noted. Call light within reach.      Nadja Campbell RN  09/07/22 1660

## 2022-09-07 NOTE — ED PROVIDER NOTES
[address] [facility]          Pt Name: Sil Alexandra  MRN: [id number]  [place] 1975  Date of Evaluation: 9/7/2022  Treating Resident Physician: Lucas Navarrete MD  Supervising Physician: Ramsey Salomon MD    CHIEF COMPLAINT       Chief Complaint   Patient presents with    Hyperglycemia     History obtained from chart review and the patient. HISTORY OF PRESENT ILLNESS    HPI    Sil Alexandra is a 56 y.o. male with a past medical history significant for diabetes mellitus, peripheral neuropathy,  , presents to the emergency department for evaluation of hyperglycemia. Has a long history of diabetes mellitus, he lost 80 pounds about a year ago and had improvement in his sugar control down to not needing any supplemental medication sides diet. Over the past few months he has regained 20 pounds. Yesterday he woke up from a nap, was dizzy, lightheaded, he checked his blood sugar at that time and was 400 mg/dL. Also at that time he had blurry vision which he notes is worse when he has high blood sugars. He sent a message to his primary care physician through [address], that physician office required this morning and sent him to the emergency department for evaluation out of concern for diabetic ketoacidosis. Patient feels better today, is just drank a lot of water, not had anything to eat his sugar this morning was 380 mg/dL. Also notes polyuria in the last few days. He notes a urinary tract infection approximately 1 month ago, denies any current symptoms. Known recent sick contacts, he has received 1 vaccine against COVID-19 however has not received any other. The patient has no other acute complaints at this time. REVIEW OF SYSTEMS   Review of Systems   Constitutional:  Positive for fatigue. Negative for activity change, appetite change, chills, diaphoresis, fever and unexpected weight change.    HENT:  Negative for congestion, rhinorrhea and sore throat. Respiratory:  Negative for cough, chest tightness, shortness of breath and wheezing. Cardiovascular:  Negative for chest pain, palpitations and leg swelling. Gastrointestinal:  Negative for abdominal distention, abdominal pain, constipation, diarrhea, nausea and vomiting. Endocrine: Positive for polyuria. Genitourinary:  Negative for dysuria, hematuria and urgency. Musculoskeletal:  Negative for back pain and neck pain. Skin:  Negative for color change, pallor, rash and wound. Allergic/Immunologic: Negative for environmental allergies, food allergies and immunocompromised state. Neurological:  Positive for dizziness and headaches. Negative for syncope, weakness and numbness. Hematological:  Negative for adenopathy. Does not bruise/bleed easily. Psychiatric/Behavioral:  Negative for agitation and confusion. All other systems reviewed and are negative.       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\"    Type 2 diabetes mellitus without complication, with long-term current use of insulin ([address] 8/16/2017    Wears glasses     for reading     Past Surgical History:   Procedure Laterality Date    ANKLE SURGERY Left     KIDNEY STONE SURGERY      x 4    URETER SURGERY Left 10/26/2021    CYSTOSCOPY, LEFT URETEROSCOPY, LASER LITHOTRIPSY, LEFT URETERAL STENT performed by Genet Cedeño MD at [address]     Current Facility-Administered Medications:     0.9 % sodium chloride bolus, 1,000 mL, IntraVENous, Once, Lashell Vera MD    Current Outpatient Medications:     tiZANidine (ZANAFLEX) 4 MG tablet, TAKE 1 TABLET BY MOUTH THREE TIMES DAILY AS NEEDED FOR MUSCLE SPASM AND FOR PAIN, Disp: 60 tablet, Rfl: 0    lamoTRIgine (LAMICTAL) 25 MG tablet, Take 2 tablets by mouth in the morning and 2 tablets before bedtime. , Disp: 120 tablet, Rfl: 0    pregabalin (LYRICA) 75 MG capsule, Take 1 capsule by mouth in the morning and 1 capsule before bedtime. Do all this for 30 days. , Disp: 60 capsule, Rfl: 0    tenofovir disoproxil fumarate (VIREAD) 300 MG tablet, Take 1 tablet by mouth in the morning., Disp: 30 tablet, Rfl: 0    naproxen (NAPROSYN) 500 MG tablet, Take 1 tablet by mouth in the morning and 1 tablet in the evening. Take with meals. , Disp: 60 tablet, Rfl: 1    budesonide-formoterol (SYMBICORT) 160-4.5 MCG/ACT AERO, 2 puffs, Disp: , Rfl:     ascorbic acid (VITAMIN C) 500 MG tablet, Vitamin C With Bita Hips 500 mg tablet  TAKE 1 TABLET BY MOUTH TWICE DAILY FOR 7 DAYS, Disp: , Rfl:     ondansetron (ZOFRAN) 4 MG tablet, , Disp: , Rfl:     docusate sodium (COLACE) 100 mg capsule, TAKE ONE Capsule BY MOUTH TWICE DAILY, Disp: , Rfl:     potassium chloride (KLOR-CON M) 20 MEQ extended release tablet, Take 1 tablet by mouth daily as needed (with lasix), Disp: 30 tablet, Rfl: 2    atorvastatin (LIPITOR) 10 MG tablet, Take 1 tablet by mouth daily, Disp: 90 tablet, Rfl: 1    fluticasone (FLOVENT HFA) 220 MCG/ACT inhaler, Inhale 2 puffs into the lungs 2 times daily, Disp: 1 each, Rfl: 3    magnesium oxide (MAG-OX) 250 MG TABS tablet, Take 1.5 tablets by mouth 2 times daily, Disp: 270 tablet, Rfl: 1    furosemide (LASIX) 40 MG tablet, Take 1 tablet by mouth daily, Disp: 60 tablet, Rfl: 2    naproxen (NAPROSYN) 500 MG tablet, Take 1 tablet by mouth 2 times daily (with meals), Disp: 60 tablet, Rfl: 0    lisinopril (PRINIVIL;ZESTRIL) 5 MG tablet, Take 1 tablet by mouth daily, Disp: 90 tablet, Rfl: 1    famotidine (PEPCID) 20 MG tablet, Take 1 tablet by mouth 2 times daily, Disp: 60 tablet, Rfl: 0    EQ LORATADINE 10 MG tablet, Take 1 tablet by mouth nightly, Disp: 90 tablet, Rfl: 1    pantoprazole (PROTONIX) 40 MG tablet, Take 1 tablet by mouth once daily, Disp: 90 tablet, Rfl: 1    metoclopramide (REGLAN) 5 MG tablet, 3 times daily, Disp: , Rfl:     naloxone 4 MG/0.1ML LIQD nasal spray, Naloxone Hcl Active 4 MG NA One Time Only 1 1 August 20th, 2020 10:59am, Disp: , Rfl:     ipratropium-albuterol (DUONEB) 0.5-2.5 (3) MG/3ML SOLN nebulizer solution, Take 3 mLs by nebulization every 6 hours as needed for Shortness of Breath, Disp: 360 vial, Rfl: 3    Multiple Vitamin (MULTIVITAMIN PO), Take by mouth daily, Disp: , Rfl:     ondansetron (ZOFRAN ODT) 4 MG disintegrating tablet, Take 1 tablet by mouth every 8 hours as needed for Nausea, Disp: 20 tablet, Rfl: 0    lidocaine (LIDODERM) 5 %, Place 1 patch onto the skin daily 12 hours on, 12 hours off., Disp: 15 patch, Rfl: 0    albuterol sulfate HFA (PROVENTIL HFA) 108 (90 Base) MCG/ACT inhaler, Inhale 2 puffs into the lungs every 4 hours as needed for Wheezing or Shortness of Breath (Space out to every 6 hours as symptoms improve) Space out to every 6 hours as symptoms improve., Disp: 1 Inhaler, Rfl: 3    METHADONE HCL PO, Take 116 mg by mouth daily , Disp: , Rfl:       SOCIAL HISTORY     Social History     Social History Narrative    Not on file     Social History     Tobacco Use    Smoking status: Every Day     Packs/day: 1.00     Years: 30.00     Pack years: 30.00     Types: Cigarettes    Smokeless tobacco: Never   Vaping Use    Vaping Use: Never used   Substance Use Topics    Alcohol use: No    Drug use: Not Currently     Comment: stopped heroin in 2013; medical marijuana card daily         ALLERGIES     Allergies   Allergen Reactions    Adhesive Tape      Other reaction(s): Hives    Bactrim [Sulfamethoxazole-Trimethoprim]      Nausea and vomiting Ciprofloxacin      Tendon rupture    Clonazepam Other (See Comments)     Restless leg    Cyclobenzaprine Hives and Other (See Comments)     Pt unsure    Morphine Hives and Other (See Comments)     \"throat started swelling up\"    Other Other (See Comments)    Quetiapine Other (See Comments)     restless         FAMILY HISTORY     Family History   Problem Relation Age of Onset    Substance Abuse Mother     Depression Mother     Heart Disease Mother         CHF    Rheum Arthritis Mother     Other Mother         Fibromyalgia    Substance Abuse Father     Heart Disease Father     Depression Sister     Substance Abuse Brother     Substance Abuse Maternal Aunt     Substance Abuse Maternal Uncle     Substance Abuse Paternal Aunt     Substance Abuse Paternal Uncle     Substance Abuse Maternal Grandmother     Depression Maternal Grandmother          PREVIOUS RECORDS   Previous records reviewed:  notes from PCP office reviewed . PHYSICAL EXAM     ED Triage Vitals   BP Temp Temp Source Heart Rate Resp SpO2 Height Weight   09/07/22 1322 09/07/22 1322 09/07/22 1322 09/07/22 1322 09/07/22 1322 09/07/22 1322 09/07/22 1324 09/07/22 1324   125/82 97.6 °F (36.4 °C) Oral 85 17 96 % 6' 1\" (1.854 m) 240 lb (108.9 kg)     Initial vital signs and nursing assessment reviewed and normal. Body mass index is 31.66 kg/m². Pulsoximetry is normal per my interpretation. Additional Vital Signs:  Vitals:    09/07/22 1447   BP:    Pulse: 82   Resp: 16   Temp:    SpO2: 97%       Physical Exam  Vitals and nursing note reviewed. Constitutional:       General: He is not in acute distress. Appearance: Normal appearance. He is obese. He is not ill-appearing, toxic-appearing or diaphoretic. HENT:      Head: Normocephalic and atraumatic. Nose: Nose normal.      Mouth/Throat:      Mouth: Mucous membranes are moist.      Pharynx: Oropharynx is clear.    Eyes:      Conjunctiva/sclera: Conjunctivae normal.   Cardiovascular:      Rate and Rhythm: Normal rate and regular rhythm. Pulses: Normal pulses. Heart sounds: Normal heart sounds. Pulmonary:      Effort: Pulmonary effort is normal.      Breath sounds: Normal breath sounds. Abdominal:      General: Abdomen is flat. Bowel sounds are normal.      Palpations: Abdomen is soft. Tenderness: There is no abdominal tenderness. Musculoskeletal:      Cervical back: Normal range of motion. Skin:     General: Skin is warm and dry. Capillary Refill: Capillary refill takes less than 2 seconds. Neurological:      Mental Status: He is alert and oriented to person, place, and time. Psychiatric:         Mood and Affect: Mood normal.         Behavior: Behavior normal.           MEDICAL DECISION MAKING   Initial Differential Diagnosis: (includes but is not limited to)  Symptomatic hyperglycemia  Uncontrolled diabetes mellitus  Acute illness worsening underlying diabetes mellitus. COVID-19  Influenza  Urinary tract infection    Plan:   POCT Glucose  CBC, BMP, LFT, A1C, VBG  Urinalysis  1 l normal saline    Summary:  Patient found not to be in diabetic ketoacidosis, however he does have hyperglycemia and may need to restart his antidiabetic medications. Patient also has swabs that were negative for COVID-19 influenza, urinalysis does show glucose urea however does not show an acute infection. Patient be discharged home and recommended he follow-up as soon as possible with his primary care provider.     ED RESULTS   Laboratory results:  Labs Reviewed   CBC WITH AUTO DIFFERENTIAL - Abnormal; Notable for the following components:       Result Value    Platelets 008 (*)     All other components within normal limits   BASIC METABOLIC PANEL W/ REFLEX TO MG FOR LOW K - Abnormal; Notable for the following components:    Glucose 259 (*)     All other components within normal limits   BLOOD GAS, VENOUS - Abnormal; Notable for the following components:    PH MIXED 7.48 (*)     PCO2, MIXED VENOUS physician's documentation if my documentation differs.     Electronically Signed: Karen Ramirez MD, 09/07/22, 3:30 PM         Unique Sage MD  Resident  09/07/22 4401

## 2022-09-08 DIAGNOSIS — Z79.4 TYPE 2 DIABETES MELLITUS WITHOUT COMPLICATION, WITH LONG-TERM CURRENT USE OF INSULIN (HCC): ICD-10-CM

## 2022-09-08 DIAGNOSIS — E11.9 TYPE 2 DIABETES MELLITUS WITHOUT COMPLICATION, WITH LONG-TERM CURRENT USE OF INSULIN (HCC): ICD-10-CM

## 2022-09-08 NOTE — PROGRESS NOTES
Patient with elevated blood sugars. Has been on metformin and insulin in the past.  Will restart metformin at 500 mg twice daily, will increase as tolerated. We will also add Mylinda Aver given patient's history of heart failure. Medication sent to the patient's pharmacy. Please advise.

## 2022-09-12 ENCOUNTER — OFFICE VISIT (OUTPATIENT)
Dept: FAMILY MEDICINE CLINIC | Age: 47
End: 2022-09-12
Payer: COMMERCIAL

## 2022-09-12 VITALS
WEIGHT: 236.6 LBS | HEIGHT: 73 IN | DIASTOLIC BLOOD PRESSURE: 80 MMHG | OXYGEN SATURATION: 98 % | BODY MASS INDEX: 31.36 KG/M2 | TEMPERATURE: 96.8 F | RESPIRATION RATE: 12 BRPM | HEART RATE: 69 BPM | SYSTOLIC BLOOD PRESSURE: 128 MMHG

## 2022-09-12 DIAGNOSIS — K74.69 OTHER CIRRHOSIS OF LIVER (HCC): ICD-10-CM

## 2022-09-12 DIAGNOSIS — L57.0 ACTINIC KERATOSES: ICD-10-CM

## 2022-09-12 DIAGNOSIS — G63 POLYNEUROPATHY ASSOCIATED WITH UNDERLYING DISEASE (HCC): ICD-10-CM

## 2022-09-12 DIAGNOSIS — F31.32 BIPOLAR AFFECTIVE DISORDER, CURRENTLY DEPRESSED, MODERATE (HCC): Primary | ICD-10-CM

## 2022-09-12 DIAGNOSIS — E11.9 TYPE 2 DIABETES MELLITUS WITHOUT COMPLICATION, WITHOUT LONG-TERM CURRENT USE OF INSULIN (HCC): ICD-10-CM

## 2022-09-12 PROCEDURE — 99214 OFFICE O/P EST MOD 30 MIN: CPT | Performed by: STUDENT IN AN ORGANIZED HEALTH CARE EDUCATION/TRAINING PROGRAM

## 2022-09-12 PROCEDURE — 17003 DESTRUCT PREMALG LES 2-14: CPT | Performed by: STUDENT IN AN ORGANIZED HEALTH CARE EDUCATION/TRAINING PROGRAM

## 2022-09-12 PROCEDURE — 17000 DESTRUCT PREMALG LESION: CPT | Performed by: STUDENT IN AN ORGANIZED HEALTH CARE EDUCATION/TRAINING PROGRAM

## 2022-09-12 PROCEDURE — 3052F HG A1C>EQUAL 8.0%<EQUAL 9.0%: CPT | Performed by: STUDENT IN AN ORGANIZED HEALTH CARE EDUCATION/TRAINING PROGRAM

## 2022-09-12 PROCEDURE — 4004F PT TOBACCO SCREEN RCVD TLK: CPT | Performed by: STUDENT IN AN ORGANIZED HEALTH CARE EDUCATION/TRAINING PROGRAM

## 2022-09-12 PROCEDURE — G8427 DOCREV CUR MEDS BY ELIG CLIN: HCPCS | Performed by: STUDENT IN AN ORGANIZED HEALTH CARE EDUCATION/TRAINING PROGRAM

## 2022-09-12 PROCEDURE — 2022F DILAT RTA XM EVC RTNOPTHY: CPT | Performed by: STUDENT IN AN ORGANIZED HEALTH CARE EDUCATION/TRAINING PROGRAM

## 2022-09-12 PROCEDURE — G8417 CALC BMI ABV UP PARAM F/U: HCPCS | Performed by: STUDENT IN AN ORGANIZED HEALTH CARE EDUCATION/TRAINING PROGRAM

## 2022-09-12 RX ORDER — LAMOTRIGINE 25 MG/1
50 TABLET ORAL 2 TIMES DAILY
Qty: 120 TABLET | Refills: 0 | Status: SHIPPED | OUTPATIENT
Start: 2022-09-12 | End: 2022-10-05

## 2022-09-12 RX ORDER — LISINOPRIL 5 MG/1
TABLET ORAL
Qty: 90 TABLET | Refills: 0 | OUTPATIENT
Start: 2022-09-12

## 2022-09-12 RX ORDER — PREGABALIN 75 MG/1
75 CAPSULE ORAL 2 TIMES DAILY
Qty: 60 CAPSULE | Refills: 0 | Status: SHIPPED | OUTPATIENT
Start: 2022-09-12 | End: 2022-10-19 | Stop reason: SDUPTHER

## 2022-09-12 ASSESSMENT — ENCOUNTER SYMPTOMS
ABDOMINAL PAIN: 0
DIARRHEA: 0
SHORTNESS OF BREATH: 0
CONSTIPATION: 0
WHEEZING: 0

## 2022-09-12 NOTE — PROGRESS NOTES
Julienne Lewis (:  1975) is a 56 y.o. male,Established patient, here for evaluation of the following chief complaint(s):  Follow-up (Pt presents for a f/u. He would like a refill on his diabetes medication. )         ASSESSMENT/PLAN:  1. Bipolar affective disorder, currently depressed, moderate (HCC)  -     lamoTRIgine (LAMICTAL) 25 MG tablet; Take 2 tablets by mouth 2 times daily, Disp-120 tablet, R-0Normal  2. Polyneuropathy associated with underlying disease ([facility] [address]  3. Other cirrhosis of liver ([facility] [address]  4. Type 2 diabetes mellitus without complication, without long-term current use of insulin ([facility] [address]  5. Actinic keratoses  -     81965 - [place] DESTRUC PREMALIGNANT,2-14 LESIONS    Plan  - Start metformin 500mg BID and farxiga 10mg daily. Low carb, low fat diet  - Continue lamictal 50mg BID  - Continue lyrica 75mg BID for neuropathy  - Continue follow up with GI in [place]. Management of liver disease per specialists  - S/p cryotherapy for multiple AK, including the right forearm, left posterior arm, and right dorsal hand     Cryotherapy  Procedure trim/freeze Actinic Keratoses. AK as described in the physical exam.   Patient was counseled on risk of procedure including bleeding, infection, blister formation, scarring, lesion recurrence, and possible need for repeat procedure and benefits of procedure including possible lesion resolution. Patient tolerated procedure well without complication. No blood loss. Counseled on post procedure care. Return in about 4 weeks (around 10/10/2022). Subjective   SUBJECTIVE/OBJECTIVE:  HPI    ED follow     Hyperglycemia  A1c 8.1 . Was previously controlled without medications. States not change in diet. No new medications. No new illness. Restart metformin 500mg BID and start farxiga. Bipolar disorder  Currently on lamictal 50mg BID. Stable. Neuropathy  Improved with anya to 75mg twice daily. Chronic hep C  GI doctor in [place].  LFTs have gotten better. Follow up and US in 1 year. Multiple AKs -wants cryotherapy. Review of Systems   Respiratory:  Negative for shortness of breath and wheezing. Cardiovascular:  Negative for chest pain and palpitations. Gastrointestinal:  Negative for abdominal pain, constipation and diarrhea. Skin:         Actinic Keratosis lesions         Objective   Vitals:    09/12/22 1046   BP: 128/80   Pulse: 69   Resp: 12   Temp: 96.8 °F (36 °C)   SpO2: 98%       Physical Exam  Vitals and nursing note reviewed. Constitutional:       General: He is not in acute distress. HENT:      Head: Normocephalic and atraumatic. Right Ear: External ear normal.      Left Ear: External ear normal.      Nose: No congestion or rhinorrhea. Eyes:      General:         Right eye: No discharge. Left eye: No discharge. Cardiovascular:      Rate and Rhythm: Normal rate and regular rhythm. Pulmonary:      Effort: Pulmonary effort is normal.      Breath sounds: No wheezing. Abdominal:      General: Abdomen is flat. Palpations: Abdomen is soft. Tenderness: There is no abdominal tenderness. Musculoskeletal:         General: No swelling. Normal range of motion. Cervical back: Normal range of motion. Skin:     General: Skin is warm. Findings: Lesion (Multiple actinic keratoses including the right forearm, left forarm, left posterior upper arm, right dorsum of the hand) present. No rash. Neurological:      Mental Status: He is alert and oriented to person, place, and time. An electronic signature was used to authenticate this note.     --Sherly Renee MD

## 2022-09-12 NOTE — PROGRESS NOTES
S: 56 y.o. male with   Chief Complaint   Patient presents with    Follow-up     Pt presents for a f/u. He would like a refill on his diabetes medication. HPI: please see resident note for HPI and ROS. BP Readings from Last 3 Encounters:   09/12/22 128/80   09/07/22 125/82   08/15/22 130/84     Wt Readings from Last 3 Encounters:   09/12/22 236 lb 9.6 oz (107.3 kg)   09/07/22 240 lb (108.9 kg)   08/15/22 236 lb 12.8 oz (107.4 kg)       O: VS:  height is 6' 1\" (1.854 m) and weight is 236 lb 9.6 oz (107.3 kg). His temperature is 96.8 °F (36 °C). His blood pressure is 128/80 and his pulse is 69. His respiration is 12 and oxygen saturation is 98%. AAO/NAD, appropriate affect for mood  CV:  RRR, no murmur  Resp: CTAB  Skin: Multiple actinic keratoses located over bilateral arms. Diagnosis Orders   1. Bipolar affective disorder, currently depressed, moderate (HCC)  lamoTRIgine (LAMICTAL) 25 MG tablet      2. Polyneuropathy associated with underlying disease ([address]        3. Other cirrhosis of liver ([facility] [address]        4. Type 2 diabetes mellitus without complication, without long-term current use of insulin ([facility] [address]        5. Actinic keratoses  38728 - [place] DESTRUC PREMALIGNANT,2-14 LESIONS          Plan:  Please refer to resident note for full plan. 52-year-old female presents the office for ER follow-up and discuss multiple chronic medical conditions. Patient had ER follow-up for high blood sugars in the 300s. Previously dietary controlled diabetes. Glucose in the office today 180. Average at home around 236. We will continue lifestyle modifications including diet and exercise. We will plan to start medication including metformin and Farxiga. A1c in the ER 8.3. Plan to restart medication regimen and follow-up in 3 months for repeat A1c check. Bipolar: Chronic, well controlled. Continue Lamictal 50 mg twice daily. Diabetic neuropathy: Chronic, uncontrolled.   Patient previously was increased from Lyrica 75 mg daily up to 75 mg twice daily. Symptoms are slowly improving. We will plan to work on glycemic control and then adjust medication once glucose is under control. Patient had multiple actinic keratoses located over her arms today in the office. Was frozen off via cryotherapy. Is refer to resident note for full procedure note. No complications, patient tolerated well. Care instructions were reviewed prior to leaving. Follow-up as scheduled. Health Maintenance Due   Topic Date Due    Hepatitis A vaccine (2 of 3 - Hep A Twinrix risk 3-dose series) 07/10/2013    Hepatitis B vaccine (2 of 3 - Hep B Twinrix risk 3-dose series) 07/10/2013    Diabetic retinal exam  06/04/2019    Colorectal Cancer Screen  Never done    COVID-19 Vaccine (3 - Booster for Pfizer series) 12/01/2021    Diabetic foot exam  01/21/2022    Diabetic microalbuminuria test  07/01/2022    Flu vaccine (1) 09/01/2022       Attending Physician Statement  I have discussed the case, including pertinent history and exam findings with the resident. I also have seen the patient and performed key portions of the examination. I agree with the documented assessment and plan as documented by the resident.   DARREL Pena DO 9/13/2022 7:45 AM

## 2022-09-13 ENCOUNTER — PATIENT MESSAGE (OUTPATIENT)
Dept: FAMILY MEDICINE CLINIC | Age: 47
End: 2022-09-13

## 2022-09-13 DIAGNOSIS — R73.01 IFG (IMPAIRED FASTING GLUCOSE): ICD-10-CM

## 2022-09-13 RX ORDER — BLOOD SUGAR DIAGNOSTIC
STRIP MISCELLANEOUS
Qty: 100 EACH | Refills: 0 | Status: SHIPPED | OUTPATIENT
Start: 2022-09-13 | End: 2022-10-19 | Stop reason: SDUPTHER

## 2022-09-13 NOTE — TELEPHONE ENCOUNTER
From: Ok Anibal  To: Leonardo Akbar MD  Sent: 9/13/2022 9:36 AM EDT  Subject: Test strips    I think you forgot to write a prescription for my test strips yesterday.

## 2022-09-13 NOTE — TELEPHONE ENCOUNTER
Ok to hold the metformin given the muscle cramps. Make sure you are staying hydrated. Drink at least 100 ounces of water a day. Thank you.

## 2022-09-13 NOTE — TELEPHONE ENCOUNTER
From: Sil Alexandra  To: Sarah Dunn MD  Sent: 9/13/2022 9:34 AM EDT  Subject: Metformin     I started taking my metformin yesterday and last night the Bhaskar horses in my feet started back again. Havent had it happen much since i was off the medication.

## 2022-09-29 DIAGNOSIS — F31.32 BIPOLAR AFFECTIVE DISORDER, CURRENTLY DEPRESSED, MODERATE (HCC): ICD-10-CM

## 2022-09-29 RX ORDER — LAMOTRIGINE 25 MG/1
TABLET ORAL
Qty: 120 TABLET | Refills: 0 | OUTPATIENT
Start: 2022-09-29

## 2022-09-30 DIAGNOSIS — E11.9 TYPE 2 DIABETES MELLITUS WITHOUT COMPLICATION, WITH LONG-TERM CURRENT USE OF INSULIN (HCC): ICD-10-CM

## 2022-09-30 DIAGNOSIS — Z79.4 TYPE 2 DIABETES MELLITUS WITHOUT COMPLICATION, WITH LONG-TERM CURRENT USE OF INSULIN (HCC): ICD-10-CM

## 2022-09-30 RX ORDER — ATORVASTATIN CALCIUM 40 MG/1
TABLET, FILM COATED ORAL
Qty: 30 TABLET | Refills: 0 | OUTPATIENT
Start: 2022-09-30

## 2022-10-03 NOTE — TELEPHONE ENCOUNTER
Patient also stated he stop taking the metformin, started having leg cramps and really bad charley horse. patient said he is no longer taking it at this time.     Patient's last appointment was : 9/12/2022  Patient's next appointment is :   Future Appointments   Date Time Provider Didier Josephine   10/17/2022 10:15 AM Laly Parson MD N [facility] [place]   10/17/2022 10:40 AM Carolyn Olsen MD [facility] FM [facility]     Last refilled:6/7/22    Lab Results   Component Value Date    LABA1C 8.2 (H) 09/07/2022     Lab Results   Component Value Date    CHOL 139 05/17/2022    TRIG 149 05/17/2022    HDL 28 05/17/2022    LDLCALC 81 05/17/2022     Lab Results   Component Value Date     09/07/2022    K 3.6 09/07/2022    CL 99 09/07/2022    CO2 26 09/07/2022    BUN 12 09/07/2022    CREATININE 0.6 09/07/2022    GLUCOSE 259 (H) 09/07/2022    CALCIUM 9.0 09/07/2022    PROT 6.7 09/07/2022    LABALBU 4.0 09/07/2022    BILITOT 0.5 09/07/2022    ALKPHOS 159 (H) 09/07/2022    AST 28 09/07/2022    ALT 22 09/07/2022    LABGLOM >90 09/07/2022    GFRAA >60 02/26/2019    GLOB NOT REPORTED 04/05/2018     Lab Results   Component Value Date    TSH 1.78 10/11/2017    FT3 3.37 10/11/2017    T4FREE 1.14 05/07/2013     Lab Results   Component Value Date    WBC 6.4 09/07/2022    HGB 15.0 09/07/2022    HCT 42.8 09/07/2022    MCV 85.3 09/07/2022     (L) 09/07/2022

## 2022-10-05 DIAGNOSIS — F31.32 BIPOLAR AFFECTIVE DISORDER, CURRENTLY DEPRESSED, MODERATE (HCC): ICD-10-CM

## 2022-10-05 RX ORDER — LAMOTRIGINE 25 MG/1
TABLET ORAL
Qty: 120 TABLET | Refills: 0 | Status: SHIPPED | OUTPATIENT
Start: 2022-10-05

## 2022-10-05 RX ORDER — ONDANSETRON 4 MG/1
4 TABLET, FILM COATED ORAL EVERY 8 HOURS PRN
Qty: 30 TABLET | Refills: 0 | Status: SHIPPED | OUTPATIENT
Start: 2022-10-05 | End: 2022-10-19 | Stop reason: SDUPTHER

## 2022-10-05 NOTE — TELEPHONE ENCOUNTER
Patient's last appointment was : 9/12/2022  Patient's next appointment is : 10/17/22  Future Appointments   Date Time Provider Didier Burton   10/17/2022 10:15 AM Tyler Ojeda MD N [facility] [place]   10/17/2022 10:40 AM Vanna Menard MD [facility] FM [facility] [place]     Last refilled:09/12/22    Lab Results   Component Value Date    LABA1C 8.2 (H) 09/07/2022     Lab Results   Component Value Date    CHOL 139 05/17/2022    TRIG 149 05/17/2022    HDL 28 05/17/2022    LDLCALC 81 05/17/2022     Lab Results   Component Value Date     09/07/2022    K 3.6 09/07/2022    CL 99 09/07/2022    CO2 26 09/07/2022    BUN 12 09/07/2022    CREATININE 0.6 09/07/2022    GLUCOSE 259 (H) 09/07/2022    CALCIUM 9.0 09/07/2022    PROT 6.7 09/07/2022    LABALBU 4.0 09/07/2022    BILITOT 0.5 09/07/2022    ALKPHOS 159 (H) 09/07/2022    AST 28 09/07/2022    ALT 22 09/07/2022    LABGLOM >90 09/07/2022    GFRAA >60 02/26/2019    GLOB NOT REPORTED 04/05/2018     Lab Results   Component Value Date    TSH 1.78 10/11/2017    FT3 3.37 10/11/2017    T4FREE 1.14 05/07/2013     Lab Results   Component Value Date    WBC 6.4 09/07/2022    HGB 15.0 09/07/2022    HCT 42.8 09/07/2022    MCV 85.3 09/07/2022     (L) 09/07/2022

## 2022-10-14 ENCOUNTER — TELEPHONE (OUTPATIENT)
Dept: FAMILY MEDICINE CLINIC | Age: 47
End: 2022-10-14

## 2022-10-14 NOTE — TELEPHONE ENCOUNTER
Pt. Called stating his blood sugar was 451 this morning and is currently 321 and states it was in the 500s yesterday. Pt. States he is no longer taking his Metformin due to his muscle cramps in his legs. Pt. States none of his wounds will heal and have puss, he says he now has a new large wound/abscess on his neck that is red and swollen. I encouraged the patient to go the hospital but he declined and refused to go.

## 2022-10-17 ENCOUNTER — OFFICE VISIT (OUTPATIENT)
Dept: FAMILY MEDICINE CLINIC | Age: 47
End: 2022-10-17
Payer: COMMERCIAL

## 2022-10-17 VITALS
WEIGHT: 233.4 LBS | RESPIRATION RATE: 12 BRPM | SYSTOLIC BLOOD PRESSURE: 104 MMHG | BODY MASS INDEX: 29.95 KG/M2 | DIASTOLIC BLOOD PRESSURE: 72 MMHG | OXYGEN SATURATION: 96 % | HEART RATE: 75 BPM | HEIGHT: 74 IN | TEMPERATURE: 97 F

## 2022-10-17 DIAGNOSIS — Z79.4 TYPE 2 DIABETES MELLITUS WITH HYPERGLYCEMIA, WITH LONG-TERM CURRENT USE OF INSULIN (HCC): Primary | ICD-10-CM

## 2022-10-17 DIAGNOSIS — E11.65 TYPE 2 DIABETES MELLITUS WITH HYPERGLYCEMIA, WITH LONG-TERM CURRENT USE OF INSULIN (HCC): Primary | ICD-10-CM

## 2022-10-17 DIAGNOSIS — N30.00 ACUTE CYSTITIS WITHOUT HEMATURIA: ICD-10-CM

## 2022-10-17 PROCEDURE — G8427 DOCREV CUR MEDS BY ELIG CLIN: HCPCS | Performed by: STUDENT IN AN ORGANIZED HEALTH CARE EDUCATION/TRAINING PROGRAM

## 2022-10-17 PROCEDURE — 3052F HG A1C>EQUAL 8.0%<EQUAL 9.0%: CPT | Performed by: STUDENT IN AN ORGANIZED HEALTH CARE EDUCATION/TRAINING PROGRAM

## 2022-10-17 PROCEDURE — G8417 CALC BMI ABV UP PARAM F/U: HCPCS | Performed by: STUDENT IN AN ORGANIZED HEALTH CARE EDUCATION/TRAINING PROGRAM

## 2022-10-17 PROCEDURE — 99213 OFFICE O/P EST LOW 20 MIN: CPT | Performed by: STUDENT IN AN ORGANIZED HEALTH CARE EDUCATION/TRAINING PROGRAM

## 2022-10-17 PROCEDURE — 4004F PT TOBACCO SCREEN RCVD TLK: CPT | Performed by: STUDENT IN AN ORGANIZED HEALTH CARE EDUCATION/TRAINING PROGRAM

## 2022-10-17 PROCEDURE — G8484 FLU IMMUNIZE NO ADMIN: HCPCS | Performed by: STUDENT IN AN ORGANIZED HEALTH CARE EDUCATION/TRAINING PROGRAM

## 2022-10-17 PROCEDURE — 2022F DILAT RTA XM EVC RTNOPTHY: CPT | Performed by: STUDENT IN AN ORGANIZED HEALTH CARE EDUCATION/TRAINING PROGRAM

## 2022-10-17 RX ORDER — DULAGLUTIDE 1.5 MG/.5ML
1.5 INJECTION, SOLUTION SUBCUTANEOUS WEEKLY
Qty: 4 ADJUSTABLE DOSE PRE-FILLED PEN SYRINGE | Refills: 1 | Status: SHIPPED | OUTPATIENT
Start: 2022-10-17

## 2022-10-17 RX ORDER — SULFAMETHOXAZOLE AND TRIMETHOPRIM 800; 160 MG/1; MG/1
TABLET ORAL
COMMUNITY
Start: 2022-10-14

## 2022-10-17 RX ORDER — CEPHALEXIN 500 MG/1
CAPSULE ORAL
COMMUNITY
Start: 2022-10-14

## 2022-10-17 SDOH — ECONOMIC STABILITY: FOOD INSECURITY: WITHIN THE PAST 12 MONTHS, YOU WORRIED THAT YOUR FOOD WOULD RUN OUT BEFORE YOU GOT MONEY TO BUY MORE.: SOMETIMES TRUE

## 2022-10-17 SDOH — ECONOMIC STABILITY: FOOD INSECURITY: WITHIN THE PAST 12 MONTHS, THE FOOD YOU BOUGHT JUST DIDN'T LAST AND YOU DIDN'T HAVE MONEY TO GET MORE.: SOMETIMES TRUE

## 2022-10-17 ASSESSMENT — ENCOUNTER SYMPTOMS
WHEEZING: 0
SHORTNESS OF BREATH: 0
CONSTIPATION: 0
DIARRHEA: 0
ABDOMINAL PAIN: 0

## 2022-10-17 ASSESSMENT — SOCIAL DETERMINANTS OF HEALTH (SDOH): HOW HARD IS IT FOR YOU TO PAY FOR THE VERY BASICS LIKE FOOD, HOUSING, MEDICAL CARE, AND HEATING?: SOMEWHAT HARD

## 2022-10-17 NOTE — PROGRESS NOTES
Sheryle Embs (:  1975) is a 56 y.o. male,Established patient, here for evaluation of the following chief complaint(s):  Follow-up (Pt presents for a f/u. )      ASSESSMENT  1. Type 2 diabetes mellitus with hyperglycemia, with long-term current use of insulin (HCC)  -     Dulaglutide (TRULICITY) 1.5 JS/6.7SO SOPN; Inject 1.5 mg into the skin once a week, Disp-4 Adjustable Dose Pre-filled Pen Syringe, R-1Normal  2. Acute cystitis without hematuria    PLAN  - Stop metformin as patient is not tolerating. Start trulicity 0.6AM weekly. Drink plenty of water. If recurrent UTI may need to discontinue farxiga  - Complete antibiotic treatment for UTI and SSI    Return in about 4 weeks (around 2022) for diabetes . SUBJECTIVE/OBJECTIVE:  HPI    DM: stopped taking his metformin due to concerns for muscle cramping in the legs. Sugars running in the 400-500s at home. Sugars 190 this morning. Headache and diarrhea. [facility] ED. Labs showed evidence UTI. Did have symptoms. Was stated on Keflex and bactrim x7 days. Symptoms improving. Stressed wife in ICU and father actively dying from pulmonary fibrosis.      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\"    Type 2 diabetes mellitus without complication, with long-term current use of insulin ([address] 2017    Wears glasses     for reading       Past Surgical History:   Procedure Laterality Date    ANKLE SURGERY Left     KIDNEY STONE SURGERY      x 4    URETER SURGERY Left 10/26/2021 CYSTOSCOPY, LEFT URETEROSCOPY, LASER LITHOTRIPSY, LEFT URETERAL STENT performed by Tom Julien MD at [address]   Allergen Reactions    Adhesive Tape      Other reaction(s): Hives    Bactrim [Sulfamethoxazole-Trimethoprim]      Nausea and vomiting    Ciprofloxacin      Tendon rupture    Clonazepam Other (See Comments)     Restless leg    Cyclobenzaprine Hives and Other (See Comments)     Pt unsure    Morphine Hives and Other (See Comments)     \"throat started swelling up\"    Other Other (See Comments)    Quetiapine Other (See Comments)     restless         Current Outpatient Medications:     Dulaglutide (TRULICITY) 1.5 XC/8.4BE SOPN, Inject 1.5 mg into the skin once a week, Disp: 4 Adjustable Dose Pre-filled Pen Syringe, Rfl: 1    ondansetron (ZOFRAN) 4 MG tablet, Take 1 tablet by mouth every 8 hours as needed for Nausea or Vomiting, Disp: 30 tablet, Rfl: 0    lamoTRIgine (LAMICTAL) 25 MG tablet, Take 2 tablets by mouth twice daily, Disp: 120 tablet, Rfl: 0    ONETOUCH ULTRA strip, As needed. , Disp: 100 each, Rfl: 0    pregabalin (LYRICA) 75 MG capsule, Take 1 capsule by mouth 2 times daily for 30 days. , Disp: 60 capsule, Rfl: 0    tiZANidine (ZANAFLEX) 4 MG tablet, TAKE 1 TABLET BY MOUTH THREE TIMES DAILY AS NEEDED FOR MUSCLE SPASM AND FOR PAIN, Disp: 60 tablet, Rfl: 0    tenofovir disoproxil fumarate (VIREAD) 300 MG tablet, Take 1 tablet by mouth in the morning., Disp: 30 tablet, Rfl: 0    naproxen (NAPROSYN) 500 MG tablet, Take 1 tablet by mouth in the morning and 1 tablet in the evening. Take with meals. , Disp: 60 tablet, Rfl: 1    budesonide-formoterol (SYMBICORT) 160-4.5 MCG/ACT AERO, 2 puffs, Disp: , Rfl:     ascorbic acid (VITAMIN C) 500 MG tablet, Vitamin C With Bita Hips 500 mg tablet  TAKE 1 TABLET BY MOUTH TWICE DAILY FOR 7 DAYS, Disp: , Rfl:     docusate sodium (COLACE) 100 mg capsule, TAKE ONE Capsule BY MOUTH TWICE DAILY, Disp: , Rfl:     potassium chloride (KLOR-CON M) 20 MEQ extended release tablet, Take 1 tablet by mouth daily as needed (with lasix), Disp: 30 tablet, Rfl: 2    atorvastatin (LIPITOR) 10 MG tablet, Take 1 tablet by mouth daily, Disp: 90 tablet, Rfl: 1    fluticasone (FLOVENT HFA) 220 MCG/ACT inhaler, Inhale 2 puffs into the lungs 2 times daily, Disp: 1 each, Rfl: 3    magnesium oxide (MAG-OX) 250 MG TABS tablet, Take 1.5 tablets by mouth 2 times daily, Disp: 270 tablet, Rfl: 1    furosemide (LASIX) 40 MG tablet, Take 1 tablet by mouth daily, Disp: 60 tablet, Rfl: 2    naproxen (NAPROSYN) 500 MG tablet, Take 1 tablet by mouth 2 times daily (with meals), Disp: 60 tablet, Rfl: 0    lisinopril (PRINIVIL;ZESTRIL) 5 MG tablet, Take 1 tablet by mouth daily, Disp: 90 tablet, Rfl: 1    famotidine (PEPCID) 20 MG tablet, Take 1 tablet by mouth 2 times daily, Disp: 60 tablet, Rfl: 0    EQ LORATADINE 10 MG tablet, Take 1 tablet by mouth nightly, Disp: 90 tablet, Rfl: 1    pantoprazole (PROTONIX) 40 MG tablet, Take 1 tablet by mouth once daily, Disp: 90 tablet, Rfl: 1    metoclopramide (REGLAN) 5 MG tablet, 3 times daily, Disp: , Rfl:     naloxone 4 MG/0.1ML LIQD nasal spray, Naloxone Hcl Active 4 MG NA One Time Only 1 1 August 20th, 2020 10:59am, Disp: , Rfl:     ipratropium-albuterol (DUONEB) 0.5-2.5 (3) MG/3ML SOLN nebulizer solution, Take 3 mLs by nebulization every 6 hours as needed for Shortness of Breath, Disp: 360 vial, Rfl: 3    Multiple Vitamin (MULTIVITAMIN PO), Take by mouth daily, Disp: , Rfl:     ondansetron (ZOFRAN ODT) 4 MG disintegrating tablet, Take 1 tablet by mouth every 8 hours as needed for Nausea, Disp: 20 tablet, Rfl: 0    lidocaine (LIDODERM) 5 %, Place 1 patch onto the skin daily 12 hours on, 12 hours off., Disp: 15 patch, Rfl: 0    albuterol sulfate HFA (PROVENTIL HFA) 108 (90 Base) MCG/ACT inhaler, Inhale 2 puffs into the lungs every 4 hours as needed for Wheezing or Shortness of Breath (Space out to every 6 hours as symptoms improve) Space out to every 6 hours as symptoms improve., Disp: 1 Inhaler, Rfl: 3    METHADONE HCL PO, Take 116 mg by mouth daily , Disp: , Rfl:     cephALEXin (KEFLEX) 500 MG capsule, TAKE 1 CAPSULE BY MOUTH 4 TIMES DAILY FOR 7 DAYS, Disp: , Rfl:     sulfamethoxazole-trimethoprim (BACTRIM DS;SEPTRA DS) 800-160 MG per tablet, TAKE 1 TABLET BY MOUTH EVERY 12 HOURS, Disp: , Rfl:     dapagliflozin (FARXIGA) 10 MG tablet, Take 1 tablet by mouth every morning, Disp: 30 tablet, Rfl: 0    Family History   Problem Relation Age of Onset    Substance Abuse Mother     Depression Mother     Heart Disease Mother         CHF    Rheum Arthritis Mother     Other Mother         Fibromyalgia    Substance Abuse Father     Heart Disease Father     Depression Sister     Substance Abuse Brother     Substance Abuse Maternal Aunt     Substance Abuse Maternal Uncle     Substance Abuse Paternal Aunt     Substance Abuse Paternal Uncle     Substance Abuse Maternal Grandmother     Depression Maternal Grandmother        Social History     Tobacco Use    Smoking status: Every Day     Packs/day: 1.00     Years: 30.00     Pack years: 30.00     Types: Cigarettes    Smokeless tobacco: Never   Vaping Use    Vaping Use: Never used   Substance Use Topics    Alcohol use: No    Drug use: Not Currently     Comment: stopped heroin in 2013; medical marijuana card daily       Review of Systems   Respiratory:  Negative for shortness of breath and wheezing. Cardiovascular:  Negative for chest pain and palpitations. Gastrointestinal:  Negative for abdominal pain, constipation and diarrhea. Vitals:    10/17/22 1100   BP: 104/72   Pulse: 75   Resp: 12   Temp: 97 °F (36.1 °C)   SpO2: 96%       Physical Exam  Vitals and nursing note reviewed. Constitutional:       General: He is not in acute distress. HENT:      Head: Normocephalic and atraumatic. Right Ear: External ear normal.      Left Ear: External ear normal.      Nose: No congestion or rhinorrhea.    Eyes:      General: Right eye: No discharge. Left eye: No discharge. Cardiovascular:      Rate and Rhythm: Normal rate and regular rhythm. Pulmonary:      Effort: Pulmonary effort is normal.      Breath sounds: No wheezing. Abdominal:      General: Abdomen is flat. Palpations: Abdomen is soft. Tenderness: There is no abdominal tenderness. Musculoskeletal:         General: No swelling. Normal range of motion. Cervical back: Normal range of motion. Skin:     General: Skin is warm. Findings: Lesion (Dorsum of both hands and back of the neck.) present. No rash. Neurological:      Mental Status: He is alert and oriented to person, place, and time. An electronic signature was used to authenticate this note.     --Cruz Webber MD

## 2022-10-17 NOTE — PROGRESS NOTES
After pharmacist chart review, the following recommendations are made:    Given patient's recent increase in A1c from 6.0 to 8.2% after being intolerant to metformin (leg cramps), patient may benefit from a GLP-1 agonist such as Trulicity. Patient may benefit from annual influenza vaccine and bivalent COVID booster. Brigette Gonzalez.  Sandy Chapman, PharmD  10/17/2022 9:19 AM    For Pharmacy Admin Tracking Only    CPA in place:  No  Recommendation Provided To: Provider: 2 via Verbally to provider  Intervention Detail: Vaccine Recommended/Administered  Time Spent (min): 15

## 2022-10-17 NOTE — PROGRESS NOTES
S: 56 y.o. male with   Chief Complaint   Patient presents with    Follow-up     Pt presents for a f/u. HPI: please see resident note for HPI and ROS. BP Readings from Last 3 Encounters:   10/17/22 104/72   09/12/22 128/80   09/07/22 125/82     Wt Readings from Last 3 Encounters:   10/17/22 233 lb 6.4 oz (105.9 kg)   09/12/22 236 lb 9.6 oz (107.3 kg)   09/07/22 240 lb (108.9 kg)       O: VS:  height is 6' 2\" (1.88 m) and weight is 233 lb 6.4 oz (105.9 kg). His temperature is 97 °F (36.1 °C). His blood pressure is 104/72 and his pulse is 75. His respiration is 12 and oxygen saturation is 96%. Physical exam performed by resident physicians. Diagnosis Orders   1. Type 2 diabetes mellitus with hyperglycemia, with long-term current use of insulin (HCC)  Dulaglutide (TRULICITY) 1.5 AO/7.2LG SOPN      2. Acute cystitis without hematuria            Plan:  Please refer to resident note for full plan. 59-year-old male presents the office to follow-up on type 2 diabetes and recent ER visit. Patient called the office at the end of last week with sugars ranging 4-5 100s. Patient had to stop metformin secondary to muscle cramping and charley horses resulting in his sugar to rise. After patient stopped this nose and sugars at high he was encouraged to go to the local emergency department. Patient went to [facility] and was diagnosed with urinary tract infection which was most likely causing a rise in his sugars. He was treated with combination Bactrim and Keflex and patient states his symptoms are resolving/fully resolved. Patient does have a pertinent history of type 2 diabetes currently on [place] with this being his second urinary tract infection the past couple months. Patient will most likely need to be taken off of SGLT2 inhibitor secondary to recurrent urinary tract infections if patient gets an additional urinary tract infection. We will plan discuss this on follow-up appointment. Patient's diabetes seem to be improving since his infection is under control. Most recent sugars in the 190s. Since patient had to discontinue metformin, will continue [place] at this time, start Trulicity 1.5 mg weekly and follow-up in 1 month for medication reevaluation. Health Maintenance Due   Topic Date Due    Hepatitis A vaccine (2 of 3 - Hep A Twinrix risk 3-dose series) 07/10/2013    Hepatitis B vaccine (2 of 3 - Hep B Twinrix risk 3-dose series) 07/10/2013    Diabetic retinal exam  06/04/2019    Colorectal Cancer Screen  Never done    COVID-19 Vaccine (3 - Booster for Pfizer series) 12/01/2021    Diabetic foot exam  01/21/2022    Diabetic microalbuminuria test  07/01/2022    Flu vaccine (1) 08/01/2022       Attending Physician Statement  I have discussed the case, including pertinent history and exam findings with the resident. I agree with the documented assessment and plan as documented by the resident.   BULMARO Austin DO 10/17/2022 1:23 PM

## 2022-10-18 ENCOUNTER — TELEPHONE (OUTPATIENT)
Dept: FAMILY MEDICINE CLINIC | Age: 47
End: 2022-10-18

## 2022-10-19 ENCOUNTER — TELEPHONE (OUTPATIENT)
Dept: FAMILY MEDICINE CLINIC | Age: 47
End: 2022-10-19

## 2022-10-19 DIAGNOSIS — R73.01 IFG (IMPAIRED FASTING GLUCOSE): ICD-10-CM

## 2022-10-19 DIAGNOSIS — M25.531 ACUTE PAIN OF RIGHT WRIST: ICD-10-CM

## 2022-10-19 DIAGNOSIS — E11.65 TYPE 2 DIABETES MELLITUS WITH HYPERGLYCEMIA, WITH LONG-TERM CURRENT USE OF INSULIN (HCC): ICD-10-CM

## 2022-10-19 DIAGNOSIS — A04.8 H. PYLORI INFECTION: ICD-10-CM

## 2022-10-19 DIAGNOSIS — K21.9 GASTROESOPHAGEAL REFLUX DISEASE: ICD-10-CM

## 2022-10-19 DIAGNOSIS — J44.1 COPD EXACERBATION (HCC): ICD-10-CM

## 2022-10-19 DIAGNOSIS — G63 POLYNEUROPATHY ASSOCIATED WITH UNDERLYING DISEASE (HCC): Primary | ICD-10-CM

## 2022-10-19 DIAGNOSIS — J30.1 SEASONAL ALLERGIC RHINITIS DUE TO POLLEN: ICD-10-CM

## 2022-10-19 DIAGNOSIS — M65.9 TENOSYNOVITIS OF BOTH HANDS: ICD-10-CM

## 2022-10-19 DIAGNOSIS — Z79.4 TYPE 2 DIABETES MELLITUS WITH HYPERGLYCEMIA, WITH LONG-TERM CURRENT USE OF INSULIN (HCC): ICD-10-CM

## 2022-10-19 DIAGNOSIS — F31.32 BIPOLAR AFFECTIVE DISORDER, CURRENTLY DEPRESSED, MODERATE (HCC): ICD-10-CM

## 2022-10-19 NOTE — LETTER
[address] [place] [address],[address] [facility] [address] [place] [address]  Phone: [phone]  Fax: [phone]    Airam Quigley MD        October 20, 2022     Patient: Alberteen Soulier   YOB: 1975   Date of Visit: 10/19/2022       To Whom It May Concern: It is my medical opinion that Arya Mahmood should be excused from 10/12/22-10/20/22. .    If you have any questions or concerns, please don't hesitate to call.     Sincerely,    Electronically signed by Airam Quigley MD on 10/20/2022 at 2:43 PM      Airam Quigley MD

## 2022-10-20 ENCOUNTER — OFFICE VISIT (OUTPATIENT)
Dept: FAMILY MEDICINE CLINIC | Age: 47
End: 2022-10-20
Payer: COMMERCIAL

## 2022-10-20 VITALS
TEMPERATURE: 98 F | SYSTOLIC BLOOD PRESSURE: 118 MMHG | HEART RATE: 88 BPM | OXYGEN SATURATION: 96 % | BODY MASS INDEX: 29.95 KG/M2 | DIASTOLIC BLOOD PRESSURE: 62 MMHG | HEIGHT: 74 IN | WEIGHT: 233.4 LBS

## 2022-10-20 DIAGNOSIS — R11.10 VOMITING AND DIARRHEA: Primary | ICD-10-CM

## 2022-10-20 DIAGNOSIS — R19.7 VOMITING AND DIARRHEA: Primary | ICD-10-CM

## 2022-10-20 PROCEDURE — G8427 DOCREV CUR MEDS BY ELIG CLIN: HCPCS | Performed by: STUDENT IN AN ORGANIZED HEALTH CARE EDUCATION/TRAINING PROGRAM

## 2022-10-20 PROCEDURE — 99213 OFFICE O/P EST LOW 20 MIN: CPT | Performed by: STUDENT IN AN ORGANIZED HEALTH CARE EDUCATION/TRAINING PROGRAM

## 2022-10-20 PROCEDURE — G8417 CALC BMI ABV UP PARAM F/U: HCPCS | Performed by: STUDENT IN AN ORGANIZED HEALTH CARE EDUCATION/TRAINING PROGRAM

## 2022-10-20 PROCEDURE — 4004F PT TOBACCO SCREEN RCVD TLK: CPT | Performed by: STUDENT IN AN ORGANIZED HEALTH CARE EDUCATION/TRAINING PROGRAM

## 2022-10-20 PROCEDURE — G8484 FLU IMMUNIZE NO ADMIN: HCPCS | Performed by: STUDENT IN AN ORGANIZED HEALTH CARE EDUCATION/TRAINING PROGRAM

## 2022-10-20 RX ORDER — LISINOPRIL 5 MG/1
5 TABLET ORAL DAILY
Qty: 90 TABLET | Refills: 1 | OUTPATIENT
Start: 2022-10-20

## 2022-10-20 RX ORDER — NAPROXEN 500 MG/1
500 TABLET ORAL 2 TIMES DAILY WITH MEALS
Qty: 60 TABLET | Refills: 1 | OUTPATIENT
Start: 2022-10-20 | End: 2022-11-19

## 2022-10-20 ASSESSMENT — ENCOUNTER SYMPTOMS
VOMITING: 1
BLOOD IN STOOL: 0
DIARRHEA: 1
SHORTNESS OF BREATH: 0
CONSTIPATION: 0
EYE PAIN: 0
ABDOMINAL PAIN: 1
COUGH: 0

## 2022-10-20 NOTE — LETTER
[address] [address],[address] [facility]. [address] [place] [address]  Phone: [phone]  Fax: [phone]    Grant Fernández MD        October 20, 2022     Patient: Kt Chase   YOB: 1975   Date of Visit: 10/20/2022       To Whom It May Concern: It is my medical opinion that Ralph Even may return to work on 10/24/22. If you have any questions or concerns, please don't hesitate to call.     Sincerely,        Grant Fernández MD

## 2022-10-20 NOTE — TELEPHONE ENCOUNTER
Pt requests refills on naproxen that was started last visit. He did not follow up. Cannot refill until evaluated.

## 2022-10-20 NOTE — TELEPHONE ENCOUNTER
Last visit- 10/17/2022  Next visit- 11/14/2022    Requested Prescriptions     Pending Prescriptions Disp Refills    furosemide (LASIX) 40 MG tablet 60 tablet 2     Sig: Take 1 tablet by mouth daily    atorvastatin (LIPITOR) 10 MG tablet 90 tablet 1     Sig: Take 1 tablet by mouth daily    tiZANidine (ZANAFLEX) 4 MG tablet 60 tablet 0     Sig: TAKE 1 TABLET BY MOUTH THREE TIMES DAILY AS NEEDED FOR MUSCLE SPASM AND FOR PAIN    pregabalin (LYRICA) 75 MG capsule 60 capsule 0     Sig: Take 1 capsule by mouth 2 times daily for 30 days. ONETOUCH ULTRA strip 100 each 0     Sig: As needed.     ondansetron (ZOFRAN) 4 MG tablet 30 tablet 0     Sig: Take 1 tablet by mouth every 8 hours as needed for Nausea or Vomiting    lamoTRIgine (LAMICTAL) 25 MG tablet 120 tablet 0

## 2022-10-20 NOTE — Clinical Note
Sorry about this chart too, I didn't put the diagnosis before I staffed this, I should do this or else you cant have all the variables/smartlists completed for your chart.

## 2022-10-20 NOTE — PROGRESS NOTES
Anyi Rosas (:  1975) is a 56 y.o. male,Established patient, here for evaluation of the following chief complaint(s):  Diarrhea, Nausea & Vomiting, and Abdominal Pain (No energy, has no appetite, hasn't ate today only drank apple juice, all symptoms started last night.)         ASSESSMENT/PLAN:  1. Vomiting and diarrhea    Discussed supportive care and strict return precautions at length. Asked to call on call service if concerned. Return if symptoms worsen or fail to improve. Subjective   SUBJECTIVE/OBJECTIVE:  HPI    Vomiting and Diarrhea  At 10pm last night he began to have stomach cramp and diarrhea. Vomited last night with some headache. Vomti is nonbloody and nonbilious. He thinks it may be new diabetic medication. Only started it for 1 day. No blood in diarrhea. Some form. He says said he felt kind of sick since he started the antibiotic . Has had less appetite. Hasnt been taking naproxen. Not taking magensium or potassium. Denied red flag symptoms of dehydration as making normal urine and normal HR in office and moist mucous membranes noted. Review of Systems   Constitutional:  Negative for chills, fatigue and fever. HENT:  Negative for congestion and ear pain. Eyes:  Negative for pain and visual disturbance. Respiratory:  Negative for cough and shortness of breath. Cardiovascular:  Negative for chest pain and leg swelling. Gastrointestinal:  Positive for abdominal pain, diarrhea and vomiting. Negative for blood in stool and constipation. Genitourinary:  Negative for decreased urine volume, difficulty urinating, dysuria and hematuria. Musculoskeletal:  Negative for arthralgias and myalgias. Allergic/Immunologic: Negative for environmental allergies. Neurological:  Negative for dizziness and headaches. Psychiatric/Behavioral:  Negative for behavioral problems and sleep disturbance.          Objective   Physical Exam  Vitals and nursing note reviewed. Constitutional:       Appearance: Normal appearance. HENT:      Head: Normocephalic and atraumatic. Nose: Nose normal.      Mouth/Throat:      Mouth: Mucous membranes are moist.      Pharynx: Oropharynx is clear. Eyes:      Extraocular Movements: Extraocular movements intact. Pupils: Pupils are equal, round, and reactive to light. Cardiovascular:      Rate and Rhythm: Normal rate and regular rhythm. Pulses: Normal pulses. Heart sounds: Normal heart sounds. Pulmonary:      Effort: Pulmonary effort is normal.      Breath sounds: Normal breath sounds. Abdominal:      General: Abdomen is flat. Bowel sounds are normal.      Tenderness: There is no abdominal tenderness. There is no right CVA tenderness, left CVA tenderness or guarding. Musculoskeletal:         General: No signs of injury. Normal range of motion. Cervical back: Normal range of motion and neck supple. Skin:     General: Skin is warm and dry. Capillary Refill: Capillary refill takes less than 2 seconds. Neurological:      General: No focal deficit present. Mental Status: He is alert and oriented to person, place, and time. Mental status is at baseline. Psychiatric:         Mood and Affect: Mood normal.         Behavior: Behavior normal.                An electronic signature was used to authenticate this note.     --Zach Coffman MD

## 2022-10-20 NOTE — TELEPHONE ENCOUNTER
Last visit- 10/17/2022  Next visit- 11/14/2022    Requested Prescriptions     Pending Prescriptions Disp Refills    fluticasone (FLOVENT HFA) 220 MCG/ACT inhaler 1 each 3     Sig: Inhale 2 puffs into the lungs 2 times daily

## 2022-10-20 NOTE — PROGRESS NOTES
S: 56 y.o. male with   Chief Complaint   Patient presents with    Diarrhea    Nausea & Vomiting    Abdominal Pain     No energy, has no appetite, hasn't ate today only drank apple juice, all symptoms started last night. HPI: please see resident note for HPI and ROS. 76-year-old male with history of diabetes here for concern of acute nausea/vomiting and diarrhea x1 day. No associated fever, bloody diarrhea, or hematemesis. Has been able to hydrate. Stool is loose and not watery. Was on Bactrim and Keflex last week for UTI and does have sensitivities to these medications. Was able to take Zofran at home. Has Reglan to take at home as well but has not done so yet. BP Readings from Last 3 Encounters:   10/20/22 118/62   10/17/22 104/72   09/12/22 128/80     Wt Readings from Last 3 Encounters:   10/20/22 233 lb 6.4 oz (105.9 kg)   10/17/22 233 lb 6.4 oz (105.9 kg)   09/12/22 236 lb 9.6 oz (107.3 kg)       O: VS:  height is 6' 2\" (1.88 m) and weight is 233 lb 6.4 oz (105.9 kg). His oral temperature is 98 °F (36.7 °C). His blood pressure is 118/62 and his pulse is 88. His oxygen saturation is 96%. Diagnosis Orders   1. Vomiting and diarrhea            Plan:  Please refer to resident note for full plan. Acute gastroenteritis: Acute, stable. No red flag symptoms per history. Etiology unclear, though consider viral gastroenteritis versus antibiotic induced GI upset. Plan to continue supportive care with rest, hydration, brat diet. ER precautions discussed by resident physician. Return to office if symptoms are worsening or persisting.     Health Maintenance Due   Topic Date Due    Hepatitis A vaccine (2 of 3 - Hep A Twinrix risk 3-dose series) 07/10/2013    Hepatitis B vaccine (2 of 3 - Hep B Twinrix risk 3-dose series) 07/10/2013    Diabetic retinal exam  06/04/2019    Colorectal Cancer Screen  Never done    COVID-19 Vaccine (3 - Booster for Pfizer series) 08/26/2021    Diabetic foot exam 01/21/2022    Diabetic microalbuminuria test  07/01/2022    Flu vaccine (1) 08/01/2022       Attending Physician Statement  I have discussed the case, including pertinent history and exam findings with the resident. I agree with the documented assessment and plan as documented by the resident.         Jennifer Galindo,  10/23/2022 6:20 AM

## 2022-10-20 NOTE — TELEPHONE ENCOUNTER
Last visit- 10/17/2022  Next visit- 11/14/2022    Requested Prescriptions     Pending Prescriptions Disp Refills    loratadine (EQ LORATADINE) 10 MG tablet 90 tablet 1    pantoprazole (PROTONIX) 40 MG tablet 90 tablet 1

## 2022-10-20 NOTE — Clinical Note
Patient wanted to switch PCPs, can we change his followup with gi to a first year with the warning he wanted more thorough care?   Id switch to me as patient liked me but with me leaving a first year makes more sense

## 2022-10-20 NOTE — PROGRESS NOTES
Health Maintenance Due   Topic Date Due    Hepatitis A vaccine (2 of 3 - Hep A Twinrix risk 3-dose series) 07/10/2013    Hepatitis B vaccine (2 of 3 - Hep B Twinrix risk 3-dose series) 07/10/2013    Diabetic retinal exam  06/04/2019    Colorectal Cancer Screen  Never done    COVID-19 Vaccine (3 - Booster for Pfizer series) 12/01/2021    Diabetic foot exam  01/21/2022    Diabetic microalbuminuria test  07/01/2022    Flu vaccine (1) 08/01/2022

## 2022-10-24 RX ORDER — LAMOTRIGINE 25 MG/1
TABLET ORAL
Qty: 120 TABLET | Refills: 0 | OUTPATIENT
Start: 2022-10-24

## 2022-10-24 RX ORDER — FUROSEMIDE 40 MG/1
40 TABLET ORAL DAILY
Qty: 60 TABLET | Refills: 2 | Status: SHIPPED | OUTPATIENT
Start: 2022-10-24

## 2022-10-24 RX ORDER — FLUTICASONE PROPIONATE 220 UG/1
2 AEROSOL, METERED RESPIRATORY (INHALATION) 2 TIMES DAILY
Qty: 1 EACH | Refills: 3 | Status: SHIPPED | OUTPATIENT
Start: 2022-10-24 | End: 2023-10-24

## 2022-10-24 RX ORDER — LORATADINE 10 MG/1
10 TABLET ORAL DAILY
Qty: 90 TABLET | Refills: 1 | Status: SHIPPED | OUTPATIENT
Start: 2022-10-24

## 2022-10-24 RX ORDER — ONDANSETRON 4 MG/1
4 TABLET, FILM COATED ORAL EVERY 8 HOURS PRN
Qty: 30 TABLET | Refills: 0 | Status: SHIPPED | OUTPATIENT
Start: 2022-10-24 | End: 2022-11-28

## 2022-10-24 RX ORDER — PREGABALIN 75 MG/1
75 CAPSULE ORAL 2 TIMES DAILY
Qty: 60 CAPSULE | Refills: 0 | Status: SHIPPED | OUTPATIENT
Start: 2022-10-24 | End: 2022-11-28

## 2022-10-24 RX ORDER — BLOOD SUGAR DIAGNOSTIC
STRIP MISCELLANEOUS
Qty: 100 EACH | Refills: 0 | Status: SHIPPED | OUTPATIENT
Start: 2022-10-24

## 2022-10-24 RX ORDER — TIZANIDINE 4 MG/1
TABLET ORAL
Qty: 60 TABLET | Refills: 0 | Status: SHIPPED | OUTPATIENT
Start: 2022-10-24 | End: 2022-11-28

## 2022-10-24 RX ORDER — ATORVASTATIN CALCIUM 10 MG/1
10 TABLET, FILM COATED ORAL DAILY
Qty: 90 TABLET | Refills: 1 | Status: SHIPPED | OUTPATIENT
Start: 2022-10-24

## 2022-10-24 RX ORDER — PANTOPRAZOLE SODIUM 40 MG/1
40 TABLET, DELAYED RELEASE ORAL DAILY
Qty: 90 TABLET | Refills: 1 | Status: SHIPPED | OUTPATIENT
Start: 2022-10-24

## 2022-10-31 NOTE — TELEPHONE ENCOUNTER
[facility] faxed the forms back, changes need to be made to the forms. Forms in mailbox for updates.

## 2022-11-01 NOTE — TELEPHONE ENCOUNTER
Form has been updated to include 10/26/22. As I explained to the patient previously, his condition does not qualify for intermittent leave or episodic flares. The muscle cramps were reportedly due to the metformin, which he is no longer taking. He is also on appropriate medications for his diabetes and these can adjusted as needed. Moving forward, anytime the patient feels ill and is unable to go to work, he will need to call the office and make a same day appointment to be evaluated and treated. This way there will appropriate and accurate documentation. Please advise. Form in out box. Thank you.

## 2022-11-07 ENCOUNTER — TELEPHONE (OUTPATIENT)
Dept: FAMILY MEDICINE CLINIC | Age: 47
End: 2022-11-07

## 2022-11-09 ENCOUNTER — PATIENT MESSAGE (OUTPATIENT)
Dept: FAMILY MEDICINE CLINIC | Age: 47
End: 2022-11-09

## 2022-11-09 NOTE — TELEPHONE ENCOUNTER
Called Blanca Vargas at his phone number ([phone]). Theodore I requesting FMLA with intermittent leave. Discussed with patient , that he does not need intermittent leave for his type 2 diabetes diagnosis. Instead we should focus on getting his diabetes under control with medication and life style modifications. Rosa Maria Valencia states that he has other medical conditions such as COPD, Liver cirrhosis, Hep C, and diastolic heart failure. The patient has a hepatologist for his hepatitis and cirrhosis. Also these conditions have been mostly stable. His other conditions are currently stable as well. Discussed at that is is currently on the appropriate medications for his conditions and should therefore be able to work. My plan was to monitor the patient for the next couple of months to accurately determine how much time he might need for intermittent leave in the future. The patient declined this plan. He became upset. Brought up what if scenarios and even threatened to roland. Furthermore, the patient is also threatening to find a different provider. Discussed with patient that he is free to find another provider if he wishes.

## 2022-11-09 NOTE — TELEPHONE ENCOUNTER
From: Nicole Banuelos  To: Kike Sanchez MD  Sent: 11/9/2022 1:44 PM EST  Subject: C/ [address] understand why you didnt jeovanny line 7 and 8 for intermittent absences. I have many problems like diabetes, hepatitis c and b, stage 4 psoriasis of the liver, congestive heart failure, and gastroporesis all in which make me sick often and causes me to miss work. Now my job is in jeopardy because agatha miss work for my blood sugar being super high like 500+. I called your office every time and went to the E.R. as directed. Then i was seen in your office several times. I talked to the people at [facility] and they told me they have never seen a doctor reject a diabetic intermittent absence. You only gave me 1 hour to see you for a doctor's appointment on the [facility] paperwork. I have waited 1 hour before i was seen the appointment took 2 hours. This is on the verge of me losing my job. Can you please explain the reasoning to me?

## 2022-11-11 ENCOUNTER — TELEPHONE (OUTPATIENT)
Dept: FAMILY MEDICINE CLINIC | Age: 47
End: 2022-11-11

## 2022-11-15 NOTE — TELEPHONE ENCOUNTER
[facility] called stating that they did not receive office note from 10/20/2022. I re faxed paperwork with Office Note from 10/20/2022.

## 2022-11-22 DIAGNOSIS — M65.9 TENOSYNOVITIS OF BOTH HANDS: ICD-10-CM

## 2022-11-22 DIAGNOSIS — G63 POLYNEUROPATHY ASSOCIATED WITH UNDERLYING DISEASE (HCC): ICD-10-CM

## 2022-11-22 DIAGNOSIS — M25.531 ACUTE PAIN OF RIGHT WRIST: ICD-10-CM

## 2022-11-23 ENCOUNTER — TELEPHONE (OUTPATIENT)
Dept: FAMILY MEDICINE CLINIC | Age: 47
End: 2022-11-23

## 2022-11-23 RX ORDER — NAPROXEN 500 MG/1
TABLET ORAL
Qty: 60 TABLET | Refills: 0 | OUTPATIENT
Start: 2022-11-23

## 2022-11-23 NOTE — TELEPHONE ENCOUNTER
Patient's last appointment was : 10/20/2022  Patient's next appointment is : 11/25/22  Future Appointments   Date Time Provider Didier Burton   11/25/2022  8:00 AM Jared Sebastian MD [facility] IIT[place]VI[facility]ARA     Last refilled:    Lab Results   Component Value Date    LABA1C 8.2 (H) 09/07/2022     Lab Results   Component Value Date    CHOL 139 05/17/2022    TRIG 149 05/17/2022    HDL 28 05/17/2022    LDLCALC 81 05/17/2022     Lab Results   Component Value Date     09/07/2022    K 3.6 09/07/2022    CL 99 09/07/2022    CO2 26 09/07/2022    BUN 12 09/07/2022    CREATININE 0.6 09/07/2022    GLUCOSE 259 (H) 09/07/2022    CALCIUM 9.0 09/07/2022    PROT 6.7 09/07/2022    LABALBU 4.0 09/07/2022    BILITOT 0.5 09/07/2022    ALKPHOS 159 (H) 09/07/2022    AST 28 09/07/2022    ALT 22 09/07/2022    LABGLOM >90 09/07/2022    GFRAA >60 02/26/2019    GLOB NOT REPORTED 04/05/2018     Lab Results   Component Value Date    TSH 1.78 10/11/2017    FT3 3.37 10/11/2017    T4FREE 1.14 05/07/2013     Lab Results   Component Value Date    WBC 6.4 09/07/2022    HGB 15.0 09/07/2022    HCT 42.8 09/07/2022    MCV 85.3 09/07/2022     (L) 09/07/2022

## 2022-11-23 NOTE — TELEPHONE ENCOUNTER
Called this gentleman to confirm his upcoming appointment w/ Dr Ly Barton this Friday at 8 am. He has his [place] paperwork w/ him, and he is concerned because he stated you were giving him 10/27/22-10/30/22 off from work. He then stated that his PCP wouldn't honor those days and he wanted me to get ahold of you and make sure those days are taken care of.

## 2022-11-28 RX ORDER — PREGABALIN 75 MG/1
75 CAPSULE ORAL 2 TIMES DAILY
Qty: 60 CAPSULE | Refills: 0 | Status: SHIPPED | OUTPATIENT
Start: 2022-11-28 | End: 2022-12-28

## 2022-11-28 RX ORDER — TIZANIDINE 4 MG/1
TABLET ORAL
Qty: 60 TABLET | Refills: 0 | Status: SHIPPED | OUTPATIENT
Start: 2022-11-28

## 2022-11-28 RX ORDER — ONDANSETRON 4 MG/1
TABLET, FILM COATED ORAL
Qty: 30 TABLET | Refills: 0 | Status: SHIPPED | OUTPATIENT
Start: 2022-11-28

## 2022-11-30 ENCOUNTER — HOSPITAL ENCOUNTER (EMERGENCY)
Age: 47
Discharge: ANOTHER HOSPITAL | End: 2022-11-30
Payer: COMMERCIAL

## 2022-11-30 VITALS
HEART RATE: 85 BPM | OXYGEN SATURATION: 97 % | HEIGHT: 73 IN | SYSTOLIC BLOOD PRESSURE: 85 MMHG | WEIGHT: 230 LBS | BODY MASS INDEX: 30.48 KG/M2 | TEMPERATURE: 97.6 F | DIASTOLIC BLOOD PRESSURE: 51 MMHG | RESPIRATION RATE: 16 BRPM

## 2022-11-30 DIAGNOSIS — R65.10 SIRS (SYSTEMIC INFLAMMATORY RESPONSE SYNDROME) (HCC): ICD-10-CM

## 2022-11-30 DIAGNOSIS — I95.9 HYPOTENSION, UNSPECIFIED HYPOTENSION TYPE: Primary | ICD-10-CM

## 2022-11-30 LAB
FLU A ANTIGEN: NEGATIVE
INFLUENZA B AG, EIA: NEGATIVE
SARS-COV-2, NAA: NOT  DETECTED

## 2022-11-30 PROCEDURE — 87804 INFLUENZA ASSAY W/OPTIC: CPT

## 2022-11-30 PROCEDURE — 87635 SARS-COV-2 COVID-19 AMP PRB: CPT

## 2022-11-30 PROCEDURE — 99213 OFFICE O/P EST LOW 20 MIN: CPT | Performed by: NURSE PRACTITIONER

## 2022-11-30 PROCEDURE — 99215 OFFICE O/P EST HI 40 MIN: CPT

## 2022-11-30 ASSESSMENT — ENCOUNTER SYMPTOMS
SORE THROAT: 0
SINUS PRESSURE: 0
SHORTNESS OF BREATH: 1
VOMITING: 0
NAUSEA: 0
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
## 2022-11-30 NOTE — ED PROVIDER NOTES
[facility]  Urgent Care Encounter       CHIEF COMPLAINT       Chief Complaint   Patient presents with    Cough     Pt c/o sob, chills,  fever 102, body aches       Nurses Notes reviewed and I agree except as noted in the HPI. HISTORY OF PRESENT ILLNESS   Anna Marie Morillo is a 56 y.o. male who presents with complaints of cough, shortness of breath, chills, reported fever of 102, and body aches. His symptoms started last evening while at work. His coworker told him to go be evaluated by the nurse at work. He reports a blood sugar last evening of 400 that did improve to 146 this morning. He states he has been diaphoretic and has left lower leg swelling. He states the leg swelling has been present for the past couple weeks. He does admit to being exposed to COVID-19 by his coworker 2 days ago. He has not tried anything for treatment thus far. The history is provided by the patient. REVIEW OF SYSTEMS     Review of Systems   Constitutional:  Positive for chills, diaphoresis, fatigue and fever. HENT:  Negative for sinus pressure and sore throat. Respiratory:  Positive for shortness of breath. Cardiovascular:  Negative for chest pain. Gastrointestinal:  Negative for nausea and vomiting. Musculoskeletal:  Positive for myalgias. Neurological:  Negative for dizziness, weakness and headaches.      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 CURRENT MEDICATIONS       Previous Medications    ALBUTEROL SULFATE HFA (PROVENTIL HFA) 108 (90 BASE) MCG/ACT INHALER    Inhale 2 puffs into the lungs every 4 hours as needed for Wheezing or Shortness of Breath (Space out to every 6 hours as symptoms improve) Space out to every 6 hours as symptoms improve. ASCORBIC ACID (VITAMIN C) 500 MG TABLET    Vitamin C With Bita Hips 500 mg tablet   TAKE 1 TABLET BY MOUTH TWICE DAILY FOR 7 DAYS    ATORVASTATIN (LIPITOR) 10 MG TABLET    Take 1 tablet by mouth daily    BUDESONIDE-FORMOTEROL (SYMBICORT) 160-4.5 MCG/ACT AERO    2 puffs    CEPHALEXIN (KEFLEX) 500 MG CAPSULE    TAKE 1 CAPSULE BY MOUTH 4 TIMES DAILY FOR 7 DAYS    DAPAGLIFLOZIN (FARXIGA) 10 MG TABLET    Take 1 tablet by mouth every morning    DOCUSATE SODIUM (COLACE) 100 MG CAPSULE    TAKE ONE Capsule BY MOUTH TWICE DAILY    DULAGLUTIDE (TRULICITY) 1.5 UQ/3.6PD SOPN    Inject 1.5 mg into the skin once a week    FAMOTIDINE (PEPCID) 20 MG TABLET    Take 1 tablet by mouth 2 times daily    FLUTICASONE (FLOVENT HFA) 220 MCG/ACT INHALER    Inhale 2 puffs into the lungs 2 times daily    FUROSEMIDE (LASIX) 40 MG TABLET    Take 1 tablet by mouth daily    IPRATROPIUM-ALBUTEROL (DUONEB) 0.5-2.5 (3) MG/3ML SOLN NEBULIZER SOLUTION    Take 3 mLs by nebulization every 6 hours as needed for Shortness of Breath    LAMOTRIGINE (LAMICTAL) 25 MG TABLET    Take 2 tablets by mouth twice daily    LIDOCAINE (LIDODERM) 5 %    Place 1 patch onto the skin daily 12 hours on, 12 hours off.     LISINOPRIL (PRINIVIL;ZESTRIL) 5 MG TABLET    Take 1 tablet by mouth daily    LORATADINE (EQ LORATADINE) 10 MG TABLET    Take 1 tablet by mouth daily    MAGNESIUM OXIDE (MAG-OX) 250 MG TABS TABLET    Take 1.5 tablets by mouth 2 times daily    METHADONE HCL PO    Take 116 mg by mouth daily     METOCLOPRAMIDE (REGLAN) 5 MG TABLET    3 times daily    MULTIPLE VITAMIN (MULTIVITAMIN PO)    Take by mouth daily    NALOXONE 4 MG/0.1ML LIQD NASAL SPRAY    Naloxone Hcl Active 4 MG NA One Time Only 1 1 August 20th, 2020 10:59am    NAPROXEN (NAPROSYN) 500 MG TABLET    Take 1 tablet by mouth 2 times daily (with meals)    NAPROXEN (NAPROSYN) 500 MG TABLET    Take 1 tablet by mouth in the morning and 1 tablet in the evening. Take with meals. ONDANSETRON (ZOFRAN ODT) 4 MG DISINTEGRATING TABLET    Take 1 tablet by mouth every 8 hours as needed for Nausea    ONDANSETRON (ZOFRAN) 4 MG TABLET    TAKE 1 TABLET BY MOUTH EVERY 8 HOURS AS NEEDED FOR NAUSEA AND VOMITING    ONETOUCH ULTRA STRIP    As needed. PANTOPRAZOLE (PROTONIX) 40 MG TABLET    Take 1 tablet by mouth daily    POTASSIUM CHLORIDE (KLOR-CON M) 20 MEQ EXTENDED RELEASE TABLET    Take 1 tablet by mouth daily as needed (with lasix)    PREGABALIN (LYRICA) 75 MG CAPSULE    Take 1 capsule by mouth 2 times daily for 30 days. SULFAMETHOXAZOLE-TRIMETHOPRIM (BACTRIM DS;SEPTRA DS) 800-160 MG PER TABLET    TAKE 1 TABLET BY MOUTH EVERY 12 HOURS    TENOFOVIR DISOPROXIL FUMARATE (VIREAD) 300 MG TABLET    Take 1 tablet by mouth in the morning. TIZANIDINE (ZANAFLEX) 4 MG TABLET    TAKE 1 TABLET BY MOUTH THREE TIMES DAILY AS NEEDED FOR MUSCLE SPASM AND FOR PAIN       ALLERGIES     Patient is is allergic to adhesive tape, bactrim [sulfamethoxazole-trimethoprim], ciprofloxacin, clonazepam, cyclobenzaprine, morphine, other, and quetiapine.     Patients   Immunization History   Administered Date(s) Administered    COVID-19, PFIZER PURPLE top, DILUTE for use, (age 15 y+), 30mcg/0.3mL 05/30/2021, 07/01/2021    Hepatitis A/Hepatitis B (Twinrix) 06/12/2013    Influenza Vaccine, unspecified formulation 11/13/2015, 09/21/2017    Influenza Virus Vaccine 11/13/2015, 01/31/2020    Influenza, AFLURIA (age 1 yrs+), FLUZONE, (age 10 mo+), MDV, 0.5mL 09/21/2017, 01/31/2020    Influenza, FLUARIX, FLULAVAL, FLUZONE (age 10 mo+) AND AFLURIA, (age 1 y+), PF, 0.5mL 09/28/2018, 10/08/2020    Influenza, FLUCELVAX, (age 10 mo+), MDCK, PF, 0.5mL 11/08/2021    Pneumococcal Conjugate 13-valent ([id number]) 07/17/2015    Pneumococcal Polysaccharide ([id number]) 09/21/2017    Tdap (Boostrix, Adacel) 10/08/2012, 06/12/2013, 11/13/2015       FAMILY HISTORY     Patient's family history includes Depression in his maternal grandmother, mother, and sister; Heart Disease in his father and mother; Other in his mother; Rheum Arthritis in his mother; Substance Abuse in his brother, father, maternal aunt, maternal grandmother, maternal uncle, mother, paternal aunt, and paternal uncle. SOCIAL HISTORY     Patient  reports that he has been smoking cigarettes. He has a 30.00 pack-year smoking history. He has never used smokeless tobacco. He reports that he does not currently use drugs. He reports that he does not drink alcohol. PHYSICAL EXAM     ED TRIAGE VITALS  BP: (!) 85/51, Temp: 97.6 °F (36.4 °C), Heart Rate: 85, Resp: 16, SpO2: 97 %,Estimated body mass index is 30.34 kg/m² as calculated from the following:    Height as of this encounter: 6' 1\" (1.854 m). Weight as of this encounter: 230 lb (104.3 kg). ,No LMP for male patient. Physical Exam  Vitals and nursing note reviewed. Constitutional:       Appearance: He is ill-appearing and diaphoretic. He is not toxic-appearing. HENT:      Right Ear: Tympanic membrane normal.      Left Ear: Tympanic membrane normal.      Nose: Congestion present. No rhinorrhea. Mouth/Throat:      Mouth: Mucous membranes are moist.      Pharynx: No posterior oropharyngeal erythema. Cardiovascular:      Rate and Rhythm: Normal rate and regular rhythm. Pulses: Normal pulses. Heart sounds: Normal heart sounds.    Pulmonary:      Effort: Pulmonary effort is normal.      Breath sounds: Examination of the right-lower field reveals decreased breath sounds. Examination of the left-lower field reveals decreased breath sounds. Decreased breath sounds present. No wheezing, rhonchi or rales. Abdominal:      General: Abdomen is flat. Bowel sounds are normal.      Palpations: Abdomen is soft. Musculoskeletal:      Left lower leg: Swelling present. No tenderness. 1+ Edema present. Left ankle: Normal pulse. Skin:     General: Skin is warm. Findings: Erythema (left lower leg) present. Neurological:      Mental Status: He is alert and oriented to person, place, and time. Motor: No weakness. Psychiatric:         Behavior: Behavior normal.       DIAGNOSTIC RESULTS     Labs:  Results for orders placed or performed during the hospital encounter of 11/30/22   Rapid influenza A/B antigens   Result Value Ref Range    Flu A Antigen Negative NEGATIVE    Influenza B Ag, EIA Negative NEGATIVE   COVID-19, Rapid   Result Value Ref Range    SARS-CoV-2, MELANIA NOT  DETECTED NOT DETECTED       IMAGING:  None    EKG:  None    URGENT CARE COURSE:     Vitals:    11/30/22 1121   BP: (!) 85/51   Pulse: 85   Resp: 16   Temp: 97.6 °F (36.4 °C)   TempSrc: Oral   SpO2: 97%   Weight: 230 lb (104.3 kg)   Height: 6' 1\" (1.854 m)       Medications - No data to display       PROCEDURES:  None    FINAL IMPRESSION      1. Hypotension, unspecified hypotension type    2. SIRS (systemic inflammatory response syndrome) [facility])      DISPOSITION/ PLAN   DISPOSITION Decision To Transfer 11/30/2022 11:47:32 AM     Patient presents ill-appearing, pale, diaphoretic, with noted hypotension. Patient did admit to elevated blood sugars as well. Patient is at high risk. Patient meets criteria for sepsis evaluation. Opted to transfer patient to [facility] per patient's preference. Report given to Dr. Liam Meyers. Patient transferred in stable condition after refusing EMS transport.     PATIENT REFERRED TO:  Kalyan Gilmore MD Ned Navarro / [facility] [facility] [address] [id number]      DISCHARGE MEDICATIONS:  New Prescriptions    No medications on file       Discontinued Medications    No medications on file       Current Discharge Medication List          CELIO Arreola CNP    (Please note that portions of this note were completed with a voice recognition program. Efforts were made to edit the dictations but occasionally words are mis-transcribed.)           CELIO Arreola CNP  11/30/22 [id number]

## 2022-11-30 NOTE — ED TRIAGE NOTES
AMbualtory to room 4 c/o ocugh chills guillaume fever body aches  exposed he thinks he was esxposed to covid at work. Pt is pale and diaphoretic  states his blood sugar today is 141 at home.   Pt also c/o bilateral legf swelling  redness noted left ankle

## 2022-12-12 ENCOUNTER — HOSPITAL ENCOUNTER (EMERGENCY)
Age: 47
Discharge: HOME | End: 2022-12-12
Payer: COMMERCIAL

## 2022-12-12 VITALS
OXYGEN SATURATION: 98 % | DIASTOLIC BLOOD PRESSURE: 79 MMHG | RESPIRATION RATE: 16 BRPM | SYSTOLIC BLOOD PRESSURE: 125 MMHG | TEMPERATURE: 97.6 F | HEART RATE: 81 BPM | WEIGHT: 230 LBS | BODY MASS INDEX: 30.48 KG/M2 | HEIGHT: 73 IN

## 2022-12-12 DIAGNOSIS — J06.9 VIRAL URI WITH COUGH: Primary | ICD-10-CM

## 2022-12-12 LAB — SARS-COV-2, NAA: NOT  DETECTED

## 2022-12-12 PROCEDURE — 99213 OFFICE O/P EST LOW 20 MIN: CPT

## 2022-12-12 PROCEDURE — 99213 OFFICE O/P EST LOW 20 MIN: CPT | Performed by: NURSE PRACTITIONER

## 2022-12-12 PROCEDURE — 87635 SARS-COV-2 COVID-19 AMP PRB: CPT

## 2022-12-12 ASSESSMENT — PAIN DESCRIPTION - ONSET: ONSET: ON-GOING

## 2022-12-12 ASSESSMENT — ENCOUNTER SYMPTOMS
VOMITING: 0
WHEEZING: 0
EYE REDNESS: 0
SHORTNESS OF BREATH: 0
SORE THROAT: 0
COUGH: 1
ABDOMINAL PAIN: 0
NAUSEA: 0
SINUS PRESSURE: 0
EYE ITCHING: 0
DIARRHEA: 0

## 2022-12-12 ASSESSMENT — PAIN DESCRIPTION - PAIN TYPE: TYPE: ACUTE PAIN

## 2022-12-12 ASSESSMENT — PAIN DESCRIPTION - LOCATION: LOCATION: GENERALIZED

## 2022-12-12 ASSESSMENT — PAIN SCALES - GENERAL: PAINLEVEL_OUTOF10: 6

## 2022-12-12 ASSESSMENT — PAIN - FUNCTIONAL ASSESSMENT
PAIN_FUNCTIONAL_ASSESSMENT: 0-10
PAIN_FUNCTIONAL_ASSESSMENT: PREVENTS OR INTERFERES SOME ACTIVE ACTIVITIES AND ADLS

## 2022-12-12 ASSESSMENT — PAIN DESCRIPTION - FREQUENCY: FREQUENCY: CONTINUOUS

## 2022-12-12 ASSESSMENT — PAIN DESCRIPTION - DESCRIPTORS: DESCRIPTORS: ACHING

## 2022-12-12 NOTE — Clinical Note
Cesar Valentine was seen and treated in our emergency department on 12/12/2022. He may return to work on 12/14/2022. If you have any questions or concerns, please don't hesitate to call.       Tess Butler, CELIO - CNP

## 2022-12-12 NOTE — ED NOTES
Discharge instructions reviewed with pt. Pt verbalized understanding. Pt ambulated out in stable condition. Assessment unchanged upon discharge.      Sade Garner RN  12/12/22 5925

## 2022-12-12 NOTE — LETTER
[facility]  [address]  Phone: [phone]               December 12, 2022    Patient: Arya De Leon   YOB: 1975   Date of Visit: 12/12/2022       To Whom It May Concern: Feliciano Pierce was seen and treated in our emergency department on 12/12/2022. Acute illness started 12/11/22. He may to work on  12/17/22.       Sincerely,       Signature:_Electronically signed by CELIO Gonzalez CNP on 12/12/2022 at 2:40 PM  _________________________________

## 2022-12-12 NOTE — ED TRIAGE NOTES
Pt to urgent care due to taking an at home COVID test. Pt states he needs to have a test completed at the urgent care and a not for his work.

## 2022-12-12 NOTE — ED PROVIDER NOTES
[address]       Chief Complaint   Patient presents with    Positive For Covid-19     Needs a test for work       Nurses Notes reviewed and I agree except as noted in the HPI. HISTORY OF PRESENT ILLNESS   Franklyn Asif is a 56 y.o. male who presents to the urgent care for evaluation. He states that he has been sick since yesterday and a home COVID test was positive. He needs to have a facility ran test  for work and a work note. He has taken Tylenol. The patient/patient representative has no other acute complaints at this time. REVIEW OF SYSTEMS     Review of Systems   Constitutional:  Positive for fatigue. Negative for chills and fever. HENT:  Positive for congestion. Negative for ear pain, sinus pressure and sore throat. Eyes:  Negative for redness and itching. Respiratory:  Positive for cough. Negative for shortness of breath and wheezing. Cardiovascular:  Negative for chest pain and palpitations. Gastrointestinal:  Negative for abdominal pain, diarrhea, nausea and vomiting. Musculoskeletal:  Positive for myalgias. Skin:  Negative for rash. Allergic/Immunologic: Negative for environmental allergies and food allergies. Neurological:  Negative for headaches.      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 CURRENT MEDICATIONS       Previous Medications    ALBUTEROL SULFATE HFA (PROVENTIL HFA) 108 (90 BASE) MCG/ACT INHALER    Inhale 2 puffs into the lungs every 4 hours as needed for Wheezing or Shortness of Breath (Space out to every 6 hours as symptoms improve) Space out to every 6 hours as symptoms improve. ASCORBIC ACID (VITAMIN C) 500 MG TABLET    Vitamin C With Btia Hips 500 mg tablet   TAKE 1 TABLET BY MOUTH TWICE DAILY FOR 7 DAYS    ATORVASTATIN (LIPITOR) 10 MG TABLET    Take 1 tablet by mouth daily    BUDESONIDE-FORMOTEROL (SYMBICORT) 160-4.5 MCG/ACT AERO    2 puffs    CEPHALEXIN (KEFLEX) 500 MG CAPSULE    TAKE 1 CAPSULE BY MOUTH 4 TIMES DAILY FOR 7 DAYS    DAPAGLIFLOZIN (FARXIGA) 10 MG TABLET    Take 1 tablet by mouth every morning    DOCUSATE SODIUM (COLACE) 100 MG CAPSULE    TAKE ONE Capsule BY MOUTH TWICE DAILY    DULAGLUTIDE (TRULICITY) 1.5 FN/0.6WT SOPN    Inject 1.5 mg into the skin once a week    FAMOTIDINE (PEPCID) 20 MG TABLET    Take 1 tablet by mouth 2 times daily    FLUTICASONE (FLOVENT HFA) 220 MCG/ACT INHALER    Inhale 2 puffs into the lungs 2 times daily    FUROSEMIDE (LASIX) 40 MG TABLET    Take 1 tablet by mouth daily    IPRATROPIUM-ALBUTEROL (DUONEB) 0.5-2.5 (3) MG/3ML SOLN NEBULIZER SOLUTION    Take 3 mLs by nebulization every 6 hours as needed for Shortness of Breath    LAMOTRIGINE (LAMICTAL) 25 MG TABLET    Take 2 tablets by mouth twice daily    LIDOCAINE (LIDODERM) 5 %    Place 1 patch onto the skin daily 12 hours on, 12 hours off.     LISINOPRIL (PRINIVIL;ZESTRIL) 5 MG TABLET    Take 1 tablet by mouth daily    LORATADINE (EQ LORATADINE) 10 MG TABLET    Take 1 tablet by mouth daily    MAGNESIUM OXIDE (MAG-OX) 250 MG TABS TABLET    Take 1.5 tablets by mouth 2 times daily    METHADONE HCL PO    Take 116 mg by mouth daily     METOCLOPRAMIDE (REGLAN) 5 MG TABLET    3 times daily    MULTIPLE VITAMIN (MULTIVITAMIN PO)    Take by mouth daily    NALOXONE 4 MG/0.1ML LIQD NASAL SPRAY    Naloxone Hcl Active 4 MG NA One Time Only 1 1 August 20th, 2020 10:59am    NAPROXEN (NAPROSYN) 500 MG TABLET    Take 1 tablet by mouth 2 times daily (with meals)    NAPROXEN (NAPROSYN) 500 MG TABLET    Take 1 tablet by mouth in the morning and 1 tablet in the evening. Take with meals. ONDANSETRON (ZOFRAN ODT) 4 MG DISINTEGRATING TABLET    Take 1 tablet by mouth every 8 hours as needed for Nausea    ONDANSETRON (ZOFRAN) 4 MG TABLET    TAKE 1 TABLET BY MOUTH EVERY 8 HOURS AS NEEDED FOR NAUSEA AND VOMITING    ONETOUCH ULTRA STRIP    As needed. PANTOPRAZOLE (PROTONIX) 40 MG TABLET    Take 1 tablet by mouth daily    POTASSIUM CHLORIDE (KLOR-CON M) 20 MEQ EXTENDED RELEASE TABLET    Take 1 tablet by mouth daily as needed (with lasix)    PREGABALIN (LYRICA) 75 MG CAPSULE    Take 1 capsule by mouth 2 times daily for 30 days. SULFAMETHOXAZOLE-TRIMETHOPRIM (BACTRIM DS;SEPTRA DS) 800-160 MG PER TABLET    TAKE 1 TABLET BY MOUTH EVERY 12 HOURS    TENOFOVIR DISOPROXIL FUMARATE (VIREAD) 300 MG TABLET    Take 1 tablet by mouth in the morning. TIZANIDINE (ZANAFLEX) 4 MG TABLET    TAKE 1 TABLET BY MOUTH THREE TIMES DAILY AS NEEDED FOR MUSCLE SPASM AND FOR PAIN       ALLERGIES     Patient is is allergic to adhesive tape, bactrim [sulfamethoxazole-trimethoprim], ciprofloxacin, clonazepam, cyclobenzaprine, morphine, other, and quetiapine. FAMILY HISTORY     Patient'sfamily history includes Depression in his maternal grandmother, mother, and sister; Heart Disease in his father and mother; Other in his mother; Rheum Arthritis in his mother; Substance Abuse in his brother, father, maternal aunt, maternal grandmother, maternal uncle, mother, paternal aunt, and paternal uncle. SOCIAL HISTORY     Patient  reports that he has been smoking cigarettes.  He has a 30.00 pack-year smoking history. He has never used smokeless tobacco. He reports that he does not currently use drugs. He reports that he does not drink alcohol. PHYSICAL EXAM     ED TRIAGE VITALS  BP: 125/79, Temp: 97.6 °F (36.4 °C), Heart Rate: 81, Resp: 16, SpO2: 98 %  Physical Exam  Vitals and nursing note reviewed. Constitutional:       General: He is not in acute distress. Appearance: Normal appearance. He is well-developed and well-groomed. HENT:      Head: Normocephalic and atraumatic. Right Ear: External ear normal.      Left Ear: External ear normal.      Nose: Nose normal.      Mouth/Throat:      Lips: Pink. Mouth: Mucous membranes are moist.   Eyes:      Conjunctiva/sclera:      Right eye: Right conjunctiva is not injected. Left eye: Left conjunctiva is not injected. Pupils: Pupils are equal.   Cardiovascular:      Rate and Rhythm: Normal rate. Heart sounds: Normal heart sounds. Pulmonary:      Effort: Pulmonary effort is normal. No respiratory distress. Breath sounds: Normal breath sounds and air entry. Musculoskeletal:      Cervical back: Normal range of motion. Lymphadenopathy:      Cervical: No cervical adenopathy. Skin:     General: Skin is warm and dry. Findings: No rash (on exposed surfaces). Neurological:      Mental Status: He is alert and oriented to person, place, and time. Gait: Gait is intact. Psychiatric:         Mood and Affect: Mood normal.         Speech: Speech normal.         Behavior: Behavior is cooperative. DIAGNOSTIC RESULTS   Labs:  Abnormal Labs Reviewed - No abnormal labs to display     IMAGING:  No orders to display     URGENT CARE COURSE:     Vitals:    12/12/22 1430   BP: 125/79   Pulse: 81   Resp: 16   Temp: 97.6 °F (36.4 °C)   TempSrc: Temporal   SpO2: 98%   Weight: 230 lb (104.3 kg)   Height: 6' 1\" (1.854 m)       Medications - No data to display  PROCEDURES:  FINALIMPRESSION      1.  Viral URI with cough        DISPOSITION/PLAN   DISPOSITION Decision To Discharge 12/12/2022 02:44:31 PM      Problem List Items Addressed This Visit    None  Visit Diagnoses       Viral URI with cough    -  Primary            Physical assessment findings, diagnostic testing(s) if applicable, and vital signs reviewed with patient/patient representative. Differential diagnosis(s) discussed with patient/patient representative. Prescription medications and/or over-the-counter medications for symptom management discussed. Patient is to follow-up with family care provider in 2-3 days if no improvement. If symptoms should worsen or change, go to the ED. Patient/patient representative is aware of care plan, questions answered, verbalizes understanding and is in agreement. Printed instructions attached to after visit summary. PATIENT REFERRED TO:  Angella Cisneros MD  [place] [address]  [phone]    Schedule an appointment as soon as possible for a visit in 3 days  For further evaluation. , If symptoms change/worsen, go to the [address] [place] [address], APRN - CNP    Please note that some or all of this chart was generated using [employer] voice recognition software. Although every effort was made to ensure the accuracy of this automated transcription, some errors in transcription may have occurred.         CELIO Castro CNP  12/12/22 1211

## 2022-12-14 ENCOUNTER — OFFICE VISIT (OUTPATIENT)
Dept: FAMILY MEDICINE CLINIC | Age: 47
End: 2022-12-14
Payer: COMMERCIAL

## 2022-12-14 VITALS
DIASTOLIC BLOOD PRESSURE: 64 MMHG | SYSTOLIC BLOOD PRESSURE: 112 MMHG | WEIGHT: 223.4 LBS | BODY MASS INDEX: 29.61 KG/M2 | HEIGHT: 73 IN | OXYGEN SATURATION: 99 % | TEMPERATURE: 97.1 F | HEART RATE: 62 BPM

## 2022-12-14 DIAGNOSIS — R50.9 FEVER, UNSPECIFIED FEVER CAUSE: ICD-10-CM

## 2022-12-14 DIAGNOSIS — R05.1 ACUTE COUGH: Primary | ICD-10-CM

## 2022-12-14 PROCEDURE — G8417 CALC BMI ABV UP PARAM F/U: HCPCS

## 2022-12-14 PROCEDURE — 3078F DIAST BP <80 MM HG: CPT

## 2022-12-14 PROCEDURE — G8484 FLU IMMUNIZE NO ADMIN: HCPCS

## 2022-12-14 PROCEDURE — 99213 OFFICE O/P EST LOW 20 MIN: CPT

## 2022-12-14 PROCEDURE — 3074F SYST BP LT 130 MM HG: CPT

## 2022-12-14 PROCEDURE — 4004F PT TOBACCO SCREEN RCVD TLK: CPT

## 2022-12-14 PROCEDURE — G8427 DOCREV CUR MEDS BY ELIG CLIN: HCPCS

## 2022-12-14 RX ORDER — BENZONATATE 200 MG/1
200 CAPSULE ORAL 3 TIMES DAILY PRN
Qty: 30 CAPSULE | Refills: 0 | Status: SHIPPED | OUTPATIENT
Start: 2022-12-14 | End: 2022-12-21

## 2022-12-14 ASSESSMENT — ENCOUNTER SYMPTOMS
CONSTIPATION: 0
VOMITING: 0
SHORTNESS OF BREATH: 0
NAUSEA: 0
DIARRHEA: 0
COUGH: 1
ABDOMINAL PAIN: 0
STRIDOR: 0
WHEEZING: 0

## 2022-12-14 NOTE — PROGRESS NOTES
S: 56 y.o. male with   Chief Complaint   Patient presents with    Fever    Cough    Diarrhea           Diabetes    Flu Vaccine    Urinary Tract Infection     Mentioned he might have a uti        55 yo male here with fever, cough, vomiting. Started Saturday, tested neg for covid    Reviewed hx and ROS in detail with resident prior to exam.  See notes for additional details. BP Readings from Last 3 Encounters:   12/14/22 112/64   12/12/22 125/79   11/30/22 (!) 85/51     Wt Readings from Last 3 Encounters:   12/14/22 223 lb 6.4 oz (101.3 kg)   12/12/22 230 lb (104.3 kg)   11/30/22 230 lb (104.3 kg)           O: VS:  height is 6' 1\" (1.854 m) and weight is 223 lb 6.4 oz (101.3 kg). His temporal temperature is 97.1 °F (36.2 °C). His blood pressure is 112/64 and his pulse is 62. His oxygen saturation is 99%. AAO/NAD, appropriate affect for mood     Diagnosis Orders   1. Acute cough  POCT Influenza A/B DNA (Ale[facility] i)          Plan    Cough/fever- covid negative, unable to test for influenza in office today d/t equipment malfunction (though is outside tx window for tamiflu use). Recc CXR to exclude bacterial pneumonia given hx uncontrolled DM2. Health Maintenance Due   Topic Date Due    Hepatitis A vaccine (2 of 3 - Hep A Twinrix risk 3-dose series) 07/10/2013    Hepatitis B vaccine (2 of 3 - Hep B Twinrix risk 3-dose series) 07/10/2013    Diabetic retinal exam  06/04/2019    Colorectal Cancer Screen  Never done    COVID-19 Vaccine (3 - Booster for Pfizer series) 08/26/2021    Diabetic foot exam  01/21/2022    Diabetic microalbuminuria test  07/01/2022    Flu vaccine (1) 08/01/2022         Attending Physician Statement  I have discussed the case, including pertinent history and exam findings with the resident. I agree with the documented assessment and plan as documented by the resident.   GE modifier added to this encounter      Lester Serrano MD 12/14/2022 3:32 PM

## 2022-12-14 NOTE — LETTER
[address] [address],[address] [facility] [address] [place], [place] [address]  Phone: [phone]  Fax: [phone]    Josiah Leroy MD        December 14, 2022     Patient: Elsa Ibrahim   YOB: 1975   Date of Visit: 12/14/2022       To Whom It May Concern: It is my medical opinion that Cesar Valentine may return to work on Dec/20/2022. If you have any questions or concerns, please don't hesitate to call.     Sincerely,        Josiah Leroy MD

## 2022-12-14 NOTE — PROGRESS NOTES
Health Maintenance Due   Topic Date Due    Hepatitis A vaccine (2 of 3 - Hep A Twinrix risk 3-dose series) 07/10/2013    Hepatitis B vaccine (2 of 3 - Hep B Twinrix risk 3-dose series) 07/10/2013    Diabetic retinal exam  06/04/2019    Colorectal Cancer Screen  Never done    COVID-19 Vaccine (3 - Booster for Pfizer series) 08/26/2021    Diabetic foot exam  01/21/2022    Diabetic microalbuminuria test  07/01/2022    Flu vaccine (1) 08/01/2022

## 2022-12-15 NOTE — PROGRESS NOTES
Joanna Smithch (:  1975) is a 56 y.o. male,Established patient, here for evaluation of the following chief complaint(s):  Fever, Cough, Diarrhea (/), Diabetes, Flu Vaccine, and Urinary Tract Infection (Mentioned he might have a uti )         ASSESSMENT/PLAN:  1. Acute cough  -     XR CHEST STANDARD (2 VW); Future  2. Fever, unspecified fever cause    5 dy hx of URI symptoms, with nonproductive cough however noted fever. CXR ordered to r/o PNA. If PNA consider Abx given hx fevers. .  Tessalon pearls ordered. Encouraged supportive care, Red flag signs reviewd with PT and instructed to seek immediate medical attention . Work note given  Needs to address Diabetes and care gaps at next visit. Return in 5 days (on 2022) for f/u Cough, Diabetes otherwise if Cough improved. .         Subjective   SUBJECTIVE/OBJECTIVE:  HPI    Pt is a 56 y.o male w/ PMH CHF, HTN,  DM2, COPD, Mild intermittent Asthma, smoking hx, Cirrhosis, Hep C, Hep B, GERD, DEEPAK, BPD, obesity presenting for acute visit for urgent care f/u for acute cough. Pt states 5 days of acute onset nont productive cough w/ uri symptoms and fevers t max 101 however currently improved this morning at 100.7, afebrile in office. Pt has not had flu shot this year, negative covid-19 at urgent care on dec/2022. Pt is currently unable to smoke at this time. Pt has tried OTC medications to no relief. Some decreased PO intake however is tolerating okay. Pt would like diabetes management however at this time given acute illness defer to next visit after recovery from illness. Review of Systems   Constitutional:  Positive for appetite change, fatigue and fever. Negative for activity change, chills and diaphoresis. HENT:  Positive for congestion and postnasal drip. Respiratory:  Positive for cough. Negative for shortness of breath, wheezing and stridor. Cardiovascular:  Negative for chest pain, palpitations and leg swelling.    Gastrointestinal: Negative for abdominal pain, constipation, diarrhea, nausea and vomiting. Genitourinary:  Negative for decreased urine volume, dysuria, flank pain, frequency, hematuria and urgency. Musculoskeletal:  Negative for gait problem and myalgias. Skin:  Negative for pallor, rash and wound. Neurological:  Negative for dizziness, tremors, seizures, syncope, weakness, light-headedness, numbness and headaches. Psychiatric/Behavioral:  Negative for behavioral problems, confusion and decreased concentration. Objective   Vitals:    12/14/22 1425   BP: 112/64   Site: Left Upper Arm   Position: Sitting   Cuff Size: Medium Adult   Pulse: 62   Temp: 97.1 °F (36.2 °C)   TempSrc: Temporal   SpO2: 99%   Weight: 223 lb 6.4 oz (101.3 kg)   Height: 6' 1\" (1.854 m)       Physical Exam  Constitutional:       General: He is not in acute distress. Appearance: Normal appearance. He is obese. He is not ill-appearing or diaphoretic. HENT:      Head: Normocephalic and atraumatic. Nose: Nose normal. No congestion or rhinorrhea. Mouth/Throat:      Mouth: Mucous membranes are moist.      Pharynx: Oropharynx is clear. No oropharyngeal exudate or posterior oropharyngeal erythema. Eyes:      General: No scleral icterus. Right eye: No discharge. Left eye: No discharge. Extraocular Movements: Extraocular movements intact. Conjunctiva/sclera: Conjunctivae normal.      Pupils: Pupils are equal, round, and reactive to light. Cardiovascular:      Rate and Rhythm: Normal rate. Pulses: Normal pulses. Heart sounds: Normal heart sounds. No murmur heard. No friction rub. No gallop. Pulmonary:      Effort: Pulmonary effort is normal. No respiratory distress. Breath sounds: Normal breath sounds. No stridor. No wheezing, rhonchi or rales. Chest:      Chest wall: No tenderness. Abdominal:      General: Abdomen is flat. Bowel sounds are normal. There is no distension.

## 2022-12-19 ENCOUNTER — HOSPITAL ENCOUNTER (OUTPATIENT)
Dept: GENERAL RADIOLOGY | Age: 47
Discharge: HOME | End: 2022-12-19
Payer: COMMERCIAL

## 2022-12-19 ENCOUNTER — TELEPHONE (OUTPATIENT)
Dept: FAMILY MEDICINE CLINIC | Age: 47
End: 2022-12-19

## 2022-12-19 ENCOUNTER — OFFICE VISIT (OUTPATIENT)
Dept: FAMILY MEDICINE CLINIC | Age: 47
End: 2022-12-19
Payer: COMMERCIAL

## 2022-12-19 ENCOUNTER — HOSPITAL ENCOUNTER (OUTPATIENT)
Age: 47
Discharge: HOME | End: 2022-12-19
Payer: COMMERCIAL

## 2022-12-19 VITALS
BODY MASS INDEX: 30.27 KG/M2 | TEMPERATURE: 97 F | WEIGHT: 228.4 LBS | HEART RATE: 63 BPM | SYSTOLIC BLOOD PRESSURE: 118 MMHG | RESPIRATION RATE: 16 BRPM | DIASTOLIC BLOOD PRESSURE: 72 MMHG | OXYGEN SATURATION: 97 % | HEIGHT: 73 IN

## 2022-12-19 DIAGNOSIS — G63 POLYNEUROPATHY ASSOCIATED WITH UNDERLYING DISEASE (HCC): ICD-10-CM

## 2022-12-19 DIAGNOSIS — R73.01 IFG (IMPAIRED FASTING GLUCOSE): ICD-10-CM

## 2022-12-19 DIAGNOSIS — R05.1 ACUTE COUGH: ICD-10-CM

## 2022-12-19 DIAGNOSIS — F11.20 LONG-TERM CURRENT USE OF METHADONE FOR OPIATE DEPENDENCE (HCC): ICD-10-CM

## 2022-12-19 DIAGNOSIS — Z12.11 ENCOUNTER FOR SCREENING COLONOSCOPY: ICD-10-CM

## 2022-12-19 DIAGNOSIS — Z13.220 LIPID SCREENING: ICD-10-CM

## 2022-12-19 DIAGNOSIS — E11.42 TYPE 2 DIABETES MELLITUS WITH DIABETIC POLYNEUROPATHY, WITH LONG-TERM CURRENT USE OF INSULIN (HCC): Primary | ICD-10-CM

## 2022-12-19 DIAGNOSIS — Z79.4 TYPE 2 DIABETES MELLITUS WITH DIABETIC POLYNEUROPATHY, WITH LONG-TERM CURRENT USE OF INSULIN (HCC): Primary | ICD-10-CM

## 2022-12-19 PROCEDURE — 2022F DILAT RTA XM EVC RTNOPTHY: CPT | Performed by: STUDENT IN AN ORGANIZED HEALTH CARE EDUCATION/TRAINING PROGRAM

## 2022-12-19 PROCEDURE — 3078F DIAST BP <80 MM HG: CPT | Performed by: STUDENT IN AN ORGANIZED HEALTH CARE EDUCATION/TRAINING PROGRAM

## 2022-12-19 PROCEDURE — G8417 CALC BMI ABV UP PARAM F/U: HCPCS | Performed by: STUDENT IN AN ORGANIZED HEALTH CARE EDUCATION/TRAINING PROGRAM

## 2022-12-19 PROCEDURE — 3052F HG A1C>EQUAL 8.0%<EQUAL 9.0%: CPT | Performed by: STUDENT IN AN ORGANIZED HEALTH CARE EDUCATION/TRAINING PROGRAM

## 2022-12-19 PROCEDURE — G8427 DOCREV CUR MEDS BY ELIG CLIN: HCPCS | Performed by: STUDENT IN AN ORGANIZED HEALTH CARE EDUCATION/TRAINING PROGRAM

## 2022-12-19 PROCEDURE — 99214 OFFICE O/P EST MOD 30 MIN: CPT | Performed by: STUDENT IN AN ORGANIZED HEALTH CARE EDUCATION/TRAINING PROGRAM

## 2022-12-19 PROCEDURE — 4004F PT TOBACCO SCREEN RCVD TLK: CPT | Performed by: STUDENT IN AN ORGANIZED HEALTH CARE EDUCATION/TRAINING PROGRAM

## 2022-12-19 PROCEDURE — G8484 FLU IMMUNIZE NO ADMIN: HCPCS | Performed by: STUDENT IN AN ORGANIZED HEALTH CARE EDUCATION/TRAINING PROGRAM

## 2022-12-19 PROCEDURE — 3074F SYST BP LT 130 MM HG: CPT | Performed by: STUDENT IN AN ORGANIZED HEALTH CARE EDUCATION/TRAINING PROGRAM

## 2022-12-19 PROCEDURE — 71046 X-RAY EXAM CHEST 2 VIEWS: CPT

## 2022-12-19 RX ORDER — PREGABALIN 100 MG/1
100 CAPSULE ORAL 2 TIMES DAILY
Qty: 60 CAPSULE | Refills: 0 | Status: SHIPPED | OUTPATIENT
Start: 2022-12-29 | End: 2023-01-28

## 2022-12-19 RX ORDER — IBUPROFEN 800 MG/1
TABLET ORAL
COMMUNITY
Start: 2022-09-28

## 2022-12-19 RX ORDER — BLOOD SUGAR DIAGNOSTIC
STRIP MISCELLANEOUS
Qty: 100 EACH | Refills: 3 | Status: SHIPPED | OUTPATIENT
Start: 2022-12-19

## 2022-12-19 RX ORDER — TENOFOVIR ALAFENAMIDE 25 MG/1
TABLET ORAL DAILY
COMMUNITY
Start: 2022-11-16

## 2022-12-19 RX ORDER — CLINDAMYCIN HYDROCHLORIDE 150 MG/1
CAPSULE ORAL
COMMUNITY
Start: 2022-09-28 | End: 2022-12-19 | Stop reason: ALTCHOICE

## 2022-12-19 NOTE — PROGRESS NOTES
[address] [facility] [place] [address] [address]  Dept: [phone]  Loc: [phone]      Please see Resident note for complete HPI. Here for diabetes followup. T2DM:  Hemoglobin a1c in August was 8.2. Restarted [place]. Taking trulicity as well. Decreased sensation in his feet, chronic. He has completed dietary counseling in [place] for his diabetes.     ROS per Resident    Lab Results   Component Value Date    WBC 6.4 09/07/2022    HGB 15.0 09/07/2022    HCT 42.8 09/07/2022    MCV 85.3 09/07/2022     (L) 09/07/2022     Lab Results   Component Value Date     09/07/2022    K 3.6 09/07/2022    CL 99 09/07/2022    CO2 26 09/07/2022    BUN 12 09/07/2022    CREATININE 0.6 09/07/2022    GLUCOSE 259 (H) 09/07/2022    CALCIUM 9.0 09/07/2022    PROT 6.7 09/07/2022    LABALBU 4.0 09/07/2022    BILITOT 0.5 09/07/2022    ALKPHOS 159 (H) 09/07/2022    AST 28 09/07/2022    ALT 22 09/07/2022    LABGLOM >90 09/07/2022    GFRAA >60 02/26/2019    GLOB NOT REPORTED 04/05/2018     Lab Results   Component Value Date    LABA1C 8.2 (H) 09/07/2022     No results found for: EAG  Lab Results   Component Value Date    LABMICR < 1.20 07/01/2021     Lab Results   Component Value Date    TSH 1.78 10/11/2017    FT3 3.37 10/11/2017    T4FREE 1.14 05/07/2013     Lab Results   Component Value Date    CHOL 139 05/17/2022    CHOL 157 07/01/2021    CHOL 90 02/13/2020     Lab Results   Component Value Date    TRIG 149 05/17/2022    TRIG 151 07/01/2021    TRIG 143 02/13/2020     Lab Results   Component Value Date    HDL 28 05/17/2022    HDL 29 07/01/2021    HDL 28 (L) 02/13/2020     Lab Results   Component Value Date    LDLCHOLESTEROL 33 02/13/2020    LDLCHOLESTEROL 79 10/11/2017    LDLCALC 81 05/17/2022    LDLCALC 98 07/01/2021     Lab Results   Component Value Date    VLDL NOT REPORTED 02/13/2020    VLDL NOT REPORTED 10/11/2017     Lab Results Component Value Date    CHOLHDLRATIO 3.2 02/13/2020    CHOLHDLRATIO 7.4 (H) 10/11/2017     No results found for: PSA, PSADIA    Health Maintenance Due   Topic Date Due    Hepatitis A vaccine (2 of 3 - Hep A Twinrix risk 3-dose series) 07/10/2013    Hepatitis B vaccine (2 of 3 - Hep B Twinrix risk 3-dose series) 07/10/2013    Diabetic retinal exam  06/04/2019    Colorectal Cancer Screen  Never done    COVID-19 Vaccine (3 - Booster for Pfizer series) 08/26/2021    Diabetic microalbuminuria test  07/01/2022    Flu vaccine (1) 08/01/2022         Physical Exam per Resident       ICD-10-CM    1. Type 2 diabetes mellitus without complication, with long-term current use of insulin (HCC)  E11.9  DIABETES FOOT EXAM    Z79.4 pregabalin (LYRICA) 100 MG capsule     Hemoglobin A1C     Microalbumin / Creatinine Urine Ratio     CBC with Auto Differential     Comprehensive Metabolic Panel      2. IFG (impaired fasting glucose)  R73.01 ONETOUCH ULTRA strip      3. Polyneuropathy associated with underlying disease ([address]  G63 pregabalin (LYRICA) 100 MG capsule      4. Encounter for screening colonoscopy  Z12.11 [facility] General Surgery Colonoscopy Referral      5. Long-term current use of methadone for opiate dependence ([address]  F11.20       6.  Lipid screening  Z13.220 Lipid Panel              Plan  I participated in the discussion and care of this patient   Continue farxiga and trulicity  Continue lyrica  Labs ordered today  Needs followup with GI for hepatitis c

## 2022-12-19 NOTE — PATIENT INSTRUCTIONS
Thank you   Thank you for trusting us with your healthcare needs. You may receive a survey regarding today's visit. It would help us out if you would take a few moments to provide your feedback. We value your input. Please bring in ALL medications BOTTLES, including inhalers, herbal supplements, over the counter, prescribed & non-prescribed medicine. The office would like actual medication bottles and a list.   Please note our OFFICE POLICIES:   Prior to getting your labs drawn, please check with your insurance company for benefits and eligibility of lab services. Often, insurance companies cover certain tests for preventative visits only. It is patient's responsibility to see what is covered. We are unable to change a diagnosis after the test has been performed. Lab orders will not be re-printed. Please hold onto your original lab orders and take them to your lab to be completed. If you no show your scheduled appointment three times, you will be dismissed from this practice. Reschedules must be completed 24 hours prior to your schedule appointment. If the list below has been completed, PLEASE FAX RECORDS TO OUR OFFICE @ [phone].  Once the records have been received we will update your records at our office:  Health Maintenance Due   Topic Date Due    Hepatitis A vaccine (2 of 3 - Hep A Twinrix risk 3-dose series) 07/10/2013    Hepatitis B vaccine (2 of 3 - Hep B Twinrix risk 3-dose series) 07/10/2013    Diabetic retinal exam  06/04/2019    Colorectal Cancer Screen  Never done    COVID-19 Vaccine (3 - Booster for Pfizer series) 08/26/2021    Diabetic foot exam  01/21/2022    Diabetic microalbuminuria test  07/01/2022    Flu vaccine (1) 08/01/2022

## 2022-12-19 NOTE — TELEPHONE ENCOUNTER
Patient was here for his appointment, gave us his [place] papers that need to be filled out and faxed when completed. He stated they need to be turned in by January 2nd. Placed in your mailbox!

## 2022-12-19 NOTE — PROGRESS NOTES
[id number] [id number]  Dept: [phone]  Loc: [phone]      Truitt Dakins (:  1975) is a 56 y.o. male,Established patient, here for evaluation of the following chief complaint(s):  Follow-up (Diabetes)      ASSESSMENT/PLAN:  1. Type 2 diabetes mellitus with diabetic polyneuropathy, with long-term current use of insulin ([facility])  - Chronic type 2 diabetes mellitus with bilateral lower extremity polyneuropathy, previously on insulin but currently on Farxiga and Trulicity. Last A1c 8.2 on 2022. - Will obtain A1c and microalbumin, increase water intake  - Diet and exercise counseling provided. - Diabetic foot exam with decreased sensation in feet bilaterally, 3/6  - Does report recurrent UTIs, may need to discontinue [place] and trial different medication if UTIs continue. Patient agreeable with plan  -      DIABETES FOOT EXAM  -     pregabalin (LYRICA) 100 MG capsule; Take 1 capsule by mouth 2 times daily for 30 days. , Disp-60 capsule, R-0Normal  -     Hemoglobin A1C; Future  -     Microalbumin / Creatinine Urine Ratio; Future  -     CBC with Auto Differential; Future  -     Comprehensive Metabolic Panel; Future  2. IFG (impaired fasting glucose)  - Requesting more strips for his blood sugar testing for #1 above. -     ONETOUCH ULTRA strip; As needed. , Disp-100 each, R-3, DAWNormal  3. Polyneuropathy associated with underlying disease ([facility] [address]  - Bilateral lower extremity polyneuropathy of his feet with decreased sensation on exam with 3/6 bilaterally. Does experience some improvement with Lyrica but feels the higher dose may be beneficial.  Will increase Lyrica from 75 twice daily to 100 mg twice daily. -     pregabalin (LYRICA) 100 MG capsule; Take 1 capsule by mouth 2 times daily for 30 days. , Disp-60 capsule, R-0Normal  4.  Encounter for screening colonoscopy  - Due for screening colonoscopy, no family history of colon cancer. Denies hematochezia. Does have some constipation due to methadone use. -     [facility] General Surgery Colonoscopy Referral  5. Long-term current use of methadone for opiate dependence ([address]   -Continue with methadone clinic [facility] in [place]. 6. Lipid screening  -     Lipid Panel; Future    Might have had flu last week, will wait on vaccine at this time. Obtain labs as ordered above: CBC, CMP, lipid, A1c, microalbumin    Follow-up with GI as scheduled for hepatitis and GERD    Return in 1 month (on 1/19/2023) for Diabetes and review labs. SUBJECTIVE/OBJECTIVE:  FREDERIC Antonio is a 56-year-old male, patient of Dr. Jason Robertson, presents for routine follow-up for his diabetes. Averages: 160-170  High: 200  Still struggles with carbs at times, went through diabetic program in [place]. Does not participate in much exercise  On Farxiga and Trulicity. Does report recurrent UTIs, may need to discontinue [place] and trial different medication if UTIs continue. Patient agreeable with plan  Numbness and tingling of toes. Lyrica helps most of the time but not the whole day, requesting higher dose. Denies hematochezia, no family history of colon cancer. Brother had oral cancer. Still smoking 34 years (since age 16), 1 PPD. Doing well on methadone. [facility] in [place]. If on follow-up patient is still having issues with recurrent UTIs we will plan to discontinue [place] and trial a different medication. Patient is agreeable to plan. Review of Systems  Constitutional: Negative for chills, diaphoresis and fever. HENT: Negative for congestion and sore throat. Eyes: Negative for redness and visual disturbance. Respiratory: Negative for cough, chest tightness and shortness of breath. Cardiovascular: Negative for palpitations and leg swelling. Gastrointestinal: Negative for abdominal distention, abdominal pain and nausea.    Genitourinary: Negative for difficulty urinating and dysuria. Musculoskeletal: Negative for back pain and neck pain. Skin: Negative for color change and pallor. Neurological: Negative for dizziness and light-headedness. Psychiatric/Behavioral: Negative for agitation and confusion. The patient is not nervous/anxious    Physical Exam  General appearance: No apparent distress, appears stated age and cooperative. HEENT: Pupils equal, round, and reactive to light. Conjunctivae/corneas clear. Neck: Supple, with full range of motion. No jugular venous distention. Trachea midline. Respiratory: Diffuse diminished breath sounds bilaterally. Cardiovascular: Regular rate and rhythm with normal S1/S2 without murmurs, rubs or gallops. Abdomen: Soft, non-tender, non-distended with normal bowel sounds. Musculoskeletal: Trace bilateral extremity edema. Passive and active ROM x 4 extremities. Skin: Skin color, texture, turgor normal.  No rashes or lesions. Psychiatric: Alert and oriented, thought content appropriate, normal insight    Diabetic foot exam:   Left Foot:   Visual Exam: callous- on heal   Pulse DP: 2+ (normal)   Filament test: 3/6     Right Foot:   Visual Exam: callous- on heal   Pulse DP: 2+ (normal)   Filament test: 3/6     Vitals:    12/19/22 0904   BP: 118/72   Site: Left Upper Arm   Position: Sitting   Cuff Size: Large Adult   Pulse: 63   Resp: 16   Temp: 97 °F (36.1 °C)   TempSrc: Temporal   SpO2: 97%   Weight: 228 lb 6.4 oz (103.6 kg)   Height: 6' 1\" (1.854 m)         An electronic signature was used to authenticate this note.     --Sabiha Felix MD

## 2022-12-19 NOTE — PROGRESS NOTES
S: 56 y.o. male with   Chief Complaint   Patient presents with    Follow-up     Diabetes       HPI: please see resident note for HPI and ROS. BP Readings from Last 3 Encounters:   12/19/22 118/72   12/14/22 112/64   12/12/22 125/79     Wt Readings from Last 3 Encounters:   12/19/22 228 lb 6.4 oz (103.6 kg)   12/14/22 223 lb 6.4 oz (101.3 kg)   12/12/22 230 lb (104.3 kg)       O: VS:  height is 6' 1\" (1.854 m) and weight is 228 lb 6.4 oz (103.6 kg). His temporal temperature is 97 °F (36.1 °C). His blood pressure is 118/72 and his pulse is 63. His respiration is 16 and oxygen saturation is 97%. AAO/NAD, appropriate affect for mood  CV:  RRR, no murmur  Resp: CTAB       Diagnosis Orders   1. Type 2 diabetes mellitus with diabetic polyneuropathy, with long-term current use of insulin ([facility])  HM DIABETES FOOT EXAM    pregabalin (LYRICA) 100 MG capsule    Hemoglobin A1C    Microalbumin / Creatinine Urine Ratio    CBC with Auto Differential    Comprehensive Metabolic Panel      2. IFG (impaired fasting glucose)  ONETOUCH ULTRA strip      3. Polyneuropathy associated with underlying disease ([address]  pregabalin (LYRICA) 100 MG capsule      4. Encounter for screening colonoscopy  [facility] Surgery Colonoscopy Referral      5. Long-term current use of methadone for opiate dependence ([facility] [address]        6. Lipid screening  Lipid Panel          Plan:  Please refer to resident note for full plan. 60-year-old male presents the office to follow-up on diabetes: Chronic, uncontrolled. Recent A1c in September 2022 was 8.2%. Currently on Farxiga and Trulicity. Patient is still having diabetic neuropathy with bilateral numbness and tingling currently on Lyrica 75 mg twice daily. We will plan to increase to 100 mg twice daily. Patient also need to obtain blood work and follow-up in 1 month for review. Patient does seem to have intermittent episodes of urinary tract infection that may be related to Mickeal Bares.   Patient was educated on appropriate cleaning post urination to prevent infection. If on follow-up patient is still having issues with recurrent UTIs we will plan to discontinue Bernett Pickles and trial a different medication. Patient is agreeable to plan. Pdmp reviewed. Health Maintenance Due   Topic Date Due    Hepatitis A vaccine (2 of 3 - Hep A Twinrix risk 3-dose series) 07/10/2013    Hepatitis B vaccine (2 of 3 - Hep B Twinrix risk 3-dose series) 07/10/2013    Diabetic retinal exam  06/04/2019    Colorectal Cancer Screen  Never done    COVID-19 Vaccine (3 - Booster for Pfizer series) 08/26/2021    Diabetic microalbuminuria test  07/01/2022    Flu vaccine (1) 08/01/2022       Attending Physician Statement  I have discussed the case, including pertinent history and exam findings with the resident. I also have seen the patient and performed key portions of the examination. I agree with the documented assessment and plan as documented by the resident.   DARREL Mann DO 12/19/2022 10:14 AM

## 2022-12-20 DIAGNOSIS — E11.65 TYPE 2 DIABETES MELLITUS WITH HYPERGLYCEMIA, WITH LONG-TERM CURRENT USE OF INSULIN (HCC): ICD-10-CM

## 2022-12-20 DIAGNOSIS — Z79.4 TYPE 2 DIABETES MELLITUS WITH HYPERGLYCEMIA, WITH LONG-TERM CURRENT USE OF INSULIN (HCC): ICD-10-CM

## 2022-12-20 DIAGNOSIS — F31.32 BIPOLAR AFFECTIVE DISORDER, CURRENTLY DEPRESSED, MODERATE (HCC): ICD-10-CM

## 2022-12-20 RX ORDER — DULAGLUTIDE 1.5 MG/.5ML
INJECTION, SOLUTION SUBCUTANEOUS
Qty: 4 ML | Refills: 0 | Status: SHIPPED | OUTPATIENT
Start: 2022-12-20 | End: 2023-01-11

## 2022-12-20 RX ORDER — LAMOTRIGINE 25 MG/1
TABLET ORAL
Qty: 120 TABLET | Refills: 0 | Status: SHIPPED | OUTPATIENT
Start: 2022-12-20 | End: 2023-01-11

## 2022-12-20 NOTE — TELEPHONE ENCOUNTER
Patient's last appointment was : 12/19/2022  Patient's next appointment is : 01/20/2023  Future Appointments   Date Time Provider Didier Burton   1/20/2023 10:00 AM Titus Barth MD [facility]     Last refilled:    Lab Results   Component Value Date    LABA1C 8.2 (H) 09/07/2022     Lab Results   Component Value Date    CHOL 139 05/17/2022    TRIG 149 05/17/2022    HDL 28 05/17/2022    LDLCALC 81 05/17/2022     Lab Results   Component Value Date     09/07/2022    K 3.6 09/07/2022    CL 99 09/07/2022    CO2 26 09/07/2022    BUN 12 09/07/2022    CREATININE 0.6 09/07/2022    GLUCOSE 259 (H) 09/07/2022    CALCIUM 9.0 09/07/2022    PROT 6.7 09/07/2022    LABALBU 4.0 09/07/2022    BILITOT 0.5 09/07/2022    ALKPHOS 159 (H) 09/07/2022    AST 28 09/07/2022    ALT 22 09/07/2022    LABGLOM >90 09/07/2022    GFRAA >60 02/26/2019    GLOB NOT REPORTED 04/05/2018     Lab Results   Component Value Date    TSH 1.78 10/11/2017    FT3 3.37 10/11/2017    T4FREE 1.14 05/07/2013     Lab Results   Component Value Date    WBC 6.4 09/07/2022    HGB 15.0 09/07/2022    HCT 42.8 09/07/2022    MCV 85.3 09/07/2022     (L) 09/07/2022

## 2022-12-20 NOTE — TELEPHONE ENCOUNTER
Will approve one time. Pt is actually a pt of Dr. Ricarda Rowland. This provider was erroneously listed as PCP on Dec/19/2022. Updated PCP listing to correct provider.     Electronically signed by Austyn Stephens MD on 12/20/2022 at 4:18 PM

## 2022-12-21 NOTE — TELEPHONE ENCOUNTER
Patient called to check the status of this. He states this needs sent ASAP because he will not get paid for 3 weeks if he doesn't get it sent soon. Thank you!

## 2023-01-09 DIAGNOSIS — M25.531 ACUTE PAIN OF RIGHT WRIST: ICD-10-CM

## 2023-01-09 DIAGNOSIS — Z79.4 TYPE 2 DIABETES MELLITUS WITH DIABETIC POLYNEUROPATHY, WITH LONG-TERM CURRENT USE OF INSULIN (HCC): ICD-10-CM

## 2023-01-09 DIAGNOSIS — G63 POLYNEUROPATHY ASSOCIATED WITH UNDERLYING DISEASE (HCC): ICD-10-CM

## 2023-01-09 DIAGNOSIS — F31.32 BIPOLAR AFFECTIVE DISORDER, CURRENTLY DEPRESSED, MODERATE (HCC): ICD-10-CM

## 2023-01-09 DIAGNOSIS — E11.42 TYPE 2 DIABETES MELLITUS WITH DIABETIC POLYNEUROPATHY, WITH LONG-TERM CURRENT USE OF INSULIN (HCC): ICD-10-CM

## 2023-01-09 DIAGNOSIS — E11.65 TYPE 2 DIABETES MELLITUS WITH HYPERGLYCEMIA, WITH LONG-TERM CURRENT USE OF INSULIN (HCC): ICD-10-CM

## 2023-01-09 DIAGNOSIS — Z79.4 TYPE 2 DIABETES MELLITUS WITH HYPERGLYCEMIA, WITH LONG-TERM CURRENT USE OF INSULIN (HCC): ICD-10-CM

## 2023-01-09 DIAGNOSIS — R73.01 IFG (IMPAIRED FASTING GLUCOSE): ICD-10-CM

## 2023-01-10 RX ORDER — TIZANIDINE 4 MG/1
TABLET ORAL
Qty: 60 TABLET | Refills: 0 | OUTPATIENT
Start: 2023-01-10

## 2023-01-10 RX ORDER — BLOOD SUGAR DIAGNOSTIC
STRIP MISCELLANEOUS
Qty: 100 EACH | Refills: 0 | OUTPATIENT
Start: 2023-01-10

## 2023-01-10 NOTE — TELEPHONE ENCOUNTER
Patient's last appointment was : 12/19/2022  Patient's next appointment is :   Future Appointments   Date Time Provider Didier Burton   1/20/2023 10:00 AM Desiree Coto MD [facility] [place]     Last refilled:12/29/22    Lab Results   Component Value Date    LABA1C 8.2 (H) 09/07/2022     Lab Results   Component Value Date    CHOL 139 05/17/2022    TRIG 149 05/17/2022    HDL 28 05/17/2022    LDLCALC 81 05/17/2022     Lab Results   Component Value Date     09/07/2022    K 3.6 09/07/2022    CL 99 09/07/2022    CO2 26 09/07/2022    BUN 12 09/07/2022    CREATININE 0.6 09/07/2022    GLUCOSE 259 (H) 09/07/2022    CALCIUM 9.0 09/07/2022    PROT 6.7 09/07/2022    LABALBU 4.0 09/07/2022    BILITOT 0.5 09/07/2022    ALKPHOS 159 (H) 09/07/2022    AST 28 09/07/2022    ALT 22 09/07/2022    LABGLOM >90 09/07/2022    GFRAA >60 02/26/2019    GLOB NOT REPORTED 04/05/2018     Lab Results   Component Value Date    TSH 1.78 10/11/2017    FT3 3.37 10/11/2017    T4FREE 1.14 05/07/2013     Lab Results   Component Value Date    WBC 6.4 09/07/2022    HGB 15.0 09/07/2022    HCT 42.8 09/07/2022    MCV 85.3 09/07/2022     (L) 09/07/2022

## 2023-01-10 NOTE — TELEPHONE ENCOUNTER
Patient's last appointment was : 12/19/2022  Patient's next appointment is :   Future Appointments   Date Time Provider Didier Burton   1/20/2023 10:00 AM Mally Fernandez MD [facility]     Last refilled:12/20/22    Lab Results   Component Value Date    LABA1C 8.2 (H) 09/07/2022     Lab Results   Component Value Date    CHOL 139 05/17/2022    TRIG 149 05/17/2022    HDL 28 05/17/2022    LDLCALC 81 05/17/2022     Lab Results   Component Value Date     09/07/2022    K 3.6 09/07/2022    CL 99 09/07/2022    CO2 26 09/07/2022    BUN 12 09/07/2022    CREATININE 0.6 09/07/2022    GLUCOSE 259 (H) 09/07/2022    CALCIUM 9.0 09/07/2022    PROT 6.7 09/07/2022    LABALBU 4.0 09/07/2022    BILITOT 0.5 09/07/2022    ALKPHOS 159 (H) 09/07/2022    AST 28 09/07/2022    ALT 22 09/07/2022    LABGLOM >90 09/07/2022    GFRAA >60 02/26/2019    GLOB NOT REPORTED 04/05/2018     Lab Results   Component Value Date    TSH 1.78 10/11/2017    FT3 3.37 10/11/2017    T4FREE 1.14 05/07/2013     Lab Results   Component Value Date    WBC 6.4 09/07/2022    HGB 15.0 09/07/2022    HCT 42.8 09/07/2022    MCV 85.3 09/07/2022     (L) 09/07/2022

## 2023-01-10 NOTE — TELEPHONE ENCOUNTER
Patient's last appointment was : 12/19/2022  Patient's next appointment is :   Future Appointments   Date Time Provider Didier Burton   1/20/2023 10:00 AM Dashawn Maher MD [facility]     Last refilled:11/28/22    Lab Results   Component Value Date    LABA1C 8.2 (H) 09/07/2022     Lab Results   Component Value Date    CHOL 139 05/17/2022    TRIG 149 05/17/2022    HDL 28 05/17/2022    LDLCALC 81 05/17/2022     Lab Results   Component Value Date     09/07/2022    K 3.6 09/07/2022    CL 99 09/07/2022    CO2 26 09/07/2022    BUN 12 09/07/2022    CREATININE 0.6 09/07/2022    GLUCOSE 259 (H) 09/07/2022    CALCIUM 9.0 09/07/2022    PROT 6.7 09/07/2022    LABALBU 4.0 09/07/2022    BILITOT 0.5 09/07/2022    ALKPHOS 159 (H) 09/07/2022    AST 28 09/07/2022    ALT 22 09/07/2022    LABGLOM >90 09/07/2022    GFRAA >60 02/26/2019    GLOB NOT REPORTED 04/05/2018     Lab Results   Component Value Date    TSH 1.78 10/11/2017    FT3 3.37 10/11/2017    T4FREE 1.14 05/07/2013     Lab Results   Component Value Date    WBC 6.4 09/07/2022    HGB 15.0 09/07/2022    HCT 42.8 09/07/2022    MCV 85.3 09/07/2022     (L) 09/07/2022

## 2023-01-11 RX ORDER — PREGABALIN 100 MG/1
100 CAPSULE ORAL 2 TIMES DAILY
Qty: 60 CAPSULE | Refills: 1 | OUTPATIENT
Start: 2023-01-11 | End: 2023-03-12

## 2023-01-11 RX ORDER — DULAGLUTIDE 1.5 MG/.5ML
INJECTION, SOLUTION SUBCUTANEOUS
Qty: 4 ML | Refills: 0 | Status: SHIPPED | OUTPATIENT
Start: 2023-01-11

## 2023-01-11 RX ORDER — LAMOTRIGINE 25 MG/1
TABLET ORAL
Qty: 120 TABLET | Refills: 0 | Status: SHIPPED | OUTPATIENT
Start: 2023-01-11

## 2023-01-11 NOTE — TELEPHONE ENCOUNTER
Appears one touch strips were already ordered with 3 refills on Dec/19/2022.   Needs to see provider for zanafle as was for injury in Nov/2022

## 2023-01-19 DIAGNOSIS — Z79.4 TYPE 2 DIABETES MELLITUS WITH DIABETIC POLYNEUROPATHY, WITH LONG-TERM CURRENT USE OF INSULIN (HCC): ICD-10-CM

## 2023-01-19 DIAGNOSIS — E11.42 TYPE 2 DIABETES MELLITUS WITH DIABETIC POLYNEUROPATHY, WITH LONG-TERM CURRENT USE OF INSULIN (HCC): ICD-10-CM

## 2023-01-19 DIAGNOSIS — G63 POLYNEUROPATHY ASSOCIATED WITH UNDERLYING DISEASE (HCC): ICD-10-CM

## 2023-01-19 DIAGNOSIS — M25.531 ACUTE PAIN OF RIGHT WRIST: ICD-10-CM

## 2023-01-19 DIAGNOSIS — R05.1 ACUTE COUGH: ICD-10-CM

## 2023-01-20 RX ORDER — BENZONATATE 200 MG/1
CAPSULE ORAL
Qty: 30 CAPSULE | Refills: 0 | OUTPATIENT
Start: 2023-01-20

## 2023-01-20 NOTE — TELEPHONE ENCOUNTER
Patient's last appointment was : 12/19/2022  Patient's next appointment is :  3/20/23  Last refilled: 11/28/22    Lab Results   Component Value Date    LABA1C 8.2 (H) 09/07/2022     Lab Results   Component Value Date    CHOL 139 05/17/2022    TRIG 149 05/17/2022    HDL 28 05/17/2022    LDLCALC 81 05/17/2022     Lab Results   Component Value Date     09/07/2022    K 3.6 09/07/2022    CL 99 09/07/2022    CO2 26 09/07/2022    BUN 12 09/07/2022    CREATININE 0.6 09/07/2022    GLUCOSE 259 (H) 09/07/2022    CALCIUM 9.0 09/07/2022    PROT 6.7 09/07/2022    LABALBU 4.0 09/07/2022    BILITOT 0.5 09/07/2022    ALKPHOS 159 (H) 09/07/2022    AST 28 09/07/2022    ALT 22 09/07/2022    LABGLOM >90 09/07/2022    GFRAA >60 02/26/2019    GLOB NOT REPORTED 04/05/2018     Lab Results   Component Value Date    TSH 1.78 10/11/2017    FT3 3.37 10/11/2017    T4FREE 1.14 05/07/2013     Lab Results   Component Value Date    WBC 6.4 09/07/2022    HGB 15.0 09/07/2022    HCT 42.8 09/07/2022    MCV 85.3 09/07/2022     (L) 09/07/2022

## 2023-01-20 NOTE — TELEPHONE ENCOUNTER
Patient's last appointment was : 12/19/2022  Patient's next appointment is :  3/20/23  Last refilled: 12/14/22    Lab Results   Component Value Date    LABA1C 8.2 (H) 09/07/2022     Lab Results   Component Value Date    CHOL 139 05/17/2022    TRIG 149 05/17/2022    HDL 28 05/17/2022    LDLCALC 81 05/17/2022     Lab Results   Component Value Date     09/07/2022    K 3.6 09/07/2022    CL 99 09/07/2022    CO2 26 09/07/2022    BUN 12 09/07/2022    CREATININE 0.6 09/07/2022    GLUCOSE 259 (H) 09/07/2022    CALCIUM 9.0 09/07/2022    PROT 6.7 09/07/2022    LABALBU 4.0 09/07/2022    BILITOT 0.5 09/07/2022    ALKPHOS 159 (H) 09/07/2022    AST 28 09/07/2022    ALT 22 09/07/2022    LABGLOM >90 09/07/2022    GFRAA >60 02/26/2019    GLOB NOT REPORTED 04/05/2018     Lab Results   Component Value Date    TSH 1.78 10/11/2017    FT3 3.37 10/11/2017    T4FREE 1.14 05/07/2013     Lab Results   Component Value Date    WBC 6.4 09/07/2022    HGB 15.0 09/07/2022    HCT 42.8 09/07/2022    MCV 85.3 09/07/2022     (L) 09/07/2022

## 2023-01-20 NOTE — TELEPHONE ENCOUNTER
Patient's last appointment was : 12/19/2022  Patient's next appointment is :  3/20/23  Last refilled: 12/19/22    Lab Results   Component Value Date    LABA1C 8.2 (H) 09/07/2022     Lab Results   Component Value Date    CHOL 139 05/17/2022    TRIG 149 05/17/2022    HDL 28 05/17/2022    LDLCALC 81 05/17/2022     Lab Results   Component Value Date     09/07/2022    K 3.6 09/07/2022    CL 99 09/07/2022    CO2 26 09/07/2022    BUN 12 09/07/2022    CREATININE 0.6 09/07/2022    GLUCOSE 259 (H) 09/07/2022    CALCIUM 9.0 09/07/2022    PROT 6.7 09/07/2022    LABALBU 4.0 09/07/2022    BILITOT 0.5 09/07/2022    ALKPHOS 159 (H) 09/07/2022    AST 28 09/07/2022    ALT 22 09/07/2022    LABGLOM >90 09/07/2022    GFRAA >60 02/26/2019    GLOB NOT REPORTED 04/05/2018     Lab Results   Component Value Date    TSH 1.78 10/11/2017    FT3 3.37 10/11/2017    T4FREE 1.14 05/07/2013     Lab Results   Component Value Date    WBC 6.4 09/07/2022    HGB 15.0 09/07/2022    HCT 42.8 09/07/2022    MCV 85.3 09/07/2022     (L) 09/07/2022

## 2023-01-21 RX ORDER — TIZANIDINE 4 MG/1
TABLET ORAL
Qty: 60 TABLET | Refills: 0 | OUTPATIENT
Start: 2023-01-21

## 2023-01-21 RX ORDER — ONDANSETRON 4 MG/1
TABLET, FILM COATED ORAL
Qty: 30 TABLET | Refills: 0 | OUTPATIENT
Start: 2023-01-21

## 2023-01-21 RX ORDER — PREGABALIN 100 MG/1
CAPSULE ORAL
Qty: 60 CAPSULE | Refills: 0 | OUTPATIENT
Start: 2023-01-21

## 2023-01-31 ENCOUNTER — TELEPHONE (OUTPATIENT)
Dept: FAMILY MEDICINE CLINIC | Age: 48
End: 2023-01-31

## 2023-01-31 ENCOUNTER — OFFICE VISIT (OUTPATIENT)
Dept: FAMILY MEDICINE CLINIC | Age: 48
End: 2023-01-31
Payer: COMMERCIAL

## 2023-01-31 ENCOUNTER — NURSE ONLY (OUTPATIENT)
Dept: LAB | Age: 48
End: 2023-01-31

## 2023-01-31 VITALS
HEIGHT: 73 IN | DIASTOLIC BLOOD PRESSURE: 62 MMHG | HEART RATE: 66 BPM | SYSTOLIC BLOOD PRESSURE: 90 MMHG | RESPIRATION RATE: 18 BRPM | TEMPERATURE: 97.8 F | WEIGHT: 214.8 LBS | BODY MASS INDEX: 28.47 KG/M2 | OXYGEN SATURATION: 98 %

## 2023-01-31 DIAGNOSIS — R50.9 FEVER, UNKNOWN ORIGIN: ICD-10-CM

## 2023-01-31 DIAGNOSIS — R35.0 URINARY FREQUENCY: Primary | ICD-10-CM

## 2023-01-31 DIAGNOSIS — K31.84 GASTROPARESIS: ICD-10-CM

## 2023-01-31 DIAGNOSIS — G63 POLYNEUROPATHY ASSOCIATED WITH UNDERLYING DISEASE (HCC): ICD-10-CM

## 2023-01-31 DIAGNOSIS — Z79.4 TYPE 2 DIABETES MELLITUS WITH DIABETIC POLYNEUROPATHY, WITH LONG-TERM CURRENT USE OF INSULIN (HCC): ICD-10-CM

## 2023-01-31 DIAGNOSIS — M25.531 ACUTE PAIN OF RIGHT WRIST: ICD-10-CM

## 2023-01-31 DIAGNOSIS — E11.42 TYPE 2 DIABETES MELLITUS WITH DIABETIC POLYNEUROPATHY, WITH LONG-TERM CURRENT USE OF INSULIN (HCC): ICD-10-CM

## 2023-01-31 PROBLEM — I10 BENIGN ESSENTIAL HYPERTENSION: Status: ACTIVE | Noted: 2017-07-05

## 2023-01-31 PROBLEM — F11.11 NONDEPENDENT OPIOID ABUSE IN REMISSION (HCC): Status: ACTIVE | Noted: 2023-01-31

## 2023-01-31 PROBLEM — G60.9 IDIOPATHIC PERIPHERAL NEUROPATHY: Status: ACTIVE | Noted: 2023-01-31

## 2023-01-31 PROBLEM — E66.9 SIMPLE OBESITY: Status: RESOLVED | Noted: 2020-05-11 | Resolved: 2023-01-31

## 2023-01-31 PROBLEM — E11.65 TYPE 2 DIABETES MELLITUS WITH HYPERGLYCEMIA (HCC): Status: ACTIVE | Noted: 2022-10-14

## 2023-01-31 PROBLEM — G93.9 DISORDER OF BRAIN: Status: RESOLVED | Noted: 2023-01-31 | Resolved: 2023-01-31

## 2023-01-31 PROBLEM — D75.839 THROMBOCYTOSIS: Status: ACTIVE | Noted: 2023-01-31

## 2023-01-31 PROBLEM — M79.644 PAIN IN RIGHT FINGER(S): Status: ACTIVE | Noted: 2022-09-20

## 2023-01-31 PROBLEM — B18.2 CHRONIC HEPATITIS C WITHOUT HEPATIC COMA (HCC): Status: ACTIVE | Noted: 2023-01-31

## 2023-01-31 PROBLEM — R73.9 HYPERGLYCEMIA: Status: ACTIVE | Noted: 2017-07-05

## 2023-01-31 PROBLEM — K76.82 HEPATIC ENCEPHALOPATHY: Status: ACTIVE | Noted: 2020-07-02

## 2023-01-31 PROBLEM — E66.9 SIMPLE OBESITY: Status: ACTIVE | Noted: 2020-05-11

## 2023-01-31 PROBLEM — M79.641 PAIN IN RIGHT HAND: Status: ACTIVE | Noted: 2022-06-07

## 2023-01-31 PROBLEM — R53.1 WEAKNESS: Status: ACTIVE | Noted: 2023-01-31

## 2023-01-31 PROBLEM — N39.0 URINARY TRACT INFECTION: Status: ACTIVE | Noted: 2023-01-31

## 2023-01-31 PROBLEM — J43.8 OTHER EMPHYSEMA (HCC): Status: ACTIVE | Noted: 2017-07-05

## 2023-01-31 PROBLEM — S66.909A INJURY OF EXTENSOR TENDON OF HAND: Status: ACTIVE | Noted: 2022-06-05

## 2023-01-31 PROBLEM — M66.241: Status: ACTIVE | Noted: 2022-06-07

## 2023-01-31 PROBLEM — F43.10 POSTTRAUMATIC STRESS DISORDER: Status: ACTIVE | Noted: 2020-05-11

## 2023-01-31 PROBLEM — G93.9 DISORDER OF BRAIN: Status: ACTIVE | Noted: 2023-01-31

## 2023-01-31 PROBLEM — Z96.652 PRESENCE OF LEFT ARTIFICIAL KNEE JOINT: Status: ACTIVE | Noted: 2022-06-17

## 2023-01-31 PROBLEM — I50.9 CONGESTIVE HEART FAILURE (HCC): Status: ACTIVE | Noted: 2020-05-11

## 2023-01-31 PROBLEM — E78.2 MIXED HYPERLIPIDEMIA: Status: ACTIVE | Noted: 2020-05-11

## 2023-01-31 PROBLEM — E86.0 DEHYDRATION: Status: ACTIVE | Noted: 2023-01-31

## 2023-01-31 PROBLEM — K71.7: Status: ACTIVE | Noted: 2020-05-11

## 2023-01-31 PROBLEM — E86.0 DEHYDRATION: Status: RESOLVED | Noted: 2023-01-31 | Resolved: 2023-01-31

## 2023-01-31 LAB
ALBUMIN SERPL BCG-MCNC: 4.2 G/DL (ref 3.5–5.1)
ALP SERPL-CCNC: 101 U/L (ref 38–126)
ALT SERPL W/O P-5'-P-CCNC: 10 U/L (ref 11–66)
ANION GAP SERPL CALC-SCNC: 9 MEQ/L (ref 8–16)
AST SERPL-CCNC: 18 U/L (ref 5–40)
BASOPHILS ABSOLUTE: 0.1 THOU/MM3 (ref 0–0.1)
BASOPHILS NFR BLD AUTO: 0.7 %
BILIRUB SERPL-MCNC: 1 MG/DL (ref 0.3–1.2)
BILIRUBIN URINE: NEGATIVE
BLOOD URINE, POC: ABNORMAL
BUN SERPL-MCNC: 12 MG/DL (ref 7–22)
CALCIUM SERPL-MCNC: 9.5 MG/DL (ref 8.5–10.5)
CHARACTER, URINE: ABNORMAL
CHLORIDE SERPL-SCNC: 95 MEQ/L (ref 98–111)
CO2 SERPL-SCNC: 27 MEQ/L (ref 23–33)
COLOR, URINE: YELLOW
CREAT SERPL-MCNC: 1 MG/DL (ref 0.4–1.2)
DEPRECATED RDW RBC AUTO: 42.2 FL (ref 35–45)
EOSINOPHIL NFR BLD AUTO: 2.1 %
EOSINOPHILS ABSOLUTE: 0.2 THOU/MM3 (ref 0–0.4)
ERYTHROCYTE [DISTWIDTH] IN BLOOD BY AUTOMATED COUNT: 13.8 % (ref 11.5–14.5)
GFR SERPL CREATININE-BSD FRML MDRD: > 60 ML/MIN/1.73M2
GLUCOSE SERPL-MCNC: 96 MG/DL (ref 70–108)
GLUCOSE URINE: NEGATIVE MG/DL
HCT VFR BLD AUTO: 40.8 % (ref 42–52)
HGB BLD-MCNC: 14.3 GM/DL (ref 14–18)
IMM GRANULOCYTES # BLD AUTO: 0.03 THOU/MM3 (ref 0–0.07)
IMM GRANULOCYTES NFR BLD AUTO: 0.4 %
KETONES, URINE: NEGATIVE
LEUKOCYTE CLUMPS, URINE: ABNORMAL
LYMPHOCYTES ABSOLUTE: 1 THOU/MM3 (ref 1–4.8)
LYMPHOCYTES NFR BLD AUTO: 13 %
MCH RBC QN AUTO: 29.5 PG (ref 26–33)
MCHC RBC AUTO-ENTMCNC: 35 GM/DL (ref 32.2–35.5)
MCV RBC AUTO: 84.1 FL (ref 80–94)
MONOCYTES ABSOLUTE: 0.4 THOU/MM3 (ref 0.4–1.3)
MONOCYTES NFR BLD AUTO: 4.9 %
NEUTROPHILS NFR BLD AUTO: 78.9 %
NITRITE, URINE: NEGATIVE
NRBC BLD AUTO-RTO: 0 /100 WBC
PH, URINE: 6 (ref 5–9)
PLATELET # BLD AUTO: 164 THOU/MM3 (ref 130–400)
PMV BLD AUTO: 11.5 FL (ref 9.4–12.4)
POTASSIUM SERPL-SCNC: 3.4 MEQ/L (ref 3.5–5.2)
PROT SERPL-MCNC: 7.4 G/DL (ref 6.1–8)
PROTEIN, URINE: NEGATIVE MG/DL
RBC # BLD AUTO: 4.85 MILL/MM3 (ref 4.7–6.1)
SEGMENTED NEUTROPHILS ABSOLUTE COUNT: 6 THOU/MM3 (ref 1.8–7.7)
SODIUM SERPL-SCNC: 131 MEQ/L (ref 135–145)
SPECIFIC GRAVITY, URINE: 1.02 (ref 1–1.03)
UROBILINOGEN, URINE: 1 EU/DL (ref 0–1)
WBC # BLD AUTO: 7.6 THOU/MM3 (ref 4.8–10.8)

## 2023-01-31 PROCEDURE — 81003 URINALYSIS AUTO W/O SCOPE: CPT | Performed by: STUDENT IN AN ORGANIZED HEALTH CARE EDUCATION/TRAINING PROGRAM

## 2023-01-31 PROCEDURE — 99214 OFFICE O/P EST MOD 30 MIN: CPT | Performed by: STUDENT IN AN ORGANIZED HEALTH CARE EDUCATION/TRAINING PROGRAM

## 2023-01-31 PROCEDURE — 3074F SYST BP LT 130 MM HG: CPT | Performed by: STUDENT IN AN ORGANIZED HEALTH CARE EDUCATION/TRAINING PROGRAM

## 2023-01-31 PROCEDURE — 3078F DIAST BP <80 MM HG: CPT | Performed by: STUDENT IN AN ORGANIZED HEALTH CARE EDUCATION/TRAINING PROGRAM

## 2023-01-31 RX ORDER — ONDANSETRON 4 MG/1
4 TABLET, ORALLY DISINTEGRATING ORAL EVERY 8 HOURS PRN
Qty: 20 TABLET | Refills: 0 | Status: SHIPPED | OUTPATIENT
Start: 2023-01-31

## 2023-01-31 RX ORDER — TIZANIDINE 4 MG/1
TABLET ORAL
Qty: 60 TABLET | Refills: 0 | Status: SHIPPED | OUTPATIENT
Start: 2023-01-31

## 2023-01-31 RX ORDER — PREGABALIN 100 MG/1
100 CAPSULE ORAL 2 TIMES DAILY
Qty: 60 CAPSULE | Refills: 0 | Status: SHIPPED | OUTPATIENT
Start: 2023-01-31 | End: 2023-03-02

## 2023-01-31 ASSESSMENT — PATIENT HEALTH QUESTIONNAIRE - PHQ9
SUM OF ALL RESPONSES TO PHQ QUESTIONS 1-9: 0
10. IF YOU CHECKED OFF ANY PROBLEMS, HOW DIFFICULT HAVE THESE PROBLEMS MADE IT FOR YOU TO DO YOUR WORK, TAKE CARE OF THINGS AT HOME, OR GET ALONG WITH OTHER PEOPLE: 0
1. LITTLE INTEREST OR PLEASURE IN DOING THINGS: 0
SUM OF ALL RESPONSES TO PHQ QUESTIONS 1-9: 0
7. TROUBLE CONCENTRATING ON THINGS, SUCH AS READING THE NEWSPAPER OR WATCHING TELEVISION: 0
9. THOUGHTS THAT YOU WOULD BE BETTER OFF DEAD, OR OF HURTING YOURSELF: 0
SUM OF ALL RESPONSES TO PHQ QUESTIONS 1-9: 0
SUM OF ALL RESPONSES TO PHQ9 QUESTIONS 1 & 2: 0
3. TROUBLE FALLING OR STAYING ASLEEP: 0
5. POOR APPETITE OR OVEREATING: 0
SUM OF ALL RESPONSES TO PHQ QUESTIONS 1-9: 0
2. FEELING DOWN, DEPRESSED OR HOPELESS: 0
8. MOVING OR SPEAKING SO SLOWLY THAT OTHER PEOPLE COULD HAVE NOTICED. OR THE OPPOSITE, BEING SO FIGETY OR RESTLESS THAT YOU HAVE BEEN MOVING AROUND A LOT MORE THAN USUAL: 0
4. FEELING TIRED OR HAVING LITTLE ENERGY: 0
6. FEELING BAD ABOUT YOURSELF - OR THAT YOU ARE A FAILURE OR HAVE LET YOURSELF OR YOUR FAMILY DOWN: 0

## 2023-01-31 NOTE — PROGRESS NOTES
Sheryle Embs (:  1975) is a 56 y.o. male,Established patient, here for evaluation of the following chief complaint(s):  Urinary Tract Infection (Pt presents c/o uti sxs (dysuria and lower flank pain) that started Thursday. He has been drinking cranberry juice which seems to be helping. About 0013-3536 this morning, pt woke up very cold and took his temp which was 103. He took tylenol which brought his temp down. )         ASSESSMENT/PLAN:  1. Urinary frequency  -     POCT Urinalysis No Micro (Auto)  -     Culture, Urine  2. Acute pain of right wrist  -     tiZANidine (ZANAFLEX) 4 MG tablet; TAKE 1 TABLET BY MOUTH THREE TIMES DAILY AS NEEDED FOR MUSCLE SPASM AND FOR PAIN, Disp-60 tablet, R-0Normal  3. Type 2 diabetes mellitus with diabetic polyneuropathy, with long-term current use of insulin ([facility])  -     pregabalin (LYRICA) 100 MG capsule; Take 1 capsule by mouth 2 times daily for 30 days. , Disp-60 capsule, R-0Normal  4. Polyneuropathy associated with underlying disease ([facility] [address]  -     pregabalin (LYRICA) 100 MG capsule; Take 1 capsule by mouth 2 times daily for 30 days. , Disp-60 capsule, R-0Normal  5. Gastroparesis  -     ondansetron (ZOFRAN ODT) 4 MG disintegrating tablet; Take 1 tablet by mouth every 8 hours as needed for Nausea, Disp-20 tablet, R-0Normal  6. Fever, unknown origin  -     CBC with Auto Differential; Future  -     Comprehensive Metabolic Panel; Future  -     Culture, Blood 2; Future  -     Culture, Blood 2; Future    Refills as above. UA not strongly positive for infection, will send culture  Unexplained fever, with risk factors will check labs as above. Will notify patient of results, hold abx until culture results at this time. F/u with Primary as planned. If fevers continue, patient to notify. Return for follow up as planned with Dr. Diana Schulz. Subjective   SUBJECTIVE/OBJECTIVE:  HPI    Since Thursday has had increased urinary frequency and back pain.  Has also been increased fatigue. This morning woke up freezing and wife checked temp and it was 103. States recurrent UTIs     Was started on Trulicity October 0226, states has had about 3 utis since then. Qamar Valenzuela was added in September 2022. Per chart review was Ua in September with no infection but did have glucose. Drinks a couple of carpenter, pops, coffee daily. Review of Systems   Constitutional:  Positive for chills, diaphoresis, fatigue and fever. Negative for activity change and appetite change. HENT:  Negative for congestion, rhinorrhea, sinus pressure, sinus pain and sore throat. Respiratory:  Negative for cough, shortness of breath and wheezing. Gastrointestinal:  Positive for constipation. Negative for abdominal pain, diarrhea, nausea and vomiting. Chronic constipation   Genitourinary:  Positive for dysuria, frequency and urgency. Negative for hematuria. Musculoskeletal:  Positive for back pain and myalgias. Skin:  Negative for rash and wound. Neurological:  Negative for dizziness, light-headedness and headaches. Psychiatric/Behavioral:  Negative for dysphoric mood. The patient is not nervous/anxious. Objective   Vitals:    01/31/23 0951   BP: 90/62   Pulse: 66   Resp: 18   Temp: 97.8 °F (36.6 °C)   SpO2: 98%   Weight: 214 lb 12.8 oz (97.4 kg)   Height: 6' 1\" (1.854 m)       Physical Exam  Constitutional:       General: He is not in acute distress. Appearance: Normal appearance. He is not ill-appearing. HENT:      Head: Normocephalic and atraumatic. Nose: Nose normal.      Mouth/Throat:      Mouth: Mucous membranes are moist.      Pharynx: Oropharynx is clear. Eyes:      Conjunctiva/sclera: Conjunctivae normal.      Pupils: Pupils are equal, round, and reactive to light. Cardiovascular:      Rate and Rhythm: Normal rate and regular rhythm. Pulses: Normal pulses. Heart sounds: No murmur heard.   Pulmonary:      Effort: Pulmonary effort is normal. Breath sounds: Normal breath sounds. No wheezing. Abdominal:      General: Abdomen is flat. Bowel sounds are normal. There is no distension. Palpations: Abdomen is soft. Tenderness: There is no abdominal tenderness. There is no right CVA tenderness or left CVA tenderness. Musculoskeletal:         General: Normal range of motion. Cervical back: Normal range of motion and neck supple. Skin:     General: Skin is warm and dry. Findings: No rash. Neurological:      General: No focal deficit present. Mental Status: He is alert. Mental status is at baseline. Psychiatric:         Mood and Affect: Mood normal.                An electronic signature was used to authenticate this note.     --Kenny Pardo MD

## 2023-01-31 NOTE — TELEPHONE ENCOUNTER
Patient is in office for a follow up appt, and he wanted to let you know that his work did not accept his [place] papers as he stated they said they were not legible so he was wrote up for missing two days. He states it just need to be refilled out and to just state that he needs intermittent absences. Thank you!

## 2023-01-31 NOTE — PROGRESS NOTES
[address] [facility] [address] [address]  Dept: [phone]  Loc: [phone]      Please see Resident note for complete HPI. Here for urinary symptoms.  Reports recurrent symptoms  Had fever to 103 this am. Low BP (does have cirrhosis)  Last treated for UTI 7/29/22 with Fosfomycin    DM: on Farxiga, Trulicity    ROS per Resident    Lab Results   Component Value Date    WBC 6.4 09/07/2022    HGB 15.0 09/07/2022    HCT 42.8 09/07/2022    MCV 85.3 09/07/2022     (L) 09/07/2022     Lab Results   Component Value Date     09/07/2022    K 3.6 09/07/2022    CL 99 09/07/2022    CO2 26 09/07/2022    BUN 12 09/07/2022    CREATININE 0.6 09/07/2022    GLUCOSE 259 (H) 09/07/2022    CALCIUM 9.0 09/07/2022    PROT 6.7 09/07/2022    LABALBU 4.0 09/07/2022    BILITOT 0.5 09/07/2022    ALKPHOS 159 (H) 09/07/2022    AST 28 09/07/2022    ALT 22 09/07/2022    LABGLOM >90 09/07/2022    GFRAA >60 02/26/2019    GLOB NOT REPORTED 04/05/2018     Lab Results   Component Value Date    LABA1C 8.2 (H) 09/07/2022     No results found for: EAG  Lab Results   Component Value Date    LABMICR < 1.20 07/01/2021     Lab Results   Component Value Date    TSH 1.78 10/11/2017    FT3 3.37 10/11/2017    T4FREE 1.14 05/07/2013     Lab Results   Component Value Date    CHOL 139 05/17/2022    CHOL 157 07/01/2021    CHOL 90 02/13/2020     Lab Results   Component Value Date    TRIG 149 05/17/2022    TRIG 151 07/01/2021    TRIG 143 02/13/2020     Lab Results   Component Value Date    HDL 28 05/17/2022    HDL 29 07/01/2021    HDL 28 (L) 02/13/2020     Lab Results   Component Value Date    LDLCHOLESTEROL 33 02/13/2020    LDLCHOLESTEROL 79 10/11/2017    LDLCALC 81 05/17/2022    LDLCALC 98 07/01/2021     Lab Results   Component Value Date    VLDL NOT REPORTED 02/13/2020    VLDL NOT REPORTED 10/11/2017     Lab Results   Component Value Date    CHOLHDLRATIO 3.2 02/13/2020    CHOLHDLRATIO 7.4 (H) 10/11/2017     No results found for: PSA, PSADIA    Health Maintenance Due   Topic Date Due    Hepatitis A vaccine (2 of 3 - Hep A Twinrix risk 3-dose series) 07/10/2013    Hepatitis B vaccine (2 of 3 - Hep B Twinrix risk 3-dose series) 07/10/2013    Diabetic retinal exam  06/04/2019    Colorectal Cancer Screen  Never done    COVID-19 Vaccine (3 - Booster for Pfizer series) 08/26/2021    Diabetic Alb to Cr ratio (uACR) test  07/01/2022    Flu vaccine (1) 08/01/2022    Depression Monitoring  02/01/2023         Physical Exam per Resident       ICD-10-CM    1. Acute pain of right wrist  M25.531       2. Type 2 diabetes mellitus with diabetic polyneuropathy, with long-term current use of insulin (HCC)  E11.42     Z79.4       3. Polyneuropathy associated with underlying disease ([facility] [address]  G63       4.  Gastroparesis  K31.84               Plan  I participated in the discussion and care of this patient     Fever with urinary symptoms - CBC, CMP, blood cultures x 2  Urinary symptoms - obtain UA, urine culture  DM - Obtain POCT DML2M, Continue Trulicity  Polyneuropathy - Continue Lyrica

## 2023-02-01 ENCOUNTER — TELEPHONE (OUTPATIENT)
Dept: FAMILY MEDICINE CLINIC | Age: 48
End: 2023-02-01

## 2023-02-01 LAB
BACTERIA UR CULT: ABNORMAL
ORGANISM: ABNORMAL

## 2023-02-01 ASSESSMENT — ENCOUNTER SYMPTOMS
COUGH: 0
SINUS PAIN: 0
CONSTIPATION: 1
VOMITING: 0
BACK PAIN: 1
DIARRHEA: 0
SORE THROAT: 0
WHEEZING: 0
SHORTNESS OF BREATH: 0
SINUS PRESSURE: 0
RHINORRHEA: 0
ABDOMINAL PAIN: 0
NAUSEA: 0

## 2023-02-01 NOTE — TELEPHONE ENCOUNTER
Patient called today, and emailed me more [place] papers. He wanted me to let you know that for December, it needs to include his diabetes diagnosis. Placed in your mailbox. Thank you!

## 2023-02-02 DIAGNOSIS — N39.0 URINARY TRACT INFECTION WITHOUT HEMATURIA, SITE UNSPECIFIED: Primary | ICD-10-CM

## 2023-02-02 RX ORDER — SULFAMETHOXAZOLE AND TRIMETHOPRIM 800; 160 MG/1; MG/1
1 TABLET ORAL 2 TIMES DAILY
Qty: 20 TABLET | Refills: 0 | Status: SHIPPED | OUTPATIENT
Start: 2023-02-02 | End: 2023-02-12

## 2023-02-02 NOTE — PROGRESS NOTES
Urine culture positive for Serratia, sent in Bactrim and recommended patient take it with food and Zofran to prevent nausea/vomiting. Sent longer course as patient had this same bacteria in July. Additionally stopped Berry Slate due to multiple UTIs, will need repeat hemoglobin A1C at next visit (lab did not complete entire ordered labs when patient recently got them done).      Electronically signed by Mamadou Abraham MD on 2/2/23 at 8:17 AM EST

## 2023-02-03 ENCOUNTER — TELEPHONE (OUTPATIENT)
Dept: FAMILY MEDICINE CLINIC | Age: 48
End: 2023-02-03

## 2023-02-03 NOTE — TELEPHONE ENCOUNTER
----- Message from Yordy De La Cruz MD sent at 2/1/2023  8:13 AM EST -----  Please let patient know that overall labs look good, no real concerns at this time. Most likely fever was from URI. We are still waiting for urine culture results. Thank you!

## 2023-02-03 NOTE — TELEPHONE ENCOUNTER
Theodore Valadez informed and voiced understanding. Also Abdi Reji states that he does need a work note for when he was last here for his visit with you on 01/31/2023 and also states that is is still not feeling he did state that he did just start the antibiotic. Please Advise.

## 2023-02-03 NOTE — TELEPHONE ENCOUNTER
----- Message from Sherri Diallo MD sent at 2/2/2023  8:18 AM EST -----  Can you please let patient know that his urine grew a bacteria and I have sent an antibiotic to his pharmacy. Otherwise I have stopped his farxiga and he will follow up with Dr. Alysia Zuniga as planned in March, sooner if needed. Please let him know to ta  ke the medication with food and zofran to help prevent some nausea and vomiting. Thanks!

## 2023-02-03 NOTE — LETTER
[address] [address],[address] [address]  Phone: [phone]  Fax: [phone]    Andy Nissen, MD        February 3, 2023     Patient: Tamica Stone   YOB: 1975   Date of Visit: 1/31/2023       To Whom It May Concern: It is my medical opinion that Tayla Norris was seen in our office on 1/31/2023, can return to work 2/1/2023. If you have any questions or concerns, please don't hesitate to call.     Sincerely,        Electronically signed by Laura Muñoz MD on 2/3/23 at 1:23 PM EST

## 2023-02-05 LAB
BACTERIA BLD AEROBE CULT: NORMAL
BACTERIA BLD AEROBE CULT: NORMAL

## 2023-02-06 NOTE — TELEPHONE ENCOUNTER
Patient informed and verbalized understanding. Patient stated he is having vomiting and diarrhea, scheduled patient today with Dr. Debra Reyna.

## 2023-02-07 ENCOUNTER — OFFICE VISIT (OUTPATIENT)
Dept: FAMILY MEDICINE CLINIC | Age: 48
End: 2023-02-07
Payer: COMMERCIAL

## 2023-02-07 VITALS
WEIGHT: 218.4 LBS | TEMPERATURE: 98 F | RESPIRATION RATE: 18 BRPM | DIASTOLIC BLOOD PRESSURE: 78 MMHG | HEIGHT: 73 IN | OXYGEN SATURATION: 99 % | BODY MASS INDEX: 28.94 KG/M2 | SYSTOLIC BLOOD PRESSURE: 124 MMHG | HEART RATE: 85 BPM

## 2023-02-07 DIAGNOSIS — R11.2 NAUSEA AND VOMITING, UNSPECIFIED VOMITING TYPE: Primary | ICD-10-CM

## 2023-02-07 LAB
INFLUENZA A ANTIBODY: NEGATIVE
INFLUENZA B ANTIBODY: NEGATIVE
Lab: NORMAL
QC PASS/FAIL: NORMAL
SARS-COV-2 RDRP RESP QL NAA+PROBE: NEGATIVE

## 2023-02-07 PROCEDURE — 3074F SYST BP LT 130 MM HG: CPT | Performed by: STUDENT IN AN ORGANIZED HEALTH CARE EDUCATION/TRAINING PROGRAM

## 2023-02-07 PROCEDURE — 3078F DIAST BP <80 MM HG: CPT | Performed by: STUDENT IN AN ORGANIZED HEALTH CARE EDUCATION/TRAINING PROGRAM

## 2023-02-07 PROCEDURE — 87635 SARS-COV-2 COVID-19 AMP PRB: CPT | Performed by: STUDENT IN AN ORGANIZED HEALTH CARE EDUCATION/TRAINING PROGRAM

## 2023-02-07 PROCEDURE — 87804 INFLUENZA ASSAY W/OPTIC: CPT | Performed by: STUDENT IN AN ORGANIZED HEALTH CARE EDUCATION/TRAINING PROGRAM

## 2023-02-07 PROCEDURE — 99213 OFFICE O/P EST LOW 20 MIN: CPT | Performed by: STUDENT IN AN ORGANIZED HEALTH CARE EDUCATION/TRAINING PROGRAM

## 2023-02-07 SDOH — ECONOMIC STABILITY: FOOD INSECURITY: WITHIN THE PAST 12 MONTHS, YOU WORRIED THAT YOUR FOOD WOULD RUN OUT BEFORE YOU GOT MONEY TO BUY MORE.: NEVER TRUE

## 2023-02-07 SDOH — ECONOMIC STABILITY: FOOD INSECURITY: WITHIN THE PAST 12 MONTHS, THE FOOD YOU BOUGHT JUST DIDN'T LAST AND YOU DIDN'T HAVE MONEY TO GET MORE.: NEVER TRUE

## 2023-02-07 SDOH — ECONOMIC STABILITY: INCOME INSECURITY: HOW HARD IS IT FOR YOU TO PAY FOR THE VERY BASICS LIKE FOOD, HOUSING, MEDICAL CARE, AND HEATING?: NOT HARD AT ALL

## 2023-02-07 SDOH — ECONOMIC STABILITY: HOUSING INSECURITY
IN THE LAST 12 MONTHS, WAS THERE A TIME WHEN YOU DID NOT HAVE A STEADY PLACE TO SLEEP OR SLEPT IN A SHELTER (INCLUDING NOW)?: NO

## 2023-02-07 NOTE — PROGRESS NOTES
[address] [facility]. [address]  [facility] [address]  Dept: [phone]  Loc: [phone]      Isaiah Cueto (:  1975) is a 56 y.o. male,Established patient, here for evaluation of the following chief complaint(s):  Nausea & Vomiting (PT presents c/o NVD that started Friday that has not gotten better. He has been taking zofran with no relief. )      ASSESSMENT/PLAN:  1. Nausea and vomiting, unspecified vomiting type  - Acute onset of nausea vomiting and diarrhea associated with chills and body aches. Symptoms consistent with viral illness. Patient tested negative for COVID and influenza a/B.  - Patient advised to maintain oral hydration with small frequent sips of water as well with small frequent meals, plenty of rest.  Patient has Zofran, advised him to take Zofran as needed for nausea and vomiting. Return precautions discussed with patient, patient voiced understanding.  -     POCT Influenza A/B  -     POCT COVID-19 Rapid, NAAT    Patient to follow-up as scheduled, may return as needed. Return precautions discussed with patient. Return for As needed, pt instructed to call with any concerns. SUBJECTIVE/OBJECTIVE:  HPI  Patient presents to the clinic with symptoms of nausea and vomiting and diarrhea that started Friday. Positive for chills and body aches. Started bactrim for UTI on 2023, patient denies dysuria and flank pain. No sick contacts    Review of Systems  Positive for nausea, vomiting, diarrhea, body chills, body aches    Physical Exam  General appearance: No apparent distress, appears stated age and cooperative. HEENT: Positive for rhinorrhea. Pupils equal, round, and reactive to light. Conjunctivae/corneas clear. Neck: Supple, with full range of motion. No jugular venous distention. Trachea midline. Respiratory:  Normal respiratory effort.  Clear to auscultation, bilaterally without Rales/Wheezes/Rhonchi. Cardiovascular: Regular rate and rhythm with normal S1/S2 without murmurs, rubs or gallops. Abdomen: Soft, non-tender, non-distended with normal bowel sounds. Musculoskeletal: passive and active ROM x 4 extremities. Skin: Skin color, texture, turgor normal.  No rashes or lesions. Neurologic:  Neurovascularly intact without any focal sensory/motor deficits. Cranial nerves: II-XII intact, grossly non-focal.  Psychiatric: Alert and oriented, thought content appropriate, normal insight  Capillary Refill: Brisk,< 3 seconds   Peripheral Pulses: +2 palpable, equal bilaterally       Vitals:    02/07/23 1148   BP: 124/78   Pulse: 85   Resp: 18   Temp: 98 °F (36.7 °C)   SpO2: 99%   Weight: 218 lb 6.4 oz (99.1 kg)   Height: 6' 1\" (1.854 m)         An electronic signature was used to authenticate this note.     --Deng Silva MD

## 2023-02-09 ENCOUNTER — TELEPHONE (OUTPATIENT)
Dept: FAMILY MEDICINE CLINIC | Age: 48
End: 2023-02-09

## 2023-02-09 DIAGNOSIS — R35.0 URINARY FREQUENCY: Primary | ICD-10-CM

## 2023-02-09 NOTE — TELEPHONE ENCOUNTER
Pt called states that he is allergic to BACTRIM and BACTRIM was rx'd to him on 2/2/23. Pt states that he started vomitting on 2/3/23 and pt thinks it is caused from the BACTRIM. Pt states that he has stopped taking the bactrim, his last dose was today. Pt states he has about 2-3 more days left of ATB. Please advise.

## 2023-02-14 DIAGNOSIS — N30.00 ACUTE CYSTITIS WITHOUT HEMATURIA: Primary | ICD-10-CM

## 2023-02-14 RX ORDER — CEFUROXIME AXETIL 500 MG/1
500 TABLET ORAL 2 TIMES DAILY
Qty: 20 TABLET | Refills: 0 | Status: SHIPPED | OUTPATIENT
Start: 2023-02-14 | End: 2023-02-24

## 2023-02-23 DIAGNOSIS — M25.531 WRIST PAIN, CHRONIC, RIGHT: ICD-10-CM

## 2023-02-23 DIAGNOSIS — J30.1 SEASONAL ALLERGIC RHINITIS DUE TO POLLEN: ICD-10-CM

## 2023-02-23 DIAGNOSIS — R73.01 IFG (IMPAIRED FASTING GLUCOSE): ICD-10-CM

## 2023-02-23 DIAGNOSIS — Z79.4 TYPE 2 DIABETES MELLITUS WITH HYPERGLYCEMIA, WITH LONG-TERM CURRENT USE OF INSULIN (HCC): ICD-10-CM

## 2023-02-23 DIAGNOSIS — G89.29 WRIST PAIN, CHRONIC, RIGHT: ICD-10-CM

## 2023-02-23 DIAGNOSIS — B19.10 HEPATITIS B INFECTION WITHOUT DELTA AGENT WITHOUT HEPATIC COMA, UNSPECIFIED CHRONICITY: ICD-10-CM

## 2023-02-23 DIAGNOSIS — I50.32 CHRONIC DIASTOLIC HEART FAILURE (HCC): Primary | ICD-10-CM

## 2023-02-23 DIAGNOSIS — K21.9 GASTROESOPHAGEAL REFLUX DISEASE: ICD-10-CM

## 2023-02-23 DIAGNOSIS — M25.531 ACUTE PAIN OF RIGHT WRIST: ICD-10-CM

## 2023-02-23 DIAGNOSIS — E11.65 TYPE 2 DIABETES MELLITUS WITH HYPERGLYCEMIA, WITH LONG-TERM CURRENT USE OF INSULIN (HCC): ICD-10-CM

## 2023-02-23 RX ORDER — LORATADINE 10 MG/1
10 TABLET ORAL DAILY
Qty: 90 TABLET | Refills: 1 | Status: SHIPPED | OUTPATIENT
Start: 2023-02-23

## 2023-02-23 RX ORDER — TIZANIDINE 4 MG/1
TABLET ORAL
Qty: 60 TABLET | Refills: 0 | OUTPATIENT
Start: 2023-02-23

## 2023-02-23 RX ORDER — IPRATROPIUM BROMIDE AND ALBUTEROL SULFATE 2.5; .5 MG/3ML; MG/3ML
1 SOLUTION RESPIRATORY (INHALATION) EVERY 6 HOURS PRN
Qty: 360 EACH | Refills: 3 | Status: SHIPPED | OUTPATIENT
Start: 2023-02-23

## 2023-02-23 RX ORDER — NAPROXEN 500 MG/1
500 TABLET ORAL 2 TIMES DAILY WITH MEALS
Qty: 60 TABLET | Refills: 0 | Status: SHIPPED | OUTPATIENT
Start: 2023-02-23

## 2023-02-23 RX ORDER — PANTOPRAZOLE SODIUM 40 MG/1
40 TABLET, DELAYED RELEASE ORAL DAILY
Qty: 90 TABLET | Refills: 1 | Status: SHIPPED | OUTPATIENT
Start: 2023-02-23

## 2023-02-23 RX ORDER — FUROSEMIDE 40 MG/1
40 TABLET ORAL DAILY
Qty: 60 TABLET | Refills: 2 | Status: SHIPPED | OUTPATIENT
Start: 2023-02-23

## 2023-02-23 RX ORDER — FAMOTIDINE 20 MG/1
20 TABLET, FILM COATED ORAL 2 TIMES DAILY
Qty: 60 TABLET | Refills: 0 | Status: SHIPPED | OUTPATIENT
Start: 2023-02-23

## 2023-02-23 RX ORDER — ATORVASTATIN CALCIUM 10 MG/1
10 TABLET, FILM COATED ORAL DAILY
Qty: 90 TABLET | Refills: 1 | Status: SHIPPED | OUTPATIENT
Start: 2023-02-23

## 2023-02-23 RX ORDER — BLOOD SUGAR DIAGNOSTIC
STRIP MISCELLANEOUS
Qty: 100 EACH | Refills: 3 | OUTPATIENT
Start: 2023-02-23

## 2023-02-23 RX ORDER — ONDANSETRON 4 MG/1
4 TABLET, FILM COATED ORAL EVERY 8 HOURS PRN
Qty: 30 TABLET | Refills: 0 | Status: SHIPPED | OUTPATIENT
Start: 2023-02-23

## 2023-02-23 RX ORDER — TENOFOVIR DISOPROXIL FUMARATE 300 MG/1
300 TABLET, FILM COATED ORAL DAILY
Qty: 30 TABLET | Refills: 0 | Status: SHIPPED | OUTPATIENT
Start: 2023-02-23

## 2023-02-23 NOTE — TELEPHONE ENCOUNTER
Patient's last appointment was : 02/07/2023  Patient's next appointment is :  03/20/2023  Last refilled: Multiple dates  Yes Pharmacy Verified    Lab Results   Component Value Date    LABA1C 8.2 (H) 09/07/2022     Lab Results   Component Value Date    CHOL 139 05/17/2022    TRIG 149 05/17/2022    HDL 28 05/17/2022    LDLCALC 81 05/17/2022     Lab Results   Component Value Date     (L) 01/31/2023    K 3.4 (L) 01/31/2023    CL 95 (L) 01/31/2023    CO2 27 01/31/2023    BUN 12 01/31/2023    CREATININE 1.0 01/31/2023    GLUCOSE 96 01/31/2023    CALCIUM 9.5 01/31/2023    PROT 7.4 01/31/2023    LABALBU 4.2 01/31/2023    BILITOT 1.0 01/31/2023    ALKPHOS 101 01/31/2023    AST 18 01/31/2023    ALT 10 (L) 01/31/2023    LABGLOM >60 01/31/2023    GFRAA >60 02/26/2019    GLOB NOT REPORTED 04/05/2018     Lab Results   Component Value Date    TSH 1.78 10/11/2017    FT3 3.37 10/11/2017    T4FREE 1.14 05/07/2013     Lab Results   Component Value Date    WBC 7.6 01/31/2023    HGB 14.3 01/31/2023    HCT 40.8 (L) 01/31/2023    MCV 84.1 01/31/2023     01/31/2023

## 2023-02-23 NOTE — TELEPHONE ENCOUNTER
Patient's last appointment was : 02/07/2023  Patient's next appointment is :  03/20/2023  Last refilled: 01/31/2023  Yes Pharmacy Verified    Lab Results   Component Value Date    LABA1C 8.2 (H) 09/07/2022     Lab Results   Component Value Date    CHOL 139 05/17/2022    TRIG 149 05/17/2022    HDL 28 05/17/2022    LDLCALC 81 05/17/2022     Lab Results   Component Value Date     (L) 01/31/2023    K 3.4 (L) 01/31/2023    CL 95 (L) 01/31/2023    CO2 27 01/31/2023    BUN 12 01/31/2023    CREATININE 1.0 01/31/2023    GLUCOSE 96 01/31/2023    CALCIUM 9.5 01/31/2023    PROT 7.4 01/31/2023    LABALBU 4.2 01/31/2023    BILITOT 1.0 01/31/2023    ALKPHOS 101 01/31/2023    AST 18 01/31/2023    ALT 10 (L) 01/31/2023    LABGLOM >60 01/31/2023    GFRAA >60 02/26/2019    GLOB NOT REPORTED 04/05/2018     Lab Results   Component Value Date    TSH 1.78 10/11/2017    FT3 3.37 10/11/2017    T4FREE 1.14 05/07/2013     Lab Results   Component Value Date    WBC 7.6 01/31/2023    HGB 14.3 01/31/2023    HCT 40.8 (L) 01/31/2023    MCV 84.1 01/31/2023     01/31/2023

## 2023-02-23 NOTE — TELEPHONE ENCOUNTER
Needs appointment at this time for refills of tizanidine.     Electronically signed by Stefan Anderson MD on 2/23/2023 at 11:34 AM

## 2023-02-23 NOTE — TELEPHONE ENCOUNTER
Chart reviewd.  Meds refilled.  Needs appointment soon for touch base.    Electronically signed by Jeff Gutierrez MD on 2/23/2023 at 11:32 AM

## 2023-03-02 PROBLEM — N39.0 URINARY TRACT INFECTION: Status: RESOLVED | Noted: 2023-01-31 | Resolved: 2023-03-02

## 2023-03-20 ENCOUNTER — OFFICE VISIT (OUTPATIENT)
Dept: FAMILY MEDICINE CLINIC | Age: 48
End: 2023-03-20

## 2023-03-20 VITALS
HEART RATE: 73 BPM | WEIGHT: 221.2 LBS | RESPIRATION RATE: 18 BRPM | OXYGEN SATURATION: 98 % | HEIGHT: 73 IN | DIASTOLIC BLOOD PRESSURE: 66 MMHG | BODY MASS INDEX: 29.31 KG/M2 | SYSTOLIC BLOOD PRESSURE: 164 MMHG | TEMPERATURE: 97.3 F

## 2023-03-20 DIAGNOSIS — J44.1 COPD EXACERBATION (HCC): ICD-10-CM

## 2023-03-20 DIAGNOSIS — I50.32 CHRONIC DIASTOLIC HEART FAILURE (HCC): ICD-10-CM

## 2023-03-20 DIAGNOSIS — F11.20 LONG-TERM CURRENT USE OF METHADONE FOR OPIATE DEPENDENCE (HCC): ICD-10-CM

## 2023-03-20 DIAGNOSIS — E11.65 TYPE 2 DIABETES MELLITUS WITH HYPERGLYCEMIA, WITH LONG-TERM CURRENT USE OF INSULIN (HCC): Primary | ICD-10-CM

## 2023-03-20 DIAGNOSIS — F19.90 ACTIVE INTRAVENOUS DRUG USE: ICD-10-CM

## 2023-03-20 DIAGNOSIS — F31.32 BIPOLAR AFFECTIVE DISORDER, CURRENTLY DEPRESSED, MODERATE (HCC): ICD-10-CM

## 2023-03-20 DIAGNOSIS — Z79.4 TYPE 2 DIABETES MELLITUS WITH HYPERGLYCEMIA, WITH LONG-TERM CURRENT USE OF INSULIN (HCC): Primary | ICD-10-CM

## 2023-03-20 DIAGNOSIS — L03.90 CELLULITIS, UNSPECIFIED CELLULITIS SITE: ICD-10-CM

## 2023-03-20 LAB — HBA1C MFR BLD: 5.7 % (ref 4.3–5.7)

## 2023-03-20 RX ORDER — LISINOPRIL 5 MG/1
5 TABLET ORAL DAILY
Qty: 90 TABLET | Refills: 1 | Status: SHIPPED | OUTPATIENT
Start: 2023-03-20

## 2023-03-20 RX ORDER — LAMOTRIGINE 100 MG/1
50 TABLET ORAL 2 TIMES DAILY
Qty: 30 TABLET | Refills: 0 | Status: SHIPPED | OUTPATIENT
Start: 2023-03-20

## 2023-03-20 RX ORDER — CEPHALEXIN 500 MG/1
500 CAPSULE ORAL 2 TIMES DAILY
Qty: 20 CAPSULE | Refills: 0 | Status: SHIPPED | OUTPATIENT
Start: 2023-03-20 | End: 2023-03-30

## 2023-03-20 RX ORDER — BLOOD SUGAR DIAGNOSTIC
STRIP MISCELLANEOUS
Qty: 100 EACH | Refills: 3 | Status: SHIPPED | OUTPATIENT
Start: 2023-03-20

## 2023-03-20 RX ORDER — FLUTICASONE PROPIONATE 220 UG/1
2 AEROSOL, METERED RESPIRATORY (INHALATION) 2 TIMES DAILY
Qty: 1 EACH | Refills: 3 | Status: SHIPPED | OUTPATIENT
Start: 2023-03-20 | End: 2024-03-19

## 2023-03-20 NOTE — PROGRESS NOTES
Wesley Trejo (:  1975) is a 53 y.o. male,Established patient, here for evaluation of the following chief complaint(s):  Diabetes (Follow up) and Medication Refill (Patient states the pharmacy has him checking sugars once a day. Patient believes he should be checking them twice daily. Patient states that he has been out of strips for a while.)         ASSESSMENT/PLAN:  1. Type 2 diabetes mellitus with hyperglycemia, with long-term current use of insulin ([facility])  -     ONETOUCH ULTRA strip; Check blood sugars once daily as needed. , Disp-100 each, R-3, DAWNormal  -     lisinopril (PRINIVIL;ZESTRIL) 5 MG tablet; Take 1 tablet by mouth daily, Disp-90 tablet, R-1Normal  2. Bipolar affective disorder, currently depressed, moderate ([facility])  -     lamoTRIgine (LAMICTAL) 100 MG tablet; Take 0.5 tablets by mouth 2 times daily, Disp-30 tablet, R-0Normal  3. Long-term current use of methadone for opiate dependence ([facility] [address]  4. Active intravenous drug use  -     [facility]  5. COPD exacerbation ([facility])  -     fluticasone (FLOVENT HFA) 220 MCG/ACT inhaler; Inhale 2 puffs into the lungs 2 times daily, Disp-1 each, R-3Normal  -     Full PFT Study With Bronchodilator; Future  6. Chronic diastolic heart failure (HCC)  -     ECHO 2D WO Color Doppler Complete; Future  7. Cellulitis, unspecified cellulitis site  -     cephALEXin (KEFLEX) 500 MG capsule; Take 1 capsule by mouth 2 times daily for 10 days, Disp-20 capsule, R-0Normal    Recent use of IV drugs (fentanyl likely laced with other) noted skin popping rashes (see media) with RLE cellulitis and non healing ulcers x  1 month  Will refer to wound care, may need debridement surgically to remove. Start keflex at this time, may need MRSA coverage if not improved. Follow up ECHO for CHF, follow up PFT for COPD  Uptitrate lamictal to 50 BID from 25 bid.   Noted elevated BP, previously controlled in office, Pt to monitor likely 2/2 anxiety/mood disorder at
heart failure (HCC)  ECHO 2D WO Color Doppler Complete      7. Cellulitis, unspecified cellulitis site  cephALEXin (KEFLEX) 500 MG capsule          Plan:  Please refer to resident note for full plan. A1c improved, continue current diabetic regimen  Order PFTs, continue flovent   Order Echo with new history of recent drug use  Refer to wound care   Increase Lamictal dose    Begin keflex for cellulitis        Return in about 1 month (around 4/20/2023), or if symptoms worsen or fail to improve, for f/u Wounds, BPD1, cellulitis. Nilson Mealing Health Maintenance Due   Topic Date Due    Hepatitis A vaccine (2 of 3 - Hep A Twinrix risk 3-dose series) 07/10/2013    Hepatitis B vaccine (2 of 3 - Hep B Twinrix risk 3-dose series) 07/10/2013    Diabetic retinal exam  06/04/2019    Colorectal Cancer Screen  Never done    COVID-19 Vaccine (3 - Booster for Pfizer series) 08/26/2021    Diabetic Alb to Cr ratio (uACR) test  07/01/2022    Flu vaccine (1) 08/01/2022     I have discussed the case, including pertinent history and exam findings with the resident and attending physician. I agree with the documented assessment and plan as documented by the resident.       Kedar Anderson DO 3/20/2023 9:45 AM
3/22/2023 7:14 AM

## 2023-03-24 ENCOUNTER — HOSPITAL ENCOUNTER (OUTPATIENT)
Dept: WOUND CARE | Age: 48
Discharge: HOME | End: 2023-03-24
Payer: COMMERCIAL

## 2023-03-24 VITALS
RESPIRATION RATE: 16 BRPM | TEMPERATURE: 97.3 F | WEIGHT: 220 LBS | OXYGEN SATURATION: 99 % | SYSTOLIC BLOOD PRESSURE: 139 MMHG | HEART RATE: 74 BPM | DIASTOLIC BLOOD PRESSURE: 83 MMHG | HEIGHT: 73 IN | BODY MASS INDEX: 29.16 KG/M2

## 2023-03-24 DIAGNOSIS — F19.10 TRACK MARKS DUE TO INTRAVENOUS DRUG ABUSE (HCC): ICD-10-CM

## 2023-03-24 DIAGNOSIS — T14.8XXA NONHEALING NONSURGICAL WOUND LIMITED TO BREAKDOWN OF SKIN: ICD-10-CM

## 2023-03-24 DIAGNOSIS — F19.10 IV DRUG ABUSE (HCC): Primary | ICD-10-CM

## 2023-03-24 PROCEDURE — 99213 OFFICE O/P EST LOW 20 MIN: CPT

## 2023-03-24 PROCEDURE — 99203 OFFICE O/P NEW LOW 30 MIN: CPT | Performed by: NURSE PRACTITIONER

## 2023-03-24 RX ORDER — BACITRACIN ZINC AND POLYMYXIN B SULFATE 500; 1000 [USP'U]/G; [USP'U]/G
OINTMENT TOPICAL ONCE
OUTPATIENT
Start: 2023-03-24 | End: 2023-03-24

## 2023-03-24 RX ORDER — LIDOCAINE HYDROCHLORIDE 40 MG/ML
SOLUTION TOPICAL ONCE
Status: CANCELLED | OUTPATIENT
Start: 2023-03-24 | End: 2023-03-24

## 2023-03-24 RX ORDER — GINSENG 100 MG
CAPSULE ORAL ONCE
OUTPATIENT
Start: 2023-03-24 | End: 2023-03-24

## 2023-03-24 RX ORDER — LIDOCAINE HYDROCHLORIDE 20 MG/ML
JELLY TOPICAL ONCE
OUTPATIENT
Start: 2023-03-24 | End: 2023-03-24

## 2023-03-24 RX ORDER — LIDOCAINE 40 MG/G
CREAM TOPICAL ONCE
Status: CANCELLED | OUTPATIENT
Start: 2023-03-24 | End: 2023-03-24

## 2023-03-24 RX ORDER — CLOBETASOL PROPIONATE 0.5 MG/G
OINTMENT TOPICAL ONCE
OUTPATIENT
Start: 2023-03-24 | End: 2023-03-24

## 2023-03-24 RX ORDER — BACITRACIN, NEOMYCIN, POLYMYXIN B 400; 3.5; 5 [USP'U]/G; MG/G; [USP'U]/G
OINTMENT TOPICAL ONCE
Status: CANCELLED | OUTPATIENT
Start: 2023-03-24 | End: 2023-03-24

## 2023-03-24 RX ORDER — LIDOCAINE 50 MG/G
OINTMENT TOPICAL ONCE
Status: CANCELLED | OUTPATIENT
Start: 2023-03-24 | End: 2023-03-24

## 2023-03-24 RX ORDER — BETAMETHASONE DIPROPIONATE 0.05 %
OINTMENT (GRAM) TOPICAL ONCE
OUTPATIENT
Start: 2023-03-24 | End: 2023-03-24

## 2023-03-24 RX ORDER — GINSENG 100 MG
CAPSULE ORAL ONCE
Status: CANCELLED | OUTPATIENT
Start: 2023-03-24 | End: 2023-03-24

## 2023-03-24 RX ORDER — BACITRACIN, NEOMYCIN, POLYMYXIN B 400; 3.5; 5 [USP'U]/G; MG/G; [USP'U]/G
OINTMENT TOPICAL ONCE
OUTPATIENT
Start: 2023-03-24 | End: 2023-03-24

## 2023-03-24 RX ORDER — GENTAMICIN SULFATE 1 MG/G
OINTMENT TOPICAL ONCE
Status: CANCELLED | OUTPATIENT
Start: 2023-03-24 | End: 2023-03-24

## 2023-03-24 RX ORDER — LIDOCAINE HYDROCHLORIDE 40 MG/ML
SOLUTION TOPICAL ONCE
OUTPATIENT
Start: 2023-03-24 | End: 2023-03-24

## 2023-03-24 RX ORDER — CLOBETASOL PROPIONATE 0.5 MG/G
OINTMENT TOPICAL ONCE
Status: CANCELLED | OUTPATIENT
Start: 2023-03-24 | End: 2023-03-24

## 2023-03-24 RX ORDER — LIDOCAINE 40 MG/G
CREAM TOPICAL ONCE
OUTPATIENT
Start: 2023-03-24 | End: 2023-03-24

## 2023-03-24 RX ORDER — LIDOCAINE HYDROCHLORIDE 20 MG/ML
JELLY TOPICAL ONCE
Status: CANCELLED | OUTPATIENT
Start: 2023-03-24 | End: 2023-03-24

## 2023-03-24 RX ORDER — GENTAMICIN SULFATE 1 MG/G
OINTMENT TOPICAL ONCE
OUTPATIENT
Start: 2023-03-24 | End: 2023-03-24

## 2023-03-24 RX ORDER — BETAMETHASONE DIPROPIONATE 0.05 %
OINTMENT (GRAM) TOPICAL ONCE
Status: CANCELLED | OUTPATIENT
Start: 2023-03-24 | End: 2023-03-24

## 2023-03-24 RX ORDER — LIDOCAINE 50 MG/G
OINTMENT TOPICAL ONCE
OUTPATIENT
Start: 2023-03-24 | End: 2023-03-24

## 2023-03-24 RX ORDER — BACITRACIN ZINC AND POLYMYXIN B SULFATE 500; 1000 [USP'U]/G; [USP'U]/G
OINTMENT TOPICAL ONCE
Status: CANCELLED | OUTPATIENT
Start: 2023-03-24 | End: 2023-03-24

## 2023-03-24 ASSESSMENT — PAIN DESCRIPTION - ORIENTATION: ORIENTATION: RIGHT;LEFT

## 2023-03-24 ASSESSMENT — PAIN DESCRIPTION - LOCATION: LOCATION: HAND;ARM;LEG

## 2023-03-24 ASSESSMENT — PAIN SCALES - GENERAL: PAINLEVEL_OUTOF10: 7

## 2023-03-24 NOTE — PLAN OF CARE
Problem: Wound:  Goal: Will show signs of wound healing; wound closure and no evidence of infection  Description: Will show signs of wound healing; wound closure and no evidence of infection  Outcome: Progressing   Patient presents to wound clinic for evaluation and treatment of multiple wounds. Instructed patient to take antibiotics as prescribed. Right arm, Left hand, Right leg, Right ankle- Apply betadine to all wounds/scabs daily. Follow up visit:  3 Weeks on Friday April 14th at 9:30 am.    Care plan reviewed with patient. Patient verbalize understanding of the plan of care and contribute to goal setting.

## 2023-03-24 NOTE — PROGRESS NOTES
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 All available lab work, radiology studies, and progress notes pertaining to [facility] reviewed prior to or during patient visit today.    -Nonhealing, nonsurgical wounds, Active IV drug abuse, Track marks- Wounds currently dry eschar. Recommend against debridement at this time as this will lead to larger wounds and potential for infection. Advised patient to start use of Betadine to wounds to help promote healing. (Supplies provided today). Advised him that eschar will lift as wound heals. Recommend avoiding injecting in location of wounds. If patient does continue to inject may use betadine to clean skin prior to injection to prevent further infections/wounds. Patient continues to use \"tranc\" and \"krocodil\" advised him that both of these have been known to have severe skin complications and loss of limb. Ongoing use may lead to skin complications including but not limited to infection, sepsis, loss of limb and death. Patient tearful throughout visit regarding drug use. Emotional support provided. Strongly encouraged him to follow up with methadone clinic as scheduled for ongoing management. He verbalized understanding. Application process discussed and demonstrated during visit today, written instructions provided. Patient verbalized comfort with process. All questions and concerns addressed prior to completion of visit.      -No indications of infection noted at today's visit. Encouraged him to complete entire course of Keflex as ordered. Education provided on signs and symptoms of infection.   Call clinic or seek emergency care

## 2023-03-24 NOTE — DISCHARGE INSTRUCTIONS
Visit Discharge/Physician Orders:  - Take antibiotics as prescribed. - Try to cut back/quit smoking to help with wound healing.   - Drug use will delay wound healing.   - Recommend wearing compression socks on legs to decrease swelling.  - Continue to follow up with methadone clinic in [place] to try to quit drug use. Wound Location: Right arm, Left hand, Right leg, Right ankle    Dressing orders: Apply betadine to all wounds/scabs daily. Follow up visit:  3 Weeks on Friday April 14th at 9:30 am    Keep next scheduled appointment. Please give 24 hour notice if unable to keep appointment. [phone]    If you experience any of the following, please call the Wound Care Service during business hours: Monday through Friday 8:00 am - 4:30 pm  ([phone]). *Increase in pain   *Temperature over 101   *Increase in drainage from your wound or a foul odor   *Uncontrolled swelling   *Need for compression bandage changes due to slippage, breakthrough drainage    If you need medical attention outside of business hours, please contact your Primary Care Doctor or go to the nearest emergency room.

## 2023-06-29 ENCOUNTER — TELEPHONE (OUTPATIENT)
Dept: WOUND CARE | Age: 48
End: 2023-06-29

## 2023-10-10 ENCOUNTER — HOSPITAL ENCOUNTER (OUTPATIENT)
Age: 48
Discharge: HOME | End: 2023-10-10

## 2023-10-15 LAB — LEAD, INDUSTRIAL: NORMAL

## 2024-06-06 ENCOUNTER — HOSPITAL ENCOUNTER (EMERGENCY)
Age: 49
Discharge: HOME | End: 2024-06-06
Attending: EMERGENCY MEDICINE
Payer: MEDICAID

## 2024-06-06 ENCOUNTER — APPOINTMENT (OUTPATIENT)
Dept: GENERAL RADIOLOGY | Age: 49
End: 2024-06-06
Payer: MEDICAID

## 2024-06-06 VITALS
RESPIRATION RATE: 17 BRPM | TEMPERATURE: 97.5 F | WEIGHT: 170 LBS | BODY MASS INDEX: 22.53 KG/M2 | HEART RATE: 69 BPM | DIASTOLIC BLOOD PRESSURE: 77 MMHG | HEIGHT: 73 IN | OXYGEN SATURATION: 98 % | SYSTOLIC BLOOD PRESSURE: 116 MMHG

## 2024-06-06 DIAGNOSIS — R07.89 CHEST WALL PAIN: Primary | ICD-10-CM

## 2024-06-06 LAB
ANION GAP SERPL CALC-SCNC: 11 MEQ/L (ref 8–16)
BASOPHILS ABSOLUTE: 0 THOU/MM3 (ref 0–0.1)
BASOPHILS NFR BLD AUTO: 0.8 %
BUN SERPL-MCNC: 10 MG/DL (ref 7–22)
CALCIUM SERPL-MCNC: 9.1 MG/DL (ref 8.5–10.5)
CHLORIDE SERPL-SCNC: 104 MEQ/L (ref 98–111)
CO2 SERPL-SCNC: 26 MEQ/L (ref 23–33)
D DIMER PPP IA.FEU-MCNC: 340 NG/ML FEU (ref 0–500)
DEPRECATED RDW RBC AUTO: 42.3 FL (ref 35–45)
EKG P AXIS: 61 DEGREES
EKG P-R INTERVAL: 154 MS
EKG QRS DURATION: 80 MS
EKG QTC CALCULATION (BAZETT): 446 MS
EKG R AXIS: 68 DEGREES
EKG T AXIS: 42 DEGREES
EKG VENTRICULAR RATE: 66 BPM
EOSINOPHIL NFR BLD AUTO: 1.7 %
EOSINOPHILS ABSOLUTE: 0.1 THOU/MM3 (ref 0–0.4)
ERYTHROCYTE [DISTWIDTH] IN BLOOD BY AUTOMATED COUNT: 13.6 % (ref 11.5–14.5)
GFR SERPL CREATININE-BSD FRML MDRD: > 90 ML/MIN/1.73M2
HCT VFR BLD AUTO: 42.7 % (ref 42–52)
IMM GRANULOCYTES # BLD AUTO: 0.02 THOU/MM3 (ref 0–0.07)
IMM GRANULOCYTES NFR BLD AUTO: 0.4 %
LYMPHOCYTES ABSOLUTE: 1.1 THOU/MM3 (ref 1–4.8)
LYMPHOCYTES NFR BLD AUTO: 21.1 %
MCH RBC QN AUTO: 29.1 PG (ref 26–33)
MCHC RBC AUTO-ENTMCNC: 33.7 GM/DL (ref 32.2–35.5)
MCV RBC AUTO: 86.4 FL (ref 80–94)
MONOCYTES ABSOLUTE: 0.4 THOU/MM3 (ref 0.4–1.3)
MONOCYTES NFR BLD AUTO: 7.4 %
NEUTROPHILS ABSOLUTE: 3.6 THOU/MM3 (ref 1.8–7.7)
NRBC BLD AUTO-RTO: 0 /100 WBC
OSMOLALITY SERPL CALC.SUM OF ELEC: 279.3 MOSMOL/KG (ref 275–300)
PLATELET # BLD AUTO: 139 THOU/MM3 (ref 130–400)
POTASSIUM SERPL-SCNC: 4 MEQ/L (ref 3.5–5.2)
PROCALCITONIN SERPL IA-MCNC: 0.05 NG/ML (ref 0.01–0.09)
RBC # BLD AUTO: 4.94 MILL/MM3 (ref 4.7–6.1)
SODIUM SERPL-SCNC: 141 MEQ/L (ref 135–145)
TROPONIN, HIGH SENSITIVITY: 7 NG/L (ref 0–12)
WBC # BLD AUTO: 5.2 THOU/MM3 (ref 4.8–10.8)

## 2024-06-06 PROCEDURE — 85025 COMPLETE CBC W/AUTO DIFF WBC: CPT

## 2024-06-06 PROCEDURE — 84484 ASSAY OF TROPONIN QUANT: CPT

## 2024-06-06 PROCEDURE — 84145 PROCALCITONIN (PCT): CPT

## 2024-06-06 PROCEDURE — 80048 BASIC METABOLIC PNL TOTAL CA: CPT

## 2024-06-06 PROCEDURE — 71046 X-RAY EXAM CHEST 2 VIEWS: CPT

## 2024-06-06 PROCEDURE — 6360000002 HC RX W HCPCS: Performed by: STUDENT IN AN ORGANIZED HEALTH CARE EDUCATION/TRAINING PROGRAM

## 2024-06-06 PROCEDURE — 93010 ELECTROCARDIOGRAM REPORT: CPT | Performed by: NUCLEAR MEDICINE

## 2024-06-06 PROCEDURE — 83880 ASSAY OF NATRIURETIC PEPTIDE: CPT

## 2024-06-06 PROCEDURE — 93005 ELECTROCARDIOGRAM TRACING: CPT | Performed by: EMERGENCY MEDICINE

## 2024-06-06 PROCEDURE — 99285 EMERGENCY DEPT VISIT HI MDM: CPT

## 2024-06-06 PROCEDURE — 96372 THER/PROPH/DIAG INJ SC/IM: CPT

## 2024-06-06 PROCEDURE — 85379 FIBRIN DEGRADATION QUANT: CPT

## 2024-06-06 PROCEDURE — 36415 COLL VENOUS BLD VENIPUNCTURE: CPT

## 2024-06-06 RX ORDER — KETOROLAC TROMETHAMINE 30 MG/ML
30 INJECTION, SOLUTION INTRAMUSCULAR; INTRAVENOUS ONCE
Status: COMPLETED | OUTPATIENT
Start: 2024-06-06 | End: 2024-06-06

## 2024-06-06 RX ADMIN — KETOROLAC TROMETHAMINE 30 MG: 30 INJECTION, SOLUTION INTRAMUSCULAR at 16:50

## 2024-06-06 ASSESSMENT — HEART SCORE: ECG: NORMAL

## 2024-06-06 ASSESSMENT — PAIN SCALES - GENERAL
PAINLEVEL_OUTOF10: 8
PAINLEVEL_OUTOF10: 7

## 2024-06-06 ASSESSMENT — PAIN DESCRIPTION - LOCATION: LOCATION: CHEST

## 2024-06-06 ASSESSMENT — PAIN - FUNCTIONAL ASSESSMENT: PAIN_FUNCTIONAL_ASSESSMENT: 0-10

## 2024-06-06 NOTE — ED TRIAGE NOTES
Pt presents to the ED from home with complaints of chest pain and shortness of breath that started about 2 days ago. Pt states a couple weeks ago he was diagnosed with pneumonia. States he has completed his antibiotics for that. VSS. EKG completed.

## 2024-06-06 NOTE — DISCHARGE INSTRUCTIONS
You are seen today in the emergency department for chest pain and shortness of breath.  Your workup was reassuring.  As you do have pain when touching the left chest, you may have something called costochondritis which is inflammation of your chest wall.  Please take ibuprofen as needed for the pain.  Follow-up with cardiology regarding today's visit.

## 2024-06-06 NOTE — ED NOTES
Medication administered per MAR. Patient resting in bed. Respirations easy and unlabored. No distress noted. Call light within reach.

## 2024-06-06 NOTE — ED PROVIDER NOTES
hours as needed for Nausea or Vomiting    ONETOUCH ULTRA STRIP    Check blood sugars once daily as needed.    PANTOPRAZOLE (PROTONIX) 40 MG TABLET    Take 1 tablet by mouth daily    POTASSIUM CHLORIDE (KLOR-CON M) 20 MEQ EXTENDED RELEASE TABLET    Take 1 tablet by mouth daily as needed (with lasix)    TENOFOVIR DISOPROXIL FUMARATE (VIREAD) 300 MG TABLET    Take 1 tablet by mouth daily    VEMLIDY 25 MG TABS    Take by mouth daily       ALLERGIES     is allergic to adhesive tape, bactrim [sulfamethoxazole-trimethoprim], ciprofloxacin, clonazepam, cyclobenzaprine, morphine, other, and quetiapine.    FAMILY HISTORY     He indicated that his mother is . He indicated that his father is . He indicated that the status of his sister is unknown. He indicated that the status of his brother is unknown. He indicated that the status of his maternal grandmother is unknown. He indicated that the status of his maternal aunt is unknown. He indicated that the status of his maternal uncle is unknown. He indicated that the status of his paternal aunt is unknown. He indicated that the status of his paternal uncle is unknown.       SOCIAL HISTORY       Social History     Tobacco Use    Smoking status: Every Day     Current packs/day: 0.00     Average packs/day: 1 pack/day for 32.0 years (32.0 ttl pk-yrs)     Types: Cigarettes     Start date: 1988     Last attempt to quit: 2019     Years since quittin.1    Smokeless tobacco: Never   Vaping Use    Vaping Use: Never used   Substance Use Topics    Alcohol use: No    Drug use: Yes     Frequency: 7.0 times per week     Types: IV, Marijuana (Weed)     Comment: 3/31/23 - Fentanyl last use       PHYSICAL EXAM       ED Triage Vitals   BP Temp Temp Source Pulse Respirations SpO2 Height Weight - Scale   24 1455 24 1455 24 1454 24 1455 24 1455 24 1455 24 1455 24 1455   119/78 97.5 °F (36.4 °C) Oral 75 18 97 % 1.854 m (6' 1\")

## 2025-05-26 NOTE — ED PROVIDER NOTES
[facility] ENCOUNTER          Pt Name: Ledy Willis  MRN: [id number]  [place] 1975  Date of evaluation: 11/6/2020  Treating Resident Physician: Clayton Lerner MD  Supervising Physician: Sandeep Velásquez MD  CHIEF COMPLAINT       Chief Complaint   Patient presents with    Foreign Body in Eye     believes plastic in right eye     History obtained from the patient. HISTORY OF PRESENT ILLNESS    HPI  Ledy Willis is a 41 y.o. male who presents to the emergency department for evaluation of right eye pain. Patient had been hammering a piece of plastic with a plastic nail. Patient states that he is unsure if the plastic or the nail broke and flew towards his right eye. He states that the eye has been hurting since and occurred approximately 2 hours prior to arrival.  Patient states he is post to wear glasses but does not. Patient denies any vision changes. Patient states last tetanus shot was less than 10 years prior. Patient denies any previous injuries to right eye. The patient has no other acute complaints at this time. REVIEW OF SYSTEMS   Review of Systems   Constitutional: Negative for fatigue and fever. HENT: Negative for congestion, ear pain, rhinorrhea and sore throat. Eyes: Positive for pain. Negative for discharge. Respiratory: Negative for cough, shortness of breath and wheezing. Cardiovascular: Negative for chest pain, palpitations and leg swelling. Gastrointestinal: Negative for abdominal distention, abdominal pain, diarrhea, nausea and vomiting. Genitourinary: Negative for difficulty urinating, flank pain and frequency. Musculoskeletal: Negative for arthralgias. Neurological: Negative for dizziness, tremors, syncope, weakness and numbness.          PAST MEDICAL AND SURGICAL HISTORY     Past Medical History:   Diagnosis Date    Anxiety     Bipolar 1 disorder ([facility]     Chronic diastolic congestive [facility] failure [facility] 3/16/2018    patient denies    Constipation     Gastroesophageal reflux disease 3/16/2018    Hard of hearing     left ear, no hearing aid    Hep C w/o coma, chronic (HCC)     Hepatitis B     Kidney stones     hx of    Post traumatic stress disorder (PTSD)     Substance abuse ([facility]     \"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\"    Type 2 diabetes mellitus without complication, with long-term current use of insulin ([facility] 8/16/2017    Wears glasses     for reading     Past Surgical History:   Procedure Laterality Date    ANKLE SURGERY Left     KIDNEY STONE SURGERY      x 4         MEDICATIONS   No current facility-administered medications for this encounter.      Current Outpatient Medications:     tobramycin (TOBREX) 0.3 % ophthalmic solution, Place 1 drop into the right eye every 4 hours for 10 days, Disp: 1 Bottle, Rfl: 0    ONETOUCH ULTRA strip, USE 1 STRIP TO CHECK GLUCOSE THREE TIMES DAILY, Disp: 100 each, Rfl: 0    Multiple Vitamin (MULTIVITAMIN PO), Take by mouth daily, Disp: , Rfl:     gabapentin (NEURONTIN) 800 MG tablet, TAKE 1 TABLET BY MOUTH THREE TIMES DAILY, Disp: 270 tablet, Rfl: 3    atorvastatin (LIPITOR) 40 MG tablet, TAKE 1 TABLET BY MOUTH ONCE DAILY, Disp: 90 tablet, Rfl: 2    furosemide (LASIX) 40 MG tablet, Take 1 tablet by mouth daily, Disp: 30 tablet, Rfl: 2    ipratropium-albuterol (DUONEB) 0.5-2.5 (3) MG/3ML SOLN nebulizer solution, Take 3 mLs by nebulization every 6 hours, Disp: 360 mL, Rfl: 3    lisinopril (PRINIVIL;ZESTRIL) 5 MG tablet, Take 1 tablet by mouth once daily, Disp: 90 tablet, Rfl: 3    loratadine (CLARITIN) 10 MG tablet, Take 1 tablet by mouth nightly, Disp: 90 tablet, Rfl: 3    pantoprazole (PROTONIX) 40 MG tablet, Take 1 tablet by mouth once daily, Disp: 90 tablet, Rfl: 3    tiotropium (SPIRIVA HANDIHALER) 18 MCG inhalation capsule, Inhale 1 puff by mouth once daily, Disp: 90 capsule, Rfl: 3    ondansetron (ZOFRAN ODT) 4 MG disintegrating tablet, Take 1 tablet by mouth every 8 hours as needed for Nausea, Disp: 20 tablet, Rfl: 0    polyethylene glycol (GLYCOLAX) 17 GM/SCOOP powder, Take 17 g by mouth as needed, Disp: , Rfl:     tenofovir disoproxil fumarate (VIREAD) 300 MG tablet, Take 300 mg by mouth daily, Disp: , Rfl:     Aimsco Ultra Thin Lancets MISC, 1 applicator by Does not apply route 2 times daily, Disp: 100 each, Rfl: 3    lidocaine (LIDODERM) 5 %, Place 1 patch onto the skin daily 12 hours on, 12 hours off., Disp: 15 patch, Rfl: 0    sucralfate (CARAFATE) 1 GM/10ML suspension, Take 10 mLs by mouth 4 times daily (Patient taking differently: Take 1 g by mouth 4 times daily as needed ), Disp: 200 mL, Rfl: 0    albuterol sulfate HFA (PROVENTIL HFA) 108 (90 Base) MCG/ACT inhaler, Inhale 2 puffs into the lungs every 4 hours as needed for Wheezing or Shortness of Breath (Space out to every 6 hours as symptoms improve) Space out to every 6 hours as symptoms improve., Disp: 1 Inhaler, Rfl: 3    sodium chloride (ALTAMIST SPRAY) 0.65 % nasal spray, 1 spray by Nasal route as needed for Congestion, Disp: 1 Bottle, Rfl: 3    METHADONE HCL PO, Take 106 mg by mouth daily , Disp: , Rfl:     escitalopram (LEXAPRO) 10 MG tablet, Take 1 tablet by mouth daily, Disp: 30 tablet, Rfl: 1      SOCIAL HISTORY     Social History     Social History Narrative    Not on file     Social History     Tobacco Use    Smoking status: Current Every Day Smoker     Packs/day: 0.25     Years: 30.00     Pack years: 7.50     Types: Cigarettes    Smokeless tobacco: Never Used    Tobacco comment: 6 per day    Substance Use Topics    Alcohol use: No    Drug use: No     Comment: stopped heroin in 2013         ALLERGIES     Allergies   Allergen Reactions    Adhesive Tape      Other reaction(s): Hives    Flexeril [Cyclobenzaprine] Hives     Pt unsure    Morphine Hives    Quetiapine Other (See Comments)     restless         FAMILY HISTORY     Family History   Problem Relation Age of Onset    Substance Abuse Mother     Depression Mother     Heart Disease Mother     Substance Abuse Father     Heart Disease Father     Depression Sister     Substance Abuse Brother     Substance Abuse Maternal Aunt     Substance Abuse Maternal Uncle     Substance Abuse Paternal Aunt     Substance Abuse Paternal Uncle     Substance Abuse Maternal Grandmother     Depression Maternal Grandmother          PREVIOUS RECORDS   Previous records reviewed today. PHYSICAL EXAM     ED Triage Vitals [11/06/20 1202]   BP Temp Temp Source Pulse Resp SpO2 Height Weight   130/78 98.3 °F (36.8 °C) Oral 86 16 97 % -- --     Initial vital signs and nursing assessment reviewed and normal. Pulsoximetry is normal per my interpretation. Additional Vital Signs:  Vitals:    11/06/20 1202   BP: 130/78   Pulse: 86   Resp: 16   Temp: 98.3 °F (36.8 °C)   SpO2: 97%       Physical Exam  Constitutional:       Appearance: Normal appearance. HENT:      Head: Normocephalic. Right Ear: External ear normal.      Left Ear: External ear normal.      Nose: Nose normal.      Mouth/Throat:      Mouth: Mucous membranes are moist.      Pharynx: Oropharynx is clear. Eyes:      Conjunctiva/sclera: Conjunctivae normal.      Pupils: Pupils are equal, round, and reactive to light. Comments: Noted midline small corneal abrasion using wood lamp and vegetable dye. Post application flush right eye thoroughly. Neck:      Musculoskeletal: Normal range of motion and neck supple. Cardiovascular:      Rate and Rhythm: Normal rate and regular rhythm. Pulses: Normal pulses. Heart sounds: Normal heart sounds. Pulmonary:      Effort: Pulmonary effort is normal.      Breath sounds: Normal breath sounds. Abdominal:      General: Bowel sounds are normal.      Palpations: Abdomen is soft. Musculoskeletal: Normal range of motion. Skin:     General: Skin is warm and dry.       Capillary Refill: Capillary refill takes less than 2 seconds. Neurological:      General: No focal deficit present. Mental Status: He is alert. MEDICAL DECISION MAKING   Initial Assessment: Corneal abrasion  Plan: Patient presented to the ER with complaint of back pain. No foreign body with thorough exam.  Patient did have noted corneal abrasion to mid pupil right eye. ED RESULTS   Laboratory results:  Labs Reviewed - No data to display    Radiologic studies results:  No orders to display       ED Medications administered this visit: Medications - No data to display      ED COURSE        Strict return precautions and follow up instructions were discussed with the patient prior to discharge, with which the patient agrees. MEDICATION CHANGES     Discharge Medication List as of 11/6/2020  1:45 PM      START taking these medications    Details   tobramycin (TOBREX) 0.3 % ophthalmic solution Place 1 drop into the right eye every 4 hours for 10 days, Disp-1 Bottle,[id number]               FINAL DISPOSITION     Final diagnoses:   Corneal abrasion, unspecified laterality, initial encounter     Condition: condition: good  Dispo: Discharge to home      This transcription was electronically signed. Parts of this transcriptions may have been dictated by use of voice recognition software and electronically transcribed, and parts may have been transcribed with the assistance of an ED scribe. The transcription may contain errors not detected in proofreading. Please refer to my supervising physician's documentation if my documentation differs.     Electronically Signed: Rashaad Maguire, 11/06/20, 4:23 PM         Rashaad Maguire MD  Resident  11/06/20 [id number] 26-May-2025 11:57

## (undated) DEVICE — CYSTO PACK: Brand: MEDLINE INDUSTRIES, INC.

## (undated) DEVICE — 3M™ STERI-STRIP™ COMPOUND BENZOIN TINCTURE 40 BAGS/CARTON 4 CARTONS/CASE C1544: Brand: 3M™ STERI-STRIP™

## (undated) DEVICE — Device

## (undated) DEVICE — GUIDEWIRE URO L150CM DIA0.035IN STIFF NIT HYDRPHLC STR TIP

## (undated) DEVICE — URETEROSCOPE FLX 7.7 FR DIGITAL LITHOVUE DISP

## (undated) DEVICE — STRIP,CLOSURE,WOUND,MEDI-STRIP,1/2X4: Brand: MEDLINE

## (undated) DEVICE — SOLUTION IRRIG 3000ML 0.9% SOD CHL USP UROMATIC PLAS CONT

## (undated) DEVICE — SOLUTION IV IRRIG WATER 1000ML POUR BRL 2F7114

## (undated) DEVICE — DUAL LUMEN URETERAL CATHETER

## (undated) DEVICE — ADAPTER URO SCP UROLOK LL